# Patient Record
Sex: MALE | Race: WHITE | NOT HISPANIC OR LATINO | URBAN - METROPOLITAN AREA
[De-identification: names, ages, dates, MRNs, and addresses within clinical notes are randomized per-mention and may not be internally consistent; named-entity substitution may affect disease eponyms.]

---

## 2017-01-19 ENCOUNTER — EMERGENCY (EMERGENCY)
Facility: HOSPITAL | Age: 27
LOS: 1 days | Discharge: HOME | End: 2017-01-19
Attending: EMERGENCY MEDICINE | Admitting: EMERGENCY MEDICINE
Payer: SELF-PAY

## 2017-01-19 VITALS
TEMPERATURE: 98 F | RESPIRATION RATE: 18 BRPM | HEART RATE: 112 BPM | OXYGEN SATURATION: 98 % | SYSTOLIC BLOOD PRESSURE: 122 MMHG | DIASTOLIC BLOOD PRESSURE: 84 MMHG

## 2017-01-19 DIAGNOSIS — K04.7 PERIAPICAL ABSCESS WITHOUT SINUS: ICD-10-CM

## 2017-01-19 DIAGNOSIS — K02.9 DENTAL CARIES, UNSPECIFIED: ICD-10-CM

## 2017-01-19 DIAGNOSIS — K08.89 OTHER SPECIFIED DISORDERS OF TEETH AND SUPPORTING STRUCTURES: ICD-10-CM

## 2017-01-19 PROCEDURE — 99283 EMERGENCY DEPT VISIT LOW MDM: CPT

## 2017-01-19 RX ORDER — ACETAMINOPHEN 500 MG
975 TABLET ORAL ONCE
Qty: 0 | Refills: 0 | Status: COMPLETED | OUTPATIENT
Start: 2017-01-19 | End: 2017-01-19

## 2017-01-19 RX ORDER — IBUPROFEN 200 MG
600 TABLET ORAL ONCE
Qty: 0 | Refills: 0 | Status: COMPLETED | OUTPATIENT
Start: 2017-01-19 | End: 2017-01-19

## 2017-01-19 RX ORDER — PENICILLIN V POTASSIUM 250 MG
1 TABLET ORAL
Qty: 40 | Refills: 0 | OUTPATIENT
Start: 2017-01-19 | End: 2017-01-29

## 2017-01-19 RX ADMIN — Medication 600 MILLIGRAM(S): at 10:20

## 2017-01-19 RX ADMIN — Medication 975 MILLIGRAM(S): at 10:20

## 2017-01-19 NOTE — ED ADULT NURSE NOTE - OBJECTIVE STATEMENT
26y m pt c/o pain th left upper mouth; pt has no insurance; no dentist; no doctor; aox3; no resp distress; no chest pain; no fever/chills; no blood from gums; pt able to eat and swallow

## 2017-01-19 NOTE — ED PROVIDER NOTE - PHYSICAL EXAMINATION
post OP wnl, no trismus, no facial swelling, neck supple, +significant caries of tooth #19 and 18, no buccal swelling, no abscess at gumline

## 2017-01-19 NOTE — ED PROVIDER NOTE - PLAN OF CARE
You were seen in the ED for tooth pain and found to have a tooth infection.     Please take the antibiotic as prescribed, take the full course.     You may use Tylenol 650mg every 8 hours or Motrin 600mg every 8 hours as needed for pain.     Please call  to arrange a follow up with one of our dentists in 2-3 days. Return for worsening pain, difficulty swallowing or for any other concerns

## 2017-01-19 NOTE — ED PROVIDER NOTE - OBJECTIVE STATEMENT
26M presenting with left lower tooth pain x 1 day. No injury, no swelling of the face. Denies fevers or chills. Has not taken anything for the pain yet.

## 2017-01-19 NOTE — ED PROVIDER NOTE - MEDICAL DECISION MAKING DETAILS
Attending MD Gonzáles: 26M with left tooth pain, tooth #18 and 19 with substantial caries, no dental abscess visualized. Plan for PO penicillin VK, dental referral

## 2017-01-19 NOTE — ED PROVIDER NOTE - CARE PLAN
Principal Discharge DX:	Dental infection  Instructions for follow-up, activity and diet:	You were seen in the ED for tooth pain and found to have a tooth infection.     Please take the antibiotic as prescribed, take the full course.     You may use Tylenol 650mg every 8 hours or Motrin 600mg every 8 hours as needed for pain.     Please call  to arrange a follow up with one of our dentists in 2-3 days. Return for worsening pain, difficulty swallowing or for any other concerns

## 2017-04-15 NOTE — ED PROVIDER NOTE - ATTENDING CONTRIBUTION TO CARE
Attending MD Gonzáles:  I personally have seen and examined this patient.  Resident note reviewed and agree on plan of care and except where noted.  See HPI, PE, and MDM for details. Viral syndrome

## 2018-02-16 ENCOUNTER — APPOINTMENT (OUTPATIENT)
Dept: FAMILY MEDICINE | Facility: CLINIC | Age: 28
End: 2018-02-16
Payer: MEDICAID

## 2018-02-16 VITALS
WEIGHT: 220 LBS | OXYGEN SATURATION: 98 % | HEIGHT: 64.5 IN | SYSTOLIC BLOOD PRESSURE: 132 MMHG | HEART RATE: 106 BPM | BODY MASS INDEX: 37.1 KG/M2 | DIASTOLIC BLOOD PRESSURE: 88 MMHG

## 2018-02-16 VITALS — TEMPERATURE: 98.2 F | RESPIRATION RATE: 16 BRPM

## 2018-02-16 DIAGNOSIS — Z78.9 OTHER SPECIFIED HEALTH STATUS: ICD-10-CM

## 2018-02-16 DIAGNOSIS — G47.00 INSOMNIA, UNSPECIFIED: ICD-10-CM

## 2018-02-16 DIAGNOSIS — F17.200 NICOTINE DEPENDENCE, UNSPECIFIED, UNCOMPLICATED: ICD-10-CM

## 2018-02-16 PROCEDURE — 99205 OFFICE O/P NEW HI 60 MIN: CPT

## 2018-02-17 ENCOUNTER — EMERGENCY (EMERGENCY)
Facility: HOSPITAL | Age: 28
LOS: 1 days | Discharge: HOME | End: 2018-02-17
Admitting: EMERGENCY MEDICINE
Payer: MEDICAID

## 2018-02-17 PROCEDURE — 87086 URINE CULTURE/COLONY COUNT: CPT

## 2018-02-17 PROCEDURE — 80053 COMPREHEN METABOLIC PANEL: CPT

## 2018-02-17 PROCEDURE — 36415 COLL VENOUS BLD VENIPUNCTURE: CPT

## 2018-02-17 PROCEDURE — 99284 EMERGENCY DEPT VISIT MOD MDM: CPT

## 2018-02-17 PROCEDURE — 85027 COMPLETE CBC AUTOMATED: CPT

## 2018-02-17 PROCEDURE — 81003 URINALYSIS AUTO W/O SCOPE: CPT

## 2018-02-17 PROCEDURE — 99283 EMERGENCY DEPT VISIT LOW MDM: CPT | Mod: 25

## 2018-02-20 LAB
ALBUMIN SERPL ELPH-MCNC: 4.7 G/DL
ALP BLD-CCNC: 125 U/L
ALT SERPL-CCNC: 28 U/L
ANION GAP SERPL CALC-SCNC: 14 MMOL/L
AST SERPL-CCNC: 25 U/L
BASOPHILS # BLD AUTO: 0.02 K/UL
BASOPHILS NFR BLD AUTO: 0.2 %
BILIRUB SERPL-MCNC: 0.3 MG/DL
BUN SERPL-MCNC: 9 MG/DL
CALCIUM SERPL-MCNC: 10.7 MG/DL
CHLORIDE SERPL-SCNC: 101 MMOL/L
CHOLEST SERPL-MCNC: 209 MG/DL
CHOLEST/HDLC SERPL: 4.6 RATIO
CO2 SERPL-SCNC: 27 MMOL/L
CREAT SERPL-MCNC: 1.05 MG/DL
EOSINOPHIL # BLD AUTO: 0.04 K/UL
EOSINOPHIL NFR BLD AUTO: 0.5 %
GLUCOSE SERPL-MCNC: 91 MG/DL
HBA1C MFR BLD HPLC: 5.6 %
HCT VFR BLD CALC: 45.1 %
HDLC SERPL-MCNC: 45 MG/DL
HGB BLD-MCNC: 14.8 G/DL
IMM GRANULOCYTES NFR BLD AUTO: 0.2 %
LDLC SERPL CALC-MCNC: 135 MG/DL
LYMPHOCYTES # BLD AUTO: 1.28 K/UL
LYMPHOCYTES NFR BLD AUTO: 16 %
MAN DIFF?: NORMAL
MCHC RBC-ENTMCNC: 27.7 PG
MCHC RBC-ENTMCNC: 32.8 GM/DL
MCV RBC AUTO: 84.5 FL
MONOCYTES # BLD AUTO: 0.52 K/UL
MONOCYTES NFR BLD AUTO: 6.5 %
NEUTROPHILS # BLD AUTO: 6.14 K/UL
NEUTROPHILS NFR BLD AUTO: 76.6 %
PLATELET # BLD AUTO: 246 K/UL
POTASSIUM SERPL-SCNC: 4.7 MMOL/L
PROT SERPL-MCNC: 7.7 G/DL
RBC # BLD: 5.34 M/UL
RBC # FLD: 12.6 %
SODIUM SERPL-SCNC: 142 MMOL/L
T4 FREE SERPL-MCNC: 1.2 NG/DL
TRIGL SERPL-MCNC: 146 MG/DL
TSH SERPL-ACNC: 1.2 UIU/ML
WBC # FLD AUTO: 8.02 K/UL

## 2018-02-22 ENCOUNTER — TRANSCRIPTION ENCOUNTER (OUTPATIENT)
Age: 28
End: 2018-02-22

## 2018-02-22 ENCOUNTER — APPOINTMENT (OUTPATIENT)
Dept: FAMILY MEDICINE | Facility: CLINIC | Age: 28
End: 2018-02-22

## 2018-03-15 ENCOUNTER — APPOINTMENT (OUTPATIENT)
Dept: FAMILY MEDICINE | Facility: CLINIC | Age: 28
End: 2018-03-15
Payer: MEDICAID

## 2018-03-15 VITALS — RESPIRATION RATE: 14 BRPM

## 2018-03-15 VITALS
HEIGHT: 65 IN | DIASTOLIC BLOOD PRESSURE: 78 MMHG | OXYGEN SATURATION: 97 % | BODY MASS INDEX: 36.65 KG/M2 | SYSTOLIC BLOOD PRESSURE: 130 MMHG | WEIGHT: 220 LBS | HEART RATE: 74 BPM

## 2018-03-15 DIAGNOSIS — E66.9 OBESITY, UNSPECIFIED: ICD-10-CM

## 2018-03-15 DIAGNOSIS — F41.8 OTHER SPECIFIED ANXIETY DISORDERS: ICD-10-CM

## 2018-03-15 DIAGNOSIS — Z00.00 ENCOUNTER FOR GENERAL ADULT MEDICAL EXAMINATION W/OUT ABNORMAL FINDINGS: ICD-10-CM

## 2018-03-15 DIAGNOSIS — E83.52 HYPERCALCEMIA: ICD-10-CM

## 2018-03-15 PROCEDURE — 99395 PREV VISIT EST AGE 18-39: CPT | Mod: 25

## 2018-03-15 PROCEDURE — 36415 COLL VENOUS BLD VENIPUNCTURE: CPT

## 2018-03-15 RX ORDER — SERTRALINE HYDROCHLORIDE 50 MG/1
50 TABLET, FILM COATED ORAL
Qty: 30 | Refills: 3 | Status: ACTIVE | COMMUNITY
Start: 2018-02-16 | End: 1900-01-01

## 2018-03-15 RX ORDER — MIRTAZAPINE 15 MG/1
15 TABLET, FILM COATED ORAL
Qty: 30 | Refills: 3 | Status: ACTIVE | COMMUNITY
Start: 2018-02-16 | End: 1900-01-01

## 2018-03-16 LAB
ALBUMIN SERPL ELPH-MCNC: 4.4 G/DL
ALP BLD-CCNC: 124 U/L
ALT SERPL-CCNC: 27 U/L
ANION GAP SERPL CALC-SCNC: 13 MMOL/L
AST SERPL-CCNC: 27 U/L
BILIRUB SERPL-MCNC: <0.2 MG/DL
BUN SERPL-MCNC: 13 MG/DL
CALCIUM SERPL-MCNC: 10.2 MG/DL
CALCIUM SERPL-MCNC: 10.2 MG/DL
CHLORIDE SERPL-SCNC: 101 MMOL/L
CO2 SERPL-SCNC: 26 MMOL/L
CREAT SERPL-MCNC: 0.98 MG/DL
GLUCOSE SERPL-MCNC: 116 MG/DL
PARATHYROID HORMONE INTACT: 34 PG/ML
POTASSIUM SERPL-SCNC: 4.3 MMOL/L
PROT SERPL-MCNC: 7 G/DL
SODIUM SERPL-SCNC: 140 MMOL/L

## 2020-10-21 ENCOUNTER — APPOINTMENT (OUTPATIENT)
Dept: GENERAL RADIOLOGY | Age: 30
End: 2020-10-21
Payer: COMMERCIAL

## 2020-10-21 ENCOUNTER — HOSPITAL ENCOUNTER (EMERGENCY)
Age: 30
Discharge: HOME | End: 2020-10-21
Payer: COMMERCIAL

## 2020-10-21 VITALS
RESPIRATION RATE: 18 BRPM | SYSTOLIC BLOOD PRESSURE: 165 MMHG | WEIGHT: 240 LBS | TEMPERATURE: 99 F | HEIGHT: 66 IN | OXYGEN SATURATION: 96 % | HEART RATE: 101 BPM | DIASTOLIC BLOOD PRESSURE: 100 MMHG | BODY MASS INDEX: 38.57 KG/M2

## 2020-10-21 PROCEDURE — 99283 EMERGENCY DEPT VISIT LOW MDM: CPT

## 2020-10-21 PROCEDURE — 6370000000 HC RX 637 (ALT 250 FOR IP): Performed by: NURSE PRACTITIONER

## 2020-10-21 PROCEDURE — 73630 X-RAY EXAM OF FOOT: CPT

## 2020-10-21 RX ORDER — IBUPROFEN 800 MG/1
800 TABLET ORAL EVERY 8 HOURS PRN
Qty: 20 TABLET | Refills: 0 | Status: SHIPPED | OUTPATIENT
Start: 2020-10-21 | End: 2021-11-03 | Stop reason: ALTCHOICE

## 2020-10-21 RX ORDER — IBUPROFEN 800 MG/1
800 TABLET ORAL ONCE
Status: COMPLETED | OUTPATIENT
Start: 2020-10-21 | End: 2020-10-21

## 2020-10-21 RX ADMIN — IBUPROFEN 800 MG: 800 TABLET, FILM COATED ORAL at 17:28

## 2020-10-21 ASSESSMENT — PAIN SCALES - GENERAL
PAINLEVEL_OUTOF10: 7
PAINLEVEL_OUTOF10: 8

## 2020-10-21 ASSESSMENT — PAIN DESCRIPTION - ORIENTATION: ORIENTATION: LEFT

## 2020-10-21 ASSESSMENT — PAIN DESCRIPTION - LOCATION: LOCATION: FOOT

## 2020-10-21 NOTE — ED PROVIDER NOTES
[address] [place] [address]  ED  [address] [address]  Dept: [phone]  Dept Fax: [phone]  Loc: [facility]        This patient was not seen or evaluated by the attending physician. I evaluated this patient, the attending physician was available for consultation. CHIEF COMPLAINT    Chief Complaint   Patient presents with    Foot Pain     left foot pain with some swelling for a week. Pain doctor appointment on the 27th for his chronic knee and back pain       HPI    Dandy Castillo is a 35 y.o. male who presents with pain localized in the left foot. Onset was 1 week ago. The context was patient states that he felt like he was going to fall, and caught himself. Did not know if that was specifically the injury event to cause the left foot to have some pain. He has had pain in that left foot ever since. No pain at rest, states that it starts hurting after long periods of standing. He works at [employer] and stands for long periods of time at work. The pain severity is 7/10. The patient has no other associated injuries. The pain is aggravated by ambulation. Came to the ED for further evaluation and treatment. REVIEW OF SYSTEMS    General: No fever  Skin: No lacerations or puncture wounds  Musculoskeletal: see HPI    PAST MEDICAL & SURGICAL HISTORY    No past medical history on file. No past surgical history on file.     CURRENT MEDICATIONS  (may include discharge medications prescribed in the ED)  Current Outpatient Rx   Medication Sig Dispense Refill    ibuprofen (IBU) 800 MG tablet Take 1 tablet by mouth every 8 hours as needed for Pain 20 tablet 0       ALLERGIES    Allergies   Allergen Reactions    Trazodone And Nefazodone Nausea And Vomiting       SOCIAL & FAMILY HISTORY    Social History     Socioeconomic History    Marital status: Single     Spouse name: Not on file    Number of children: Not on file    Years of education: Not on file    Highest education level: Not on file   Occupational History    Not on file   Social Needs    Financial resource strain: Not on file    Food insecurity     Worry: Not on file     Inability: Not on file    Transportation needs     Medical: Not on file     Non-medical: Not on file   Tobacco Use    Smoking status: Current Every Day Smoker     Packs/day: 1.00    Smokeless tobacco: Never Used   Substance and Sexual Activity    Alcohol use: Yes     Comment: rarely    Drug use: Yes     Types: Marijuana     Comment: occasionally    Sexual activity: Not on file   Lifestyle    Physical activity     Days per week: Not on file     Minutes per session: Not on file    Stress: Not on file   Relationships    Social connections     Talks on phone: Not on file     Gets together: Not on file     Attends Yazdanism service: Not on file     Active member of club or organization: Not on file     Attends meetings of clubs or organizations: Not on file     Relationship status: Not on file    Intimate partner violence     Fear of current or ex partner: Not on file     Emotionally abused: Not on file     Physically abused: Not on file     Forced sexual activity: Not on file   Other Topics Concern    Not on file   Social History Narrative    Not on file     No family history on file.       PHYSICAL EXAM    VITAL SIGNS: BP (!) 165/100   Pulse 101   Temp 99 °F (37.2 °C) (Oral)   Resp 18   Ht 5' 6\" (1.676 m)   Wt 240 lb (108.9 kg)   SpO2 96%   BMI 38.74 kg/m²   Constitutional:  Well developed, appears comfortable  HENT:  Atraumatic  Respiratory:  No retractions  Vascular: left DP pulse 2+  Musculoskeletal: Examination of the affected ankle and foot reveals: no edema, no obvious deformity, no bony tenderness of the lateral malleolus, no bony tenderness of the medial malleolus, no navicular tenderness, no bony tenderness at the base of the fifth metatarsal; the ankle joint is stable, no intraosseous membrane tenderness, no proximal fibular tenderness  Integument:  No open wounds of the affected foot, no erythema of the foot  Neurologic:  Awake, alert, no slurred speech, sensation and motor intact in the affected extremity    RADIOLOGY   XR FOOT LEFT (MIN 3 VIEWS)   Final Result   No acute osseous injury is identified. ED COURSE & MEDICAL DECISION MAKING    See EMR for medications prescribed. Differential diagnosis: fracture, dislocation, grade 3 sprain, syndesmotic sprain, vascular injury, neurologic injury, tendon injury, other    No evidence of neurovascular injury on exam.  Plain films as above. Patient provided with Ace wrap, NSAIDs. We will give him couple days off of work to rice the area. I do not believe that crutches and/or a postop shoe or boot are warranted at this point in time. Patient is remained afebrile and hemodynamically stable throughout his entire ED course and will be discharged home in stable condition with a prescription for NSAIDs. He will be given a no PCP referral as none is listed. I estimate there is LOW risk for COMPARTMENT SYNDROME, DEEP VENOUS THROMBOSIS, SEPTIC ARTHRITIS, TENDON OR NEUROVASCULAR INJURY, thus I consider the discharge disposition reasonable. Feliberto Grace and I have discussed the diagnosis and risks, and we agree with discharging home to follow-up with their primary doctor or the referral orthopedist. We also discussed returning to the Emergency Department immediately if new or worsening symptoms occur. We have discussed the symptoms which are most concerning (e.g., changing or worsening pain, numbness, weakness) that necessitate immediate return. Blood pressure (!) 165/100, pulse 101, temperature 99 °F (37.2 °C), temperature source Oral, resp. rate 18, height 5' 6\" (1.676 m), weight 240 lb (108.9 kg), SpO2 96 %. The patient was instructed to follow up as an outpatient in 2 days.  The patient was instructed to return to the ED immediately for any new or worsening symptoms. The patient verbalized understanding. FINAL IMPRESSION    1.  Left foot pain        PLAN  Outpatient followup, medications, and discharge instructions (see EMR)    (Please note that this note was completed with a voice recognition program.  Every attempt was made to edit the dictations, but inevitably there remain words that are mis-transcribed.)              XOCHILT Fuchs - JESUS  10/21/20 2654

## 2021-06-12 ENCOUNTER — HOSPITAL ENCOUNTER (EMERGENCY)
Age: 31
Discharge: HOME | End: 2021-06-12
Payer: COMMERCIAL

## 2021-06-12 VITALS
SYSTOLIC BLOOD PRESSURE: 161 MMHG | OXYGEN SATURATION: 97 % | WEIGHT: 250 LBS | RESPIRATION RATE: 20 BRPM | HEART RATE: 107 BPM | DIASTOLIC BLOOD PRESSURE: 92 MMHG | HEIGHT: 66 IN | TEMPERATURE: 97.9 F | BODY MASS INDEX: 40.18 KG/M2

## 2021-06-12 DIAGNOSIS — K02.9 DENTAL CARIES: ICD-10-CM

## 2021-06-12 DIAGNOSIS — F41.1 ANXIETY STATE: ICD-10-CM

## 2021-06-12 DIAGNOSIS — H65.02 ACUTE SEROUS OTITIS MEDIA OF LEFT EAR, RECURRENCE NOT SPECIFIED: Primary | ICD-10-CM

## 2021-06-12 PROCEDURE — 99284 EMERGENCY DEPT VISIT MOD MDM: CPT

## 2021-06-12 RX ORDER — AMOXICILLIN 250 MG/1
250 CAPSULE ORAL 3 TIMES DAILY
COMMUNITY
End: 2021-11-03 | Stop reason: ALTCHOICE

## 2021-06-12 ASSESSMENT — PAIN DESCRIPTION - PAIN TYPE: TYPE: ACUTE PAIN

## 2021-06-12 ASSESSMENT — PAIN DESCRIPTION - ORIENTATION: ORIENTATION: LEFT

## 2021-06-12 ASSESSMENT — PAIN DESCRIPTION - DESCRIPTORS: DESCRIPTORS: ACHING

## 2021-06-12 ASSESSMENT — PAIN SCALES - GENERAL: PAINLEVEL_OUTOF10: 9

## 2021-06-12 ASSESSMENT — PAIN DESCRIPTION - LOCATION: LOCATION: EAR

## 2021-06-12 NOTE — LETTER
[facility]  [address] [facility] [address]  Phone: [phone]  Fax: [phone]               June 12, 2021    Patient: Saira Aragon   YOB: 1990   Date of Visit: 6/12/2021       To Whom It May Concern:    Saira Aragon was seen and treated in our emergency department on 6/12/2021. Please excuse from work from 6/12-6/13/21. Thank you.        Sincerely,       Zoë Arroyo RN         Signature:__________________________________

## 2021-06-13 NOTE — ED NOTES
Pt DC home in good condition with referral to ENT and instructions to complete ABX as prescribed. V/u of Dc instructions. Denies questions or concerns. Teaching done re: s/s to report.        Karen Mcwilliams RN  06/12/21 6826

## 2021-06-13 NOTE — ED NOTES
RN rounded on pt. Pt ambulated to bathroom without difficulty. Updated on plan of care. Became tearful while this RN was in room due to his ear pain. Provider notified. Pt has no further needs at this time. Pt resting in bed, call light within reach. Pt denies any questions.         Carola Pedroza RN  06/12/21 5667

## 2021-06-14 NOTE — ED PROVIDER NOTES
[address] [place] [address]  ED  EMERGENCY DEPARTMENT ENCOUNTER      This patient was not seen and evaluated by the attending physician. Pt Name: Osvaldo Nageotte  MRN: [id number]  Tianagfmaulik 1990  Date of evaluation: 6/12/2021  Provider: XOCHILT Gerard - CNP-C  PCP: Elizabeth Menendez DO      History provided by the patient. CHIEFCOMPLAINT:     Chief Complaint   Patient presents with    Tinnitus     seen at urgent care 2 days ago for left ear infection; taking amoxicillin; pain is getting better; ringing in left ear.  Otalgia       HISTORY OF PRESENT ILLNESS:      Osvaldo Nageotte is a 35 y.o. male who presents to [address] [place] [address]  ED with complaints of left ear pain. Patient states that he was diagnosed 2 days ago with a left ear infection, states that his been have persistent ringing in the ear, patient has been on amoxicillin for only 2 days. He states that it is getting better but is still painful. Patient is also very anxious, he states that he is worried he is can go deaf. He is here for further evaluation. LOCATION:left ear  QUALITY:ache  SEVERITY:9  DURATION:several days  MODIFYING FACTORS:none noted    Nursing Notes were reviewed     REVIEW OF SYSTEMS:     Review of Systems  All systems, a total of 10, are reviewed and negative except for those that were just noted in history present illness. PAST MEDICAL HISTORY:   History reviewed. No pertinent past medical history. SURGICAL HISTORY:    History reviewed. No pertinent surgical history.       CURRENT MEDICATIONS:       Discharge Medication List as of 6/12/2021 10:40 PM      CONTINUE these medications which have NOT CHANGED    Details   amoxicillin (AMOXIL) 250 MG capsule Take 250 mg by mouth 3 times dailyHistorical Med      ibuprofen (IBU) 800 MG tablet Take 1 tablet by mouth every 8 hours as needed for Pain, Disp-20 tablet,R-0Print               ALLERGIES:    Trazodone and nefazodone    FAMILY HISTORY: History reviewed. No pertinent family history. SOCIAL HISTORY:     Social History     Socioeconomic History    Marital status: Single     Spouse name: None    Number of children: None    Years of education: None    Highest education level: None   Occupational History    None   Tobacco Use    Smoking status: Current Every Day Smoker     Packs/day: 1.00    Smokeless tobacco: Never Used   Substance and Sexual Activity    Alcohol use: Yes     Comment: rarely    Drug use: Yes     Types: Marijuana     Comment: occasionally    Sexual activity: None   Other Topics Concern    None   Social History Narrative    None     Social Determinants of Health     Financial Resource Strain:     Difficulty of Paying Living Expenses:    Food Insecurity:     Worried About Running Out of Food in the Last Year:     Ran Out of Food in the Last Year:    Transportation Needs:     Lack of Transportation (Medical):  Lack of Transportation (Non-Medical):    Physical Activity:     Days of Exercise per Week:     Minutes of Exercise per Session:    Stress:     Feeling of Stress :    Social Connections:     Frequency of Communication with Friends and Family:     Frequency of Social Gatherings with Friends and Family:     Attends Mandaeism Services:     Active Member of Clubs or Organizations:     Attends Club or Organization Meetings:     Marital Status:    Intimate Partner Violence:     Fear of Current or Ex-Partner:     Emotionally Abused:     Physically Abused:     Sexually Abused:        SCREENINGS:    Jennifer Coma Scale  Eye Opening: Spontaneous  Best Verbal Response: Oriented  Best Motor Response: Obeys commands  Sylvia Coma Scale Score: 15        PHYSICAL EXAM:       ED Triage Vitals [06/12/21 2145]   BP Temp Temp Source Pulse Resp SpO2 Height Weight   (!) 161/92 97.9 °F (36.6 °C) Oral 107 20 97 % 5' 6\" (1.676 m) 250 lb (113.4 kg)       Physical Exam    CONSTITUTIONAL: Awake and alert. Cooperative. 97%   Weight: 250 lb (113.4 kg)   Height: 5' 6\" (1.676 m)       Patient wasgiven the following medications:  Medications - No data to display      Patient was evaluated independently by myself with the attending physician available for consultation. Patient presented to the emergency room today with complaints of left ear pain states he was diagnosed with an ear infection a couple days ago, states that he still having some discomfort is been on antibiotics for 2 days now. Physical exam actually was fairly unremarkable, the tympanic membrane appeared clear, I did caution the patient to continue his antibiotics. I gave him referral to follow-up with ENT, patient was very anxious, he was reassured that I saw no indications of an acute emergent medical condition. I did recommend that he follow-up with a dentist, patient has very poor dentition with most of his teeth decayed. Patient was discharged home in good condition. Patient laboratory studies, radiographic imaging, and assessment were all discussed with the patient and/orpatient family. There was shared decision-making between myself as well as the patient and/or their surrogate and we are all in agreement with discharge home. There was an opportunity for questions and all questions were answered tothe best of my ability and to the satisfaction of the patient and/or patient family. FINAL IMPRESSION:      1. Acute serous otitis media of left ear, recurrence not specified    2. Anxiety state    3.  Dental caries          DISPOSITION/PLAN:   DISPOSITION Decision To Discharge      PATIENT REFERRED TO:  Sammy Ruth MD  [address] [address]  [phone]    Call   For follow up      DISCHARGE MEDICATIONS:  Discharge Medication List as of 6/12/2021 10:40 PM                     (Please note thatportions of this note were completed with a voice recognition program.  Efforts were made to edit the dictations, but occasionally

## 2021-11-03 ENCOUNTER — OFFICE VISIT (OUTPATIENT)
Dept: PRIMARY CARE CLINIC | Age: 31
End: 2021-11-03
Payer: COMMERCIAL

## 2021-11-03 VITALS
BODY MASS INDEX: 33.88 KG/M2 | SYSTOLIC BLOOD PRESSURE: 139 MMHG | WEIGHT: 210.8 LBS | HEIGHT: 66 IN | HEART RATE: 146 BPM | DIASTOLIC BLOOD PRESSURE: 87 MMHG

## 2021-11-03 DIAGNOSIS — G89.29 CHRONIC BILATERAL LOW BACK PAIN WITHOUT SCIATICA: ICD-10-CM

## 2021-11-03 DIAGNOSIS — Z83.79 FAMILY HISTORY OF CELIAC DISEASE: ICD-10-CM

## 2021-11-03 DIAGNOSIS — F99 INSOMNIA DUE TO OTHER MENTAL DISORDER: ICD-10-CM

## 2021-11-03 DIAGNOSIS — K08.9 POOR DENTITION: ICD-10-CM

## 2021-11-03 DIAGNOSIS — I10 PRIMARY HYPERTENSION: ICD-10-CM

## 2021-11-03 DIAGNOSIS — F41.9 ANXIETY: ICD-10-CM

## 2021-11-03 DIAGNOSIS — F17.200 TOBACCO USE DISORDER: ICD-10-CM

## 2021-11-03 DIAGNOSIS — F51.05 INSOMNIA DUE TO OTHER MENTAL DISORDER: ICD-10-CM

## 2021-11-03 DIAGNOSIS — Z00.00 ANNUAL PHYSICAL EXAM: ICD-10-CM

## 2021-11-03 DIAGNOSIS — Z00.00 ANNUAL PHYSICAL EXAM: Primary | ICD-10-CM

## 2021-11-03 DIAGNOSIS — K04.7 DENTAL ABSCESS: ICD-10-CM

## 2021-11-03 DIAGNOSIS — M54.50 CHRONIC BILATERAL LOW BACK PAIN WITHOUT SCIATICA: ICD-10-CM

## 2021-11-03 DIAGNOSIS — E66.09 CLASS 1 OBESITY DUE TO EXCESS CALORIES WITH SERIOUS COMORBIDITY AND BODY MASS INDEX (BMI) OF 34.0 TO 34.9 IN ADULT: ICD-10-CM

## 2021-11-03 DIAGNOSIS — R19.7 DIARRHEA, UNSPECIFIED TYPE: ICD-10-CM

## 2021-11-03 LAB
BASOPHILS ABSOLUTE: 0 K/UL (ref 0–0.2)
BASOPHILS RELATIVE PERCENT: 0.3 %
EOSINOPHILS ABSOLUTE: 0.2 K/UL (ref 0–0.6)
EOSINOPHILS RELATIVE PERCENT: 2.5 %
HCT VFR BLD CALC: 44 % (ref 40.5–52.5)
HEMOGLOBIN: 14.9 G/DL (ref 13.5–17.5)
IGA: 148 MG/DL (ref 70–400)
LYMPHOCYTES ABSOLUTE: 1.5 K/UL (ref 1–5.1)
LYMPHOCYTES RELATIVE PERCENT: 15.8 %
MCH RBC QN AUTO: 28.2 PG (ref 26–34)
MCHC RBC AUTO-ENTMCNC: 33.8 G/DL (ref 31–36)
MCV RBC AUTO: 83.3 FL (ref 80–100)
MONOCYTES ABSOLUTE: 0.6 K/UL (ref 0–1.3)
MONOCYTES RELATIVE PERCENT: 6.3 %
NEUTROPHILS ABSOLUTE: 7 K/UL (ref 1.7–7.7)
NEUTROPHILS RELATIVE PERCENT: 75.1 %
PDW BLD-RTO: 12.8 % (ref 12.4–15.4)
PLATELET # BLD: 234 K/UL (ref 135–450)
PMV BLD AUTO: 8.6 FL (ref 5–10.5)
RBC # BLD: 5.28 M/UL (ref 4.2–5.9)
WBC # BLD: 9.3 K/UL (ref 4–11)

## 2021-11-03 PROCEDURE — 90732 PPSV23 VACC 2 YRS+ SUBQ/IM: CPT | Performed by: STUDENT IN AN ORGANIZED HEALTH CARE EDUCATION/TRAINING PROGRAM

## 2021-11-03 PROCEDURE — 90471 IMMUNIZATION ADMIN: CPT | Performed by: STUDENT IN AN ORGANIZED HEALTH CARE EDUCATION/TRAINING PROGRAM

## 2021-11-03 PROCEDURE — 99204 OFFICE O/P NEW MOD 45 MIN: CPT | Performed by: STUDENT IN AN ORGANIZED HEALTH CARE EDUCATION/TRAINING PROGRAM

## 2021-11-03 PROCEDURE — 99385 PREV VISIT NEW AGE 18-39: CPT | Performed by: STUDENT IN AN ORGANIZED HEALTH CARE EDUCATION/TRAINING PROGRAM

## 2021-11-03 RX ORDER — CETIRIZINE HYDROCHLORIDE 10 MG/1
TABLET ORAL
COMMUNITY
Start: 2021-10-16 | End: 2022-01-25 | Stop reason: ALTCHOICE

## 2021-11-03 RX ORDER — CLINDAMYCIN HYDROCHLORIDE 300 MG/1
CAPSULE ORAL
COMMUNITY
Start: 2021-09-28 | End: 2021-11-03 | Stop reason: ALTCHOICE

## 2021-11-03 RX ORDER — BLOOD PRESSURE TEST KIT-MEDIUM
KIT MISCELLANEOUS
COMMUNITY
Start: 2021-08-16 | End: 2021-11-03 | Stop reason: ALTCHOICE

## 2021-11-03 RX ORDER — AMOXICILLIN AND CLAVULANATE POTASSIUM 875; 125 MG/1; MG/1
1 TABLET, FILM COATED ORAL 2 TIMES DAILY
Qty: 14 TABLET | Refills: 0 | Status: SHIPPED | OUTPATIENT
Start: 2021-11-03 | End: 2021-11-10

## 2021-11-03 RX ORDER — CHLORHEXIDINE GLUCONATE 0.12 MG/ML
15 RINSE ORAL 2 TIMES DAILY
Qty: 420 ML | Refills: 0 | Status: SHIPPED | OUTPATIENT
Start: 2021-11-03 | End: 2021-11-17

## 2021-11-03 RX ORDER — LISINOPRIL 10 MG/1
10 TABLET ORAL DAILY
Qty: 90 TABLET | Refills: 3 | Status: SHIPPED | OUTPATIENT
Start: 2021-11-03 | End: 2022-01-25 | Stop reason: ALTCHOICE

## 2021-11-03 RX ORDER — LISINOPRIL 10 MG/1
TABLET ORAL
COMMUNITY
Start: 2021-09-09 | End: 2021-11-03 | Stop reason: SDUPTHER

## 2021-11-03 ASSESSMENT — PATIENT HEALTH QUESTIONNAIRE - PHQ9
SUM OF ALL RESPONSES TO PHQ9 QUESTIONS 1 & 2: 0
SUM OF ALL RESPONSES TO PHQ QUESTIONS 1-9: 0
1. LITTLE INTEREST OR PLEASURE IN DOING THINGS: 0
2. FEELING DOWN, DEPRESSED OR HOPELESS: 0
SUM OF ALL RESPONSES TO PHQ QUESTIONS 1-9: 0
SUM OF ALL RESPONSES TO PHQ QUESTIONS 1-9: 0

## 2021-11-03 NOTE — PROGRESS NOTES
[facility] Primary Care  Establish care visit   11/3/2021    Christopher Trejo (:  1990) is a 28 y.o. male, here to establish care. Chief Complaint   Patient presents with    Annual Exam    Establish Care        ASSESSMENT/ PLAN  1. Annual physical exam  Screening labs ordered. Patient declines STD testing. Patient declines Covid and flu vaccines. Pneumonia vaccine today. Counseled on 150 minutes of exercise weekly and better dietary choices. - CBC Auto Differential; Future  - Comprehensive Metabolic Panel; Future  - Hemoglobin A1C; Future  - Lipid Panel; Future  - Hepatitis C Antibody; Future  - HIV Screen; Future  - Pneumococcal polysaccharide vaccine 23-valent greater than or equal to 3yo subcutaneous/IM    2. Primary hypertension  Controlled, continue lisinopril  - lisinopril (PRINIVIL;ZESTRIL) 10 MG tablet; Take 1 tablet by mouth daily  Dispense: 90 tablet; Refill: 3    3. Dental abscess  History of. Poor dentition with multiple dental caries in addition to inflamed tongue lesion likely secondary to persistent biting. Start Augmentin and chlorhexidine, attend scheduled follow-up with dentist next week  - amoxicillin-clavulanate (AUGMENTIN) 875-125 MG per tablet; Take 1 tablet by mouth 2 times daily for 7 days  Dispense: 14 tablet; Refill: 0  - chlorhexidine (PERIDEX) 0.12 % solution; Take 15 mLs by mouth 2 times daily for 14 days  Dispense: 420 mL; Refill: 0    4. Insomnia due to other mental disorder  Control underlying anxiety and depression. Patient would like to hold off on pharmacotherapy at this time. Referral for psychotherapy with Dr. Ingrid Chambers    5. Anxiety  Per above    6. Chronic bilateral low back pain without sciatica  Referral to Dr. Akira Sanchez pain clinic per patient request. No indication for imaging at this time. Would start with NSAIDs, physical therapy and RICE regimen. - External Referral To Pain Clinic    7.  Family history of celiac disease  Celiac screening per patient request  - Celiac Screen with Reflex; Future    8. Diarrhea, unspecified type  Likely secondary to IBS, poor diet. No red flag symptoms. Check celiac screening. Follow-up if persistent or worsening  - Celiac Screen with Reflex; Future    9. Poor dentition  Per above  - chlorhexidine (PERIDEX) 0.12 % solution; Take 15 mLs by mouth 2 times daily for 14 days  Dispense: 420 mL; Refill: 0    10. Tobacco use disorder  Precontemplative about quitting. Return in about 1 week (around 11/10/2021) for follow up with Dr. Franklin Duran, mood recheck. HPI  Patient presents today to establish care. He has concerns about tooth pain and anxious mood. He presents today with his girlfriend who has similar concerns. He is currently working as a manager at [employer]. He endorses a long history of low back pain which is bilateral, nonradicular. No known injury. He states it is worse after standing at work all day. He does not exercise. No saddle anesthesia, numbness/weakness or tingling of his lower extremity. He does not take any medications for the pain. He states that he struggles with anxiety, depression and insomnia. He previously had a bad reaction when he tried trazodone. He is currently not taking any mood medications. He is interested in starting psychotherapy. No SI, HI or self-harm. He endorses a history of dental caries and abscess. He is currently consuming a liquid diet due to the pain. He is scheduled to follow-up with the dentist next week. No fever, chills. He endorses frequent nonbloody diarrhea. No abdominal pain, nausea, vomiting, joint swelling or rashes. There is a family history of celiac disease and he would like to be tested for it today. He smokes about a pack per day. He is precontemplative about quitting at this time. Barriers include anxious mood and living with girlfriend who smokes. He lives at home with his girlfriend and an elderly roommate.   He is originally from [facility], moved to [place] when he met his girlfriend on [employer]. There is sexually active. No significant alcohol, marijuana or other drug use. Patient takes lisinopril daily as prescribed for his hypertension. He denies lightheadedness, chest pain, shortness of breath, lower extremity swelling. ROS  Review of Systems   Constitutional: Negative for fatigue and fever. HENT: Positive for dental problem. Negative for congestion, drooling, facial swelling, sinus pressure, sinus pain and sore throat. Respiratory: Negative for cough, shortness of breath and wheezing. Cardiovascular: Negative for leg swelling. Gastrointestinal: Positive for diarrhea. Negative for abdominal pain, constipation, nausea and vomiting. Genitourinary: Negative for dysuria. Musculoskeletal: Positive for back pain. Negative for gait problem, joint swelling and neck stiffness. Neurological: Negative for headaches. Psychiatric/Behavioral: Positive for agitation and sleep disturbance. Negative for self-injury and suicidal ideas. The patient is nervous/anxious. HISTORIES  Current Outpatient Medications on File Prior to Visit   Medication Sig Dispense Refill    cetirizine (ZYRTEC) 10 MG tablet       CIPRODEX 0.3-0.1 % otic suspension        No current facility-administered medications on file prior to visit. Allergies   Allergen Reactions    Tramadol Swelling    Trazodone And Nefazodone Nausea And Vomiting       No past medical history on file. Patient Active Problem List   Diagnosis    Tobacco use disorder    Primary hypertension    Chronic bilateral low back pain without sciatica    Anxiety    Insomnia due to other mental disorder    Dental abscess    Poor dentition       No past surgical history on file.     Social History     Socioeconomic History    Marital status: Single     Spouse name: Not on file    Number of children: Not on file    Years of education: Not on file    Highest education level: Not on file   Occupational History    Occupation: Manager at [address] [place] [address] Way Smoking status: Current Every Day Smoker     Packs/day: 1.00     Years: 17.00     Pack years: 17.00    Smokeless tobacco: Never Used   Substance and Sexual Activity    Alcohol use: Not Currently    Drug use: Not Currently     Types: Marijuana Isiah Curiel)    Sexual activity: Yes     Partners: Female   Other Topics Concern    Not on file   Social History Narrative    Lives at home with girlfriend      Social Determinants of Health     Financial Resource Strain:     Difficulty of Paying Living Expenses:    Food Insecurity:     Worried About Running Out of Food in the Last Year:     [address] in the Last Year:    Transportation Needs:     Lack of Transportation (Medical):  Lack of Transportation (Non-Medical):    Physical Activity:     Days of Exercise per Week:     Minutes of Exercise per Session:    Stress:     Feeling of Stress :    Social Connections:     Frequency of Communication with Friends and Family:     Frequency of Social Gatherings with Friends and Family:     Attends [employer] Services:     Active Member of Clubs or Organizations:     Attends Club or Organization Meetings:     Marital Status:    Intimate Partner Violence:     Fear of Current or Ex-Partner:     Emotionally Abused:     Physically Abused:     Sexually Abused:         Family History   Problem Relation Age of Onset    Diabetes Mother     Cancer Maternal Grandmother         Pancreatic CA       PE  Vitals:    11/03/21 1408   BP: 139/87   Pulse: 146   Weight: 210 lb 12.8 oz (95.6 kg)   Height: 5' 6\" (1.676 m)     Estimated body mass index is 34.02 kg/m² as calculated from the following:    Height as of this encounter: 5' 6\" (1.676 m). Weight as of this encounter: 210 lb 12.8 oz (95.6 kg). Physical Exam  Vitals and nursing note reviewed. Constitutional:       Appearance: Normal appearance. He is obese.  He is not ill-appearing. HENT:      Head: Normocephalic and atraumatic. Right Ear: Tympanic membrane normal.      Left Ear: Tympanic membrane normal.      Nose: Nose normal.      Mouth/Throat:      Mouth: Mucous membranes are moist.      Pharynx: Oropharynx is clear. Comments: Poor dentition with multiple dental caries. Erythematous gingiva. Inflamed lesion on right posteriolateral aspect of tongue about 2 cm in diameter. Eyes:      Conjunctiva/sclera: Conjunctivae normal.      Pupils: Pupils are equal, round, and reactive to light. Cardiovascular:      Rate and Rhythm: Normal rate and regular rhythm. Pulses: Normal pulses. Heart sounds: Normal heart sounds. Pulmonary:      Effort: Pulmonary effort is normal.      Breath sounds: Normal breath sounds. Abdominal:      General: Abdomen is flat. Palpations: Abdomen is soft. Musculoskeletal:         General: Normal range of motion. Cervical back: Normal range of motion and neck supple. Comments: Increase muscle tissue texture L2-L4 bilaterally, normal gait. Strength and sensation intact bilateral lower extremity    Skin:     General: Skin is warm. Neurological:      Mental Status: He is alert.    Psychiatric:         Mood and Affect: Mood normal.      Comments: Flat affect, speech impediment         Immunization History   Administered Date(s) Administered    COVID-19, Pfizer, PF, 30mcg/0.3mL 08/20/2021, 09/15/2021, 09/15/2021    Pneumococcal Polysaccharide ([id number]) 11/03/2021    Tdap (Boostrix, Adacel) 04/01/2021       Health Maintenance   Topic Date Due    Varicella vaccine (1 of 2 - 2-dose childhood series) Never done    HIV screen  Never done    Flu vaccine (1) Never done    Potassium monitoring  11/03/2022    Creatinine monitoring  11/03/2022    DTaP/Tdap/Td vaccine (2 - Td or Tdap) 04/01/2031    Pneumococcal 0-64 years Vaccine (2 of 2 - PPSV23) 07/22/2056    COVID-19 Vaccine  Completed    Hepatitis C screen Completed    Hepatitis A vaccine  Aged Out    Hepatitis B vaccine  Aged Out    Hib vaccine  Aged C/ Travis Mathew 19 Meningococcal (ACWY) vaccine  Aged Out       PSH, PMH, SH and FH reviewed and noted. Recent and past labs, tests and consults also reviewed. Recent or new meds also reviewed. Bianka Lightning, DO    This dictation was generated by voice recognition computer software. Although all attempts are made to edit the dictation for accuracy, there may be errors in the transcription that are not intended.

## 2021-11-03 NOTE — PATIENT INSTRUCTIONS
Patient Education        Generalized Anxiety Disorder: Care Instructions  Overview     We all worry. It's a normal part of life. But when you have generalized anxiety disorder, you worry about lots of things. You have a hard time not worrying. This worry or anxiety interferes with your relationships, work or school, and other areas of your life. You may worry most days about things like money, health, work, or friends. That may make you feel tired, tense, or cranky. It can make it hard to think. It may get in the way of healthy sleep. Counseling and medicine can both work to treat anxiety. They are often used together with lifestyle changes, such as getting enough sleep. Treatment can include a type of counseling called cognitive-behavioral therapy, or CBT. It helps you notice and replace thoughts that make you worry. You also might have counseling along with those closest to you so that they can help. Follow-up care is a key part of your treatment and safety. Be sure to make and go to all appointments, and call your doctor if you are having problems. It's also a good idea to know your test results and keep a list of the medicines you take. How can you care for yourself at home? · Get at least 30 minutes of exercise on most days of the week. Walking is a good choice. You also may want to do other activities, such as running, swimming, cycling, or playing tennis or team sports. · Learn and do relaxation exercises, such as deep breathing. · Go to bed at the same time every night. Try for 8 to 10 hours of sleep a night. · Avoid alcohol, marijuana, and illegal drugs. · Find a counselor who uses cognitive-behavioral therapy (CBT). · Don't isolate yourself. Let those closest to you help you. Find someone you can trust and confide in. Talk to that person. · Be safe with medicines. Take your medicines exactly as prescribed. Call your doctor if you think you are having a problem with your medicine.   · Practice healthy thinking. How you think can affect how you feel and act. Ask yourself if your thoughts are helpful or unhelpful. If they are unhelpful, you can learn how to change them. · Recognize and accept your anxiety. When you feel anxious, say to yourself, \"This is not an emergency. I feel uncomfortable, but I am not in danger. I can keep going even if I feel anxious. \"  When should you call for help? Call 911  anytime you think you may need emergency care. For example, call if:    · You feel you can't stop from hurting yourself or someone else. Keep the numbers for these national suicide hotlines: 7-562-008-TALK (9-317.660.8585) and 0-964-DTHCEGR (3-760.859.1357). If you or someone you know talks about suicide or feeling hopeless, get help right away. Call your doctor or counselor now or seek immediate medical care if:    · You have new anxiety, or your anxiety gets worse.     · You have been feeling sad, depressed, or hopeless or have lost interest in things that you usually enjoy.     · You do not get better as expected. Where can you learn more? Go to https://[employer]. org and sign in to your [employer] account. Enter G123 in the "[employer] box to learn more about \"Generalized Anxiety Disorder: Care Instructions. \"     If you do not have an account, please click on the \"Sign Up Now\" link. Current as of: June 16, 2021               Content Version: 13.0  © 9493-1671 Healthwise, Incorporated. Care instructions adapted under license by [facility] [facility] ([facility]). If you have questions about a medical condition or this instruction, always ask your healthcare professional. [address] any warranty or liability for your use of this information.

## 2021-11-04 PROBLEM — K08.9 POOR DENTITION: Status: ACTIVE | Noted: 2021-11-04

## 2021-11-04 PROBLEM — E66.09 CLASS 1 OBESITY DUE TO EXCESS CALORIES WITH SERIOUS COMORBIDITY AND BODY MASS INDEX (BMI) OF 34.0 TO 34.9 IN ADULT: Status: ACTIVE | Noted: 2021-11-04

## 2021-11-04 LAB
A/G RATIO: 1.6 (ref 1.1–2.2)
ALBUMIN SERPL-MCNC: 4.6 G/DL (ref 3.4–5)
ALP BLD-CCNC: 109 U/L (ref 40–129)
ALT SERPL-CCNC: 21 U/L (ref 10–40)
ANION GAP SERPL CALCULATED.3IONS-SCNC: 14 MMOL/L (ref 3–16)
AST SERPL-CCNC: 23 U/L (ref 15–37)
BILIRUB SERPL-MCNC: 0.3 MG/DL (ref 0–1)
BUN BLDV-MCNC: 8 MG/DL (ref 7–20)
CALCIUM SERPL-MCNC: 10 MG/DL (ref 8.3–10.6)
CHLORIDE BLD-SCNC: 102 MMOL/L (ref 99–110)
CHOLESTEROL, TOTAL: 184 MG/DL (ref 0–199)
CO2: 24 MMOL/L (ref 21–32)
CREAT SERPL-MCNC: 0.8 MG/DL (ref 0.9–1.3)
ESTIMATED AVERAGE GLUCOSE: 119.8 MG/DL
GFR AFRICAN AMERICAN: >60
GFR NON-AFRICAN AMERICAN: >60
GLUCOSE BLD-MCNC: 86 MG/DL (ref 70–99)
HBA1C MFR BLD: 5.8 %
HDLC SERPL-MCNC: 33 MG/DL (ref 40–60)
HEPATITIS C ANTIBODY INTERPRETATION: NORMAL
HIV AG/AB: NORMAL
HIV ANTIGEN: NORMAL
HIV-1 ANTIBODY: NORMAL
HIV-2 AB: NORMAL
LDL CHOLESTEROL CALCULATED: 119 MG/DL
POTASSIUM SERPL-SCNC: 4.5 MMOL/L (ref 3.5–5.1)
SODIUM BLD-SCNC: 140 MMOL/L (ref 136–145)
TISSUE TRANSGLUTAMINASE IGA: <0.5 U/ML (ref 0–14)
TOTAL PROTEIN: 7.4 G/DL (ref 6.4–8.2)
TRIGL SERPL-MCNC: 159 MG/DL (ref 0–150)
VLDLC SERPL CALC-MCNC: 32 MG/DL

## 2021-11-04 ASSESSMENT — ENCOUNTER SYMPTOMS
DIARRHEA: 1
SINUS PRESSURE: 0
WHEEZING: 0
VOMITING: 0
CONSTIPATION: 0
FACIAL SWELLING: 0
SHORTNESS OF BREATH: 0
SORE THROAT: 0
BACK PAIN: 1
ABDOMINAL PAIN: 0
COUGH: 0
NAUSEA: 0
SINUS PAIN: 0

## 2021-11-08 ENCOUNTER — TELEPHONE (OUTPATIENT)
Dept: PRIMARY CARE CLINIC | Age: 31
End: 2021-11-08

## 2021-11-09 ENCOUNTER — TELEPHONE (OUTPATIENT)
Dept: PRIMARY CARE CLINIC | Age: 31
End: 2021-11-09

## 2021-11-09 NOTE — TELEPHONE ENCOUNTER
----- Message from Berny Nicole sent at 11/9/2021 10:29 AM EST -----  Subject: Message to Provider    QUESTIONS  Information for Provider? Patient is asking for a pain medication. waiting   on appointment with Pain Management. also needs a prescription for cough   medicine. has a chronic cough. [phone]  ---------------------------------------------------------------------------  --------------  Milady FORD  What is the best way for the office to contact you? OK to leave message on   voicemail  Preferred Call Back Phone Number? [phone]  ---------------------------------------------------------------------------  --------------  SCRIPT ANSWERS  Relationship to Patient? Other  Representative Name? Evonne Ledezma  Is the Representative on the appropriate HIPAA document in Epic?  Yes

## 2022-01-18 NOTE — TELEPHONE ENCOUNTER
Med Request    Pt is requesting albuterol for his nebulizer    [facility] [address], [address] [place] [address] [address] [facility]

## 2022-01-19 ENCOUNTER — TELEPHONE (OUTPATIENT)
Dept: PRIMARY CARE CLINIC | Age: 32
End: 2022-01-19

## 2022-01-19 DIAGNOSIS — J45.40 MODERATE PERSISTENT ASTHMA, UNSPECIFIED WHETHER COMPLICATED: Primary | ICD-10-CM

## 2022-01-19 RX ORDER — ALBUTEROL SULFATE 2.5 MG/3ML
2.5 SOLUTION RESPIRATORY (INHALATION) EVERY 6 HOURS PRN
Qty: 120 EACH | Refills: 3 | Status: SHIPPED | OUTPATIENT
Start: 2022-01-19 | End: 2022-02-22 | Stop reason: SDUPTHER

## 2022-01-19 NOTE — TELEPHONE ENCOUNTER
[address] [facility] would like to know if the easier form of albuterol nebulizer solution can be sent in. This one comes pre mixed and its easier for the pt. So they don't have to do any mixing or pouring.      [address], [address] [place] [address]

## 2022-01-19 NOTE — TELEPHONE ENCOUNTER
New prescription sent.   If this is not correct, we need to clarify with the pharmacy before I send in anything else

## 2022-01-19 NOTE — TELEPHONE ENCOUNTER
----- Message from Fausto Zaidi sent at 1/18/2022  4:15 PM EST -----  Subject: Message to Provider    QUESTIONS  Information for Provider? Patient was told by the surgeon Dr. Sasha Carr,   who just performed his tongue surgery to get albuterol solution and   inhaler prescribed from his PCP. [facility]. ---------------------------------------------------------------------------  --------------  Shun FORD  What is the best way for the office to contact you? OK to leave message on   voicemail  Preferred Call Back Phone Number? [phone]  ---------------------------------------------------------------------------  --------------  SCRIPT ANSWERS  Relationship to Patient?  Self

## 2022-01-19 NOTE — TELEPHONE ENCOUNTER
Medication:   Requested Prescriptions     Pending Prescriptions Disp Refills    albuterol (PROVENTIL) (5 MG/ML) 0.5% nebulizer solution 120 each 3     Sig: Take 1 mL by nebulization 4 times daily as needed for Wheezing        Last Filled:  Never filled    Patient Phone Number: [phone] (home)     Last appt: 11/3/2021   Next appt: 1/19/2022    Last OARRS: No flowsheet data found.

## 2022-01-20 ENCOUNTER — HOSPITAL ENCOUNTER (EMERGENCY)
Age: 32
Discharge: HOME | End: 2022-01-20
Attending: EMERGENCY MEDICINE
Payer: COMMERCIAL

## 2022-01-20 VITALS
RESPIRATION RATE: 20 BRPM | SYSTOLIC BLOOD PRESSURE: 142 MMHG | HEART RATE: 100 BPM | DIASTOLIC BLOOD PRESSURE: 91 MMHG | TEMPERATURE: 98.1 F | OXYGEN SATURATION: 95 %

## 2022-01-20 DIAGNOSIS — R13.10 DYSPHAGIA, UNSPECIFIED TYPE: ICD-10-CM

## 2022-01-20 DIAGNOSIS — R60.9 POSTOPERATIVE EDEMA: Primary | ICD-10-CM

## 2022-01-20 PROCEDURE — 99283 EMERGENCY DEPT VISIT LOW MDM: CPT

## 2022-01-21 ENCOUNTER — TELEPHONE (OUTPATIENT)
Dept: PRIMARY CARE CLINIC | Age: 32
End: 2022-01-21

## 2022-01-21 NOTE — TELEPHONE ENCOUNTER
Evelin Rivera with [facility] call in to let Dr. Rose Underwood know that she visit the Pt today. She states that he is having suicidal thoughts. He is taking Atarax and this is not working, also started him on Zyprexa and this is making him have more anxiety. Evelin Rivera would like to know if there is something else or what is the next plan moving forward.     [phone]

## 2022-01-21 NOTE — ED PROVIDER NOTES
[address] [facility]  ED  EMERGENCY DEPARTMENT ENCOUNTER      Pt Name: Shira Canada  MRN: [id number]  [place] 1990  Date of evaluation: 1/20/2022  Provider: MD Nestor [facility]       Chief Complaint   Patient presents with    Post-op Problem     had feeding tube removed today and now feels like he is choking when he swallows. Being treated for tongue cancer         HISTORY OF PRESENT ILLNESS   (Location/Symptom, Timing/Onset,Context/Setting, Quality, Duration, Modifying Factors, Severity)  Note limiting factors. Shira Canada is a 28 y.o. male who presents to the emergency department for recent surgery for tongue cancer, feeding tube removed today. Any time he takes sips of water he feels pain. He feels like the area where he had the surgery is swollen but he is able to swallow. Patient is scared. Patient's chart is reviewed he had tongue cancer and had oral surgery on the first.  He had a trach and he had a PEG tube placed. This was done on 3 January. The patient did see his surgeon today. Nursing notes were reviewed. REVIEW OF SYSTEMS    (2-9 systems for level 4, 10 or more for level 5)     Review of Systems      PAST MEDICAL HISTORY   History reviewed. No pertinent past medical history. SURGICALHISTORY     History reviewed. No pertinent surgical history. CURRENT MEDICATIONS       Current Discharge Medication List      CONTINUE these medications which have NOT CHANGED    Details   albuterol (PROVENTIL) (5 MG/ML) 0.5% nebulizer solution Take 1 mL by nebulization 4 times daily as needed for Wheezing  Qty: 120 each, Refills: 3      albuterol (PROVENTIL) (2.5 MG/3ML) 0.083% nebulizer solution Take 3 mLs by nebulization every 6 hours as needed for Wheezing  Qty: 120 each, Refills: 3    Associated Diagnoses:  Moderate persistent asthma, unspecified whether complicated      cetirizine (ZYRTEC) 10 MG tablet       CIPRODEX 0.3-0.1 % otic suspension       lisinopril (PRINIVIL;ZESTRIL) 10 MG tablet Take 1 tablet by mouth daily  Qty: 90 tablet, Refills: 3    Associated Diagnoses: Primary hypertension             ALLERGIES     Tramadol and Trazodone and nefazodone    FAMILY HISTORY       Family History   Problem Relation Age of Onset    Diabetes Mother     Cancer Maternal Grandmother         Pancreatic CA          SOCIAL HISTORY       Social History     Socioeconomic History    Marital status: Single     Spouse name: None    Number of children: None    Years of education: None    Highest education level: None   Occupational History    Occupation: Manager at [employer] Use    Smoking status: Current Every Day Smoker     Packs/day: 1.00     Years: 17.00     Pack years: 17.00    Smokeless tobacco: Never Used   Vaping Use    Vaping Use: Never used   Substance and Sexual Activity    Alcohol use: Not Currently    Drug use: Not Currently     Types: Marijuana [facility])    Sexual activity: Yes     Partners: Female   Other Topics Concern    None   Social History Narrative    Lives at home with girlfriend      Social Determinants of Health     Financial Resource Strain:     Difficulty of Paying Living Expenses: Not on file   Food Insecurity:     Worried About Running Out of Food in the Last Year: Not on file    Patsy of Food in the Last Year: Not on file   Transportation Needs:     Lack of Transportation (Medical): Not on file    Lack of Transportation (Non-Medical):  Not on file   Physical Activity:     Days of Exercise per Week: Not on file    Minutes of Exercise per Session: Not on file   Stress:     Feeling of Stress : Not on file   Social Connections:     Frequency of Communication with Friends and Family: Not on file    Frequency of Social Gatherings with Friends and Family: Not on file    Attends Temple Services: Not on file    Active Member of Clubs or Organizations: Not on file    Attends Club or Organization Meetings: Not on file    Marital Status: Not on file   Intimate Partner Violence:     Fear of Current or Ex-Partner: Not on file    Emotionally Abused: Not on file    Physically Abused: Not on file    Sexually Abused: Not on file   Housing Stability:     Unable to Pay for Housing in the Last Year: Not on file    Number of [place] in the Last Year: Not on file    Unstable Housing in the Last Year: Not on file       SCREENINGS             PHYSICAL EXAM    (up to 7 for level 4, 8 or more for level 5)     ED Triage Vitals   BP Temp Temp Source Pulse Resp SpO2 Height Weight   01/20/22 2023 01/20/22 2021 01/20/22 2021 01/20/22 2021 01/20/22 2021 01/20/22 2021 -- --   (!) 142/91 98.1 °F (36.7 °C) Oral 139 20 95 %         Physical Exam  Vitals and nursing note reviewed. Constitutional:       Appearance: Normal appearance. He is well-developed. He is not ill-appearing. HENT:      Head: Normocephalic and atraumatic. Right Ear: External ear normal.      Left Ear: External ear normal.      Nose: Nose normal.      Mouth/Throat:      Mouth: Mucous membranes are moist.   Eyes:      General: No scleral icterus. Right eye: No discharge. Left eye: No discharge. Conjunctiva/sclera: Conjunctivae normal.   Neck:      Comments: Surgical scars are healing well. The trach site has a small stoma which does not appear infected. There is some fullness in the neck likely from scar tissue. Cardiovascular:      Rate and Rhythm: Normal rate. Pulmonary:      Effort: Pulmonary effort is normal. No respiratory distress. Breath sounds: No stridor. No wheezing. Skin:     Coloration: Skin is not pale. Neurological:      Mental Status: He is alert.    Psychiatric:         Mood and Affect: Mood normal.         Behavior: Behavior normal.             DIAGNOSTIC RESULTS     EKG: All EKG's are interpreted by the Emergency Department Physician who either signs or Co-signs this chart in the absence of a cardiologist.    12 lead EKG shows RADIOLOGY:   Non-plain film images such as CT, Ultrasound and MRI are read by the radiologist. Plain radiographic images are visualized and preliminarily interpreted by the emergency physician with the below findings:        Interpretation per the Radiologist below, if available at the time of this note:    No orders to display         ED BEDSIDE ULTRASOUND:   Performed by ED Physician - none    LABS:  Labs Reviewed - No data to display    All other labs were within normal range or not returned as of this dictation. EMERGENCY DEPARTMENT COURSE and DIFFERENTIAL DIAGNOSIS/MDM:   Vitals:    Vitals:    01/20/22 2021 01/20/22 2023 01/20/22 2036   BP:  (!) 142/91    Pulse: 139  100   Resp: 20     Temp: 98.1 °F (36.7 °C)     TempSrc: Oral     SpO2: 95%         Adult male who comes in for dysphagia. He  is concerned about the swelling in his neck. Although he states the swelling has not worsened. The patient demonstrates multiple times that he is able to drink water. He has a bottle and a small glass of. He asked that I feel his neck while he is swallowing to see if there is any change. There is no change. He does have hoarseness from his surgery but otherwise the patient is in no acute distress and his lung sounds are clear with no stridor. The patient is reassured and I encouraged him to continue to follow-up with his surgeon. The patient is trying to get a hold of his girlfriend he wants me to talk to her to let her know that he is fine. The patient seems upset that his girlfriend is not answering the telephone. He says that his girlfriend does not believe that he is fine. This may be feeding into the patient's anxiety. CRITICAL CARE TIME   None       CONSULTS:  None    PROCEDURES:       Procedures    FINAL IMPRESSION      1. Postoperative edema    2.  Dysphagia, unspecified type          DISPOSITION/PLAN   DISPOSITION Decision To Discharge 01/20/2022 08:36:52 PM      PATIENT REFERREDTOFoye Dalier,   [address] [place] [address] [address]  [phone]    Schedule an appointment as soon as possible for a visit         Please follow up with your surgeon  Schedule an appointment as soon as possible for a visit         DISCHARGEMEDICATIONS:  Current Discharge Medication List             (Please note that portions of this note were completed with a voice recognition program.  Efforts were made to edit the dictations but occasionally words are mis-transcribed.)    Abdi Ceballos MD (electronically signed)  Attending Emergency Physician        Abdi Ceballos MD  01/20/22 2050

## 2022-01-25 ENCOUNTER — OFFICE VISIT (OUTPATIENT)
Dept: PRIMARY CARE CLINIC | Age: 32
End: 2022-01-25
Payer: COMMERCIAL

## 2022-01-25 VITALS
SYSTOLIC BLOOD PRESSURE: 113 MMHG | WEIGHT: 161.8 LBS | DIASTOLIC BLOOD PRESSURE: 82 MMHG | BODY MASS INDEX: 26 KG/M2 | TEMPERATURE: 96.4 F | HEIGHT: 66 IN | HEART RATE: 121 BPM

## 2022-01-25 DIAGNOSIS — C02.9 PRIMARY SQUAMOUS CELL CARCINOMA OF TONGUE (HCC): Primary | ICD-10-CM

## 2022-01-25 DIAGNOSIS — F17.200 TOBACCO USE DISORDER: ICD-10-CM

## 2022-01-25 DIAGNOSIS — I10 PRIMARY HYPERTENSION: ICD-10-CM

## 2022-01-25 DIAGNOSIS — R73.03 PREDIABETES: ICD-10-CM

## 2022-01-25 DIAGNOSIS — F33.9 RECURRENT MAJOR DEPRESSIVE DISORDER, REMISSION STATUS UNSPECIFIED (HCC): ICD-10-CM

## 2022-01-25 PROBLEM — K04.7 DENTAL ABSCESS: Status: RESOLVED | Noted: 2021-11-03 | Resolved: 2022-01-25

## 2022-01-25 PROCEDURE — G8417 CALC BMI ABV UP PARAM F/U: HCPCS | Performed by: STUDENT IN AN ORGANIZED HEALTH CARE EDUCATION/TRAINING PROGRAM

## 2022-01-25 PROCEDURE — 4004F PT TOBACCO SCREEN RCVD TLK: CPT | Performed by: STUDENT IN AN ORGANIZED HEALTH CARE EDUCATION/TRAINING PROGRAM

## 2022-01-25 PROCEDURE — 99214 OFFICE O/P EST MOD 30 MIN: CPT | Performed by: STUDENT IN AN ORGANIZED HEALTH CARE EDUCATION/TRAINING PROGRAM

## 2022-01-25 PROCEDURE — G8484 FLU IMMUNIZE NO ADMIN: HCPCS | Performed by: STUDENT IN AN ORGANIZED HEALTH CARE EDUCATION/TRAINING PROGRAM

## 2022-01-25 PROCEDURE — G8427 DOCREV CUR MEDS BY ELIG CLIN: HCPCS | Performed by: STUDENT IN AN ORGANIZED HEALTH CARE EDUCATION/TRAINING PROGRAM

## 2022-01-25 RX ORDER — AMOXICILLIN 500 MG/1
CAPSULE ORAL
Status: ON HOLD | COMMUNITY
Start: 2021-11-29 | End: 2022-06-23

## 2022-01-25 RX ORDER — AMOXICILLIN AND CLAVULANATE POTASSIUM 400; 57 MG/5ML; MG/5ML
POWDER, FOR SUSPENSION ORAL
COMMUNITY
Start: 2022-01-19 | End: 2022-01-25 | Stop reason: ALTCHOICE

## 2022-01-25 RX ORDER — AMOXICILLIN AND CLAVULANATE POTASSIUM 400; 57 MG/5ML; MG/5ML
875 POWDER, FOR SUSPENSION ORAL 2 TIMES DAILY
COMMUNITY
Start: 2022-01-19 | End: 2022-01-29

## 2022-01-25 RX ORDER — HYDROXYZINE HYDROCHLORIDE 10 MG/1
TABLET, FILM COATED ORAL
COMMUNITY
Start: 2022-01-15 | End: 2022-02-09 | Stop reason: SDUPTHER

## 2022-01-25 NOTE — PROGRESS NOTES
[facility]  2022    Erika Chun (:  1990) is a 28 y.o. male, here for evaluation of the following medical concerns:    Chief Complaint   Patient presents with    1 Month Follow-Up     for diabetes, also needs to see a physch         ASSESSMENT/ PLAN  1. Primary squamous cell carcinoma of tongue (HCC)  Status post surgical resection. Follow-up with  for aftercare, pain management, dietary recommendations. Continue smoking cessation    2. Tobacco use disorder  Congratulated patient on cessation    3. Primary hypertension  Controlled without medication. Likely was related to tobacco use disorder previously. Okay to discontinue lisinopril    4. Recurrent major depressive disorder, remission status unspecified ([facility]  Uncontrolled, patient declines pharmacotherapy today. Referral to Dr. Delos Goldberg for psychotherapy    5. Prediabetes  A1c 5.8  Patient has lost a significant amount of weight secondary to tongue surgery. We will hold off on Metformin at this time and recheck A1c in 1 month. Return in about 4 weeks (around 2022) for mood recheck, prediabetes. HPI  Patient presents today with his girlfriend for multiple concerns. He has missed previous appointments to follow-up on original blood work that was done in 2021. At that time it was found that he has prediabetes and hyperlipidemia. Since that time, he has been diagnosed with squamous cell cancer of his tongue secondary to tobacco use disorder. He underwent surgical resection through , which he states was a traumatic experience. He was admitted for a week, and required a tracheostomy and tube feeding. He is currently on a liquid diet, and is struggling with pain. Pain is being managed by his ENT with narcotic medication. He has quit smoking. He states that he struggled with depression for most of his life.   He was previously on medication, and is opposed to restarting pharmacotherapy at this time as he endorsed negative side effects or inefficacy. He is interested in psychotherapy for his mood. Girlfriend states that he discusses SI at home. Patient denies active SI, previous suicide attempt, HI or self-harm. Girlfriend also states that he has an \"anger problem\" but did not elaborate today. Since quitting smoking, he has stopped his lisinopril and his blood pressure has remained normal.    ROS  Review of Systems   Constitutional: Positive for appetite change. Negative for activity change, fatigue and unexpected weight change. HENT: Negative for tinnitus. Eyes: Negative for visual disturbance. Respiratory: Negative for chest tightness, shortness of breath and wheezing. Cardiovascular: Negative for chest pain, palpitations and leg swelling. Gastrointestinal: Negative for abdominal pain, constipation and nausea. Genitourinary: Negative for decreased urine volume. Neurological: Negative for syncope, light-headedness and headaches. Psychiatric/Behavioral: Positive for agitation, sleep disturbance and suicidal ideas. Negative for decreased concentration and self-injury. The patient is nervous/anxious. HISTORIES  Current Outpatient Medications on File Prior to Visit   Medication Sig Dispense Refill    amoxicillin-clavulanate (AUGMENTIN) 400-57 MG/5ML suspension Take 875 mg by mouth 2 times daily      albuterol (PROVENTIL) (5 MG/ML) 0.5% nebulizer solution Take 1 mL by nebulization 4 times daily as needed for Wheezing 120 each 3    albuterol (PROVENTIL) (2.5 MG/3ML) 0.083% nebulizer solution Take 3 mLs by nebulization every 6 hours as needed for Wheezing 120 each 3    hydrOXYzine (ATARAX) 10 MG tablet       amoxicillin (AMOXIL) 500 MG capsule        No current facility-administered medications on file prior to visit. No past medical history on file.   Patient Active Problem List   Diagnosis    HTN (hypertension)    Chronic bilateral low back pain without sciatica    Anxiety    Insomnia due to other mental disorder    Poor dentition    Class 1 obesity due to excess calories with serious comorbidity and body mass index (BMI) of 34.0 to 34.9 in adult    Primary squamous cell carcinoma of tongue (HCC)    Recurrent major depressive disorder (HCC)    Prediabetes       PE  Vitals:    01/25/22 1416   BP: 113/82   Site: Right Upper Arm   Position: Sitting   Cuff Size: Large Adult   Pulse: 121   Temp: 96.4 °F (35.8 °C)   TempSrc: Temporal   Weight: 161 lb 12.8 oz (73.4 kg)   Height: 5' 6\" (1.676 m)     Estimated body mass index is 26.12 kg/m² as calculated from the following:    Height as of this encounter: 5' 6\" (1.676 m). Weight as of this encounter: 161 lb 12.8 oz (73.4 kg). Physical Exam    DO Norberto Rodas dictation was generated by voice recognition computer software. Although all attempts are made to edit the dictation for accuracy, there may be errors in the transcription that are not intended.

## 2022-01-25 NOTE — PATIENT INSTRUCTIONS
Schedule follow up appointment with Dr. Adolfo Raines for therapy at the      Patient Education        Recovering From Depression: Care Instructions  Your Care Instructions     Taking good care of yourself is important as you recover from depression. In time, your symptoms will fade as your treatment takes hold. Do not give up. Instead, focus your energy on getting better. Your mood will improve. It just takes some time. Focus on things that can help you feel better, such as being with friends and family, eating well, and getting enough rest. But take things slowly. Do not do too much too soon. You will begin to feel better gradually. Follow-up care is a key part of your treatment and safety. Be sure to make and go to all appointments, and call your doctor if you are having problems. It's also a good idea to know your test results and keep a list of the medicines you take. How can you care for yourself at home? Be realistic  · If you have a large task to do, break it up into smaller steps you can handle, and just do what you can. · You may want to put off important decisions until your depression has lifted. If you have plans that will have a major impact on your life, such as marriage, divorce, or a job change, try to wait a bit. Talk it over with friends and loved ones who can help you look at the overall picture first.  · Reaching out to people for help is important. Do not isolate yourself. Let your family and friends help you. Find someone you can trust and confide in, and talk to that person. · Be patient, and be kind to yourself. Remember that depression is not your fault and is not something you can overcome with willpower alone. Treatment is important for depression, just like for any other illness. Feeling better takes time, and your mood will improve little by little. Stay active  · Stay busy and get outside. Take a walk, or try some other light exercise.   · Talk with your doctor about an exercise program. Exercise can help with mild depression. · Go to a movie or concert. Take part in a Worship activity or other social gathering. Go to a ball game. · Ask a friend to have dinner with you. Take care of yourself  · Eat a balanced diet with plenty of fresh fruits and vegetables, whole grains, and lean protein. If you have lost your appetite, eat small snacks rather than large meals. · Avoid using illegal drugs or marijuana and drinking alcohol. Do not take medicines that have not been prescribed for you. They may interfere with medicines you may be taking for depression, or they may make your depression worse. · Take your medicines exactly as they are prescribed. You may start to feel better within 1 to 3 weeks of taking antidepressant medicine. But it can take as many as 6 to 8 weeks to see more improvement. If you have questions or concerns about your medicines, or if you do not notice any improvement by 3 weeks, talk to your doctor. · Continue to take your medicine after your symptoms improve. Taking your medicine for at least 6 months after you feel better can help keep you from getting depressed again. If this isn't the first time you have been depressed, your doctor may recommend you to take medicine even longer. · If you have any side effects from your medicine, tell your doctor. Many side effects are mild and will go away on their own after you have been taking the medicine for a few weeks. Some may last longer. Talk to your doctor if side effects are bothering you too much. You might be able to try a different medicine. · Continue counseling. It may help prevent depression from returning, especially if you've had multiple episodes of depression. Talk with your counselor if you are having a hard time attending your sessions or you think the sessions aren't working. Don't just stop going. · Get enough sleep. Talk to your doctor if you are having problems sleeping.   · Avoid sleeping pills unless they are prescribed by the doctor treating your depression. Sleeping pills may make you groggy during the day, and they may interact with other medicine you are taking. · If you have any other illnesses, such as diabetes, heart disease, or high blood pressure, make sure to continue with your treatment. Tell your doctor about all of the medicines you take, including those with or without a prescription. · If you or someone you know talks about suicide, self-harm, or feeling hopeless, get help right away. Call the [address] [place] [address] at 6-374-420-VCLJ (7-928.533.4949) or text HOME to 548267 to access the Crisis Text Line. Consider saving these numbers in your phone. When should you call for help? Call 431 anytime you think you may need emergency care. For example, call if:    · You feel like hurting yourself or someone else.     · Someone you know has depression and is about to attempt or is attempting suicide. Call your doctor now or seek immediate medical care if:    · You hear voices.     · Someone you know has depression and:  ? Starts to give away his or her possessions. ? Uses illegal drugs or drinks alcohol heavily. ? Talks or writes about death, including writing suicide notes or talking about guns, knives, or pills. ? Starts to spend a lot of time alone. ? Acts very aggressively or suddenly appears calm. Watch closely for changes in your health, and be sure to contact your doctor if:    · You do not get better as expected. Where can you learn more? Go to https://Liveclubsmissael.WorldTV. org and sign in to your [employer] account. Enter G037 in the GoFormz box to learn more about \"Recovering From Depression: Care Instructions. \"     If you do not have an account, please click on the \"Sign Up Now\" link. Current as of: June 16, 2021               Content Version: 13.1  © 4813-7773 Healthwise, Incorporated.    Care instructions adapted under license by [facility] ([facility]). If you have questions about a medical condition or this instruction, always ask your healthcare professional. [place] [address] any warranty or liability for your use of this information.

## 2022-01-26 PROBLEM — F17.200 TOBACCO USE DISORDER: Status: RESOLVED | Noted: 2021-11-03 | Resolved: 2022-01-26

## 2022-01-26 ASSESSMENT — ENCOUNTER SYMPTOMS
WHEEZING: 0
CHEST TIGHTNESS: 0
SHORTNESS OF BREATH: 0
NAUSEA: 0
ABDOMINAL PAIN: 0
CONSTIPATION: 0

## 2022-02-06 ENCOUNTER — APPOINTMENT (OUTPATIENT)
Dept: GENERAL RADIOLOGY | Age: 32
End: 2022-02-06
Payer: COMMERCIAL

## 2022-02-06 ENCOUNTER — HOSPITAL ENCOUNTER (EMERGENCY)
Age: 32
Discharge: ANOTHER HOSPITAL | End: 2022-02-06
Attending: EMERGENCY MEDICINE
Payer: COMMERCIAL

## 2022-02-06 ENCOUNTER — APPOINTMENT (OUTPATIENT)
Dept: CT IMAGING | Age: 32
End: 2022-02-06
Payer: COMMERCIAL

## 2022-02-06 VITALS
SYSTOLIC BLOOD PRESSURE: 156 MMHG | OXYGEN SATURATION: 98 % | TEMPERATURE: 98 F | HEART RATE: 103 BPM | DIASTOLIC BLOOD PRESSURE: 85 MMHG | HEIGHT: 66 IN | WEIGHT: 150 LBS | RESPIRATION RATE: 19 BRPM | BODY MASS INDEX: 24.11 KG/M2

## 2022-02-06 DIAGNOSIS — C06.9 ORAL CANCER (HCC): ICD-10-CM

## 2022-02-06 DIAGNOSIS — R22.0 TONGUE SWELLING: Primary | ICD-10-CM

## 2022-02-06 LAB
A/G RATIO: 1.8 (ref 1.1–2.2)
ALBUMIN SERPL-MCNC: 4.6 G/DL (ref 3.4–5)
ALP BLD-CCNC: 108 U/L (ref 40–129)
ALT SERPL-CCNC: 17 U/L (ref 10–40)
ANION GAP SERPL CALCULATED.3IONS-SCNC: 15 MMOL/L (ref 3–16)
AST SERPL-CCNC: 17 U/L (ref 15–37)
BASOPHILS ABSOLUTE: 0 K/UL (ref 0–0.2)
BASOPHILS RELATIVE PERCENT: 0.4 %
BILIRUB SERPL-MCNC: 0.4 MG/DL (ref 0–1)
BUN BLDV-MCNC: 8 MG/DL (ref 7–20)
CALCIUM SERPL-MCNC: 9.9 MG/DL (ref 8.3–10.6)
CHLORIDE BLD-SCNC: 97 MMOL/L (ref 99–110)
CO2: 29 MMOL/L (ref 21–32)
CREAT SERPL-MCNC: <0.5 MG/DL (ref 0.9–1.3)
D DIMER: 370 NG/ML DDU (ref 0–229)
EKG ATRIAL RATE: 100 BPM
EKG DIAGNOSIS: NORMAL
EKG Q-T INTERVAL: 390 MS
EKG QRS DURATION: 82 MS
EKG QTC CALCULATION (BAZETT): 515 MS
EKG R AXIS: 32 DEGREES
EKG T AXIS: 23 DEGREES
EKG VENTRICULAR RATE: 105 BPM
EOSINOPHILS ABSOLUTE: 0 K/UL (ref 0–0.6)
EOSINOPHILS RELATIVE PERCENT: 0.4 %
GFR AFRICAN AMERICAN: >60
GFR NON-AFRICAN AMERICAN: >60
GLUCOSE BLD-MCNC: 115 MG/DL (ref 70–99)
HCT VFR BLD CALC: 40.2 % (ref 40.5–52.5)
HEMOGLOBIN: 13.1 G/DL (ref 13.5–17.5)
INR BLD: 1.19 (ref 0.88–1.12)
LYMPHOCYTES ABSOLUTE: 0.8 K/UL (ref 1–5.1)
LYMPHOCYTES RELATIVE PERCENT: 8.1 %
MCH RBC QN AUTO: 26.9 PG (ref 26–34)
MCHC RBC AUTO-ENTMCNC: 32.6 G/DL (ref 31–36)
MCV RBC AUTO: 82.5 FL (ref 80–100)
MONOCYTES ABSOLUTE: 0.8 K/UL (ref 0–1.3)
MONOCYTES RELATIVE PERCENT: 8.4 %
NEUTROPHILS ABSOLUTE: 8 K/UL (ref 1.7–7.7)
NEUTROPHILS RELATIVE PERCENT: 82.7 %
PDW BLD-RTO: 13.3 % (ref 12.4–15.4)
PLATELET # BLD: 248 K/UL (ref 135–450)
PMV BLD AUTO: 8 FL (ref 5–10.5)
POTASSIUM REFLEX MAGNESIUM: 3.9 MMOL/L (ref 3.5–5.1)
PRO-BNP: 40 PG/ML (ref 0–124)
PROTHROMBIN TIME: 13.5 SEC (ref 9.9–12.7)
RBC # BLD: 4.88 M/UL (ref 4.2–5.9)
SODIUM BLD-SCNC: 141 MMOL/L (ref 136–145)
TOTAL PROTEIN: 7.1 G/DL (ref 6.4–8.2)
TROPONIN: <0.01 NG/ML
WBC # BLD: 9.7 K/UL (ref 4–11)

## 2022-02-06 PROCEDURE — 2580000003 HC RX 258: Performed by: EMERGENCY MEDICINE

## 2022-02-06 PROCEDURE — 85610 PROTHROMBIN TIME: CPT

## 2022-02-06 PROCEDURE — 96365 THER/PROPH/DIAG IV INF INIT: CPT

## 2022-02-06 PROCEDURE — 85025 COMPLETE CBC W/AUTO DIFF WBC: CPT

## 2022-02-06 PROCEDURE — 80053 COMPREHEN METABOLIC PANEL: CPT

## 2022-02-06 PROCEDURE — 6360000004 HC RX CONTRAST MEDICATION: Performed by: EMERGENCY MEDICINE

## 2022-02-06 PROCEDURE — 6360000002 HC RX W HCPCS: Performed by: EMERGENCY MEDICINE

## 2022-02-06 PROCEDURE — 84484 ASSAY OF TROPONIN QUANT: CPT

## 2022-02-06 PROCEDURE — 83880 ASSAY OF NATRIURETIC PEPTIDE: CPT

## 2022-02-06 PROCEDURE — 99283 EMERGENCY DEPT VISIT LOW MDM: CPT

## 2022-02-06 PROCEDURE — 71045 X-RAY EXAM CHEST 1 VIEW: CPT

## 2022-02-06 PROCEDURE — 93005 ELECTROCARDIOGRAM TRACING: CPT | Performed by: EMERGENCY MEDICINE

## 2022-02-06 PROCEDURE — 93010 ELECTROCARDIOGRAM REPORT: CPT | Performed by: INTERNAL MEDICINE

## 2022-02-06 PROCEDURE — 96375 TX/PRO/DX INJ NEW DRUG ADDON: CPT

## 2022-02-06 PROCEDURE — 70491 CT SOFT TISSUE NECK W/DYE: CPT

## 2022-02-06 PROCEDURE — 85379 FIBRIN DEGRADATION QUANT: CPT

## 2022-02-06 RX ORDER — ONDANSETRON 2 MG/ML
4 INJECTION INTRAMUSCULAR; INTRAVENOUS ONCE
Status: COMPLETED | OUTPATIENT
Start: 2022-02-06 | End: 2022-02-06

## 2022-02-06 RX ORDER — MORPHINE SULFATE 2 MG/ML
2 INJECTION, SOLUTION INTRAMUSCULAR; INTRAVENOUS ONCE
Status: COMPLETED | OUTPATIENT
Start: 2022-02-06 | End: 2022-02-06

## 2022-02-06 RX ORDER — DEXAMETHASONE SODIUM PHOSPHATE 10 MG/ML
6 INJECTION INTRAMUSCULAR; INTRAVENOUS ONCE
Status: COMPLETED | OUTPATIENT
Start: 2022-02-06 | End: 2022-02-06

## 2022-02-06 RX ADMIN — SODIUM CHLORIDE 1500 MG: 900 INJECTION INTRAVENOUS at 14:25

## 2022-02-06 RX ADMIN — ONDANSETRON 4 MG: 2 INJECTION INTRAMUSCULAR; INTRAVENOUS at 12:47

## 2022-02-06 RX ADMIN — IOPAMIDOL 75 ML: 755 INJECTION, SOLUTION INTRAVENOUS at 13:23

## 2022-02-06 RX ADMIN — MORPHINE SULFATE 2 MG: 2 INJECTION, SOLUTION INTRAMUSCULAR; INTRAVENOUS at 12:46

## 2022-02-06 RX ADMIN — DEXAMETHASONE SODIUM PHOSPHATE 6 MG: 10 INJECTION INTRAMUSCULAR; INTRAVENOUS at 12:54

## 2022-02-06 ASSESSMENT — PAIN SCALES - GENERAL
PAINLEVEL_OUTOF10: 8
PAINLEVEL_OUTOF10: 8

## 2022-02-06 NOTE — ED NOTES
1226 - called [facility] and spoke with Abram Ennis to reach ENT at [facility] hospital  Re: airway, recent post op  0 - Dr Jeanie Torres called back via the  transfer center to speak with Dr Alondra Marr.  ED Attending Dr Grace Bridges also on the line to accept pt as ED to ED transfer. [facility] called [address], ETA 1430.       Rosey Mcguire  02/06/22 1306

## 2022-02-06 NOTE — ED PROVIDER NOTES
CHIEF COMPLAINT  Oral Pain (pt had tongue surg last month for cancer and state that he is having pain today . .and state that it feels swollen) and Panic Attack (pt state this is causing him to have chest pain )      HISTORY OF PRESENT ILLNESS  Enmanuel Thompson is a 28 y.o. male with a history of oropharyngeal cancer status post resection in January 2022 status post tracheostomy removal several weeks ago presenting to emergency department for evaluation of tongue swelling, neck swelling and neck pain. Patient reports increased tongue swelling over the past night. He states that he is having difficulty breathing and swallowing because of this. He states that he did not have the swelling before last night. Patient states that he is very anxious. He is having some chest discomfort along with this feeling of anxiety. His uncle is with him who helps provide additional history. He underwent operative intervention with  ENT, Dr. Ivy Daniel. Past Medical History:   Diagnosis Date    Cancer [facility])      History reviewed. No pertinent surgical history.   Family History   Problem Relation Age of Onset    Diabetes Mother     Cancer Maternal Grandmother         Pancreatic CA     Social History     Socioeconomic History    Marital status: Single     Spouse name: Not on file    Number of children: Not on file    Years of education: Not on file    Highest education level: Not on file   Occupational History    Occupation: Manager at [employer] Use    Smoking status: Current Every Day Smoker     Packs/day: 1.00     Years: 17.00     Pack years: 17.00    Smokeless tobacco: Never Used   Vaping Use    Vaping Use: Never used   Substance and Sexual Activity    Alcohol use: Not Currently    Drug use: Not Currently     Types: Marijuana Tevin Buddickson)    Sexual activity: Yes     Partners: Female   Other Topics Concern    Not on file   Social History Narrative    Lives at home with girlfriend      Social Determinants of Health Financial Resource Strain:     Difficulty of Paying Living Expenses: Not on file   Food Insecurity:     Worried About Running Out of Food in the Last Year: Not on file    Patsy of Food in the Last Year: Not on file   Transportation Needs:     Lack of Transportation (Medical): Not on file    Lack of Transportation (Non-Medical):  Not on file   Physical Activity:     Days of Exercise per Week: Not on file    Minutes of Exercise per Session: Not on file   Stress:     Feeling of Stress : Not on file   Social Connections:     Frequency of Communication with Friends and Family: Not on file    Frequency of Social Gatherings with Friends and Family: Not on file    Attends Roman [employer] Services: Not on file    Active Member of [address] [place] [address] [email or web address] or Organizations: Not on file    Attends Club or Organization Meetings: Not on file    Marital Status: Not on file   Intimate Partner Violence:     Fear of Current or Ex-Partner: Not on file    Emotionally Abused: Not on file    Physically Abused: Not on file    Sexually Abused: Not on file   Housing Stability:     Unable to Pay for Housing in the Last Year: Not on file    Number of [place] in the Last Year: Not on file    Unstable Housing in the Last Year: Not on file     Current Facility-Administered Medications   Medication Dose Route Frequency Provider Last Rate Last Admin    ampicillin-sulbactam (UNASYN) 1500 mg IVPB minibag  1,500 mg IntraVENous Once Kellee Shah MD         Current Outpatient Medications   Medication Sig Dispense Refill    amoxicillin (AMOXIL) 500 MG capsule       hydrOXYzine (ATARAX) 10 MG tablet       albuterol (PROVENTIL) (5 MG/ML) 0.5% nebulizer solution Take 1 mL by nebulization 4 times daily as needed for Wheezing 120 each 3    albuterol (PROVENTIL) (2.5 MG/3ML) 0.083% nebulizer solution Take 3 mLs by nebulization every 6 hours as needed for Wheezing 120 each 3     Allergies   Allergen Reactions    Tramadol Swelling and Other (See Comments)    Trazodone Other (See Comments) and Hives     Does not remember      Trazodone And Nefazodone Nausea And Vomiting       REVIEW OF SYSTEMS  Positive and pertinent negatives as per HPI. All other systems were reviewed and are negative. PHYSICAL EXAM  /82   Pulse 95   Resp 16   Ht 5' 6\" (1.676 m)   Wt 150 lb (68 kg)   SpO2 96%   BMI 24.21 kg/m²   GENERAL APPEARANCE: Awake and alert. Cooperative. Anxious  HEAD: Normocephalic. Atraumatic. EYES: PERRL. EOM's grossly intact. ENT: Mucous membranes are moist.  There is moderate to severe tongue swelling. Mallampati 3, no difficulty tolerating secretions, no trismus, no stridor  NECK: Supple, trachea midline. Prior surgical incisions. There is no woody induration of the neck, no overt cellulitic change. HEART: Tachycardic no harsh murmurs. Intact radial pulses 2+ bilaterally. LUNGS: Respirations unlabored without accessory muscle use. Speaking comfortably in full sentences. Prior tracheostomy scar  ABDOMEN: Soft. Non-distended. Non-tender. No guarding or rebound. EXTREMITIES: No peripheral edema. No acute deformities. SKIN: Warm and dry. No acute rashes. NEUROLOGICAL: Alert and oriented X 3. No focal deficits    LABS  I have reviewed all labs for this visit.    Results for orders placed or performed during the hospital encounter of 02/06/22   CBC Auto Differential   Result Value Ref Range    WBC 9.7 4.0 - 11.0 K/uL    RBC 4.88 4.20 - 5.90 M/uL    Hemoglobin 13.1 (L) 13.5 - 17.5 g/dL    Hematocrit 40.2 (L) 40.5 - 52.5 %    MCV 82.5 80.0 - 100.0 fL    MCH 26.9 26.0 - 34.0 pg    MCHC 32.6 31.0 - 36.0 g/dL    RDW 13.3 12.4 - 15.4 %    Platelets 768 672 - 483 K/uL    MPV 8.0 5.0 - 10.5 fL    Neutrophils % 82.7 %    Lymphocytes % 8.1 %    Monocytes % 8.4 %    Eosinophils % 0.4 %    Basophils % 0.4 %    Neutrophils Absolute 8.0 (H) 1.7 - 7.7 K/uL    Lymphocytes Absolute 0.8 (L) 1.0 - 5.1 K/uL    Monocytes Absolute 0.8 0.0 - 1.3 K/uL Eosinophils Absolute 0.0 0.0 - 0.6 K/uL    Basophils Absolute 0.0 0.0 - 0.2 K/uL   Comprehensive Metabolic Panel w/ Reflex to MG   Result Value Ref Range    Sodium 141 136 - 145 mmol/L    Potassium reflex Magnesium 3.9 3.5 - 5.1 mmol/L    Chloride 97 (L) 99 - 110 mmol/L    CO2 29 21 - 32 mmol/L    Anion Gap 15 3 - 16    Glucose 115 (H) 70 - 99 mg/dL    BUN 8 7 - 20 mg/dL    CREATININE <0.5 (L) 0.9 - 1.3 mg/dL    GFR Non-African American >60 >60    GFR African American >60 >60    Calcium 9.9 8.3 - 10.6 mg/dL    Total Protein 7.1 6.4 - 8.2 g/dL    Albumin 4.6 3.4 - 5.0 g/dL    Albumin/Globulin Ratio 1.8 1.1 - 2.2    Total Bilirubin 0.4 0.0 - 1.0 mg/dL    Alkaline Phosphatase 108 40 - 129 U/L    ALT 17 10 - 40 U/L    AST 17 15 - 37 U/L   Troponin   Result Value Ref Range    Troponin <0.01 <0.01 ng/mL   Brain Natriuretic Peptide   Result Value Ref Range    Pro-BNP 40 0 - 124 pg/mL   Protime-INR   Result Value Ref Range    Protime 13.5 (H) 9.9 - 12.7 sec    INR 1.19 (H) 0.88 - 1.12   D-Dimer, Quantitative   Result Value Ref Range    D-Dimer, Quant 370 (H) 0 - 229 ng/mL DDU   EKG 12 Lead   Result Value Ref Range    Ventricular Rate 105 BPM    Atrial Rate 100 BPM    QRS Duration 82 ms    Q-T Interval 390 ms    QTc Calculation (Bazett) 515 ms    R Axis 32 degrees    T Axis 23 degrees    Diagnosis       Atrial fibrillation with rapid ventricular responseNonspecific ST and T wave abnormalityAbnormal ECGNo previous ECGs available       EKG  The Ekg interpreted by myself in the emergency department in the absence of a cardiologist.  atrial fibrillation with a rate of 105  Axis is   Normal  QTc is  515  Intervals and Durations are unremarkable. No specific ST-T wave changes appreciated. No evidence of acute ischemia. Compared to prior EKG dated May 18, 2021, JACQUE vasquez is new      RADIOLOGY  X-RAYS:  I have reviewed radiologic plain film image(s).   ALL OTHER NON-PLAIN FILM IMAGES SUCH AS CT, ULTRASOUND AND MRI HAVE BEEN READ BY THE RADIOLOGIST. CT SOFT TISSUE NECK W CONTRAST   Preliminary Result   1. There is a low-attenuation area along the right posterolateral   nasopharyngeal soft tissues, immediately posterior to the right   parapharyngeal fat space measuring 1.2 x 1.0 cm (series 2, image 31). This   is concerning for an abscess, however, an underlying metastatic lesion cannot   entirely be excluded. 2. Additionally, there are low-attenuation areas noted along the posterior   margins of the sternocleidomastoid muscles in the lower neck measuring 1.1 x   0.9 cm on the right, and 0.8 x 0.6 cm on the left. These may represent small   seromas or hematomas following surgery, however, abscesses cannot be excluded. 3. Indeterminate left-sided thyroid nodule measuring 2.7 cm. Outpatient   thyroid ultrasound is recommended. 4. No cervical adenopathy to suggest metastatic disease. 5. Postsurgical changes are noted along the tongue as well as from bilateral   neck dissection. No evidence of local tumor recurrence. 6. Dental caries with periodontal disease. 7. Incidental right retro cerebellar arachnoid cyst.      RECOMMENDATIONS:   Unavailable         XR CHEST PORTABLE   Final Result   No acute process. Critical Care: Total critical care time is 30 minutes, which excludes separately billable procedures and updating family. Time spent is specifically for management of the presenting complaint and symptoms initially, direct bedside care, reevaluation, review of records, and consultation. There was a high probability of clinically significant life-threatening deterioration in the patient's condition, which required my urgent intervention. ED COURSE/MDM  Patient seen and evaluated. Old records reviewed. Labs and imaging reviewed and results discussed with patient.      80-year-old male with oral cancer surgery about a month ago presenting with increased tongue swelling, difficulty breathing, neck pain. Patient arrives with stable vital signs, saturating in the high 90s on room air, no overt respiratory distress, no difficulty with tolerating secretions no stridor. He does however have significant tongue swelling and he is reporting difficulty breathing, difficulty swallowing. He also does have recent tracheostomy removal.  The combination of the tongue swelling, recent tracheostomy removal, history of oral cancer makes for a very challenging airway. Because he is having complications related to his recent ENT surgery, I have discussed his care with  ENT, Dr. John Joe, and the [facility] emergency department attending, Dr. Brian Saeed. They have accepted the patient for ED evaluation. Patient was ordered laboratory evaluation, CT of the neck with contrast.  He was given morphine, Zofran for symptom control and Decadron to help with swelling of his tongue. CT of the neck reveals multiple areas concerning for abscess. He will be started on Unasyn. These images will be post to  for their evaluation. CLINICAL IMPRESSION  1. Tongue swelling    2. Oral cancer ([address]        Blood pressure 136/82, pulse 95, resp. rate 16, height 5' 6\" (1.676 m), weight 150 lb (68 kg), SpO2 96 %. DISPOSITION  Jackie Mratino was transferred to [facility] in guarded condition    This chart was generated in part by using Dragon Dictation system and may contain errors related to that system including errors in grammar, punctuation, and spelling, as well as words and phrases that may be inappropriate. If there are any questions or concerns please feel free to contact the dictating provider for clarification.      Heydi Griffin MD  02/06/22 8694

## 2022-02-06 NOTE — ED NOTES
Pt came in today with tongue swelling, neck pain and chest pain. Pt with hx of throat cancer and surgery at [facility] ENT. Pt is awake alert. Pt with no cardiac hx however came in in AFIB RVR per EKG new onset per pt. Pt state that increase tongue swelling and pain. Pt on monitor with anxiety. Pt no airway distress. Pt worried about having to stay all night at hosp with no family. Pt did c/o some chest discomfort with anxiety that eased with family at bedside.       Kailey Mendiola RN  02/06/22 1073

## 2022-02-06 NOTE — ED NOTES
Patient identified as a positive fall risk on the ED triage fall screening. Patient placed in fall precautions which includes:  yellow fall risk bracelet on wrist and yellow socks on feet. Patient instructed on importance of not getting out of bed or ambulating without assistance for safety. Pt verbalized understanding.      Jayleen Baires RN  02/06/22 8411

## 2022-02-06 NOTE — ED NOTES
Attempted x2 to call ER and give report [phone] and no answer each time     Eli Simon RN  02/06/22 1056

## 2022-02-06 NOTE — ED NOTES
Pt currently being transported to [facility] ER.      Halle Smalls RN  02/06/22 8387

## 2022-02-08 ENCOUNTER — TELEPHONE (OUTPATIENT)
Dept: PRIMARY CARE CLINIC | Age: 32
End: 2022-02-08

## 2022-02-08 NOTE — TELEPHONE ENCOUNTER
The anxiety medication isn't working   The surgeon said to call and see if dr. Rose Underwood can give him something until he can get in with Dr. Elie Boo.

## 2022-02-08 NOTE — TELEPHONE ENCOUNTER
I called the pt and scheduled him for tomorrow at 1:45. Also the pt girlfriend(who speaks for him) stated that she would like him to get started on Lorazepam she heard about it through a friend and thinks it would be good for the pt.

## 2022-02-09 ENCOUNTER — TELEPHONE (OUTPATIENT)
Dept: PRIMARY CARE CLINIC | Age: 32
End: 2022-02-09

## 2022-02-09 DIAGNOSIS — F41.9 ANXIETY: Primary | ICD-10-CM

## 2022-02-09 RX ORDER — HYDROXYZINE HYDROCHLORIDE 10 MG/1
25 TABLET, FILM COATED ORAL EVERY 8 HOURS PRN
Qty: 30 TABLET | Refills: 2 | Status: SHIPPED | OUTPATIENT
Start: 2022-02-09

## 2022-02-09 NOTE — TELEPHONE ENCOUNTER
Medication:   Hydrooxyzine,Atarax 10 mg      Last Filled:      Patient Phone Number: [phone] (home)     Last appt: 1/25/2022   Next appt: 2/10/2022    Last OARRS: No flowsheet data found. Medication:   Requested Prescriptions      No prescriptions requested or ordered in this encounter        Last Filled:      Patient Phone Number: [phone] (home)     Last appt: 1/25/2022   Next appt: 2/10/2022    Last OARRS: No flowsheet data found.

## 2022-02-10 ENCOUNTER — OFFICE VISIT (OUTPATIENT)
Dept: PRIMARY CARE CLINIC | Age: 32
End: 2022-02-10
Payer: COMMERCIAL

## 2022-02-10 VITALS — HEART RATE: 100 BPM | DIASTOLIC BLOOD PRESSURE: 82 MMHG | SYSTOLIC BLOOD PRESSURE: 143 MMHG | TEMPERATURE: 98 F

## 2022-02-10 DIAGNOSIS — I10 PRIMARY HYPERTENSION: ICD-10-CM

## 2022-02-10 DIAGNOSIS — R45.1 AGITATION: Primary | ICD-10-CM

## 2022-02-10 DIAGNOSIS — F41.9 ANXIETY: ICD-10-CM

## 2022-02-10 DIAGNOSIS — F99 INSOMNIA DUE TO OTHER MENTAL DISORDER: ICD-10-CM

## 2022-02-10 DIAGNOSIS — F51.05 INSOMNIA DUE TO OTHER MENTAL DISORDER: ICD-10-CM

## 2022-02-10 PROCEDURE — G8427 DOCREV CUR MEDS BY ELIG CLIN: HCPCS | Performed by: STUDENT IN AN ORGANIZED HEALTH CARE EDUCATION/TRAINING PROGRAM

## 2022-02-10 PROCEDURE — G8484 FLU IMMUNIZE NO ADMIN: HCPCS | Performed by: STUDENT IN AN ORGANIZED HEALTH CARE EDUCATION/TRAINING PROGRAM

## 2022-02-10 PROCEDURE — 99215 OFFICE O/P EST HI 40 MIN: CPT | Performed by: STUDENT IN AN ORGANIZED HEALTH CARE EDUCATION/TRAINING PROGRAM

## 2022-02-10 PROCEDURE — G8420 CALC BMI NORM PARAMETERS: HCPCS | Performed by: STUDENT IN AN ORGANIZED HEALTH CARE EDUCATION/TRAINING PROGRAM

## 2022-02-10 PROCEDURE — 4004F PT TOBACCO SCREEN RCVD TLK: CPT | Performed by: STUDENT IN AN ORGANIZED HEALTH CARE EDUCATION/TRAINING PROGRAM

## 2022-02-10 RX ORDER — CIPROFLOXACIN AND DEXAMETHASONE 3; 1 MG/ML; MG/ML
SUSPENSION/ DROPS AURICULAR (OTIC)
Status: ON HOLD | COMMUNITY
Start: 2021-10-18 | End: 2022-06-23

## 2022-02-10 RX ORDER — IBUPROFEN 800 MG/1
TABLET ORAL
Status: ON HOLD | COMMUNITY
Start: 2022-01-25 | End: 2022-06-23

## 2022-02-10 RX ORDER — LISINOPRIL 10 MG/1
10 TABLET ORAL DAILY
Qty: 90 TABLET | Refills: 1 | Status: SHIPPED | OUTPATIENT
Start: 2022-02-10 | End: 2022-02-22 | Stop reason: ALTCHOICE

## 2022-02-10 NOTE — PROGRESS NOTES
[facility]  2/10/2022    Phoebe Sierra (:  1990) is a 28 y.o. male, here for evaluation of the following medical concerns:    Chief Complaint   Patient presents with    Other     wants to get on medication, gf wants him on new meds,  his behavior has changed    Anxiety        ASSESSMENT/ PLAN  1. Agitation  2. Insomnia due to other mental disorder  3. Anxiety  Uncontrolled. Past psychiatric history unknown and it is unclear if current mood concerns are related to delirium related to steroid administration, primary anxiety with panic or undiagnosed psychotic disorder. Discussed options at length with girlfriend, recommended taking the patient to the ER or follow up with our psychiatrist in clinic tomorrow. She would like to follow up tomorrow and will report to the ER in the interim if symptoms worsen. Discussed plan with Dr. Joshua Guerra. 4. Primary hypertension  Uncontrolled, restart lisinopril.   - lisinopril (PRINIVIL;ZESTRIL) 10 MG tablet; Take 1 tablet by mouth daily  Dispense: 90 tablet; Refill: 1       Return in about 1 day (around 2022) for assessment by Dr. Joshua Guerra . HPI  Phoebe Sierra is a 27 yo male with PMHx anxiety, HTN, squamous cell CA s/p recent tongue resection who is here for neck and back pain, anxiety, insomnia. History provided by girlfriend primarily as patient was pacing around the room and has some difficulty talking since recent surgery. Per girlfriend, he has been feeling more anxious and \"feels like he is having a panic attack and can't breath. \" Girlfriend says that he is \"driving everyone crazy and no one can rest around him. \"  She requests that he is prescribed medication to help him rest and specifically asks for ativan. Per girlfriend, the panic attacks are \"all day everyday\" and she feels irritated by his behavior. She states that ever since the surgery, he [facility] been a different person. \" Of note, patient was given 2 doses of decadron 3 days ago in the ER for tongue swelling. No SI/HI/self harm. Per girlfriend, patient stopped taking his medications for hypertension before the surgery. When he saw that his blood pressure was normal before surgery, he thought his blood pressure was fine and never took the medication again. He also discontinued the hydroxyzine as he did not find it effective. ROS  Review of Systems   Constitutional: Negative for activity change, appetite change, fatigue and unexpected weight change. Gastrointestinal: Negative for abdominal pain and nausea. Neurological: Negative for headaches. Psychiatric/Behavioral: Positive for agitation, decreased concentration and sleep disturbance. Negative for self-injury and suicidal ideas. The patient is nervous/anxious and is hyperactive. HISTORIES  Current Outpatient Medications on File Prior to Visit   Medication Sig Dispense Refill    phenol 1.4 % LIQD mouth spray       ciprofloxacin-dexamethasone (CIPRODEX) 0.3-0.1 % otic suspension       hydrOXYzine (ATARAX) 10 MG tablet Take 2.5 tablets by mouth every 8 hours as needed for Anxiety 30 tablet 2    amoxicillin (AMOXIL) 500 MG capsule       albuterol (PROVENTIL) (5 MG/ML) 0.5% nebulizer solution Take 1 mL by nebulization 4 times daily as needed for Wheezing 120 each 3    albuterol (PROVENTIL) (2.5 MG/3ML) 0.083% nebulizer solution Take 3 mLs by nebulization every 6 hours as needed for Wheezing 120 each 3    ibuprofen (ADVIL;MOTRIN) 800 MG tablet        No current facility-administered medications on file prior to visit.       Past Medical History:   Diagnosis Date    Cancer [facility])      Patient Active Problem List   Diagnosis    HTN (hypertension)    Chronic bilateral low back pain without sciatica    Anxiety    Insomnia due to other mental disorder    Poor dentition    Class 1 obesity due to excess calories with serious comorbidity and body mass index (BMI) of 34.0 to 34.9 in adult    Primary squamous cell [facility] of [facility] [address]    Recurrent major depressive disorder ([facility]    Prediabetes    Agitation       PE  Vitals:    02/10/22 1358   BP: (!) 143/82   Site: Left Upper Arm   Position: Sitting   Cuff Size: Medium Adult   Pulse: 100   Temp: 98 °F (36.7 °C)   TempSrc: Temporal     Estimated body mass index is 24.21 kg/m² as calculated from the following:    Height as of 2/6/22: 5' 6\" (1.676 m). Weight as of 2/6/22: 150 lb (68 kg). Physical Exam  Vitals reviewed. Constitutional:       Appearance: He is not ill-appearing or toxic-appearing. Comments: Disheveled appearance    HENT:      Head: Normocephalic and atraumatic. Mouth/Throat:      Comments: Recent surgical scars healing well   Eyes:      Extraocular Movements: Extraocular movements intact. Pupils: Pupils are equal, round, and reactive to light. Cardiovascular:      Rate and Rhythm: Normal rate and regular rhythm. Pulses: Normal pulses. Heart sounds: Normal heart sounds. Pulmonary:      Effort: Pulmonary effort is normal.      Breath sounds: Normal breath sounds. Abdominal:      General: Abdomen is flat. Bowel sounds are normal.      Palpations: Abdomen is soft. Neurological:      Mental Status: He is alert. Psychiatric:         Attention and Perception: He is inattentive. Mood and Affect: Mood is anxious. Mood is not elated. Affect is tearful. Affect is not angry. Speech: Speech is not rapid and pressured or slurred. Behavior: Behavior is agitated. Behavior is not aggressive or combative. Ricky Maurice,     This dictation was generated by voice recognition computer software. Although all attempts are made to edit the dictation for accuracy, there may be errors in the transcription that are not intended.

## 2022-02-10 NOTE — PROGRESS NOTES
PSYCHIATRY INITIAL EVALUATION    Alma Braxton  1990 02/11/22  Face to Face time: 60 minutes  PCP: Rico Hazel DO    CC: Anxiety and Depression      ASSESSMENT:   Patient is a 64-year-old male with a past medical history of squamous cell carcinoma of the tongue with recent removal as well as hypertension who presents to the outpatient psychiatry clinic today for evaluation of agitation and anxiety. Patient's presentation today appears indicative of some underlying/cooccurring disorders. The first would be that of panic disorder. This is evidenced by the patient's statements of multiple times a day having panic attacks. Is unclear whether this is directly related to his recent traumas or whether it is more something that has been going on for a prolonged period of time. This will need to be evaluated going forward. Second diagnosis from today would be an adjustment reaction with anxiety and depression. Does appear that the patient had some underlying anxiety prior to the surgery, however since the surgery he is it exhibited a significant amount of depression/anxiety above any sort of baseline that he may have had. Additionally, though not formally diagnostic, there appears to be a dependency reaction that has occurred since the patient's surgery. This is characterized by the patient's constant need for his fiancée's assistance and comforting, much in the same role that his mother would provide for him. This is a rather common response after surgeries, with patients exhibiting regressive behaviors that will ultimately resolve over time. With the recent addition of a steroid to the patient's medication regimen, there is a possibility of a steroidinduced delirium that could have been accentuated by the patient's pain medication usage.   This might be the reason behind his agitation seen on prior visits    Diagnoses:  Panic disorder  Adjustment disorder with mixed anxious and depressive states    PLAN:   1. Discussed with patient potential management options further conditions including medication management as well as nonpharmacologic strategies. Patient on board with current plan of care. 2.  We will attempt to address patient's underlying anxiety as well as current pain state by initiating duloxetine 30 mg daily for depression, anxiety, and neuropathic pain. Patient was cautioned on potential adverse effects of this medication including nausea, diarrhea, increased heart rate, abnormal blood pressure, headaches, and increased suicidal ideations. 3.  We will attempt to address his panic disorder with the addition of a small amount of clonazepam 0.5 mg twice daily as needed anxiety. Patient's fiancée was asked to manage this medication and not to time it with his current pain medications. The interactions between the pain medications and the benzodiazepines were explained to the patient and his fiancée. They were advised not to have him drinking any alcohol with this medication as that can also decrease his respiratory drive. Patient was cautioned regarding potential adverse effects including sedation, rebound anxiety, increased risk of falls. We will anticipate that as his adjustment reaction resolves and his duloxetine reaches a steadier state that he will no longer need this medication after a period of time. Medication Monitoring:    - PDMP reviewed: Oxycodoneacetaminophen 02921 30-day supply of 4 times a day dosing provided by Kika De Leon DO on 2/3/2022      Follow-up: RTC in 4 weeks or sooner as needed    Safety: Pt was counseled on the potential for increased suicidal ideations and advised on potential options for dealing with these including hotlines, calling the office, or going to the nearest emergency room.     ____________________________________________________________________________    HPI:   Patient is a 81-year-old male with a past medical history of squamous cell carcinoma of the tongue with recent removal as well as hypertension who presents to the outpatient psychiatry clinic today for evaluation of agitation and anxiety. Patient was accompanied to the visit today by his fiancée. She supplied much of the information as the patient indicated that he was in significant pain and unable to do so. Patient's fiancé indicated that over the past 3 weeks he has been demonstrating high degrees of anxiety and more recent depression. Regarding the anxiety, she noted that this seems nearly constant. The patient spoke up and did say that he gets the sensation that he cannot breathe, then usually this is accompanied by an increase in a tremor and the patient freezing in place. He notes that these episodes can last anywhere from 10 to 30 minutes long. Currently they are occurring approximately 2 times per day. Regarding the depression, the patient's fiancé indicated that this has acutely worsened over the past week and in particular the last 3 days. She noted that his appetite is significantly decreased, mostly secondary to pain. He is currently eating some semisolids but generally is only taking in liquids. His sleep is disrupted by pain, energy is generally low, motivation is practically nonexistent. Patient did voice some passive SI that is occurring secondary to his current pain situation. He denied any active SI in today's evaluation. Patient screened negative for syeda on evaluation today. He did give a history of psychotic symptoms that were present when he previously withdrew off of alcohol. Significant to the patient's fiancée, she noted that especially over the last week the patient had been excessively needy and behaving unusually. She noted that he would frequently ask her for chin/neck rubs, would follow her around the house.   She denied that this has been present prior to the surgical procedure, noted that he was an  at [employer] and had been very independent. During the visit, the patient on several occasions did actually demonstrate these behaviors, including asking for a neck rub, asking for a chin scratch, and laying his head in his fiancée's lap. ROS:   Review of Systems   Constitutional: Positive for activity change, appetite change and fatigue. HENT: Positive for sore throat, trouble swallowing and voice change. Positive for neck pain   Eyes: Negative. Respiratory: Positive for shortness of breath. Cardiovascular: Negative. Gastrointestinal: Negative. Endocrine: Negative. Genitourinary: Negative. Musculoskeletal: Negative. Skin: Negative. Allergic/Immunologic: Negative. Neurological: Negative. Hematological: Negative. Psychiatric/Behavioral: Positive for agitation, dysphoric mood and sleep disturbance. The patient is nervous/anxious. Past Psychiatric History:     Hosp: 1 hospitalization in his teenage years  Diagnoses: Anxiety, depression  Currrent medications: Hydroxyzine  Med trials: Unclear, patient unable to provide history  Outpt: No significant history  NSSI: Eating behaviors in his teenage years   Suicide Attempts: 1 prior attempt in his teenage years when he was hospitalized. Patient would not elaborate further    Past Medical History:   Diagnosis Date    Cancer [facility])      No past surgical history on file.   Social History     Socioeconomic History    Marital status: Single     Spouse name: Not on file    Number of children: Not on file    Years of education: Not on file    Highest education level: Not on file   Occupational History    Occupation: Manager at [employer] Use    Smoking status: Current Every Day Smoker     Packs/day: 1.00     Years: 17.00     Pack years: 17.00    Smokeless tobacco: Never Used   Vaping Use    Vaping Use: Never used   Substance and Sexual Activity    Alcohol use: Not Currently    Drug use: Not Currently     Types: Marijuana [facility])    Sexual activity: Yes     Partners: Female   Other Topics Concern    Not on file   Social History Narrative    Lives at home with girlfriend and elderly patient     Social Determinants of Health     Financial Resource Strain:     Difficulty of Paying Living Expenses: Not on file   Food Insecurity:     Worried About Running Out of Food in the Last Year: Not on file    Patsy of Food in the Last Year: Not on file   Transportation Needs:     Lack of Transportation (Medical): Not on file    Lack of Transportation (Non-Medical):  Not on file   Physical Activity:     Days of Exercise per Week: Not on file    Minutes of Exercise per Session: Not on file   Stress:     Feeling of Stress : Not on file   Social Connections:     Frequency of Communication with Friends and Family: Not on file    Frequency of Social Gatherings with Friends and Family: Not on file    Attends [employer] Services: Not on file    Active Member of [address] [place] [address] [employer] or Organizations: Not on file    Attends Club or Organization Meetings: Not on file    Marital Status: Not on file   Intimate Partner Violence:     Fear of Current or Ex-Partner: Not on file    Emotionally Abused: Not on file    Physically Abused: Not on file    Sexually Abused: Not on file   Housing Stability:     Unable to Pay for Housing in the Last Year: Not on file    Number of [place] in the Last Year: Not on file    Unstable Housing in the Last Year: Not on file      Family History   Problem Relation Age of Onset    Diabetes Mother     Cancer Maternal Grandmother         Pancreatic [place]     Allergies   Allergen Reactions    Trazodone And Nefazodone Nausea And Vomiting, Hives and Other (See Comments)     Does not remember  Does not remember    Tramadol Swelling and Other (See Comments)    Trazodone Other (See Comments) and Hives     Does not remember       Current Outpatient Medications on File Prior to Visit   Medication Sig Dispense Refill    phenol 1.4 % LIQD mouth spray       ibuprofen (ADVIL;MOTRIN) 800 MG tablet       ciprofloxacin-dexamethasone (CIPRODEX) 0.3-0.1 % otic suspension       lisinopril (PRINIVIL;ZESTRIL) 10 MG tablet Take 1 tablet by mouth daily 90 tablet 1    hydrOXYzine (ATARAX) 10 MG tablet Take 2.5 tablets by mouth every 8 hours as needed for Anxiety 30 tablet 2    amoxicillin (AMOXIL) 500 MG capsule       albuterol (PROVENTIL) (5 MG/ML) 0.5% nebulizer solution Take 1 mL by nebulization 4 times daily as needed for Wheezing 120 each 3    albuterol (PROVENTIL) (2.5 MG/3ML) 0.083% nebulizer solution Take 3 mLs by nebulization every 6 hours as needed for Wheezing 120 each 3     No current facility-administered medications on file prior to visit. OBJECTIVE:  Vitals:    02/13/22 1356   BP: 131/86   Pulse: 83   Resp: 12   Weight: 149 lb 12.8 oz (67.9 kg)       MSE:   Appearance:    Appropriate clothing that did appear well worn from  Motor: Frequent movements and apparent discomfort. Several times he attempted to elicit affection from his fiancée by literally placing his head in her lap. Eye Contact: Fair at best  Speech:    Soft  Language:   Appropriate diction  Mood/Affect:   \"Depressed and anxious\"/mildly labile  Thought Process:    Generally linear, logical  Thought Content:    Anxious content predominating. Patient's thoughts rarely strayed away from discussing pain, passive SI/no HI  Hallucinations:   Denied, not seem to be responding internal stimuli  Associations:   Intact  Attention/Concentration:   Fair. On days of the week forward and backwards he made the same error by not actually stating the day he was supposed to start on. For example, when asked to go forward from Tuesday, he actually went from Wednesday to Monday. Orientation:    Alert and oriented x4  Memory:   Intact  Fund of Knowledge:    Possibly mildly decreased for age  Insight/Judgement:   Fair/fair    No flowsheet data found.   PHQ-9 Questionaire 11/3/2021   Audra

## 2022-02-11 ENCOUNTER — OFFICE VISIT (OUTPATIENT)
Dept: PSYCHIATRY | Age: 32
End: 2022-02-11
Payer: COMMERCIAL

## 2022-02-11 DIAGNOSIS — F43.23 ADJUSTMENT REACTION WITH ANXIETY AND DEPRESSION: ICD-10-CM

## 2022-02-11 DIAGNOSIS — F41.0 PANIC DISORDER: Primary | ICD-10-CM

## 2022-02-11 PROCEDURE — 4004F PT TOBACCO SCREEN RCVD TLK: CPT | Performed by: STUDENT IN AN ORGANIZED HEALTH CARE EDUCATION/TRAINING PROGRAM

## 2022-02-11 PROCEDURE — 99204 OFFICE O/P NEW MOD 45 MIN: CPT | Performed by: STUDENT IN AN ORGANIZED HEALTH CARE EDUCATION/TRAINING PROGRAM

## 2022-02-11 PROCEDURE — G8484 FLU IMMUNIZE NO ADMIN: HCPCS | Performed by: STUDENT IN AN ORGANIZED HEALTH CARE EDUCATION/TRAINING PROGRAM

## 2022-02-11 PROCEDURE — G8427 DOCREV CUR MEDS BY ELIG CLIN: HCPCS | Performed by: STUDENT IN AN ORGANIZED HEALTH CARE EDUCATION/TRAINING PROGRAM

## 2022-02-11 PROCEDURE — G8420 CALC BMI NORM PARAMETERS: HCPCS | Performed by: STUDENT IN AN ORGANIZED HEALTH CARE EDUCATION/TRAINING PROGRAM

## 2022-02-11 RX ORDER — CLONAZEPAM 0.5 MG/1
0.5 TABLET ORAL 2 TIMES DAILY PRN
Qty: 60 TABLET | Refills: 0 | Status: SHIPPED | OUTPATIENT
Start: 2022-02-11 | End: 2022-03-24 | Stop reason: SDUPTHER

## 2022-02-11 RX ORDER — DULOXETIN HYDROCHLORIDE 30 MG/1
30 CAPSULE, DELAYED RELEASE ORAL DAILY
Qty: 30 CAPSULE | Refills: 0 | Status: SHIPPED | OUTPATIENT
Start: 2022-02-11 | End: 2022-03-24 | Stop reason: SDUPTHER

## 2022-02-11 ASSESSMENT — ENCOUNTER SYMPTOMS
NAUSEA: 0
ABDOMINAL PAIN: 0

## 2022-02-13 VITALS
RESPIRATION RATE: 12 BRPM | SYSTOLIC BLOOD PRESSURE: 131 MMHG | DIASTOLIC BLOOD PRESSURE: 86 MMHG | WEIGHT: 149.8 LBS | HEART RATE: 83 BPM | BODY MASS INDEX: 24.18 KG/M2

## 2022-02-13 ASSESSMENT — ENCOUNTER SYMPTOMS
EYES NEGATIVE: 1
TROUBLE SWALLOWING: 1
GASTROINTESTINAL NEGATIVE: 1
SHORTNESS OF BREATH: 1
VOICE CHANGE: 1
SORE THROAT: 1
ALLERGIC/IMMUNOLOGIC NEGATIVE: 1

## 2022-02-22 ENCOUNTER — OFFICE VISIT (OUTPATIENT)
Dept: PRIMARY CARE CLINIC | Age: 32
End: 2022-02-22
Payer: COMMERCIAL

## 2022-02-22 VITALS
BODY MASS INDEX: 22.63 KG/M2 | WEIGHT: 140.8 LBS | SYSTOLIC BLOOD PRESSURE: 86 MMHG | HEIGHT: 66 IN | HEART RATE: 121 BPM | DIASTOLIC BLOOD PRESSURE: 61 MMHG | TEMPERATURE: 97 F

## 2022-02-22 DIAGNOSIS — R13.13 PHARYNGEAL DYSPHAGIA: Primary | ICD-10-CM

## 2022-02-22 DIAGNOSIS — F41.9 ANXIETY: ICD-10-CM

## 2022-02-22 DIAGNOSIS — C02.9 PRIMARY SQUAMOUS CELL CARCINOMA OF TONGUE (HCC): ICD-10-CM

## 2022-02-22 DIAGNOSIS — J45.40 MODERATE PERSISTENT ASTHMA, UNSPECIFIED WHETHER COMPLICATED: ICD-10-CM

## 2022-02-22 DIAGNOSIS — I10 PRIMARY HYPERTENSION: ICD-10-CM

## 2022-02-22 PROBLEM — R45.1 AGITATION: Status: RESOLVED | Noted: 2022-02-10 | Resolved: 2022-02-22

## 2022-02-22 PROCEDURE — G8484 FLU IMMUNIZE NO ADMIN: HCPCS | Performed by: STUDENT IN AN ORGANIZED HEALTH CARE EDUCATION/TRAINING PROGRAM

## 2022-02-22 PROCEDURE — G8427 DOCREV CUR MEDS BY ELIG CLIN: HCPCS | Performed by: STUDENT IN AN ORGANIZED HEALTH CARE EDUCATION/TRAINING PROGRAM

## 2022-02-22 PROCEDURE — G8420 CALC BMI NORM PARAMETERS: HCPCS | Performed by: STUDENT IN AN ORGANIZED HEALTH CARE EDUCATION/TRAINING PROGRAM

## 2022-02-22 PROCEDURE — 1036F TOBACCO NON-USER: CPT | Performed by: STUDENT IN AN ORGANIZED HEALTH CARE EDUCATION/TRAINING PROGRAM

## 2022-02-22 PROCEDURE — 99214 OFFICE O/P EST MOD 30 MIN: CPT | Performed by: STUDENT IN AN ORGANIZED HEALTH CARE EDUCATION/TRAINING PROGRAM

## 2022-02-22 RX ORDER — ALBUTEROL SULFATE 2.5 MG/3ML
2.5 SOLUTION RESPIRATORY (INHALATION) EVERY 6 HOURS PRN
Qty: 120 EACH | Refills: 3 | Status: SHIPPED | OUTPATIENT
Start: 2022-02-22

## 2022-02-22 RX ORDER — PSEUDOEPHED/ACETAMINOPH/DIPHEN 30MG-500MG
TABLET ORAL
Status: ON HOLD | COMMUNITY
Start: 2022-02-08 | End: 2022-06-23

## 2022-02-22 ASSESSMENT — ENCOUNTER SYMPTOMS
EYE PAIN: 0
NAUSEA: 0
SHORTNESS OF BREATH: 0
WHEEZING: 0
SINUS PRESSURE: 0
RHINORRHEA: 0
COUGH: 0
TROUBLE SWALLOWING: 1
VOMITING: 0
DIARRHEA: 0
SORE THROAT: 1

## 2022-02-22 NOTE — PATIENT INSTRUCTIONS
Patient Education        Learning About Speech and Swallowing Problems After Cancer Treatments  How can cancer treatments affect speech and swallowing? Cancer treatment to the head and neck can cause some uncomfortable side effects. Chemotherapy or radiation of the head or neck can affect how well you can swallow or speak. The effects are more common in the days and weeks after treatment. But sometimes these changes can be permanent. Surgery for head or neck cancer may also cause long-term changes in the way you eat, drink, and speak. Cancer treatments can affect your sense of taste, which can make eating difficult. You may get a dry mouth, a cough, or a sore and swollen throat. If you have chemotherapy or radiation, you may have other side effects that can affect how you speak and eat. These side effects can be mild or severe. They include:  · Swelling and redness in the lining of the mouth (stomatitis). · A yeast infection in the mouth (thrush or candida). · Tightness of the jaw. If the side effects are so severe that you can't eat, you can get nutrients through a feeding tube. And speech therapy or special exercises or equipment can help if you're having trouble with your tongue or vocal cords. Coping with side effects can be a challenge. But there are many things you can do at home to make your mouth and throat feel better. What can you do if you have trouble swallowing or eating? · Try to think of food as medicine. It's part of your treatment. You need plenty of calories and protein to get better. · Eat foods you like, but not your favorite foods. Your sense of taste may change. After you recover, you may not want to eat the same foods. · Try soft foods like cooked cereals, scrambled eggs, cottage cheese, or even baby food, which comes in many flavors. In general, soft, moist foods are easiest to swallow.   · Get extra protein by adding protein powder to milk shakes, breakfast drinks, or nutritional drinks. · Stay away from spicy or acidic foods. And don't eat anything too cold or too hot. · If your symptoms are severe, ask your doctor about getting a feeding tube to make sure you get the nutrition you need. What are some other tips to try? · Make a rinse to keep your mouth from getting dry. Add 1 teaspoon baking soda and ½ teaspoon salt to a quart of water. Use it to rinse your mouth 4 to 6 times each day. Spit out the rinse. Don't swallow it. · Do not use a mouthwash or any other over-the-counter rinse that contains alcohol. These can dry out your mouth or cause more pain. Ask your doctor about other oral gels, lubricants, and mouthwashes that you might use. · Drink plenty of fluids to prevent dehydration. Drinking through a straw may help with pain. · Practice good oral hygiene. Use a very soft toothbrush to brush your teeth. You could also use a soft cloth. When your mouth is dry, you are more likely to get tooth decay or have other dental problems. What happens if you have trouble speaking? If you need surgery on your tongue or other parts of your mouth, such as removing lymph nodes or tumors, you may have trouble speaking. For some people, this is temporary. But for others, it can be permanent. Surgery on your voice box or vocal cords can change the sound of your voice and make it hard to communicate. You may need to use an assistive device to speak after the surgery. Or you can learn a different way to communicate. If you need a breathing tube after surgery, you will also learn how to speak with the tube in. You may need to use a special device to make sounds. Your doctor or speech therapist may give you exercises to do at home. These exercises train your muscles to work together and help you swallow and speak more clearly. A speech therapist can also show you how to use equipment that may allow you to speak again. Follow-up care is a key part of your treatment and safety.  Be sure to make and go to all appointments, and call your doctor if you are having problems. It's also a good idea to know your test results and keep a list of the medicines you take. Where can you learn more? Go to [email or web address] org and sign in to your [employer] account. Enter B689 in the [employer] box to learn more about \"Learning About Speech and Swallowing Problems After Cancer Treatments. \"     If you do not have an account, please click on the \"Sign Up Now\" link. Current as of: September 8, 2021               Content Version: 13.1  © 6745-8180 Healthwise, Incorporated. Care instructions adapted under license by [facility] ([facility]). If you have questions about a medical condition or this instruction, always ask your healthcare professional. [address] any warranty or liability for your use of this information.

## 2022-02-22 NOTE — PROGRESS NOTES
[facility]  2022    Michelle Todd (:  1990) is a 28 y.o. male, here for evaluation of the following medical concerns:    Chief Complaint   Patient presents with    2 Week Follow-Up    Diabetes        ASSESSMENT/ PLAN  1. Pharyngeal dysphagia  Uncontrolled, likely multifactorial.  Girlfriend to call  and request alternative antibiotic as previously instructed, however exam benign and dysphagia has been an issue since surgery. Referral placed to occupational therapy to work with patient on swallowing mechanism and progress to solid foods. - [facility] - [facility]    2. Moderate persistent asthma, unspecified whether complicated  Controlled, continue albuterol as needed  - albuterol (PROVENTIL) (2.5 MG/3ML) 0.083% nebulizer solution; Take 3 mLs by nebulization every 6 hours as needed for Wheezing  Dispense: 120 each; Refill: 3    3. Primary hypertension  Controlled, hypotensive today. Due to weight loss, significant improvement in blood pressure. Discontinue lisinopril. 4. Primary squamous cell carcinoma of tongue (HCC)  Continue follow-up with     5. Anxiety  Uncontrolled although improving. Continue Cymbalta, hydroxyzine and Klonopin       Return in about 2 weeks (around 3/8/2022) for mood recheck, dysphagia. HPI  Patient presents today with his girlfriend for follow-up on multiple concerns. She provides most of the history. She states that he was recently diagnosed with tonsillitis, and put on amoxicillin. He has been taking his medication as prescribed for the past 7 days without improvement in symptoms. She wonders how much of his difficulty swallowing is related to healing from the surgery and mood symptoms versus tonsillitis. No fever, chills, nausea, vomiting. He has been drinking 2 ensures per day and occasionally sips on soup. He has lost 10 pounds since previous appointment due to lack of p.o. intake.     His mood has improved since initiating Klonopin, however girlfriend states that he is still incredibly anxious. She states Amador Nissen has been really messed up since the surgery. \"    ROS  Review of Systems   Constitutional: Negative for activity change, appetite change, fatigue and fever. HENT: Positive for sore throat and trouble swallowing. Negative for congestion, ear pain, rhinorrhea and sinus pressure. Eyes: Negative for pain. Respiratory: Negative for cough, shortness of breath and wheezing. Cardiovascular: Negative for chest pain. Gastrointestinal: Negative for diarrhea, nausea and vomiting. Genitourinary: Negative for decreased urine volume. Musculoskeletal: Negative for myalgias. Neurological: Negative for headaches. Psychiatric/Behavioral: The patient is nervous/anxious. HISTORIES  Current Outpatient Medications on File Prior to Visit   Medication Sig Dispense Refill    DULoxetine (CYMBALTA) 30 MG extended release capsule Take 1 capsule by mouth daily 30 capsule 0    clonazePAM (KLONOPIN) 0.5 MG tablet Take 1 tablet by mouth 2 times daily as needed for Anxiety for up to 30 days. 60 tablet 0    phenol 1.4 % LIQD mouth spray       ibuprofen (ADVIL;MOTRIN) 800 MG tablet       ciprofloxacin-dexamethasone (CIPRODEX) 0.3-0.1 % otic suspension       hydrOXYzine (ATARAX) 10 MG tablet Take 2.5 tablets by mouth every 8 hours as needed for Anxiety 30 tablet 2    amoxicillin (AMOXIL) 500 MG capsule       albuterol (PROVENTIL) (5 MG/ML) 0.5% nebulizer solution Take 1 mL by nebulization 4 times daily as needed for Wheezing 120 each 3    ACETAMINOPHEN EXTRA STRENGTH 500 MG tablet        No current facility-administered medications on file prior to visit.       Past Medical History:   Diagnosis Date    Cancer [facility])      Patient Active Problem List   Diagnosis    Chronic bilateral low back pain without sciatica    Anxiety    Insomnia due to other mental disorder    Poor dentition    Class 1 obesity due to excess calories with serious comorbidity and body mass index (BMI) of 34.0 to 34.9 in adult    Primary squamous cell carcinoma of tongue (HCC)    Recurrent major depressive disorder (HCC)    Prediabetes       PE  Vitals:    02/22/22 1400   BP: 86/61   Site: Left Upper Arm   Position: Sitting   Cuff Size: Large Adult   Pulse: 121   Temp: 97 °F (36.1 °C)   TempSrc: Temporal   Weight: 140 lb 12.8 oz (63.9 kg)   Height: 5' 6\" (1.676 m)     Estimated body mass index is 22.73 kg/m² as calculated from the following:    Height as of this encounter: 5' 6\" (1.676 m). Weight as of this encounter: 140 lb 12.8 oz (63.9 kg). Physical Exam  Vitals reviewed. Constitutional:       General: He is not in acute distress. Appearance: Normal appearance. He is not ill-appearing. HENT:      Head: Normocephalic and atraumatic. Right Ear: Tympanic membrane normal.      Left Ear: Tympanic membrane normal.      Nose: No congestion. Mouth/Throat:      Mouth: Mucous membranes are moist.      Pharynx: Oropharynx is clear. Comments: Status post surgical resection of tongue healing well. No tonsillar hypertrophy or exudate visualized. Eyes:      Conjunctiva/sclera: Conjunctivae normal.      Pupils: Pupils are equal, round, and reactive to light. Cardiovascular:      Rate and Rhythm: Normal rate and regular rhythm. Pulses: Normal pulses. Heart sounds: Normal heart sounds. Pulmonary:      Effort: Pulmonary effort is normal.      Breath sounds: Normal breath sounds. No wheezing or rhonchi. Abdominal:      General: Abdomen is flat. Bowel sounds are normal.   Musculoskeletal:      Cervical back: Normal range of motion. Right lower leg: No edema. Left lower leg: No edema. Lymphadenopathy:      Cervical: No cervical adenopathy. Skin:     General: Skin is warm. Capillary Refill: Capillary refill takes less than 2 seconds. Coloration: Skin is not jaundiced or pale.    Neurological: Mental Status: He is alert. Psychiatric:         Behavior: Behavior normal.      Comments: Anxious, tearful and hugging girlfriend         Mary Beth Luciano, DO    This dictation was generated by voice recognition computer software. Although all attempts are made to edit the dictation for accuracy, there may be errors in the transcription that are not intended.

## 2022-02-25 ENCOUNTER — TELEPHONE (OUTPATIENT)
Dept: PRIMARY CARE CLINIC | Age: 32
End: 2022-02-25

## 2022-02-25 DIAGNOSIS — R13.13 PHARYNGEAL DYSPHAGIA: Primary | ICD-10-CM

## 2022-02-25 NOTE — TELEPHONE ENCOUNTER
[facility] from [facility] out pt therapy called and needs a referral for speech therapy for r13.13  Fr diagnosis

## 2022-03-01 ENCOUNTER — HOSPITAL ENCOUNTER (OUTPATIENT)
Dept: SPEECH THERAPY | Age: 32
Setting detail: THERAPIES SERIES
Discharge: HOME | End: 2022-03-01
Payer: COMMERCIAL

## 2022-03-01 PROCEDURE — 92610 EVALUATE SWALLOWING FUNCTION: CPT

## 2022-03-01 PROCEDURE — 92526 ORAL FUNCTION THERAPY: CPT

## 2022-03-01 NOTE — PROGRESS NOTES
Maine Medina    Dear Dr. Jefferson Garcia,    We had the pleasure of evaluating the following patient for speech therapy services at [facility]. A summary of our findings can be found in the initial assessment below. This includes our plan of care. If you have any questions or concerns regarding these findings, please do not hesitate to contact me at the office phone number checked above. Thank you for the referral.       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 The pt reported being in 10/10 chronic pain since his surgery to remove cancer of his right tongue base. The pt said that nothing has been able to take his pain way. The pt's girlfriend said that the pt is scheduled to see pain management on 3/3/22 for the first time. The pt's girlfriend also reported that more cancer was reportedly found above his tonsils on the right side this week. ONSET: 1/3/22    Recent Chest CT (21): IMPRESSION   \"No significant abnormality on chest CT\"    Modified Barium Swallow Study (22):    \"Assessment: Patient presents with oropharyngeal dysphagia secondary to perioral weakness, reduced hyolaryngeal elevation, delayed swallow initiation, poor bolus control and reduced BOT retraction, complicated by comorbidities resulting in observed laryngeal penetration of thin liquids and puree. Pt independently completes a throat clear and reswallow to clear penetrated puree, and a secondary swallow to clear penetrated thin liquid. Based on today's evaluation, recommend dysphagia 1 diet with thin liquids with intermittent throat clear and re-swallow. Of note, MBS was significantly limited d/t poor patient participation 2/2 throat pain. Pt taking very small volume for PO trials, and majority of bolus remained on anterior floor of mouth. Patient is at an increased risk of aspiration. \"    Hearing: WFL  Vision: WFL    [x] Patient history, allergies, meds reviewed. Medical chart reviewed. See intake form. Review Of Systems (ROS):  [x]Performed Review of systems (Integumentary, CardioPulmonary, Neurological) by intake and observation. Intake form has been scanned into medical record. Patient has been instructed to contact their primary care physician regarding ROS issues if not already being addressed at this time. Functional Outcome:   National Outcomes Measurement System (NOMS):     NOMS - Swallowing  Note: In Levels 3-5, some patients may meet only one of the \"and/or\" criteria listed. If you have difficulty deciding on the most appropriate level for an individual, use dietary level as the most important criterion if the dietary level is the result of swallow function rather than dentition only. Dietary levels at [facility] 6 and 7 should be judged only on swallow function, and any influence of poor dentition should be disregarded. []  Level 1 Individual is not able to swallow anything safely by mouth.  All nutrition and hydration is received through non-oral means (e.g., nasogastric tube, PEG)    []  Level 2 Individual is not able to swallow safely by mouth for nutrition and hydration, but may take some consistency with consistent maximal cues in therapy only. Alternative method of feeding required    []  Level 3  Alternative method of feeding required as individual takes less than 50% of nutrition and hydration by mouth, and/or swallowing is safe with consistent use of moderate cues to use compensatory strategies and/or requires maximum diet restriction    [x]  Level 4 Swallowing is safe, but usually requires moderate cues to use compensatory strategies, and/or the individual has moderate diet restrictions and /or still requires tube feeding and/or oral supplements    []  Level 5 Swallowing is safe with minimal diet restriction and/or occasionally requires minimal cueing to use compensatory strategies. The individual may occasionally self-cue. All nutrition and hydration needs are met by mouth at mealtime    []  Level 6 Swallowing is safe, and the individual eats and drinks independently and may rarely require minimal cueing. The individual usually self-cues when difficulty occurs. May need to avoid specific food items (e.g., popcorn and nuts), or require additional time (due to dysphasia)    []  Level 7 The individual's ability to eat independently is not limited by swallow function. Swallowing would be safe and efficient for all consistencies. Compensatory strategies are effectively used when needed       Diet levels/restrictions are defined on next page.   Please use levels as a guide in scoring this FCM    Swallowing: Dietary Levels/Restrictions  · Maximum Restrictions: Diet is two or more levels below a regular diet status and liquid consistency  · Moderate Restrictions: Diet is two or more levels below a regular diet status in either solid or liquid consistency (but not both), OR diet is one level below in both solid and liquid consistency   · Minimum Restrictions: Diet is one level below a regular diet status in solid or liquid consistency    Solids  · Regular: No restrictions  · Reduced One Level: Meats are cooked until soft, with not tough or stringy foods. Might include meats like meat loaf, baked fish, and soft chicken. Vegetables are cooked soft  · Reduced Two Levels: Meats are chopped or ground. Vegetables are one consistency (e.g., souffle, baked potato) or are mashed with a fork  · Reduced Three Levels: Meats and vegetables are pureed    Liquids  · Regular: Thin liquids; No restrictions  · Reduced One Level: Nectar, syrup; mildly thick    · Reduced Two Levels: Honey; moderately thick  · Reduced Three Levels: Pudding; extra thick       PAIN: Yes- 10/10 pain reported  Location: Right side of neck and right side of oral cavity; The pt reports that his MD is aware of this pain and that he is scheduled to see pain management on 3/3/22      DYSPHAGIA ASSESSMENT:     Prior Dysphagia History: Yes; The pt reportedly has been having dysphagia since his 1/3/22 surgery to remove cancer of the right     Patient Positioning: Upright in chair    Dentition: Adequate    Current Diet Level:  Solids: IDDSI 4 Puree Solids  Liquids: IDDSI 0 Thin Liquids  Medication: Crushed in puree    Medical record review and intake / patient interview:   Predisposing dysphagia risk factors: Hx of dysphagia and Hx of aspiration  Clinical signs of possible chronic dysphagia: weight loss, reduced PO intake, hx of dysphagia, hx of malnutrition, hx of aspiration and hx of tracheostomy  Precipitating dysphagia risk factors: recent intubation    Oral motor exam:  - Noted site of surgical intervention on right side of tongue; Reduced overall lingual ROM; Reduced jaw opening (trismus); Noted reduced movement of the velum, mild increase in movement on the left vs right side of velum; Cued cough was productive; Pt refused to audible swallow due to severe pain when swallowing.      Laryngeal function exam:   Secretions: Appears to grossly manage  Cough: Adequate  Speech: Dysarthric and apparently hypernasal    PO trials:   Ice:   IDDSI 0 (thin):   - 5cc bolus (tsp): no anterior bolus loss , suspect reduced/impaired A-P bolus transit, suspect premature bolus loss into pharynx, suspect delayed swallow onset, no clinical s/s of aspiration and dry vocal quality  - Cup: no anterior bolus loss , suspect reduced/impaired A-P bolus transit, suspect premature bolus loss into pharynx, suspect delayed swallow onset and no clinical s/s of aspiration  IDDSI 4 (puree): no anterior bolus loss , suspect reduced/impaired A-P bolus transit, suspect premature bolus loss into pharynx, suspect delayed swallow onset, prolonged oral phase and no clinical s/s of aspiration    Case History:  The pt reportedly has had L radial forearm free flap, STSG, quad scope, bilateral neck dissection, R leora glossectomy, trach, EGD (1/3). 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 Assessment/Impressions:  Clinical signs of oropharyngeal dysphagia likely subacute related to hx of dysphagia and co-morbidities. Swallow prognosis is good. Instrumental swallow study is not indicated. Given severity of laryngeal dysfunction, stable respiratory status and lack of aspiration noted on instrumental assessment, pt is safe for oral diet at this time  The pt demonstrates moderate-severe oropharyngeal dysphagia. The pt's reported 10/10 pain in the right throat and oral cavity significantly limiting today's assessment. The pt reported that his pain was worse when swallowing. He also reports feeling like this area is more swollen when swallowing. The evaluation revealed partial reduction in A-P bolus propulsion with apparent reduced bolus control and suspected premature pharyngeal entry. The pt needed 3 swallows per bite and sip.  With a thicker puree (pudding) the pt complained of the sensation of pharyngeal residue needing multiple drinks of liquid to clear this sensation. The pt has reportedly lost ~100 lbs in the past few months. However, most recently he has gain 6 lbs per his report. The pt is mostly taking oral supplements and soups. Swallowing exercises were initiated (see below). However, he was not able to tolerate all of these due to his reported pain. Management of the pt's pain may improve his tolerance of these exercises. Again, he is scheduled to see pain management in 2 days. Dysphagia treatment is recommended 1-2 x week for 8 weeks as tolerated. See the rest of this report for more details. Instrumentation: Not clinically indicated at this time.  Will monitor for need during scheduled follow-up sessions    Diet recommendation:   Solids: IDDSI 4 Puree Solids (Preferrably smoother purees)   Liquids: IDDSI 0 Thin Liquids  Meds: Meds crushed in puree as able    Risk management: upright for all intake, stay upright for at least 30 mins after intake, small bites/sips, alternate bites/sips and general aspiration precautions    Dysphagia Therapeutic Intervention:  [x]  Bolus control Exercises  [x]  Oral Motor Exercises  []  Gusman Water Protocol  []  Thermal Stimulation  []  Oral Care    []  Vital Stim/NMES  [x]  Laryngeal Exercises  [x]  Patient/Family Education  [x]  Pharyngeal Exercises  [x]  Therapeutic PO trials with SLP  [x]  Diet tolerance monitoring  []  Other:       Compensatory Swallowing Strategies: Upright for all intake, Remain upright at least 30 mins after intake, Small bites, Small sips, Alternate solids and liquids and See above      Prognosis/Rehab Potential:      []Excellent   [x]Good    []Fair   []Poor    Tolerance of evaluation/treatment:    []Excellent   []Good    [x]Fair   []Poor     Frequency/Duration:  # Days per week: [x] 1-2 days # Weeks: [] 1 week [] 5 weeks      [] 2 days   [] 2 weeks [] 6 weeks     [] 3 days   [] 3 weeks [] 7 weeks     [] 4 days   [] 4 weeks [x] 8 weeks      Therapeutic Interventions:  []  Speech-Language Evaluation/Treatment  []  Cognitive-Linguistic Skills Development  []  Voice Evaluation and Treatment  []  Inez Stare Voice Treatment (LSVT LOUD)  [x]  Dysphagia Evaluation/Treatment  []  Modified Barium Swallow Study  []  Fiberoptic Endoscopic Evaluation of Swallowing (FEES)  []  Videostroboscopy (VLS)  []  Dysphagia Treatment via Neuromuscular Electrical Stimulation (NMES)  []  Deep Pharyngeal Neuromuscular Stimulation (DPNS)  []  Evaluation, Modifications, and Training in Voice Prosthetic  []  Evaluation for Speech-Generating Augmentative and Alternative Communication Device   []  Therapeutic Services for the use of Speech-Generating Device  [x]  Myofascial release (manual) massage for the voice &/or swallow  [x]  Use of surface EMG (sEMG) for the treatment of oropharyngeal dysphagia  []  Other:    HEP instruction: Written HEP instructions provided and reviewed:  Pt verbalized understanding and agreement. GOALS:  Patient stated goal: To swallow normal again  [] Progressing: [] Met: [] Not Met: [] Adjusted    Therapist goals for Patient:   Short Term Goals:   1. The patient will tolerate recommended diet (purees and thins) with no clinical s/s of aspiration or other form of significant dysphagia 10/10  [] Progressing: [] Met: [] Not Met: [] Adjusted    2. The patient will tolerate trials of soft solids with no clinical s/s of aspiration or other form of significant dysphagia 5/5  [] Progressing: [] Met: [] Not Met: [] Adjusted    3. The patient will independently recall/perform recommended compensatory strategies  [] Progressing: [] Met: [] Not Met: [] Adjusted    Long Term Goals:   1. The patient will tolerate least restrictive diet with no clinical s/s of aspiration or worsening respiratory/pulmonary status  [] Progressing: [] Met: [] Not Met: [] Adjusted       Oral Motor Exercises:  General lingual ROM and stretching exercises were trained      Swallowing Exercises:   The following exercises were trained:  · Gargling  · Saying \"kiCK\" with hard 2nd K  · Swallowing while pressing tongue to roof of mouth  · Falsetto EE  · Shikha  · Pressing tongue to roof of mouth and holding  · Mendelsohn Maneuver      C-SSRS Triggered by Intake questionnaire (Past 2 wk assessment):   [] No, Questionnaire did not trigger screening. [x] Yes, Patient intake triggered further evaluation      [x] C-SSRS Screening completed; Pt answered \"yes\" to questions 1 and 2; He answered \"No\" to questions 3, 4, 5 and 6. The pt is already seeing behavioral health. He was told to continue these services and to contact them if he experiences any negative changes before his next behavioral health appointment. [] PCP notified via Plan of Care  [] Emergency services notified      Total Treatment Time / Charges     Time in Time out Total Time / units   Speech Sound/Lang Comp Eval         Swallow Eval  1356  1420  24 min / 1 unit   Cognitive Tx      Speech Tx      Dysphagia Tx 1420 1440 20 min / 1 unit       Thank you for this referral. Please do not hesitate to call with any questions or concerns. Electronically signed by:  Jaciel Rodriguez, [address] [place], [address] [id number]  Speech-Language Pathologist  Phone #: [phone]  Fax #: [phone]      Note: If patient does not return for scheduled/ recommended follow up visits, this note will serve as a discharge from care along with most recent update on progress.

## 2022-03-02 NOTE — CARE COORDINATION
Speech Therapy    Patient: Rachelle Lambert                                    : 1990                                  MRN: [id number]  Referring Physician: Marilee Farmer DO                   This therapist was contacted by Speech Therapist Nicholas Shaw from [address] [place] [address]. The pt reportedly was already scheduled with Speech Therapy at [address] [place] [address] unknown to this therapist. Due to the pt already being seen at [address] [place] [address] for his radiation treatment, it was decided to D/C Speech Therapy at [address] in order to allow the pt to continue the therapy at [address] [place] [address]. The therapist attempted to contact the pt about this matter but the pt's phone did not allow the therapist to leave a message. Will also contact the referring MD re: this matter. D/C Speech Therapy services at [address]       Chadd Patel MA, [address] [place] [address]  [id number]  Speech-Language Pathologist  Phone #: [phone]  Fax #: [phone]

## 2022-03-04 NOTE — CARE COORDINATION
Speech Therapy     Patient: Maverick Austin                                    : 1990                                  MRN: [id number]  Referring JOURDAN Fernandez                The Speech Therapist contacted Dr. Leah Chaparro office and left a message for the doctor explaining the reason while the therapist has discharged this patient and am transferring his Speech Therapy care to [address] [place] [address].        Luke Coleman [place], [address] [id number]  Speech-Language Pathologist  Phone #: [phone]  Fax #: [phone]

## 2022-03-07 ENCOUNTER — TELEPHONE (OUTPATIENT)
Dept: PRIMARY CARE CLINIC | Age: 32
End: 2022-03-07

## 2022-03-07 NOTE — TELEPHONE ENCOUNTER
----- Message from Nory Cannonmary annduke sent at 3/4/2022  4:15 PM EST -----  Subject: Message to Provider    QUESTIONS  Information for Provider? Speech Therapist would like provider to contact   him bc he might be transferring him to another provider at [facility] pt is not   compliant with his radiation. [phone]  ---------------------------------------------------------------------------  --------------  Mago FORD  What is the best way for the office to contact you? OK to leave message on   voicemail  Preferred Call Back Phone Number? [phone]  ---------------------------------------------------------------------------  --------------  SCRIPT ANSWERS  Relationship to Patient? Third Party  Representative Name?  [facility] Speech Therapist

## 2022-03-18 ENCOUNTER — TELEPHONE (OUTPATIENT)
Dept: PRIMARY CARE CLINIC | Age: 32
End: 2022-03-18

## 2022-03-21 ENCOUNTER — TELEPHONE (OUTPATIENT)
Dept: PSYCHIATRY | Age: 32
End: 2022-03-21

## 2022-03-22 ENCOUNTER — PATIENT MESSAGE (OUTPATIENT)
Dept: PSYCHIATRY | Age: 32
End: 2022-03-22

## 2022-03-22 DIAGNOSIS — F41.0 PANIC DISORDER: ICD-10-CM

## 2022-03-24 RX ORDER — CLONAZEPAM 0.5 MG/1
0.5 TABLET ORAL 2 TIMES DAILY PRN
Qty: 60 TABLET | Refills: 0 | Status: SHIPPED | OUTPATIENT
Start: 2022-03-24 | End: 2022-03-25

## 2022-03-24 RX ORDER — DULOXETIN HYDROCHLORIDE 30 MG/1
30 CAPSULE, DELAYED RELEASE ORAL DAILY
Qty: 30 CAPSULE | Refills: 2 | Status: ON HOLD
Start: 2022-03-24 | End: 2022-06-30 | Stop reason: HOSPADM

## 2022-03-25 RX ORDER — CLONAZEPAM 0.5 MG/1
0.5 TABLET ORAL 2 TIMES DAILY PRN
Qty: 60 TABLET | Refills: 0 | Status: ON HOLD
Start: 2022-03-25 | End: 2022-06-30 | Stop reason: HOSPADM

## 2022-04-04 ENCOUNTER — PATIENT MESSAGE (OUTPATIENT)
Dept: PSYCHIATRY | Age: 32
End: 2022-04-04

## 2022-04-14 NOTE — TELEPHONE ENCOUNTER
From: Damien Fuentes  To: Dr. Dimitri Caldwell: 4/4/2022 6:12 AM EDT  Subject: Asap    Have I Benjy Sheets kicked out of u and Marilees office ? Me and my girlfriend both got letters saying we was being dismissed ! Witch is not fair because I told the office staff that my cancer care apts come first and foremost right now. And I asked that if I miss any apts for it to be excused for right now. Could u pls let me know if its still ok to make apts with u and mrs Sahra Salazar ? For me and Nuris Monroe my girlfriend ?
Hi Dr. Dellie Denver ,   Please see message below. The message looks like it was sent to Dr. Su Diaz as well. How would you like to proceed?
Patient is dismissed per our standard no show/late cancellation policy. Please cancel upcoming appointment on 4/11. He should establish care with a different provider.
Patient's appt on 4/11 has been cancelled.
Per Dr. Sharona Caldwell, advised patient to check with his oncologist for medication as his oncologist has prescribed meds for him in the past.  Also, advised patient to check with his oncologist to see if he/she can assist patient in locating a group to help him coupe.     Message sent to patient in [address]
Wheelchair/Stroller

## 2022-04-19 NOTE — TELEPHONE ENCOUNTER
albuterol (PROVENTIL) (5 MG/ML) 0.5% nebulizer solution     Pharmacy needs the one that is already mix not this one. No

## 2022-06-08 ENCOUNTER — HOSPITAL ENCOUNTER (INPATIENT)
Age: 32
LOS: 15 days | Discharge: ANOTHER HOSPITAL | DRG: 058 | End: 2022-06-23
Attending: STUDENT IN AN ORGANIZED HEALTH CARE EDUCATION/TRAINING PROGRAM | Admitting: STUDENT IN AN ORGANIZED HEALTH CARE EDUCATION/TRAINING PROGRAM
Payer: COMMERCIAL

## 2022-06-08 PROBLEM — G72.81 CRITICAL ILLNESS MYOPATHY: Status: ACTIVE | Noted: 2022-06-08

## 2022-06-08 LAB
ANION GAP SERPL CALCULATED.3IONS-SCNC: 8 MMOL/L (ref 3–16)
BUN BLDV-MCNC: 19 MG/DL (ref 7–20)
CALCIUM SERPL-MCNC: 8.6 MG/DL (ref 8.3–10.6)
CHLORIDE BLD-SCNC: 94 MMOL/L (ref 99–110)
CO2: 25 MMOL/L (ref 21–32)
CREAT SERPL-MCNC: 0.6 MG/DL (ref 0.9–1.3)
GFR AFRICAN AMERICAN: >60
GFR NON-AFRICAN AMERICAN: >60
GLUCOSE BLD-MCNC: 90 MG/DL (ref 70–99)
POTASSIUM SERPL-SCNC: 5.1 MMOL/L (ref 3.5–5.1)
SODIUM BLD-SCNC: 127 MMOL/L (ref 136–145)

## 2022-06-08 PROCEDURE — 80048 BASIC METABOLIC PNL TOTAL CA: CPT

## 2022-06-08 PROCEDURE — 6370000000 HC RX 637 (ALT 250 FOR IP): Performed by: STUDENT IN AN ORGANIZED HEALTH CARE EDUCATION/TRAINING PROGRAM

## 2022-06-08 PROCEDURE — 36415 COLL VENOUS BLD VENIPUNCTURE: CPT

## 2022-06-08 PROCEDURE — 6360000002 HC RX W HCPCS: Performed by: STUDENT IN AN ORGANIZED HEALTH CARE EDUCATION/TRAINING PROGRAM

## 2022-06-08 PROCEDURE — 2580000003 HC RX 258: Performed by: STUDENT IN AN ORGANIZED HEALTH CARE EDUCATION/TRAINING PROGRAM

## 2022-06-08 PROCEDURE — 6370000000 HC RX 637 (ALT 250 FOR IP)

## 2022-06-08 PROCEDURE — 1280000000 HC REHAB R&B

## 2022-06-08 RX ORDER — ALBUTEROL SULFATE 2.5 MG/3ML
2.5 SOLUTION RESPIRATORY (INHALATION) EVERY 6 HOURS PRN
Status: DISCONTINUED | OUTPATIENT
Start: 2022-06-08 | End: 2022-06-23 | Stop reason: HOSPADM

## 2022-06-08 RX ORDER — SENNA AND DOCUSATE SODIUM 50; 8.6 MG/1; MG/1
2 TABLET, FILM COATED ORAL NIGHTLY
Status: DISCONTINUED | OUTPATIENT
Start: 2022-06-08 | End: 2022-06-23 | Stop reason: HOSPADM

## 2022-06-08 RX ORDER — ACETAMINOPHEN 325 MG/1
650 TABLET ORAL EVERY 6 HOURS PRN
Status: DISCONTINUED | OUTPATIENT
Start: 2022-06-08 | End: 2022-06-23 | Stop reason: HOSPADM

## 2022-06-08 RX ORDER — GABAPENTIN 100 MG/1
100 CAPSULE ORAL 3 TIMES DAILY
Status: DISCONTINUED | OUTPATIENT
Start: 2022-06-08 | End: 2022-06-23 | Stop reason: HOSPADM

## 2022-06-08 RX ORDER — LANOLIN ALCOHOL/MO/W.PET/CERES
100 CREAM (GRAM) TOPICAL DAILY
Status: DISCONTINUED | OUTPATIENT
Start: 2022-06-09 | End: 2022-06-23 | Stop reason: HOSPADM

## 2022-06-08 RX ORDER — MECOBALAMIN 5000 MCG
5 TABLET,DISINTEGRATING ORAL NIGHTLY
Status: DISCONTINUED | OUTPATIENT
Start: 2022-06-08 | End: 2022-06-23 | Stop reason: HOSPADM

## 2022-06-08 RX ORDER — BISACODYL 10 MG
10 SUPPOSITORY, RECTAL RECTAL DAILY PRN
Status: DISCONTINUED | OUTPATIENT
Start: 2022-06-08 | End: 2022-06-23 | Stop reason: HOSPADM

## 2022-06-08 RX ORDER — OXYCODONE HYDROCHLORIDE 5 MG/1
5 TABLET ORAL EVERY 4 HOURS PRN
Status: DISCONTINUED | OUTPATIENT
Start: 2022-06-08 | End: 2022-06-14

## 2022-06-08 RX ORDER — FOLIC ACID 1 MG/1
1 TABLET ORAL DAILY
Status: DISCONTINUED | OUTPATIENT
Start: 2022-06-09 | End: 2022-06-23 | Stop reason: HOSPADM

## 2022-06-08 RX ORDER — CLONAZEPAM 0.5 MG/1
0.5 TABLET ORAL DAILY PRN
Status: DISCONTINUED | OUTPATIENT
Start: 2022-06-08 | End: 2022-06-23 | Stop reason: HOSPADM

## 2022-06-08 RX ORDER — DOXAZOSIN 2 MG/1
1 TABLET ORAL NIGHTLY
Status: DISCONTINUED | OUTPATIENT
Start: 2022-06-08 | End: 2022-06-15

## 2022-06-08 RX ORDER — OXYMETAZOLINE HYDROCHLORIDE 0.05 G/100ML
2 SPRAY NASAL 2 TIMES DAILY PRN
Status: ACTIVE | OUTPATIENT
Start: 2022-06-11 | End: 2022-06-13

## 2022-06-08 RX ADMIN — CLONAZEPAM 0.5 MG: 0.5 TABLET ORAL at 23:24

## 2022-06-08 RX ADMIN — Medication 5 MG: at 23:24

## 2022-06-08 RX ADMIN — DOXAZOSIN 1 MG: 2 TABLET ORAL at 23:24

## 2022-06-08 RX ADMIN — OXYCODONE HYDROCHLORIDE 5 MG: 5 TABLET ORAL at 23:24

## 2022-06-08 RX ADMIN — APIXABAN 5 MG: 5 TABLET, FILM COATED ORAL at 23:24

## 2022-06-08 RX ADMIN — CEFTAROLINE FOSAMIL 600 MG: 600 POWDER, FOR SOLUTION INTRAVENOUS at 23:05

## 2022-06-08 RX ADMIN — Medication: at 23:25

## 2022-06-08 RX ADMIN — GABAPENTIN 100 MG: 100 CAPSULE ORAL at 23:24

## 2022-06-08 ASSESSMENT — PAIN SCALES - GENERAL
PAINLEVEL_OUTOF10: 7
PAINLEVEL_OUTOF10: 7

## 2022-06-09 ENCOUNTER — APPOINTMENT (OUTPATIENT)
Dept: GENERAL RADIOLOGY | Age: 32
DRG: 058 | End: 2022-06-09
Attending: STUDENT IN AN ORGANIZED HEALTH CARE EDUCATION/TRAINING PROGRAM
Payer: COMMERCIAL

## 2022-06-09 ENCOUNTER — APPOINTMENT (OUTPATIENT)
Dept: INTERVENTIONAL RADIOLOGY/VASCULAR | Age: 32
DRG: 058 | End: 2022-06-09
Attending: STUDENT IN AN ORGANIZED HEALTH CARE EDUCATION/TRAINING PROGRAM
Payer: COMMERCIAL

## 2022-06-09 PROBLEM — E43 SEVERE MALNUTRITION (HCC): Chronic | Status: ACTIVE | Noted: 2022-06-09

## 2022-06-09 LAB
ANION GAP SERPL CALCULATED.3IONS-SCNC: 8 MMOL/L (ref 3–16)
BASOPHILS ABSOLUTE: 0 K/UL (ref 0–0.2)
BASOPHILS RELATIVE PERCENT: 1.3 %
BUN BLDV-MCNC: 19 MG/DL (ref 7–20)
CALCIUM SERPL-MCNC: 8.5 MG/DL (ref 8.3–10.6)
CHLORIDE BLD-SCNC: 97 MMOL/L (ref 99–110)
CHLORIDE URINE RANDOM: 69 MMOL/L
CO2: 25 MMOL/L (ref 21–32)
CREAT SERPL-MCNC: 0.6 MG/DL (ref 0.9–1.3)
EOSINOPHILS ABSOLUTE: 0.1 K/UL (ref 0–0.6)
EOSINOPHILS RELATIVE PERCENT: 2.6 %
GFR AFRICAN AMERICAN: >60
GFR NON-AFRICAN AMERICAN: >60
GLUCOSE BLD-MCNC: 110 MG/DL (ref 70–99)
HCT VFR BLD CALC: 24 % (ref 40.5–52.5)
HEMOGLOBIN: 7.9 G/DL (ref 13.5–17.5)
INR BLD: 1.6 (ref 0.87–1.14)
LYMPHOCYTES ABSOLUTE: 0.2 K/UL (ref 1–5.1)
LYMPHOCYTES RELATIVE PERCENT: 5.5 %
MCH RBC QN AUTO: 28.9 PG (ref 26–34)
MCHC RBC AUTO-ENTMCNC: 33 G/DL (ref 31–36)
MCV RBC AUTO: 87.5 FL (ref 80–100)
MONOCYTES ABSOLUTE: 0.5 K/UL (ref 0–1.3)
MONOCYTES RELATIVE PERCENT: 13.8 %
NEUTROPHILS ABSOLUTE: 2.6 K/UL (ref 1.7–7.7)
NEUTROPHILS RELATIVE PERCENT: 76.8 %
OSMOLALITY URINE: 466 MOSM/KG (ref 390–1070)
PDW BLD-RTO: 15.5 % (ref 12.4–15.4)
PLATELET # BLD: 157 K/UL (ref 135–450)
PMV BLD AUTO: 7.3 FL (ref 5–10.5)
POTASSIUM REFLEX MAGNESIUM: 5.2 MMOL/L (ref 3.5–5.1)
POTASSIUM, UR: 39.2 MMOL/L
PREALBUMIN: 14.5 MG/DL (ref 20–40)
PROTHROMBIN TIME: 18.9 SEC (ref 11.7–14.5)
RBC # BLD: 2.74 M/UL (ref 4.2–5.9)
SODIUM BLD-SCNC: 130 MMOL/L (ref 136–145)
SODIUM URINE: 82 MMOL/L
T4 FREE: 1.3 NG/DL (ref 0.9–1.8)
TSH REFLEX: 5.89 UIU/ML (ref 0.27–4.2)
URIC ACID, SERUM: 5.2 MG/DL (ref 3.5–7.2)
WBC # BLD: 3.3 K/UL (ref 4–11)

## 2022-06-09 PROCEDURE — 6360000002 HC RX W HCPCS: Performed by: STUDENT IN AN ORGANIZED HEALTH CARE EDUCATION/TRAINING PROGRAM

## 2022-06-09 PROCEDURE — 97535 SELF CARE MNGMENT TRAINING: CPT

## 2022-06-09 PROCEDURE — 2580000003 HC RX 258: Performed by: STUDENT IN AN ORGANIZED HEALTH CARE EDUCATION/TRAINING PROGRAM

## 2022-06-09 PROCEDURE — 92610 EVALUATE SWALLOWING FUNCTION: CPT

## 2022-06-09 PROCEDURE — 97116 GAIT TRAINING THERAPY: CPT

## 2022-06-09 PROCEDURE — C1769 GUIDE WIRE: HCPCS

## 2022-06-09 PROCEDURE — 82436 ASSAY OF URINE CHLORIDE: CPT

## 2022-06-09 PROCEDURE — 80048 BASIC METABOLIC PNL TOTAL CA: CPT

## 2022-06-09 PROCEDURE — 84133 ASSAY OF URINE POTASSIUM: CPT

## 2022-06-09 PROCEDURE — 83935 ASSAY OF URINE OSMOLALITY: CPT

## 2022-06-09 PROCEDURE — 97167 OT EVAL HIGH COMPLEX 60 MIN: CPT

## 2022-06-09 PROCEDURE — 71045 X-RAY EXAM CHEST 1 VIEW: CPT

## 2022-06-09 PROCEDURE — 85025 COMPLETE CBC W/AUTO DIFF WBC: CPT

## 2022-06-09 PROCEDURE — 36573 INSJ PICC RS&I 5 YR+: CPT

## 2022-06-09 PROCEDURE — 84134 ASSAY OF PREALBUMIN: CPT

## 2022-06-09 PROCEDURE — 02HV33Z INSERTION OF INFUSION DEVICE INTO SUPERIOR VENA CAVA, PERCUTANEOUS APPROACH: ICD-10-PCS | Performed by: STUDENT IN AN ORGANIZED HEALTH CARE EDUCATION/TRAINING PROGRAM

## 2022-06-09 PROCEDURE — 92523 SPEECH SOUND LANG COMPREHEN: CPT

## 2022-06-09 PROCEDURE — 85610 PROTHROMBIN TIME: CPT

## 2022-06-09 PROCEDURE — 6370000000 HC RX 637 (ALT 250 FOR IP): Performed by: STUDENT IN AN ORGANIZED HEALTH CARE EDUCATION/TRAINING PROGRAM

## 2022-06-09 PROCEDURE — 97163 PT EVAL HIGH COMPLEX 45 MIN: CPT

## 2022-06-09 PROCEDURE — 36415 COLL VENOUS BLD VENIPUNCTURE: CPT

## 2022-06-09 PROCEDURE — 97110 THERAPEUTIC EXERCISES: CPT

## 2022-06-09 PROCEDURE — 84550 ASSAY OF BLOOD/URIC ACID: CPT

## 2022-06-09 PROCEDURE — 84443 ASSAY THYROID STIM HORMONE: CPT

## 2022-06-09 PROCEDURE — 74018 RADEX ABDOMEN 1 VIEW: CPT

## 2022-06-09 PROCEDURE — 92526 ORAL FUNCTION THERAPY: CPT

## 2022-06-09 PROCEDURE — 84300 ASSAY OF URINE SODIUM: CPT

## 2022-06-09 PROCEDURE — 84439 ASSAY OF FREE THYROXINE: CPT

## 2022-06-09 PROCEDURE — 97530 THERAPEUTIC ACTIVITIES: CPT

## 2022-06-09 PROCEDURE — 1280000000 HC REHAB R&B

## 2022-06-09 RX ORDER — BISACODYL 10 MG
10 SUPPOSITORY, RECTAL RECTAL DAILY PRN
COMMUNITY
Start: 2022-06-08

## 2022-06-09 RX ORDER — AMOXICILLIN 250 MG
2 CAPSULE ORAL NIGHTLY
COMMUNITY
Start: 2022-06-08

## 2022-06-09 RX ORDER — SODIUM CHLORIDE 9 MG/ML
25 INJECTION, SOLUTION INTRAVENOUS PRN
Status: DISCONTINUED | OUTPATIENT
Start: 2022-06-09 | End: 2022-06-23 | Stop reason: HOSPADM

## 2022-06-09 RX ORDER — OXYCODONE HYDROCHLORIDE 5 MG/1
5 TABLET ORAL EVERY 6 HOURS PRN
Status: ON HOLD | COMMUNITY
Start: 2022-06-08 | End: 2022-06-30 | Stop reason: HOSPADM

## 2022-06-09 RX ORDER — ECHINACEA PURPUREA EXTRACT 125 MG
1 TABLET ORAL PRN
COMMUNITY
Start: 2022-06-08

## 2022-06-09 RX ORDER — TERAZOSIN 1 MG/1
1 CAPSULE ORAL NIGHTLY
Status: ON HOLD | COMMUNITY
Start: 2022-06-08 | End: 2022-06-30 | Stop reason: HOSPADM

## 2022-06-09 RX ORDER — LANOLIN ALCOHOL/MO/W.PET/CERES
100 CREAM (GRAM) TOPICAL DAILY
COMMUNITY
Start: 2022-06-09 | End: 2022-07-09

## 2022-06-09 RX ORDER — LANOLIN ALCOHOL/MO/W.PET/CERES
6 CREAM (GRAM) TOPICAL NIGHTLY
COMMUNITY

## 2022-06-09 RX ORDER — LIDOCAINE HYDROCHLORIDE 10 MG/ML
5 INJECTION, SOLUTION INFILTRATION; PERINEURAL ONCE
Status: DISCONTINUED | OUTPATIENT
Start: 2022-06-09 | End: 2022-06-23 | Stop reason: HOSPADM

## 2022-06-09 RX ORDER — SODIUM HYPOCHLORITE 1.25 MG/ML
SOLUTION TOPICAL DAILY
Status: DISCONTINUED | OUTPATIENT
Start: 2022-06-09 | End: 2022-06-21

## 2022-06-09 RX ORDER — LIDOCAINE HYDROCHLORIDE 20 MG/ML
SOLUTION OROPHARYNGEAL
COMMUNITY
Start: 2022-04-26

## 2022-06-09 RX ORDER — OXYMETAZOLINE HYDROCHLORIDE 0.05 G/100ML
2 SPRAY NASAL 2 TIMES DAILY PRN
Status: ON HOLD | COMMUNITY
Start: 2022-06-08 | End: 2022-06-30 | Stop reason: HOSPADM

## 2022-06-09 RX ORDER — SODIUM CHLORIDE 0.9 % (FLUSH) 0.9 %
5-40 SYRINGE (ML) INJECTION PRN
Status: DISCONTINUED | OUTPATIENT
Start: 2022-06-09 | End: 2022-06-23 | Stop reason: HOSPADM

## 2022-06-09 RX ORDER — GABAPENTIN 100 MG/1
100 CAPSULE ORAL 3 TIMES DAILY
Status: ON HOLD | COMMUNITY
Start: 2022-06-08 | End: 2022-06-30 | Stop reason: SDUPTHER

## 2022-06-09 RX ORDER — HYDROPHILIC CREAM
PASTE (GRAM) TOPICAL DAILY
Status: DISCONTINUED | OUTPATIENT
Start: 2022-06-09 | End: 2022-06-10 | Stop reason: SDUPTHER

## 2022-06-09 RX ORDER — SODIUM CHLORIDE 0.9 % (FLUSH) 0.9 %
5-40 SYRINGE (ML) INJECTION EVERY 12 HOURS SCHEDULED
Status: DISCONTINUED | OUTPATIENT
Start: 2022-06-09 | End: 2022-06-23 | Stop reason: HOSPADM

## 2022-06-09 RX ORDER — FOLIC ACID 1 MG/1
1 TABLET ORAL DAILY
COMMUNITY
Start: 2022-06-09

## 2022-06-09 RX ORDER — ACETAMINOPHEN 325 MG/1
650 TABLET ORAL EVERY 6 HOURS PRN
COMMUNITY

## 2022-06-09 RX ADMIN — OXYCODONE HYDROCHLORIDE 5 MG: 5 TABLET ORAL at 22:48

## 2022-06-09 RX ADMIN — CLONAZEPAM 0.5 MG: 0.5 TABLET ORAL at 22:49

## 2022-06-09 RX ADMIN — OXYCODONE HYDROCHLORIDE 5 MG: 5 TABLET ORAL at 16:56

## 2022-06-09 RX ADMIN — SODIUM CHLORIDE, PRESERVATIVE FREE 10 ML: 5 INJECTION INTRAVENOUS at 22:50

## 2022-06-09 RX ADMIN — DOCUSATE SODIUM AND SENNOSIDES 2 TABLET: 8.6; 5 TABLET, FILM COATED ORAL at 22:48

## 2022-06-09 RX ADMIN — OXYCODONE HYDROCHLORIDE 5 MG: 5 TABLET ORAL at 13:15

## 2022-06-09 RX ADMIN — NYSTATIN 500000 UNITS: 100000 SUSPENSION ORAL at 12:21

## 2022-06-09 RX ADMIN — NYSTATIN 500000 UNITS: 100000 SUSPENSION ORAL at 22:48

## 2022-06-09 RX ADMIN — GABAPENTIN 100 MG: 100 CAPSULE ORAL at 22:49

## 2022-06-09 RX ADMIN — DOXAZOSIN 1 MG: 2 TABLET ORAL at 22:49

## 2022-06-09 RX ADMIN — GABAPENTIN 100 MG: 100 CAPSULE ORAL at 13:14

## 2022-06-09 RX ADMIN — CEFTAROLINE FOSAMIL 600 MG: 600 POWDER, FOR SOLUTION INTRAVENOUS at 06:42

## 2022-06-09 RX ADMIN — CEFTAROLINE FOSAMIL 600 MG: 600 POWDER, FOR SOLUTION INTRAVENOUS at 17:05

## 2022-06-09 RX ADMIN — SODIUM CHLORIDE, PRESERVATIVE FREE 10 ML: 5 INJECTION INTRAVENOUS at 17:17

## 2022-06-09 RX ADMIN — NYSTATIN 500000 UNITS: 100000 SUSPENSION ORAL at 16:57

## 2022-06-09 RX ADMIN — APIXABAN 5 MG: 5 TABLET, FILM COATED ORAL at 22:49

## 2022-06-09 RX ADMIN — CEFTAROLINE FOSAMIL 600 MG: 600 POWDER, FOR SOLUTION INTRAVENOUS at 23:13

## 2022-06-09 RX ADMIN — Medication 5 MG: at 22:49

## 2022-06-09 ASSESSMENT — PAIN SCALES - GENERAL
PAINLEVEL_OUTOF10: 10
PAINLEVEL_OUTOF10: 7
PAINLEVEL_OUTOF10: 10
PAINLEVEL_OUTOF10: 8

## 2022-06-09 ASSESSMENT — PAIN DESCRIPTION - DESCRIPTORS: DESCRIPTORS: SHARP

## 2022-06-09 ASSESSMENT — PAIN - FUNCTIONAL ASSESSMENT: PAIN_FUNCTIONAL_ASSESSMENT: PREVENTS OR INTERFERES WITH MANY ACTIVE NOT PASSIVE ACTIVITIES

## 2022-06-09 ASSESSMENT — PAIN DESCRIPTION - LOCATION: LOCATION: COCCYX

## 2022-06-09 NOTE — PROGRESS NOTES
Physical Therapy  Facility/Department: [facility] AR  Rehabilitation Physical Therapy Initial Assessment    NAME: Polina Callahan  : 1990 (28 y.o.)  MRN: [id number]  CODE STATUS: Full Code    Date of Service: 22      Past Medical History:   Diagnosis Date    Cancer [facility])      No past surgical history on file. Chart Reviewed: Yes  Patient assessed for rehabilitation services?: Yes  Family / Caregiver Present: No  Referring Practitioner: Oneal Weber MD  Referral Date : 22  Diagnosis: CIM  General Comment  Comments: RN cleared pt to participate and provided pt with pain medication during session per pt request    Restrictions:  Restrictions/Precautions: NPO;Isolation; Fall Risk;General Precautions  Position Activity Restriction  Other position/activity restrictions: antonio, PEG, NPO, MRSA contact precautions     SUBJECTIVE  Subjective: pt reports 10/10 throat pain, RN notified and address pts needs     Prior Level of Function:  Social/Functional History  Lives With: Significant other (elderly roommate)  Type of Home: Apartment  Home Layout: Two level  Home Access: Level entry  Bathroom Shower/Tub: Tub/Shower unit,Shower chair with back  Bathroom Toilet: Standard  Bathroom Equipment: Grab bars around toilet  Home Equipment: Cane, quad  ADL Assistance: [address]: Independent  Homemaking Responsibilities: Yes  Ambulation Assistance: Independent  Transfer Assistance: Independent  Active : No  Patient's  Info: pt reported prior to January he was driving  Occupation: Full time employment  Type of Occupation:  at [employer], [employer]–1983 13: \"I don't know\" when asked by SLP  Additional Comments: Pt states that girlfriend is able to provide 24HR physical assistance if needed. pt states him and GF live with elderly roommate whom he also assist with household tasks and physically (does not state how).       OBJECTIVE  Vision  Vision: Within Functional Limits    Hearing  Hearing: Within functional limits         ROM       Strength  Strength RLE  Strength RLE: Exception  Comment: grossly 2+/5  Strength LLE  Strength LLE: Exception  Comment: grossly 2+/5    Quality of Movement       Sensation  Overall Sensation Status: Impaired (intact light touch L1-S2, but reports numbness in B feet)    Functional Mobility  Bed mobility  Supine to Sit: Contact guard assistance  Sit to Supine: Contact guard assistance  Transfers  Sit to Stand: Minimal Assistance  Stand to sit: Minimal Assistance  Bed to Chair: Moderate assistance  Stand Pivot Transfers: Moderate Assistance  Car Transfer: Minimal Assistance  Comment: stand pivot EOB to w/c with mod A. sit to stand w/c to HHA with min A x 2 reps. stand pivot w/c <> Car with min A. stand pviot w/c to EOB with min A. no AD used for transfers. Balance  Sitting - Static: Fair;+  Sitting - Dynamic: Fair  Standing - Static: Fair;-  Standing - Dynamic: Poor  Comments: mulitple LOB posteriorly with mod-max A to correct while in standing. pt sat EOB x 3-5 min with CGA-SBA to maintain self in midline.     Environmental Mobility  Ambulation  Surface: level tile  Device: No Device;Hand-Held Assist  Other Apparatus: Wheelchair follow  Assistance: 2 Person assistance;Dependent/Total  Quality of Gait: feet anterior to shoulders resulting in posterior COG, narrow JOE, decreased swing phase , UEs in high guard position  Distance: 30 ft  Comments: w/c follow for safety, B HHA  Wheelchair Activities  Wheelchair Parts Management: Yes  Left Brakes Level of Assistance: Stand by assistance  Right Brakes Level of Assistance: Stand by Assist  Propulsion: Yes  Propulsion 1  Propulsion: Manual  Level: Level Tile  Method: RUE;RLE  Level of Assistance: Contact guard assistance  Description/ Details: decreased UE movement resulting in decreased effeciency, fatigues quickly  Distance: 15 ft         ASSESSMENT  Vitals  Heart Rate: 91  Heart Rate Source: Monitor  BP: (!) 98/59  BP Location: Left upper arm  MAP (Calculated): 72  SpO2: 95 %  O2 Device: None (Room air)    Activity Tolerance  Activity Tolerance: Patient limited by pain; Patient limited by fatigue;Patient limited by endurance    Assessment  Assessment: pt is 28year old male admitted to ARU with dx of critical illness myopathy. 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 He was found to have persistent MRSA bacteremia with concern for infected port, which was removed. His hospital course was complicated by right hydropneumothorax with worsening hypoxia requiring multiple intubations and chest tube placements. He underwent right internal jugular and right atrial thrombectomy due to concern that this was causing ongoing bacteremia. ID was consulted and he is to remain on IV ceftaroline with plan for long term treatment until 7/2/22. pt reports being ind with no AD prior to admission for mobilitym works full time and assists in caring for elderly roommate. pt currently functioning significantly below baseline, requiring CGA for bed mobility, min-mod a for pivot transfers, TD for gait upto 30 ft and CGA for w/c mobility x 15 ft. staris deferred due to safety concerns. at this time, anticiapte pt will require 24/7 supv/assist upon d/c and HHPT. DME: TBD pending progress.   Treatment Diagnosis: impaired endurance/ mobilty  Therapy Prognosis: Fair  Decision Making: High Complexity  Discharge Recommendations: 24 hour supervision or assist;Home with assist PRN  PT Equipment Recommendations  Equipment Needed: Yes (TBD)    CLINICAL IMPRESSION   pt is 28year old male admitted to ARU with dx of critical illness myopathy. 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 He was found to have persistent MRSA bacteremia with concern for infected port, which was removed. His hospital course was complicated by right hydropneumothorax with worsening hypoxia requiring multiple intubations and chest tube placements. He underwent right internal jugular and right atrial thrombectomy due to concern that this was causing ongoing bacteremia. ID was consulted and he is to remain on IV ceftaroline with plan for long term treatment until 7/2/22. pt reports being ind with no AD prior to admission for mobilitym works full time and assists in caring for elderly roommate. pt currently functioning significantly below baseline, requiring CGA for bed mobility, min-mod a for pivot transfers, TD for gait upto 30 ft and CGA for w/c mobility x 15 ft. staris deferred due to safety concerns. at this time, anticiapte pt will require 24/7 supv/assist upon d/c and HHPT. DME: TBD pending progress. GOALS  Patient Goals   Patient goals : \"to walk and to eat\"  Short Term Goals  Time Frame for Short term goals: 10 days 6/17  Short term goal 1: pt will complete bed mobility with mod ind  Short term goal 2: pt will complete functional transfer with CGA and LRAD. Short term goal 3: pt will ambulate 50 ft with LRAD and mod A. Short term goal 4: pt will tolerate stair assessment when appropriate and goal will be made. Long Term Goals  Time Frame for Long term goals : 21 days 6/28  Long term goal 1: pt will complete functional transfer with SBA and LRAD  Long term goal 2: pt will ambulate 100 ft wiht LRAD and CGA.   Long term goal 3: pt will complete car transfer with SBA and LRAD. Long term goal 4: pt will tolerate standing for 2 min with 1-2 UE support and CGA -SBA for balance. PLAN OF CARE  Frequency: 1-2 treatment sessions per day, 5-7 days per week  Plan  Plan: 5-7 times per week  Current Treatment Recommendations: Strengthening; Wheelchair mobility training;Cognitive reorientation;Equipment evaluation, education, & procurement; Modalities; Positioning; Therapeutic activities; Wound care; Patient/Caregiver education & training; Safety education & training;Home exercise program;Return to work related activity;Pain management;Gait training;Balance training;Functional mobility training;Stair training;Neuromuscular re-education;Transfer training;ADL/Self-care training; Endurance training;Manual Therapy - Soft Tissue Mobilization  Safety Devices  Type of Devices: Gait belt;Patient at risk for falls;Nurse notified; All fall risk precautions in place; Bed alarm in place;Call light within reach; Left in bed  Restraints  Restraints Initially in Place: No    EDUCATION  Education  Education Given To: Patient  Education Provided: Role of Therapy;Plan of Care;Precautions; Safety; Energy Conservation;Transfer Training;DME/Home Modifications; Fall Prevention Strategies; Equipment; Mobility Training;Cognition;ADL Function  Education Provided Comments: role of PT, ARU requirements, safe transfers/gait, tube feed precautions  Education Method: Demonstration;Verbal  Barriers to Learning: None  Education Outcome: Verbalized understanding;Demonstrated understanding      Therapy Time   Individual Concurrent Group Co-treatment   Time In 1300         Time Out 1400         Minutes 60           Timed Code Treatment Minutes: One [facility]       Keshawn Pepe, PT, 06/09/22 at 2:58 PM

## 2022-06-09 NOTE — PROGRESS NOTES
PICC/CVC insertion Procedure Note    Luis Fernando Garcia   Admitted- 6/8/2022  7:52 PM  Admission diagnosis- Critical illness myopathy [G72.81]      Attending Physician- Micah Garcia MD  Ordering Physician- Micah Garcia MD  Indication for Insertion: Limited Access    Lab Results   Component Value Date    INR 1.60 (H) 06/09/2022    PROTIME 18.9 (H) 06/09/2022     Lab Results   Component Value Date    WBC 3.3 (L) 06/09/2022    HGB 7.9 (L) 06/09/2022    HCT 24.0 (L) 06/09/2022     06/09/2022     Lab Results   Component Value Date    CREATININE 0.6 (L) 06/09/2022    BUN 19 06/09/2022       Catheter Insertion Date- 6/9/2022   Catheter Brand- Navilyst BioSmule PICC  Lot Number- [id number]  Lumen-Dual    Insertion Site- Left Brachial  Vein Diameter- 0.32 cm  Turkish Size- 5  Catheter Length- 45 cm  Exposed Catheter Length- 2cm   PICC Tip Terminates in the Cavo Atrial Junction- Yes  Easy insertion- Yes  Able to Aspirate Blood- Yes  Easy Flush- Yes    PICC Placement Confirmation- Vein Position System ECG  PICC insertion successful- Yes  Ultrasound- yes    Okay To Use PICC- \"Yes    NURSES:  *please replace all existing IV tubing with new IV tubing prior to using the PICC for current IV infusions. *please remove any PIVs from left arm. *please refrain from obtaining BPs in the left arm. All of the above may be sources of infection or damage to the PICC line/site.     Electronically signed by Luisa Butler, RN, RN on 6/9/2022 at 3:01 PM

## 2022-06-09 NOTE — PLAN OF CARE
ARU PATIENT TREATMENT PLAN  [facility], [address]   [phone]    Joe Beatty    : 1990  Acct #: [de-identified]  MRN: [id number]   PHYSICIAN:  Claudia Coleman MD  Primary Problem    Patient Active Problem List   Diagnosis    Chronic bilateral low back pain without sciatica    Anxiety    Insomnia due to other mental disorder    Poor dentition    Class 1 obesity due to excess calories with serious comorbidity and body mass index (BMI) of 34.0 to 34.9 in adult    Primary squamous cell carcinoma of tongue (HCC)    Recurrent major depressive disorder (HCC)    Prediabetes    Critical illness myopathy    Severe malnutrition (HCC)       Rehabilitation Diagnosis:     Critical illness myopathy [G72.81]       ADMIT DATE:2022    Patient Goals: \"to walk and to eat\"    Admitting Impairments: Pt. Admitted d/t Critical illness myopathy resulting in Decreased functional mobility ; Decreased safe awareness;Decreased balance;Decreased endurance;Decreased ROM; Decreased ADL status; Decreased strength;Decreased fine motor control;Decreased high-level IADLs;Decreased posture;Decreased vision/visual deficit; Decreased coordination;Decreased cognition  Barriers: home set up, comorbidities, decrease endurance, tube feeds, IV abx  Participation: Good     CARE PLAN     NURSING:  Joe Beatty while on this unit will:     [x] Be continent of bowel and bladder     [] Have an adequate number of bowel movements  [x] Urinate with no urinary retention >300ml in bladder  [] Complete bladder protocol with antonio removal  [] Maintain O2 SATs at ___%  [x] Have pain managed while on ARU       [] Be pain free by discharge   [] Have no skin breakdown while on ARU  [x] Have improved skin integrity via wound measurements  [x] Have no signs/symptoms of infection at the wound site  [] Be free from injury during hospitalization   [] Complete education with patient/family with understanding demonstrated for:  [] Adjustment   [] Other:   Nursing interventions may include bowel/bladder training, education for medical assistive devices, medication education, O2 saturation management, energy conservation, stress management techniques, fall prevention, alarms protocol, seating and positioning, skin/wound care, pressure relief instruction,dressing changes,  infection protection, DVT prophylaxis, and/or assistance with in room safety with transfers to bed, toilet, wheelchair, shower as well as bathroom activities and hygiene. Patient/caregiver education for:   [] Disease/sustained injury/management      [x] Medication Use   [] Surgical intervention   [x] Safety   [x] Body mechanics and or joint protection   [] Health maintenance         PHYSICAL THERAPY:  Goals:                  Short Term Goals  Time Frame for Short term goals: 10 days 6/17  Short term goal 1: pt will complete bed mobility with mod ind  Short term goal 2: pt will complete functional transfer with CGA and LRAD. Short term goal 3: pt will ambulate 50 ft with LRAD and mod A. Short term goal 4: pt will tolerate stair assessment when appropriate and goal will be made. Long Term Goals  Time Frame for Long term goals : 21 days 6/28  Long term goal 1: pt will complete functional transfer with SBA and LRAD  Long term goal 2: pt will ambulate 100 ft wiht LRAD and CGA. Long term goal 3: pt will complete car transfer with SBA and LRAD. Long term goal 4: pt will tolerate standing for 2 min with 1-2 UE support and CGA -SBA for balance. These goals were reviewed with this patient at the time of assessment and Dilma Louie is in agreement.      Plan of Care: Pt to be seen 5 out of 7 days per week, 60  mins (exact) per day for 21 days (exact)                   Current Treatment Recommendations: Prasad Malone mobility training,Cognitive reorientation,Equipment evaluation, education, & function. Dysphagia Goals: The patient will tolerate instrumental swallowing procedure,The patient/caregiver will demonstrate understanding of compensatory strategies for improved swallowing safety. ,The patient will tolerate recommended diet without observed clinical signs of aspiration                            Short-term Goals  Timeframe for Short-term Goals: 18 days (06/26/22)  Goal 1: Pt will complete recall tasks with 80% acc given min cues for use of compensatory strategies. Goal 2: Pt will complete executive function tasks (meds, math, money, time, etc) with 80% acc given min cues. Goal 3: Pt will complete problem solving and thought organization tasks with 80% acc given min cues. Goal 4: Pt will complete verbal and visual reasoning tasks with 80% acc given min cues. Goal 5: Pt will repeat words/phrases/sentences using speech intelligibility strategies with 80% acc given min cues. Timeframe for Short-term Goals: 18 days (06/26/22)  These goals were reviewed with this patient at the time of assessment and Keyshawn Hines is in agreement    Plan of Care: Pt to be seen 5 out of 7 days per week, 60 mins (exact) per day for 21 days (exact)             Dysphagia: NPO, safe swallow strategies, therapeutic interventions with SLP, pharyngeal/laryngeal strengthening exercises            Speech/Language/Cognition: Compensatory strategy training and carryover, recall/STM, problem solving, reasoning, exec function, thought organization, attention. CASE MANAGEMENT:  Goals:   Assist patient/family with discharge planning, patient/family counseling,   and coordination with insurance during ARU stay.     QIM / IRF KAILYN SCORES:  ITEM CURRENT SCORE GOAL   Eating CARE Score: 88 Discharge Goal: Independent   Oral Hygiene CARE Score: 88 Discharge Goal: Independent   Toileting Hygiene CARE Score: 1 Discharge Goal: Supervision or touching assistance   Shower/Bathe Self CARE Score: 2 Discharge Goal: Supervision or touching assistance   Upper Body Dressing CARE Score: 2 Discharge Goal: Independent   Lower Body Dressing CARE Score: 1 Discharge Goal: Supervision or touching assistance   On/Off Footwear CARE Score: 1 Discharge Goal: Supervision or touching assistance   Roll Left & Right CARE Score: 4 Discharge Goal: Independent   Sit to Lying  CARE Score: 4 Discharge Goal: Independent   Lying to Sitting EOB CARE Score: 4 Discharge Goal: Independent   Sit to Stand CARE Score: 3 Discharge Goal: Independent   Chair/Bed to Chair Transfer CARE Score: 3 Discharge Goal: Independent   Toilet Transfer CARE Score: 88 Discharge Goal: Supervision or touching assistance   Car Transfer CARE Score: 3 Discharge Goal: Supervision or touching assistance   Walk 10 Feet CARE Score: 1 Discharge Goal: Supervision or touching assistance   Walk 50 Feet, 2 Turns CARE Score: 88 Discharge Goal: Supervision or touching assistance   Walk 150 Feet CARE Score: 88 Discharge Goal: Dependent   Walk 10 Feet, Uneven Surface CARE Score: 88 Discharge Goal: Supervision or touching assistance   1 Step (Curb) CARE Score: 88 Discharge Goal: Supervision or touching assistance   4 Steps CARE Score: 88 Discharge Goal: Supervision or touching assistance   12 Steps CARE Score: 88 Discharge Goal: Partial/moderate assistance    Object CARE Score: 88 Discharge Goal: Independent   Wheel 50 feet, 2 turns CARE Score: 2 Discharge Goal: Independent   Wheel 150 Feet CARE Score: 2 Discharge Goal: Independent          Juan Pace will be seen a minimum of 3 hours of therapy per day, a minimum of 5 out of 7 days per week. [] In this rare instance due to the nature of this patient's medical involvement, this patient will be seen 15 hours per week (900 minutes within a 7 day period).     Treatments may include therapeutic exercises, gait training, neuromuscular re-ed, transfer training, community reintegration, bed mobility, w/c mobility and training, self care, home mgmt, cognitive training, energy conservation,dysphagia tx, speech/language/communication therapy, group therapy, and patient/family education. In addition, dietician/nutritionist may monitor calorie count as well as intake and collaboratively work with SLP on dietary upgrades. Neuropsychology/Psychology may evaluate and provide necessary support. Medical issues being managed closely and that require 24 hour availability of a physician:   [x] Swallowing Precautions  [] Bowel/Bladder Fx  [] Weight bearing precautions   [x] Wound Care    [x] Pain Mgmt   [x] Infection Protection   [x] DVT Prophylaxis   [x] Fall Precautions  [x] Fluid/Electrolyte/Nutrition Balance   [] Voice Protection   [x] Respiratory  [] Other:    Medical Prognosis: [] Good  [x] Fair    [] Guarded   Total expected IRF days 21  Anticipated discharge destination:    [] Home Independently   [] Home Modified Independent  [x] Home with supervision    []SNF     [] Other                                           Physician anticipated functional outcomes:  Home w/ supervision and home health services. 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 He was found to have persistent MRSA bacteremia with concern for infected port, which was removed. His hospital course was complicated by right hydropneumothorax with worsening hypoxia requiring multiple intubations and chest tube placements. He underwent right internal jugular and right atrial thrombectomy due to concern that this was causing ongoing bacteremia.  ID was consulted and he is to remain on IV ceftaroline with plan for long term treatment until 7/2/22. His course was complicated by severe penile edema and antonio was placed. Urology followed during acute stay. Patient failed several voiding trials. Plan to keep antonio and follow up with outpatient    This plan has been reviewed with Molly Plunkett on 6/9  in a language the patient understands.   Molly Plunkett has had the opportunity to include input with the therapy team.      I have reviewed this initial plan of care and agree with its contents:    Title   Name    Date    Time    Physician: Dr. Laron Xavier,     Case Mgmt: Bridgette Nunez    OT: Philipp Quintero OTR/L    PT: Giovani Reynoso PT, DPT     RN: Marika Thompson RN    ST: Zoe Bobby MA [facility] SLP     : Catalino Sandoval OTR/L

## 2022-06-09 NOTE — H&P
history. He has never used smokeless tobacco. He reports previous alcohol use. He reports previous drug use. Drug: Marijuana Lex Divine). family history includes Cancer in his maternal grandmother; Diabetes in his mother.     Current Facility-Administered Medications   Medication Dose Route Frequency Provider Last Rate Last Admin    acetaminophen (TYLENOL) tablet 650 mg  650 mg Per G Tube Q6H PRN Jing Parra MD        apixaban Cydney Bougie) tablet 5 mg  5 mg Per G Tube BID Jign Parra MD   5 mg at 06/08/22 2324    bisacodyl (DULCOLAX) suppository 10 mg  10 mg Rectal Daily PRN Jing Parra MD        ceftaroline fosamil (TEFLARO) 600 mg in sodium chloride 0.9 % 50 mL IVPB  600 mg IntraVENous Nile Rodrigues MD   Stopped at 07/35/69 7281    folic acid (FOLVITE) tablet 1 mg  1 mg Per G Tube Daily Jing Parra MD        gabapentin (NEURONTIN) capsule 100 mg  100 mg Per G Tube TID Jing Parra MD   100 mg at 06/08/22 2324    melatonin disintegrating tablet 5 mg  5 mg Per G Tube Nightly Jing Parra MD   5 mg at 06/08/22 2324    nystatin (MYCOSTATIN) [id number] UNIT/ML suspension 500,000 Units  5 mL Oral 4x Daily PRN Jing Parra MD        [START ON 6/11/2022] oxymetazoline (AFRIN) 0.05 % nasal spray 2 spray  2 spray Each Nostril BID PRN Jing Parra MD        sennosides-docusate sodium (SENOKOT-S) 8.6-50 MG tablet 2 tablet  2 tablet Per G Tube Nightly Jing Parra MD        sodium chloride (OCEAN, BABY AYR) 0.65 % nasal spray 1 spray  1 spray Each Nostril Q2H PRN Jing Parra MD        doxazosin (CARDURA) tablet 1 mg  1 mg Oral Nightly Jing Parra MD   1 mg at 06/08/22 2324    thiamine tablet 100 mg  100 mg Per G Tube Daily Jing Parra MD        clonazePAM Bettyjo Distel) tablet 0.5 mg  0.5 mg Per G Tube Daily PRN Jing Parra MD   0.5 mg at 06/08/22 2324    oxyCODONE (ROXICODONE) immediate release tablet 5 mg  5 mg Per G Tube Q4H PRN Yamile Limon MD   5 mg at 06/08/22 2324    albuterol (PROVENTIL) nebulizer solution 2.5 mg  2.5 mg Nebulization Q6H PRN Yamile Limon MD           Allergies   Allergen Reactions    Trazodone And Nefazodone Nausea And Vomiting, Hives and Other (See Comments)     Does not remember  Does not remember    Tramadol Swelling and Other (See Comments)    Trazodone Other (See Comments) and Hives     Does not remember         Current Facility-Administered Medications   Medication Dose Route Frequency Provider Last Rate Last Admin    acetaminophen (TYLENOL) tablet 650 mg  650 mg Per G Tube Q6H PRN Yamile Limon MD        apixaban [facility]) tablet 5 mg  5 mg Per G Tube BID Yamile Limon MD   5 mg at 06/08/22 2324    bisacodyl (DULCOLAX) suppository 10 mg  10 mg Rectal Daily PRN Yamile Limon MD        ceftaroline fosamil (TEFLARO) 600 mg in sodium chloride 0.9 % 50 mL IVPB  600 mg IntraVENous Tressa Singh MD   Stopped at 65/52/38 8414    folic acid (FOLVITE) tablet 1 mg  1 mg Per G Tube Daily Yamile Limon MD        gabapentin (NEURONTIN) capsule 100 mg  100 mg Per G Tube TID Yamile Limon MD   100 mg at 06/08/22 2324    melatonin disintegrating tablet 5 mg  5 mg Per G Tube Nightly Yamile Limon MD   5 mg at 06/08/22 2324    nystatin (MYCOSTATIN) [id number] UNIT/ML suspension 500,000 Units  5 mL Oral 4x Daily PRN Yamile Limon MD        [START ON 6/11/2022] oxymetazoline (AFRIN) 0.05 % nasal spray 2 spray  2 spray Each Nostril BID PRN Yamile Limon MD        sennosides-docusate sodium (SENOKOT-S) 8.6-50 MG tablet 2 tablet  2 tablet Per G Tube Nightly Yamile Limon MD        sodium chloride (OCEAN, BABY AYR) 0.65 % nasal spray 1 spray  1 spray Each Nostril Q2H PRN Yamile Limon MD        doxazosin (CARDURA) tablet 1 mg  1 mg Oral Nightly Yamile Limon MD   1 mg at 06/08/22 2324    thiamine tablet 100 mg  100 mg Per G Tube Daily Bea Lopez MD        clonazePAM [facility].) tablet 0.5 mg  0.5 mg Per G Tube Daily PRN Bea Lopez MD   0.5 mg at 06/08/22 2324    oxyCODONE (ROXICODONE) immediate release tablet 5 mg  5 mg Per G Tube Q4H PRN Bea Lopez MD   5 mg at 06/08/22 2324    albuterol (PROVENTIL) nebulizer solution 2.5 mg  2.5 mg Nebulization Q6H PRN Bea Lopez MD             REVIEW OF SYSTEMS:   CONSTITUTIONAL: negative for fevers, chills, diaphoresis, activity change, appetite change, fatigue, night sweats and unexpected weight change. EYES: negative for blurred vision, eye discharge, visual disturbance and icterus. HEENT: negative for hearing loss, tinnitus, ear drainage, sinus pressure, nasal congestion, epistaxis and snoring. RESPIRATORY: Negative for hemoptysis, cough, sputum production. CARDIOVASCULAR: negative for chest pain, palpitations, exertional chest pressure/discomfort, edema, syncope. GASTROINTESTINAL: negative for nausea, vomiting, diarrhea, constipation, blood in stool and abdominal pain. GENITOURINARY: has antonio  HEMATOLOGIC/LYMPHATIC: negative for easy bruising, bleeding and lymphadenopathy. ALLERGIC/IMMUNOLOGIC: negative for recurrent infections, angioedema, anaphylaxis and drug reactions. ENDOCRINE: negative for weight changes and diabetic symptoms including polyuria, polydipsia and polyphagia. MUSCULOSKELETAL: positive for muscle weakness; negative for pain, joint swelling, decreased range of motion   NEUROLOGICAL: negative for headaches, slurred speech, unilateral weakness. PSYCHIATRIC/BEHAVIORAL: negative for hallucinations, behavioral problems, confusion and agitation. All pertinent positives are noted in the HPI.     Physical Examination:  Vitals:   Patient Vitals for the past 24 hrs:   BP Temp Temp src Pulse Resp SpO2 Height Weight   06/09/22 0730 99/61 (!) 96.6 °F (35.9 °C) Oral 88 16 93 % -- -- 06/08/22 1945 113/70 97.8 °F (36.6 °C) Oral 75 18 95 % 5' 6\" (1.676 m) 110 lb 14.3 oz (50.3 kg)     Psych: Stable mood, normal judgement, normal affect   Const: No distress, cachetic, seated up in bed  Eyes: Conjunctiva noninjected, no icterus noted; pupils equal, round, and reactive to light. HENT: Atraumatic, normocephalic; Oral mucosa moist, no film on tongue. Deformity of tongue and unable to protrude from mouth  Neck: Trachea midline, neck supple. No thyromegaly noted. CV: Regular rate and rhythm, no murmur rub or gallop noted  Resp: Lungs clear to auscultation bilaterally, no rales wheezes or ronchi, no retractions. Respirations unlabored. GI: Soft, nontender, nondistended. Normal bowel sounds. No palpable masses. G-tube in place, site appears clean without surrounding erythema or drainage  Neuro: Alert, oriented, appropriate. No gross cranial nerve deficits appreciated, difficulty assessing tongue due to deformity. Sensation intact to light touch. Dysarthria, diffusely weak on manual muscle testing, able to lift both legs off of bed, ankles in plantar flexion but able to stretch to neutral  Skin: Normal temperature and turgor  MSK: No joint abnormalities noted. Ext: No significant edema appreciated. No varicosities.     Lab Results   Component Value Date    WBC 3.3 (L) 06/09/2022    HGB 7.9 (L) 06/09/2022    HCT 24.0 (L) 06/09/2022    MCV 87.5 06/09/2022     06/09/2022     Lab Results   Component Value Date    INR 1.19 (H) 02/06/2022    PROTIME 13.5 (H) 02/06/2022     Lab Results   Component Value Date    CREATININE 0.6 (L) 06/09/2022    BUN 19 06/09/2022     (L) 06/09/2022    K 5.2 (H) 06/09/2022    CL 97 (L) 06/09/2022    CO2 25 06/09/2022     Lab Results   Component Value Date    ALT 17 02/06/2022    AST 17 02/06/2022    ALKPHOS 108 02/06/2022    BILITOT 0.4 02/06/2022     IMAGING    CT Abdomen and Pelvis With IV contrast 5/9/22  Lower chest: Reported separately  Liver: Normal morphology and attenuation without focal lesions. Biliary tree:No biliary ductal dilation. Normal gallbladder. Spleen: Normal  Pancreas: Normal  Adrenal glands: Normal  Kidneys/Ureters/Bladder: Normal size without hydronephrosis. No stones. No mass lesions. Normal appearance of the urinary bladder with a Villatoro catheter in place. Gastrointestinal tract: There is a gastrostomy tube, with the inflated balloon within the body of the stomach. The GI tract is normal in caliber and wall thickness. Normal appendix. There has been progression of the contrast column into the colon to the level of the rectum. Lymphatics: No lymphadenopathy. Vasculature: Normal  Peritoneum/Retroperitoneum: No free fluid, focal fluid collections or free air. Genital Organs: Normal  Abdominal wall/Soft tissues: Diffuse anasarca. Osseous structures: No acute osseous abnormalities or suspicious osseous lesions. IMPRESSION:  No demonstrable acute abnormalities in the abdomen and pelvis, except for the diffuse anasarca. CT Chest WO contrast 5/17/22  FINDINGS:  MEDICAL DEVICES: 2 right-sided chest tubes. .  AIRWAYS & LUNGS: Dependent secretions at the jackson. Lower lobe airways bilaterally demonstrate multifocal occlusion, left more than right. Few other areas of mucoid impaction in the airways. Remainder of the central airways are patent. Several solid and cavitary nodules peripheral and lower lung zone predominance without significant change. Complete bilateral lower lobe consolidation and patchy consolidation in the right middle lobe and lingula. PLEURA: Small mildly increased right and smaller left pleural effusions. The right-sided pleural effusion extends into the right major fissure. Interval near resolution of right pneumothorax with tiny right apical anterior pneumothorax remaining. LOWER NECK: Heterogeneous thyroid gland containing low-density lesion in the left lobe, likely multinodular goiter.   HEART: The heart is normal in size. Punctate density in the expected location of RCA in the right atrioventricular may represent coronary calcification. VASCULAR STRUCTURES: Aorta and main pulmonary artery are normal in caliber. MEDIASTINUM AND MELISSA: Mediastinal adenopathy is likely reactive. CHEST WALL AND AXILLA: Unremarkable. UPPER ABDOMEN: Unremarkable. OSSEOUS STRUCTURES: No suspicious osteolytic or osteoblastic lesions. IMPRESSION:  1. Findings consistent with septic emboli without significant change. 2. Consolidation bilateral lower lobes, right middle lobe and lingula that likely represents multifocal pneumonia. 3. Small bilateral pleural effusions with ascending the right and mild improvement on the left. Tiny residual anterior inferior right thorax. 4. Punctate density in the location of right pulmonary artery may represent pulmonary calcification, significant in a young patient. CT Chest With IV contrast 5/13/22  FINDINGS:  MEDICAL DEVICES: ET tube tube overlies the midthoracic trachea. AIRWAYS & LUNGS: Retained secretions are noted at the jackson. . Significant interval increase in widespread cavitary nodules with additional scattered foci outside of consolidation the largest within the right upper lobe measuring 2 cm (series 302 image 102). There is near complete collapse of the right lower lobe with significant atelectasis involving the left lower lobe. PLEURA: Moderate left and small right pleural effusions. A moderate right pneumothorax is similar to prior. LOWER NECK: Reported separately. HEART: The heart is normal in size. VASCULAR STRUCTURES: Aorta and main pulmonary artery are normal in caliber. Filling defect within the right jugular vein is better appreciated on the prior study though grossly stable. MEDIASTINUM AND MELISSA: No pathologically enlarged lymph nodes. CHEST WALL AND AXILLA: There is diffuse anasarca.  Soft tissue defect and associated subcutaneous emphysema is present within the right chest wall  UPPER ABDOMEN: Unremarkable. OSSEOUS STRUCTURES: No suspicious osteolytic or osteoblastic lesions. IMPRESSION:  1. Significant interval increase in pulmonary nodules many of which are cavitary favoring infectious sequela. .  2. Moderate left, small right pleural effusions. 3. Stable moderate right pneumothorax. 4. Near-complete collapse of the right lower lobe, moderate left lower lobe atelectasis. CT Head WO contrast 5/13/22  FINDINGS:  The brain parenchyma demonstrates normal attenuation. No hemorrhages, mass effect or focal lesions are identified. The ventricles are normal in size and configuration. No acute calvarial abnormalities are seen. A left maxillary sinus mucous retention cyst is present. Mild mucosal thickening is noted in the remainder of the paranasal sinuses. IMPRESSION:  1. No acute intracranial hemorrhage or mass effect. 2. Evaluation for metastatic disease is limited on noncontrast CT however no enhancing lesions were identified on the recent contrast-enhanced CT dated 5/9/2022. X-ray Chest PA or AP 5/2/22  FINDINGS:  Medical Devices: Interval removal of the right apical chest tube, additional single bilateral chest tubes unchanged in position. A vascular catheter overlies over the medial right arm, unchanged. Partially included catheter projecting over the upper abdomen and few clips. Heart and Mediastinum: Unchanged. Lungs and Pleura: Bilateral mid to lower lung zone predominant pleuroparenchymal disease right greater than left not significantly changed in interim. Right apical pleural-based opacity persists. No visible pneumothorax. Bones and soft tissues: Unchanged. Results for orders placed during the hospital encounter of 05/02/22  X-ray Portable Chest      ECHOCARDIOGRAM LIMITED  - Left ventricle: The cavity size is normal. Wall thickness is normal. Systolic function was normal.  The estimated ejection fraction was in the range of 55% to 60%.  Wall motion was normal; there were  no regional wall motion abnormalities. The study is not technically sufficient to allow evaluation  of LV diastolic function.  - Right ventricle: Systolic function was normal by visual assessment. TAPSE: 3.4cm.  - Pulmonary arteries: Systolic pressure was moderately increased, = 51mm Hg assuming that the right  atrial pressure was 15 mmHg. The above laboratory data have been reviewed. The above imaging data have been reviewed. The above medical testing have been reviewed. Body mass index is 17.9 kg/m². Barriers to Discharge: home set up, comorbidities, decrease endurance, tube feeds, IV abx    POST ADMISSION PHYSICIAN EVALUATION  The patient has agreed to being admitted to our comprehensive inpatient rehabilitation facility consisting of at least 180 minutes of therapy a day, 5 out of 7 days a week. The patient/family has a good understanding of our discharge process. The patient has potential to make improvement and is in need of at least two of the following multidisciplinary therapies including but not limited to physical, occupational, respiratory, and speech, nutritional services, wound care, and prosthetics and orthotics. Given the patients complex condition and risk of further medical complications, rehabilitation services cannot be safely provided at a lower level of care such as a skilled nursing facility. I have compared the patients medical and functional status at the time of the preadmission screening and the same on this date, and there are no significant changes. By signing this document, I acknowledge that I have personally performed a full physical examination on this patient within 24 hours of admission to this inpatient rehabilitation facility and have determined the patient to be able to tolerate the above course of treatment at an intensive level for a reasonable period of time.  I will be completing a detailed individualized Plan of Care for this patient by day four of the patients stay based upon the Preadmission Screen, this Post-Admission Evaluation, and the therapy evaluations. Rehabilitation Diagnosis:  Neurologic, 3.8, Neuromuscular Disorders, e.g. 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 He was admitted to [facility] on 6/8/22 due to functional deficits below his baseline. Critical Illness Myopathy  - due to below  - PT, OT, ST    Squamous Cell Carcinoma of the tongue  - s/p right hemiglossectomy, bilateral neck dissection, and RFFF reconstruction on 1/3/22 followed by chemoradiation, and weekly cisplatin completed 4/25/22 and s/p G-tube  - NPO except ice chips  - Tube feeds per orders  - dietary consulted  - GI consulted due to G not having supplies for patient's tube. Question of exchange  - will schedule nystatin swish and spit for comfort  - PT, OT, ST    MRSA bacteremia  - cefaroline for 24 days from admit per  ID  - consult to ID as this medication needs to be cleared by them per pharmacy  - unable to see midline on XR, plan to remove and place PICC    Acute hypoxic respiratory failure  - with right lower hydropneumothorax s/p chest tube, MRSA pneumonia, pleural effusions s/p chest tube and requiring several reintubations during acute stay  - adequately oxygenating on room air  - wean oxygen for SpO2>90%  - IS     Urinary Retention  - has severe penile edema during acute stay.  Was followed by Urology and failed multiple voiding trials  - continue antonio catheter, most recently placed 6/6  - follow up with Urology outpatient     PE  Right Atrial Thrombus  - s/p thrombectomy 5/20 with IR, thrombus positive for MRSA  - Elquis 5 mg BID     Anasarca  - requiring diuretics during acute stay, since weaned off  - monitor    Hyponatremia  - consult to Nephrology, appreciate assistance    Anemia  - monitor and transfuse for Hb<7    Severe Protein-calorie malnutrition  - BMI 17.9  - on continuous tube feeds  - Dietician consulted    Pain  - gabapentin 100 mg TID  - PRN tylenol, PRN oxycodone     Anxiety  - clonazepam 0.5 mg daily PRN    Multiple Wounds POA  - including stage four pressure ulcer on coccyx  - wound care per orders  - consult to wound care    Bowels: Schedule stool softener. Follow bowel movements. Enema or suppository if needed. Bladder: continue antonio    Sleep: patient allergic to trazodone    Follow up appointments: PCP, ID, Cardiology, [address] [place] [address] Dr. Buzz Nguyen MD 6/9/2022, 8:45 AM

## 2022-06-09 NOTE — CONSULTS
[facility]  Nephrology Consult Note           Reason for Consult: Hyponatremia  Requesting Physician:  Dr. Nilton Obando    Chief Complaint:  No chief complaint on file. History of Present Illness on 6/9/2022:    28 y.o. yo male with PMH of squamous cell carcinoma of the tongue (s/p right hemiglossectomy, b/l ND, and RFFF reconstruction on 1/3/22, radiation, and weekly cisplatin completed 4/25/22) who is admitted to ARU from  for rehabilitation. Nephrology has been consulted for hyponatremia and hyperkalemia  Patient is being fed through the feeding tube. His sodium on 6/8 at [facility] was 133 and has been around 130-135 in June. Earlier in admission, he was hypernatremic in the 149 range as well    He was admitted at [facility] from 5/2/22 and had MRSA bacteremia from infected port s/p removal. Per Dr Nilton Obando, while at [facility],  \"pt had right hydropneumothorax with worsening hypoxia requiring multiple intubations and chest tube placements. He underwent right internal jugular and right atrial thrombectomy due to concern that this was causing ongoing bacteremia. ID was consulted and he is to remain on IV ceftaroline with plan for long term treatment until 7/2/22. His course was complicated by severe penile edema and antonio was placed. Urology followed during acute stay. Patient failed several voiding trials    Past Medical History:        Diagnosis Date    Cancer [facility])        Past Surgical History:    No past surgical history on file. Home Medications:    No current facility-administered medications on file prior to encounter.      Current Outpatient Medications on File Prior to Encounter   Medication Sig Dispense Refill    sodium chloride 0.9 % SOLN 50 mL with ceftaroline fosamil 600 MG SOLR 600 mg Infuse 600 mg intravenously every 8 hours      apixaban (ELIQUIS) 5 MG TABS tablet Take 5 mg by mouth 2 times daily      bisacodyl (DULCOLAX) 10 MG suppository Place 10 mg rectally daily as needed      folic acid (FOLVITE) 1 MG tablet 1 mg by Enteral route daily      gabapentin (NEURONTIN) 100 MG capsule Take 100 mg by mouth 3 times daily.  nystatin (MYCOSTATIN) 859137 UNIT/ML suspension Take 500,000 Units by mouth every 4 hours as needed      oxyCODONE (ROXICODONE) 5 MG immediate release tablet 5 mg by Enteral route every 6 hours as needed.  Oxymetazoline HCl (NASAL SPRAY) 0.05 % SOLN 2 sprays by Nasal route 2 times daily as needed      senna-docusate (PERICOLACE) 8.6-50 MG per tablet 2 tablets by Enteral route nightly      sodium chloride (OCEAN) 0.65 % nasal spray 1 spray by Nasal route as needed      terazosin (HYTRIN) 1 MG capsule Take 1 mg by mouth nightly      thiamine 100 mg tablet 100 mg by Enteral route daily      acetaminophen (TYLENOL) 325 mg tablet Take 650 mg by mouth every 6 hours as needed for Pain      melatonin 3 mg TABS tablet Take 6 mg by mouth nightly      lidocaine viscous hcl (XYLOCAINE) 2 % SOLN solution       clonazePAM (KLONOPIN) 0.5 MG tablet Take 1 tablet by mouth 2 times daily as needed for Anxiety for up to 30 days.  60 tablet 0    DULoxetine (CYMBALTA) 30 MG extended release capsule Take 1 capsule by mouth daily 30 capsule 2    ACETAMINOPHEN EXTRA STRENGTH 500 MG tablet       albuterol (PROVENTIL) (2.5 MG/3ML) 0.083% nebulizer solution Take 3 mLs by nebulization every 6 hours as needed for Wheezing 120 each 3    phenol 1.4 % LIQD mouth spray       ibuprofen (ADVIL;MOTRIN) 800 MG tablet       ciprofloxacin-dexamethasone (CIPRODEX) 0.3-0.1 % otic suspension       hydrOXYzine (ATARAX) 10 MG tablet Take 2.5 tablets by mouth every 8 hours as needed for Anxiety 30 tablet 2    amoxicillin (AMOXIL) 500 MG capsule       albuterol (PROVENTIL) (5 MG/ML) 0.5% nebulizer solution Take 1 mL by nebulization 4 times daily as needed for Wheezing 120 each 3       Allergies:  Trazodone and nefazodone, Tramadol, and Trazodone    Social History:    Social History     Socioeconomic History  Marital status: Single     Spouse name: Not on file    Number of children: 0    Years of education: 15    Highest education level: High school graduate   Occupational History    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[place] [address] Way Smoking status: Former Smoker     Packs/day: 1.00     Years: 17.00     Pack years: 17.00    Smokeless tobacco: Never Used   Vaping Use    Vaping Use: Never used   Substance and Sexual Activity    Alcohol use: Not Currently     Comment: Previously could drink up to 20-12 ounce cans of Budweiser daily    Drug use: Not Currently     Types: Marijuana Fabiola November)     Comment: Previous marijuana use    Sexual activity: Yes     Partners: Female   Other Topics Concern    Not on file   Social History Narrative    Lives at home with his harlane and elderly parent. The patient's recent surgery has put a strain to the relationship between him and his fiancée. No guns in the home, no  service, no legal issues at this time. Social Determinants of Health     Financial Resource Strain:     Difficulty of Paying Living Expenses: Not on file   Food Insecurity:     Worried About Running Out of Food in the Last Year: Not on file    Patsy of Food in the Last Year: Not on file   Transportation Needs:     Lack of Transportation (Medical): Not on file    Lack of Transportation (Non-Medical):  Not on file   Physical Activity:     Days of Exercise per Week: Not on file    Minutes of Exercise per Session: Not on file   Stress:     Feeling of Stress : Not on file   Social Connections:     Frequency of Communication with Friends and Family: Not on file    Frequency of Social Gatherings with Friends and Family: Not on file    Attends [employer]ish Services: Not on file    Active Member of Clubs or Organizations: Not on file    Attends Club or Organization Meetings: Not on file    Marital Status: Not on file   Intimate Partner Violence:     Fear of Current or Ex-Partner: Not on file  Emotionally Abused: Not on file    Physically Abused: Not on file    Sexually Abused: Not on file   Housing Stability:     Unable to Pay for Housing in the Last Year: Not on file    Number of Places Lived in the Last Year: Not on file    Unstable Housing in the Last Year: Not on file       Family History:   Family History   Problem Relation Age of Onset    Diabetes Mother     Cancer Maternal Grandmother         Pancreatic CA       Review of Systems:   Unable to obtain    Physical exam:   Constitutional:  VITALS:  /62   Pulse 90   Temp (!) 96.6 °F (35.9 °C) (Oral)   Resp 18   Ht 5' 6\" (1.676 m)   Wt 110 lb 14.3 oz (50.3 kg)   SpO2 91%   BMI 17.90 kg/m²   Gen: alert, awake  Neck: No JVD  Skin: Unremarkable  Cardiovascular:  S1, S2 without m/r/g   Respiratory: CTA B without w/r/r; respiratory effort normal  Abdomen:  soft, nt, nd,   Extremities: no lower extremity edema  Neuro/Psy: AAoriented times 3 ; moves all 4 ext    Data/  Recent Labs     06/09/22  0632   WBC 3.3*   HGB 7.9*   HCT 24.0*   MCV 87.5        Recent Labs     06/08/22 2011 06/09/22  0632   * 130*   K 5.1 5.2*   CL 94* 97*   CO2 25 25   GLUCOSE 90 110*   BUN 19 19   CREATININE 0.6* 0.6*   LABGLOM >60 >60   GFRAA >60 >60     TSH 5.89  Uric acid 5.2  Urine na 82 osm 466    Assessment  -Hyponatremia   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 ( pt had multiple chest tubes for right hydro/pneumothorax)  -Hyperkalemia    -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 (s/p right hemiglossectomy, b/l ND, and RFFF reconstruction on 1/3/22, radiation, and weekly cisplatin completed 4/25/22)    -MRSA bacteremia , teflaro until 7/2/22    -chronic antonio d/t penile edema and failed voiding trials    -mildly elevated TSH, per rehab physician    -anemia per rehab physician    Plan  -cont free water  30 ml q4h  -cont proteinex supplements daily with 30 ml free water before and after administration;  -strict intake and out put  -follow cortisol level d/t low BP, hyponatremia n hyperkalemia  -lokelma prn, monitor for now  -serial labs      Thank you for the consultation. Please do not hesitate to call with questions. Rosie Kirkpatrick MD  Office: [phone]  Fax:    [phone]  [facility]. [facility]            TIA

## 2022-06-09 NOTE — PROGRESS NOTES
Speech Language Pathology    Speech Language Pathology  Clinical Bedside Swallow Assessment  Facility/Department: [facility]        Recommendations:  Diet recommendation: NPO; NPO and Ice chips with SLP/RN only; Meds via alt means of nutrition  Instrumentation: not clinically warranted at this time   Risk management: general aspiration precautions, NPO, oral care at least 3x/daily       Elizacassidy Fine  : 1990 (28 y.o.)   MRN: [id number]  ROOM: [address] [place] [address]  ADMISSION DATE: 2022  PATIENT DIAGNOSIS(ES): Critical illness myopathy [G72.81]  No chief complaint on file. Patient Active Problem List    Diagnosis Date Noted    Critical illness myopathy 2022    [facility]    Prediabetes 2022    Primary squamous cell carcinoma of tongue ([facility] 2021    Poor dentition 2021    Class 1 obesity due to excess calories with serious comorbidity and body mass index (BMI) of 34.0 to 34.9 in adult 2021    Chronic bilateral low back pain without sciatica 2021    Anxiety 2021    Insomnia due to other mental disorder 2021     Past Medical History:   Diagnosis Date    Cancer [facility])      No past surgical history on file.   Allergies   Allergen Reactions    Trazodone And Nefazodone Nausea And Vomiting, Hives and Other (See Comments)     Does not remember  Does not remember    Tramadol Swelling and Other (See Comments)    Trazodone Other (See Comments) and Hives     Does not remember         DATE ONSET: 22 admitted to [facility], however pt had right hemiglossectomy and bilateral selective neck dissection on 22    Date of Evaluation: 2022   Evaluating Therapist: DARWIN Rivers    Chart Reviewed: : [x] Yes [] No    Current Diet: Diet NPO  ADULT TUBE FEEDING; PEG; Standard with Fiber; Continuous; 40; Yes; 5; Q 4 hours; 56; 30; Q 4 hours    Recent Chest Radiography: [x] Chest XR   [] CT of Chest  Date:06/02/22  Impressions  Impression  Performed by QUYEN  IMPRESSION:   No significant interval change. Approved by Jose Alberto Drummond MD on 6/2/2022 7:46 AM EDT     I have personally reviewed the images and I agree with this report. Pain: throat discomfort RN notified     Reason for Referral  Jose Martinez was referred for a bedside swallow evaluation to assess the efficiency of their swallow function, identify signs and symptoms of aspiration and make recommendations regarding safe dietary consistencies, effective compensatory strategies, and safe eating environment. Assessment:    Medical record review/interview:   Results of MBSS on 06/01/22:    Assessment: Patient presents with oropharyngeal dysphagia secondary to perioral weakness, reduced hyolaryngeal elevation, nearly absent hyoid excursion, absent epiglottic inversion, reduced BOT retraction and reduced laryngeal sensation, complicated by comorbidities resulting in observed deep laryngeal penetration of thin liquids and nectar thick liquid to the TVFs which was not always able to be expelled out of laryngeal vestibule. There was moderate vallecular residue with thin liquid and severe vallecular residue with nectar thick liquid and puree. This severe residue was eventually penetrated into laryngeal vestibule with nectar thick liquid and eventual aspiration of puree. Aspirated material was expelled from vestibule with an independent cough (with visual of monitor and visualization of aspirated material. Penetration and aspiration episodes were silent and expelled from pt visualizing on monitor. Honey thick liquid was not trialed due to risk of aspiration and increased pharyngeal residue with thicker viscosities. Based on today's MBS, recommend NPO with TFs except he can have ice chips and sips of H2O via tsp for pleasure, intermittent cough/re-swallow. Patient is at an increased risk of aspiration, including silent.           Predisposing dysphagia risk factors: Hx of recurrent intubation, Chronic Respiratory Failure, Hx of PEG, Hx of dysphagia, Hx of aspiration and Current smoker; history of cancer   Clinical signs of possible chronic dysphagia: current use of PEG/TF, weight loss, reduced PO intake, hx of dysphagia and hx of aspiration  Precipitating dysphagia risk factors: recent intubation, right hemiglossectomy, chemo/radiation     Patient Complaints:  Odynophagia: [x] Yes [] No  Globus Sensation: [] Yes [x] No  SOB with PO intake: [] Yes [x] No  Increased WOB with PO intake: [] Yes [x] No  Reflux Sx's: [] Yes [x] No  Weight loss: [x] Yes [] No  Coughing/Choking with PO intake: [x] Yes [] No  Reduced Appetite: [x] Yes [] No    Additional Reported Symptoms/Complaints/Hospital Course: Pt reported he has been NPO since January 2022 s/p right hemiglossectomy and bilateral selective neck dissection. Pt's goals: \"to walk and eat again\"    Vitals/labs:   Temp: N/A  SpO2: 91% RA  RR: 18  BP: 101/62  HR: 90      CBC:   Recent Labs     06/09/22  0632   WBC 3.3*   HGB 7.9*         BMP:  Recent Labs     06/09/22  0632   *   K 5.2*   CL 97*   CO2 25   BUN 19   CREATININE 0.6*   GLUCOSE 110*          Cranial nerve exam:   CN V (trigeminal): ophthalmic, maxillary, and mandibular facial sensation- Impaired  CN VII (facial): Impaired R and Reduced  CN IX/X (glossopharyngeal/vagus): MPT: Impaired R and Reduced; pitch range: Reduced; vocal quality: Reduced; cough: Productive  CN XII (hypoglossal):  Impaired R and Reduced    Laryngeal function exam:   Secretions: None   Vocal quality: See CN exam above  MPT: See CN exam above  S/Z ratio: DNT  Pitch range: See CN exam above  Cough: See CN exam above    Oral Care Status:    [] Oral Care [facility]  [] Poor oral care status  [] Edentulous  [] Upper Dentures  [] Lower Dentures  [x] Missing/Broken Teeth  [] Evidence of dental cavities/carries      Impressions:  Pt alert and oriented, cooperative and agreeable to participate. Pt currently NPO with g tube for primary means of nutrition/meds, and has been NPO since January 2022. Pt with right hemiglossectomy, deviation of tongue to right and overall reduced oral motor coordination, strength, and ROM. Pt had a MBSS completed on 06/01 recommending NPO with tube feeds, ice chips and thin liquids via tsp only with SLP. Pt observed with multiple ice chips presenting with multiple swallows per ice chip, but no overt s/s of aspiration. Pt observed with half teaspoon trials of thin liquids presenting with rapid premature spillage to pharynx, reduced laryngeal elevation upon manual palpation of anterior neck, no overt s/s of aspiration. Pt then observed with one whole teaspoon of TL presenting with rapid premature spillage to pharynx and immediate s/s of aspiration characterized by wet vocal quality, and coughing. No solid trials were administered this date due to recent results of MBSS.      Recommendations:  Diet recommendation: NPO; NPO and Ice chips with SLP/RN only; Meds via alt means of nutrition  Instrumentation: not clinically warranted at this time   Risk management: oral care 2-3x/day to reduce adverse affects in the event of aspiration    Prognosis: Fair    Recommended Intervention:   [x] Dysphagia tx  [] Videostroboscopy                      [x] NPO   [x] MBS       [] Speech/Cog Eval    [x] Therapeutic PO Trials     [x] Ice Chips   [] Other:  [] FEES                                                 Dysphagia Therapeutic Intervention:   []  Bolus control Exercises  [x]  Oral Motor Exercises  []  [employer] Protocol  []  Thermal Stimulation  [x]  Oral Care    []  Vital Stim/NMES  [x]  Laryngeal Exercises  [x]  Patient/Family Education  [x]  Pharyngeal Exercises  [x]  Therapeutic PO trials with SLP  [x]  Diet tolerance monitoring  []  Other:     Referrals:  [] ENT    [] PT  [] Pulmonology [] GI  [] Neurology  [] RD  [] OT   []     Goals:  Short Term Goals: Timeframe for Short Term Goals: (18 days (06/26/22)  Goal 1: The patient will tolerate recommended diet with no clinical s/s of aspiration 5/5  Goal 2: The patient will tolerate instrumental assessment when able   Goal 3: The patient will recall/perform recommended compensatory strategies given min cues  Goal 4: The patient will complete laryngopharyngeal strengthening exercises with 90% acc given min cues    Long Term Goals:   Timeframe for Long Term Goals: (21 days 06/29/22)  Goal 1: The patient will tolerate least restrictive diet with no clinical s/s of aspiration or worsening respiratory/pulmonary status    Treatment:  Skilled instruction completed with patient re: evidenced based practice regarding recommendations and POC, importance of oral care to reduce adverse affects in the event of aspiration, and instruction of recommended compensatory strategies developed based upon clinical exam. Pt able to recall/demonstrate compensatory strategies with (min) cues. Pt Education: SLP educated the patient re: Role of SLP, rationale for completion of assessment, results of assessment, recommendations and POC  Pt Education Response: verbalized understanding and would benefit from ongoing education    Duration/Frequency of Tx: 60 mins, 5x/wk during LOS    Individuals Consulted:   [x]  Patient     []  NP         []  RN   []  RD                   []  MD      []  Family Member                        []  PA    []  Other:      Safety Devices / Report:  [x]  All fall risk precautions in place [x]  Safety handoff completed with RN  [x]  Bed alarm in place  [x]  Left in bed     []  Chair alarm in place  []  Left in chair   [x]  Call light in reach   []  Other: Total Treatment Time / Charges       Time in Time out Total Time / units   Swallow Eval/Tx Time  0830 0900 30 min/ 2 units      Signature:    Brandon PRETTY CCC-SLP  Speech-Language Pathologist  [id number]

## 2022-06-09 NOTE — PROGRESS NOTES
NURSING ASSESSMENT: [address]    Patient:Maverick EDMONDSON [facility]     Rehab Dx/Hx: Critical illness myopathy [G72.81]   :1990  BXV:[id number]  Date of Admit: 2022  Room #: 8294/9186-33    Subjective:   Patient admitted to room 156. Alert and oriented x4. Oriented to room and call light system. Oriented to rehab routine and therapy schedules. Informed about care conferences and ordering of meals with PCA. Drug / Medication Review:   Medications were reviewed by RN at time of admission  [x]  No potential or actual clinically significant medication issues were noted. []  Potential or actual clinically significant medication issues were found and MD was notified. 4 Eyes Skin Assessment   The patient is being assessed for: Admission     I agree that 2 RN's have performed a thorough Head to Toe Skin Assessment on the patient. ALL assessment sites listed below have been assessed. Areas assessed by both nurses:   [x]   Head, Face, and Ears   [x]   Shoulders, Back, and Chest, Abdomen  [x]   Arms, Elbows, and Hands   [x]   Coccyx, Sacrum, and Ischium  [x]   Legs, Feet, and Heel     Does the Patient have Skin Breakdown? Yes       Hiram Prevention initiated:  Yes  Wound Care Orders initiated:  Yes      [address]  nurse consulted for Pressure Injury (Stage 3,4, Unstageable, DTI, NWPT, Complex wounds)and New or Established Ostomies:  Yes    Primary Nurse eSignature: AMY Lee RN  Co-signer eSignature; Yany Macdonald RN

## 2022-06-09 NOTE — CARE COORDINATION
Case Management ARU Admission Assessment     Objective:  will complete initial assessment and review the role of Case Management while on the ARU. Patient will be educated on team conferences as well as discuss family training and how it is encouraged on the unit. Order for \"discharge planning\" has been addressed. Family Present: No  Primary : Sarah Hernandez (Domestic Partner)   [phone]    Admit date: 06/08/2022        Insurance: [facility] PLAN    Admitting diagnosis: Critical illness myopathy    Current home situation: Independent prior level of function. Lives with significant other in an two level apartment. Pt has an elderly roommate that lives in the apartment. Durable Medical Equipment at home:  Walker__Cane_Quad_RTS__ BSC__Shower Chair_x_  02__ HHN__ CPAP__  BiPap__  Hospital Bed__ W/C___ Other__________    Meds to Beds program: Yes    Services prior to admission: none    Preference for [place] [id number] or Outpatient therapy: no preference    Patient's goal(s): Return home    Working or [facility] prior to admission:  _x_Yes __No                        Accessibility to community resources/transportation: significant other       Has patient experienced a recent loss or significant life event that would impact their care or ability to participate? _x_No  __Yes, please explain:    Has patient ever been treated for emotional disorder(s)? _x_No  __Yes, how does this affect current situation? Is patient and family coping appropriately with stressful events and this hospitalization?   _x_Yes  __No, please explain:    Support system at home and in the community: Brother    Who will be the one to contact for family training: Sarah Hernandez    24 hour care on discharge: TBD    PCP: Trevon Zavaleta DO    Pharmacy: [facility] meds to bed    Discharge plan/Summary:   spoke with patient at bedside to complete initial assessment and review the role of Case Management while on the ARU. Patient educated on team conferences as well as discuss family training and how it is encouraged on the unit. Independent prior level of function. Lives with significant other in an two level apartment. Pt has an elderly roommate that lives in the apartment as well.   Case Management (CM) will continue to follow for discharge planning recommendations from the treatment team.

## 2022-06-09 NOTE — CONSULTS
Consult called to Infectious Disease on 6/9/2022 @ [address] [facility]

## 2022-06-09 NOTE — PROGRESS NOTES
Speech Language Pathology  Facility/Department: [facility] ARU  Initial Speech/Language/Cognitive Assessment    NAME: Yaneli Patel  : 1990   MRN: [id number]  ADMISSION DATE: 2022  ADMITTING DIAGNOSIS: has Chronic bilateral low back pain without sciatica; Anxiety; Insomnia due to other mental disorder; Poor dentition; Class 1 obesity due to excess calories with serious comorbidity and body mass index (BMI) of 34.0 to 34.9 in adult; Primary squamous cell carcinoma of tongue ([facility] [address] Recurrent major depressive disorder ([facility] [address] Prediabetes; and Critical illness myopathy on their problem list.  DATE ONSET: 22    Date of Eval: 2022   Evaluating Therapist: DARWIN Pires    RECENT RESULTS  CT OF HEAD/MRI:N/A    Primary Complaint:  Pt reported no changes with current cognitive status. Pt's goals: \"to walk and eat again\"     Pain:  Pain Assessment  Pain Assessment: 0-10  Pain Level: 7    Assessment:  Cognitive Diagnosis: Mild-mod cognitive linguistic deficit  Speech Diagnosis: Moderate dysarthria   Per H&P: Ever Alvarado is a 28year old male with a past medical history significant for squamous cell carcinoma of the tongue (s/p right hemiglossectomy, b/l ND, and RFFF reconstruction on 1/3/22, radiation, and weekly cisplatin completed 22) who presented to [facility] on 22 with progressive hypoxia and was intubated\"    Pt alert and oriented, cooperative and agreeable to participate in evaluation. 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 Pt reported his girlfriend manages cooking, cleaning, laundry, grocery shopping. Pt not actively driving but was able to prior to 2022. The pt was administered the [address] ([address]) Examination this date to assess cognition.   The pt scored a 17/30 with a score of 27 or greater considered WNL per the SLUMS. See pt's scores on each subtest below:    [address] Status (SLUMS) Examination   Section / subtest   Score Comments   Orientation   3/3    Short-term recall   3/5    Calculations   0/3    Naming   2/3    Attention: number repetition   1/2    Visuospatial tasks: Clock drawing and shape identification   4/6    Auditory comprehension 4/8     Total score: 17/30        The pt demonstrated difficulty with divergent naming/thought organization, short term recall, executive functioning repetition of numbers in backwards order, auditory comprehension of paragaph. The pt's strengths included orientation, immediate recall, shape identification. Recommendations:  5x/wk during LOS    Plan:   Goals:  Short-term Goals  Timeframe for Short-term Goals: 18 days (06/26/22)  Goal 1: Pt will complete recall tasks with 80% acc given min cues for use of compensatory strategies. Goal 2: Pt will complete executive function tasks (meds, math, money, time, etc) with 80% acc given min cues. Goal 3: Pt will complete problem solving and thought organization tasks with 80% acc given min cues. Goal 4: Pt will complete verbal and visual reasoning tasks with 80% acc given min cues. Goal 5: Pt will repeat words/phrases/sentences using speech intelligibility strategies with 80% acc given min cues. Long-term Goals  Timeframe for Long-term Goals: 21 days (06/29/22)  Goal 1: Pt will improve overall cognitive linguistic goals to increase safety and indep to retun to PLOF. Goal 2: Pt will utilize speech intelligibility strategies for improved communication with both familiar and unfamiliar listeners.    Patient/family involved in developing goals and treatment plan: yes, pt     Subjective:  Social/Functional History  Lives With: Significant other  Type of Home: Apartment  Meal Prep Responsibility: Secondary  Laundry Responsibility: Secondary  Cleaning Responsibility: Secondary  Bill Paying/Finance Responsibility: Primary  Shopping Responsibility: Secondary  Health Care Management: Primary  Ambulation Assistance: Independent  Transfer Assistance: Independent  Active : No  Patient's  Info: pt reported prior to January he was driving  Occupation: Full time employment  Type of Occupation:  at [employer]: \"I don't know\" when asked by SLP  Vision  Vision: Within Functional Limits  Hearing  Hearing: Within functional limits    Objective:  Oral/Motor  Oral Hygiene: Moist;Clean    Auditory Comprehension  Comprehension: Within Functional Limits    Expression  Primary Mode of Expression: Verbal    Verbal Expression  Verbal Expression: Within functional limits    Written Expression  Dominant Hand: Right  Written Expression: Within Functional Limits    Pragmatics/Social Functioning  Pragmatics: Within functional limits    Cognition:   Orientation  Overall Orientation Status: Within Normal Limits  Attention  Attention: Within Functional Limits  Memory  Memory: Exceptions to [facility]  Short-term Memory: Mild  Problem Solving  Problem Solving: Exceptions to [facility]  Executive Function Skills: Moderate  Managing Finances:  (TBA)  Managing Medications:  (TBA)  Numeric Reasoning  Numeric Reasoning: Exceptions to [facility]   Calculations: Moderate  Money Management: Moderate  Time: Mild  Abstract Reasoning  Abstract Reasoning: Exceptions to [facility]  Divergent Thinking: Mild  Safety/Judgment  Safety/Judgment: Within Functional Limits      Prognosis:  Speech Therapy Prognosis  Prognosis: Good  Individuals consulted  Consulted and agree with results and recommendations: Patient;RN  RN Name: Mikel    Education:  Education provided to pt regarding role of SLP, results of assessment, and recommendations    Safety Devices in place: Yes  Type of devices: All fall risk precautions in place; Left in bed;Bed alarm in place;Call light within reach;Nurse notified    Therapy Time:   Individual Concurrent Group Co-treatment   Time In 0830         Time Out 0900         Minutes [address] [place] [address] A CCC-SLP  Speech-Language Pathologist  [id number]

## 2022-06-09 NOTE — PROGRESS NOTES
Occupational Therapy  Facility/Department: [facility]  Rehabilitation Occupational Therapy Evaluation       Date: 22  Patient Name: Mary Felix       Room: 7122/2234-54  MRN: [id number]  Account: [de-identified]   : 1990  (28 y.o.) Gender: male     Referring Practitioner: Mal Avendano MD  Diagnosis: critical illness myopathy  Additional Pertinent Hx: tongue Cancer, neck dissection (2022), hospital admission in early May with multiple intubations and chest tube placements    Restrictions  Restrictions/Precautions: NPO;Isolation; Fall Risk;General Precautions  Other position/activity restrictions: antonio, PEG, NPO, MRSA contact precautions    Subjective  Subjective: Pt in bed, pleasant, getting midline removed by RN, agreeable to OT evaluation. BP 99/59, HR 84, O2 sats 93% on RA  Comments: Pain increasing to 7/10 in buttocks with sitting up for prolonged periods of time. Objective  Vision - Basic Assessment  Prior Vision: No visual deficits  Visual History: No significant visual history  Patient Visual Report: No visual complaint reported.       Perception  Overall Perceptual Status: Impaired  Unilateral Attention: Cues to maintain midline in standing  Initiation: Cues to initiate tasks  Motor Planning: Cues to use objects appropriately  Perseveration: Not present  Sensation  Overall Sensation Status: WFL   ROM  LUE AROM (degrees)  LUE AROM : WFL  Left Hand AROM (degrees)  Left Hand AROM: WFL  RUE AROM (degrees)  RUE AROM : WFL  Right Hand AROM (degrees)  Right Hand AROM: WFL  LUE Strength  Gross LUE Strength: Exceptions to [facility]  L Shoulder Flex: 3+/5  L Elbow Flex: 4-/5  L Elbow Ext: 4-/5  L Wrist Flex: 4-/5  L Wrist Ext: 4-/5  L Hand General: 3+/5  RUE Strength  Gross RUE Strength: Exceptions to [facility]  R Shoulder Flex: 4-/5  R Elbow Flex: 4-/5  R Elbow Ext: 4-/5  R Wrist Flex: 4-/5  R Wrist Ext: 4-/5  R Hand General: 4-/5  Fine Motor Skills  Coordination  Movements Are Fluid And Coordinated: No  Coordination and Movement Description: Gross motor impairments;Right UE;Fine motor impairments; Left UE   Comment: nose to finger off by 1 inch on RUE and mild incoordination/tremors with movement in BUEs but functional tasks   Hand Assessment  Hand Dominance  Hand Dominance: Right  Functional Mobility  Balance  Sitting Balance: Stand by assistance  Standing Balance: Maximum assistance  Standing Balance  Time: 30 seconds, 10-15 seconds x4  Activity: stand step transfers, LB bathing/dressing  Transfers  Stand Step Transfers: Moderate assistance  Stand Pivot Transfers: Moderate assistance  Sit to stand: Minimal assistance  Stand to sit: Minimal assistance  Toilet Transfers  Toilet - Technique: Stand pivot  Equipment Used: Standard toilet  Toilet Transfer: Maximum assistance  Shower Transfers  Shower Transfers: Not tested  Shower Transfers Comments: Sponge bathing only  Social/Functional History  Lives With: Significant other  Type of Home: Apartment  Home Layout: Two level (full flight between levels, ~12 steps, B handrail)  Home Access: Level entry  Bathroom Shower/Tub: Tub/Shower unit; Shower chair with back  Bathroom Toilet: Standard  Bathroom Equipment: Grab bars around toilet  Home Equipment: Cane, quad (has not been using AD recently)  Has the patient had two or more falls in the past year or any fall with injury in the past year?: No  ADL Assistance: Independent  Homemaking Assistance: Independent  Homemaking Responsibilities: Yes  Meal Prep Responsibility: Secondary  Laundry Responsibility: Secondary  Cleaning Responsibility: Secondary  Bill Paying/Finance Responsibility: Secondary  Shopping Responsibility: Secondary  Ambulation Assistance: Independent  Transfer Assistance: Independent  Active : No  Patient's  Info: Pt's girlfriend drives him  Occupation: Full time employment  Type of Occupation:  at [employer]  Additional Comments: Pt states that girlfriend is able to provide 24HR physical assistance if needed. ADL  Feeding: NPO  Grooming: Stand by assistance  Grooming Skilled Clinical Factors: to brush teeth seated at sink, unable to tolerate standing for task  UE Bathing: Minimal assistance  UE Bathing Skilled Clinical Factors: assist to bathe abdomen  LE Bathing: Dependent/Total  LE Bathing Skilled Clinical Factors: total assist for all aspects of task  UE Dressing: Maximum assistance  UE Dressing Skilled Clinical Factors: assist to thread LUE, pull down in back and front  LE Dressing: Dependent/Total  LE Dressing Skilled Clinical Factors: assist to thread BLEs into brief/pants and pull up in stance, assist to don B socks, poor activity tolerance and effort  Toileting: Dependent/Total  Toileting Skilled Clinical Factors: antonio    Goals  Patient Goals   Patient goals : \"to walk and eat again\"  Short Term Goals  Time Frame for Short term goals: 1 week- 6/15/22  Short Term Goal 1: Pt will complete functional transfers with min A. Short Term Goal 2: Pt will perform toileting with max A. Short Term Goal 3: Pt will complete LB dressing with mod A. Short Term Goal 4: Pt will perform UB dressing with min A. Long Term Goals  Time Frame for Long term goals : 3 weeks- 6/29/22  Long Term Goal 1: Pt will perform functional transfers with CGA. Long Term Goal 2: Pt will complete toileting with CGA. Long Term Goal 3: Pt will perform LB dressing with CGA. Long Term Goal 4: Pt will perform UB dressing with setup. Long Term Goal 5: Pt will complete full body bathing with CGA. Assessment  Performance deficits / Impairments: Decreased functional mobility ; Decreased safe awareness;Decreased balance;Decreased endurance;Decreased ROM; Decreased ADL status; Decreased strength;Decreased fine motor control;Decreased high-level IADLs;Decreased posture;Decreased vision/visual deficit; Decreased coordination;Decreased cognition  Assessment: Pt presents with the above deficits requiring skilled OT services to return home safely. Lashell Silva is a 28year old male with a past medical history significant for HTN and stage IV squamous cell carcinoma of the tongue (s/p right hemiglossectomy, bilateral neck dissection, and RFFF reconstruction on 1/3/22 followed by chemoradiation, and weekly cisplatin completed 4/25/22 and s/p G-tube) who presented to [facility] on 5/2/22 for acute hypoxic respiratory failure and septic shock. He was found to have persistent MRSA bacteremia with concern for infected port, which was removed. His hospital course was complicated by right hydropneumothorax with worsening hypoxia requiring multiple intubations and chest tube placements. He underwent right internal jugular and right atrial thrombectomy due to concern that this was causing ongoing bacteremia. ID was consulted and he is to remain on IV ceftaroline with plan for long term treatment until 7/2/22. His course was complicated by severe penile edema and antonio was placed. Urology followed during acute stay. Patient failed several voiding trials. Plan to keep antonio and follow up with outpatient. Pt completed stand step and stand pivot transfers with min/mod A but required max A to stand from toilet and w/c when fatigued. Pt c/o dizziness and pain in buttocks multiple times throughout session. Pt performed bathing with limited effort to bathe UEs and chest only. Pt completed UB dressing with max A and LB dressing with total assist. Pt able to brush teeth and comb hair while seated in w/c with SBA. Pt required supine rest break due to pain in buttocks and completed ther ex in w/c after 5 minute supine rest break but only able to tolerate ~10 minutes in w/c before requesting to return to bed due to pain and dizziness. Pt finished BUE ther ex in bed.   Prognosis: Fair  Decision Making: High Complexity  Discharge Recommendations: 24 hour supervision or assist;Home with Home health OT;S Level 4  Plan  Times per Week: 5/7 days/week  Plan Weeks: 3 weeks  Current Treatment Recommendations: Strengthening;ROM;Balance training;Functional mobility training; Endurance training; Safety education & training;Equipment evaluation, education, & procurement;Return to work related activity; Neuromuscular re-education;Patient/Caregiver education & training;Self-Care / ADL; Home management training;Coordination training       Therapy Time   Individual Concurrent Group Co-treatment   Time In 1000         Time Out 1130         Minutes 90         Timed Code Treatment Minutes: 75 Minutes (15 minute evaluation)       Mary Jo Segovia, OT

## 2022-06-09 NOTE — CONSULTS
Comprehensive Nutrition Assessment    Type and Reason for Visit:  Initial,Consult,Positive Nutrition Screen (home tube feed)    Nutrition Recommendations/Plan:   Continue Jevity 1.5  (standard with fiber formula) at 45 mL/hr and as tolerated, increase by 10 mL/hr q 4 hours until goal of 65 mL/hr. Recommend 30 mL H20 q 4 hours. Monitor Na labs and need for further adjustments  Recommend 1 Bottle Proteinex P2Go daily via syringe. Flush with 30 mL H20 before and after. Proteinex P2Go should not be mixed directly with the tube feeding formula. Monitor for tolerance (bowel habits, N/V, cramping, abdominal exam findings: distended, firm, tense, guarded, discomfort). Monitor hyperkalemia and need for modification to TF formula  Monitor nutrition adequacy, pertinent labs, bowel habits, wt changes, and clinical progress     Malnutrition Assessment:  Malnutrition Status:  Severe malnutrition (06/09/22 1408)    Context:  Chronic Illness     Findings of the 6 clinical characteristics of malnutrition:  Weight Loss:  Greater than 20% over 1 year     Body Fat Loss:  Severe body fat loss Orbital,Buccal region   Muscle Mass Loss:  Severe muscle mass loss Temples (temporalis),Clavicles (pectoralis & deltoids),Hand (interosseous)    Nutrition Assessment:    New ARU pt admitted with critical illness myopathy. Hx of tongue cancer, s/p hemiglossectomy and neck dissection and PEG placement in January 2022. Consulted for tube feeding ordering and management. Pt severely malnourished AEB weight loss, severe muscle and fat loss. Tube feeds running at 45 mL/hr this AM following KUB with plans to advance per orders. Pt reports has been tolerating current TFs. No c/o N/V, cramping or abdominal pain. C/o feeling hungry, will monitor ability to start bolus feeds if pt continues to tolerate current TFs this ARU stay. Nutrition Related Findings:    PEG. Active BS. BM on 6/8. Na 130. K 5.2. Wound Type:  Wound Consult Pending       Current Nutrition Intake & Therapies:    Average Meal Intake: NPO  Average Supplements Intake: NPO  Diet NPO  ADULT TUBE FEEDING; PEG; Standard with Fiber; Continuous; 40; Yes; 5; Q 4 hours; 56; 30; Q 4 hours  Current Tube Feeding (TF) Orders:  · Feeding Route: PEG  · Formula: Standard with Fiber  · Schedule: Continuous  · Additives/Modulars: Protein  · Goal TF & Flush Orders Provides: Jevity 1.5 at goal rate of 65 mL/hr x20 hours to provide 1300 mL TV, 1950 kcal, 83 g protein, and 988 mL free water. +1 bottle of proteinex daily for additional 26 g protein and 104 kcal. +30 mL free water flush q 4 hrs. Anthropometric Measures:  Height: 5' 6\" (167.6 cm)  Ideal Body Weight (IBW): 142 lbs (65 kg)       Current Body Weight: 110 lb (49.9 kg), 77.5 % IBW. Weight Source: Bed Scale  Current BMI (kg/m2): 17.8  Usual Body Weight: 250 lb (113.4 kg) (June 2021)  % Weight Change (Calculated): -56  Weight Adjustment For: No Adjustment                 BMI Categories: Underweight (BMI less than 18.5)    Estimated Daily Nutrient Needs:  Energy Requirements Based On: Kcal/kg (35-40)  Weight Used for Energy Requirements: Current  Energy (kcal/day): 2204-2471 kcal  Weight Used for Protein Requirements: Current (1.5-2.0 g/kg)  Protein (g/day):  g  Method Used for Fluid Requirements: 1 ml/kcal  Fluid (ml/day): 2259-7630 mL    Nutrition Diagnosis:   · Severe malnutrition related to inadequate protein-energy intake,catabolic illness as evidenced by weight loss greater than or equal to 20% in 1 year,wounds,severe loss of subcutaneous fat,severe muscle loss,nutrition support - enteral nutrition      Nutrition Interventions:   Food and/or Nutrient Delivery: Modify Tube Feeding  Nutrition Education/Counseling: No recommendation at this time  Coordination of Nutrition Care: Continue to monitor while inpatient,Interdisciplinary Rounds  Plan of Care discussed with: Patient and RN    Goals:     Goals:  Tolerate nutrition support at goal rate,prior to discharge       Nutrition Monitoring and Evaluation:   Behavioral-Environmental Outcomes: None Identified  Food/Nutrient Intake Outcomes: Enteral Nutrition Intake/Tolerance  Physical Signs/Symptoms Outcomes: Biochemical Data,GI Status,Nutrition Focused Physical Findings,Skin,Weight    Discharge Planning:     Too soon to determine     Padmini Santa [address], [address], LD  Contact: [phone]

## 2022-06-10 LAB
ALBUMIN SERPL-MCNC: 2.3 G/DL (ref 3.4–5)
ANION GAP SERPL CALCULATED.3IONS-SCNC: 7 MMOL/L (ref 3–16)
BUN BLDV-MCNC: 19 MG/DL (ref 7–20)
CALCIUM SERPL-MCNC: 8.6 MG/DL (ref 8.3–10.6)
CHLORIDE BLD-SCNC: 96 MMOL/L (ref 99–110)
CO2: 26 MMOL/L (ref 21–32)
CORTISOL - AM: 16.7 UG/DL (ref 4.3–22.4)
CREAT SERPL-MCNC: 0.6 MG/DL (ref 0.9–1.3)
GFR AFRICAN AMERICAN: >60
GFR NON-AFRICAN AMERICAN: >60
GLUCOSE BLD-MCNC: 117 MG/DL (ref 70–99)
GLUCOSE BLD-MCNC: 124 MG/DL (ref 70–99)
GLUCOSE BLD-MCNC: 125 MG/DL (ref 70–99)
GLUCOSE BLD-MCNC: 174 MG/DL (ref 70–99)
GLUCOSE BLD-MCNC: 96 MG/DL (ref 70–99)
HCT VFR BLD CALC: 33.2 % (ref 40.5–52.5)
HEMOGLOBIN: 10.8 G/DL (ref 13.5–17.5)
MAGNESIUM: 1.7 MG/DL (ref 1.8–2.4)
MCH RBC QN AUTO: 28.7 PG (ref 26–34)
MCHC RBC AUTO-ENTMCNC: 32.6 G/DL (ref 31–36)
MCV RBC AUTO: 87.9 FL (ref 80–100)
PDW BLD-RTO: 15.6 % (ref 12.4–15.4)
PERFORMED ON: ABNORMAL
PERFORMED ON: NORMAL
PHOSPHORUS: 4.5 MG/DL (ref 2.5–4.9)
PLATELET # BLD: 118 K/UL (ref 135–450)
PMV BLD AUTO: 7.1 FL (ref 5–10.5)
POTASSIUM REFLEX MAGNESIUM: 5.3 MMOL/L (ref 3.5–5.1)
POTASSIUM SERPL-SCNC: 5.3 MMOL/L (ref 3.5–5.1)
RBC # BLD: 3.77 M/UL (ref 4.2–5.9)
SODIUM BLD-SCNC: 129 MMOL/L (ref 136–145)
WBC # BLD: 2.9 K/UL (ref 4–11)

## 2022-06-10 PROCEDURE — 85027 COMPLETE CBC AUTOMATED: CPT

## 2022-06-10 PROCEDURE — 6370000000 HC RX 637 (ALT 250 FOR IP): Performed by: STUDENT IN AN ORGANIZED HEALTH CARE EDUCATION/TRAINING PROGRAM

## 2022-06-10 PROCEDURE — 92526 ORAL FUNCTION THERAPY: CPT

## 2022-06-10 PROCEDURE — 97530 THERAPEUTIC ACTIVITIES: CPT

## 2022-06-10 PROCEDURE — 6360000002 HC RX W HCPCS: Performed by: INTERNAL MEDICINE

## 2022-06-10 PROCEDURE — 83735 ASSAY OF MAGNESIUM: CPT

## 2022-06-10 PROCEDURE — 80069 RENAL FUNCTION PANEL: CPT

## 2022-06-10 PROCEDURE — 97116 GAIT TRAINING THERAPY: CPT

## 2022-06-10 PROCEDURE — 97535 SELF CARE MNGMENT TRAINING: CPT

## 2022-06-10 PROCEDURE — 97110 THERAPEUTIC EXERCISES: CPT

## 2022-06-10 PROCEDURE — 97130 THER IVNTJ EA ADDL 15 MIN: CPT

## 2022-06-10 PROCEDURE — 82533 TOTAL CORTISOL: CPT

## 2022-06-10 PROCEDURE — 2500000003 HC RX 250 WO HCPCS: Performed by: STUDENT IN AN ORGANIZED HEALTH CARE EDUCATION/TRAINING PROGRAM

## 2022-06-10 PROCEDURE — 6360000002 HC RX W HCPCS: Performed by: STUDENT IN AN ORGANIZED HEALTH CARE EDUCATION/TRAINING PROGRAM

## 2022-06-10 PROCEDURE — 36415 COLL VENOUS BLD VENIPUNCTURE: CPT

## 2022-06-10 PROCEDURE — 97129 THER IVNTJ 1ST 15 MIN: CPT

## 2022-06-10 PROCEDURE — 6370000000 HC RX 637 (ALT 250 FOR IP): Performed by: PHYSICAL MEDICINE & REHABILITATION

## 2022-06-10 PROCEDURE — 2580000003 HC RX 258: Performed by: STUDENT IN AN ORGANIZED HEALTH CARE EDUCATION/TRAINING PROGRAM

## 2022-06-10 PROCEDURE — 92507 TX SP LANG VOICE COMM INDIV: CPT

## 2022-06-10 PROCEDURE — 1280000000 HC REHAB R&B

## 2022-06-10 RX ORDER — LANOLIN ALCOHOL/MO/W.PET/CERES
400 CREAM (GRAM) TOPICAL 2 TIMES DAILY
Status: DISCONTINUED | OUTPATIENT
Start: 2022-06-10 | End: 2022-06-23 | Stop reason: HOSPADM

## 2022-06-10 RX ORDER — FENTANYL CITRATE 50 UG/ML
25 INJECTION, SOLUTION INTRAMUSCULAR; INTRAVENOUS ONCE
Status: COMPLETED | OUTPATIENT
Start: 2022-06-10 | End: 2022-06-10

## 2022-06-10 RX ORDER — HYDROPHILIC CREAM
PASTE (GRAM) TOPICAL 2 TIMES DAILY
Status: DISCONTINUED | OUTPATIENT
Start: 2022-06-10 | End: 2022-06-23 | Stop reason: HOSPADM

## 2022-06-10 RX ORDER — FENTANYL CITRATE 50 UG/ML
25 INJECTION, SOLUTION INTRAMUSCULAR; INTRAVENOUS ONCE
Status: DISCONTINUED | OUTPATIENT
Start: 2022-06-10 | End: 2022-06-10 | Stop reason: CLARIF

## 2022-06-10 RX ADMIN — OXYCODONE HYDROCHLORIDE 5 MG: 5 TABLET ORAL at 08:58

## 2022-06-10 RX ADMIN — NYSTATIN 500000 UNITS: 100000 SUSPENSION ORAL at 13:26

## 2022-06-10 RX ADMIN — APIXABAN 5 MG: 5 TABLET, FILM COATED ORAL at 08:55

## 2022-06-10 RX ADMIN — GABAPENTIN 100 MG: 100 CAPSULE ORAL at 20:56

## 2022-06-10 RX ADMIN — Medication 100 MG: at 08:55

## 2022-06-10 RX ADMIN — APIXABAN 5 MG: 5 TABLET, FILM COATED ORAL at 20:56

## 2022-06-10 RX ADMIN — DAKIN'S SOLUTION 0.125% (QUARTER STRENGTH): 0.12 SOLUTION at 08:55

## 2022-06-10 RX ADMIN — NYSTATIN 500000 UNITS: 100000 SUSPENSION ORAL at 08:55

## 2022-06-10 RX ADMIN — DOXAZOSIN 1 MG: 2 TABLET ORAL at 20:56

## 2022-06-10 RX ADMIN — SODIUM CHLORIDE, PRESERVATIVE FREE 5 ML: 5 INJECTION INTRAVENOUS at 23:59

## 2022-06-10 RX ADMIN — Medication 400 MG: at 20:56

## 2022-06-10 RX ADMIN — CEFTAROLINE FOSAMIL 600 MG: 600 POWDER, FOR SOLUTION INTRAVENOUS at 15:37

## 2022-06-10 RX ADMIN — NYSTATIN 500000 UNITS: 100000 SUSPENSION ORAL at 20:56

## 2022-06-10 RX ADMIN — CEFTAROLINE FOSAMIL 600 MG: 600 POWDER, FOR SOLUTION INTRAVENOUS at 09:13

## 2022-06-10 RX ADMIN — OXYCODONE HYDROCHLORIDE 5 MG: 5 TABLET ORAL at 20:56

## 2022-06-10 RX ADMIN — CLONAZEPAM 0.5 MG: 0.5 TABLET ORAL at 20:56

## 2022-06-10 RX ADMIN — FENTANYL CITRATE 25 MCG: 50 INJECTION, SOLUTION INTRAMUSCULAR; INTRAVENOUS at 15:31

## 2022-06-10 RX ADMIN — SODIUM CHLORIDE, PRESERVATIVE FREE 10 ML: 5 INJECTION INTRAVENOUS at 11:25

## 2022-06-10 RX ADMIN — FOLIC ACID 1 MG: 1 TABLET ORAL at 08:55

## 2022-06-10 RX ADMIN — Medication 5 MG: at 20:56

## 2022-06-10 RX ADMIN — Medication: at 08:55

## 2022-06-10 RX ADMIN — DOCUSATE SODIUM AND SENNOSIDES 2 TABLET: 8.6; 5 TABLET, FILM COATED ORAL at 20:56

## 2022-06-10 ASSESSMENT — PAIN SCALES - GENERAL
PAINLEVEL_OUTOF10: 10
PAINLEVEL_OUTOF10: 10
PAINLEVEL_OUTOF10: 7

## 2022-06-10 ASSESSMENT — PAIN DESCRIPTION - LOCATION: LOCATION: COCCYX

## 2022-06-10 NOTE — PROGRESS NOTES
Occupational Therapy  Facility/Department: [facility]  Rehabilitation Occupational Therapy Daily Treatment Note    Date: 6/10/22  Patient Name: Carie Crain       Room: 1344/2908-41  MRN: [id number]  Account: [de-identified]   : 1990  (28 y.o.) Gender: male                    Past Medical History:  has a past medical history of Cancer ([address] Past Surgical History:   has no past surgical history on file. Restrictions  Restrictions/Precautions: NPO;Isolation; Fall Risk;General Precautions  Other position/activity restrictions: antonio, PEG, NPO, MRSA contact precautions    Subjective  Subjective: Pt in bed, c/o pain in buttocks and neck (7/10), /64, HR 97, O2 sats 93% on RA. Restrictions/Precautions: NPO;Isolation; Fall Risk;General Precautions             Objective     Cognition  Overall Cognitive Status: Exceptions  Arousal/Alertness: Appropriate responses to stimuli  Following Commands: Follows multistep commands with repitition; Follows multistep commands with increased time  Attention Span: Attends with cues to redirect  Safety Judgement: Decreased awareness of need for assistance;Decreased awareness of need for safety  Problem Solving: Assistance required to identify errors made;Assistance required to implement solutions;Assistance required to generate solutions  Insights: Decreased awareness of deficits  Initiation: Requires cues for some  Sequencing: Requires cues for some  Orientation  Overall Orientation Status: Within Functional Limits   Perception  Overall Perceptual Status: Impaired  Unilateral Attention: Cues to maintain midline in standing  Initiation: Cues to initiate tasks  Motor Planning: Cues to use objects appropriately  Perseveration: Perseverates during conversation           Functional Mobility  Activity: Retrieve items  Assistance Level: Dependent  Skilled Clinical Factors: min A of 2 with B HHA  Bed Mobility  Overall Assistance Level: Minimal Assistance  Sit to Supine  Assistance Level: Minimal assistance  Supine to Sit  Assistance Level: Minimal assistance  Transfers  Surface: From bed; Wheelchair  Additional Factors: Verbal cues; Hand placement cues  Sit to Stand  Assistance Level: Moderate assistance  Stand to Sit  Assistance Level: Moderate assistance  Bed To/From Chair  Technique: Stand step  Assistance Level: Dependent  Skilled Clinical Factors: min A of 2         Assessment  Assessment  Assessment: Pt agreeable to OT/PT session. Pt completed mobility with B HHA and min A for 2 minutes and limited by fatigue and dizziness. Pt's BP stable when sitting down but c/o dizziness with all stands. Pt picking at chemoradiation spot on neck on arrival with blood on fingers. Pt educated on need to not pick and bandage replaced on neck. Pt performed ascending of 4 stairs with min A/CGA of 2 and limited by fatigue, pain, and dizziness. Second Session: Pt incontinent of bowel on arrival. Pt ambulated to toilet with min A and completed partial bowel hygiene on toilet. Pt required max A to complete hygiene. Upon standing from toilet pt accidentally pulled PEG tube out and was not aware until OT commented on it. RN and OT assisted in bandaging tube sit and pt ambulated back to bed with min A and returned to supine with CGA. Continue POC. Activity Tolerance: Patient limited by pain; Patient limited by fatigue;Patient limited by endurance  Discharge Recommendations: 24 hour supervision or assist;Home with Home health OT;S Level 4  OT Equipment Recommendations  Other: TBD  Safety Devices  Safety Devices in place: Yes  Type of devices: Gait belt;Bed alarm in place;Call light within reach; Left in bed;Nurse notified    Patient Education  Education  Education Given To: Patient  Education Provided: Role of Therapy;Plan of Care;Precautions; Safety; Energy Conservation; Fall Prevention Strategies;DME/Home Modifications;Transfer Training;Mobility Training;Equipment;ADL Function;Cognition  Education Provided Comments: role of OT, safety with transfers, posture, midline balance, safety with PEG tube,  Education Method: Verbal;Demonstration  Barriers to Learning: Cognition  Education Outcome: Verbalized understanding;Demonstrated understanding    Plan  Plan  Times per Week: 5/7 days/week  Plan Weeks: 3 weeks  Current Treatment Recommendations: Strengthening;ROM;Balance training;Functional mobility training; Endurance training; Safety education & training;Equipment evaluation, education, & procurement;Return to work related activity; Neuromuscular re-education;Patient/Caregiver education & training;Self-Care / ADL; Home management training;Coordination training    Goals  Short Term Goals  Time Frame for Short term goals: 1 week- 6/15/22  Short Term Goal 1: Pt will complete functional transfers with min A. Short Term Goal 2: Pt will perform toileting with max A. Short Term Goal 3: Pt will complete LB dressing with mod A. Short Term Goal 4: Pt will perform UB dressing with min A. Long Term Goals  Time Frame for Long term goals : 3 weeks- 6/29/22  Long Term Goal 1: Pt will perform functional transfers with CGA. Long Term Goal 2: Pt will complete toileting with CGA. Long Term Goal 3: Pt will perform LB dressing with CGA. Long Term Goal 4: Pt will perform UB dressing with setup. Long Term Goal 5: Pt will complete full body bathing with CGA.       Therapy Time   Individual Concurrent Group Co-treatment   Time In 1030     0900   Time Out 1100     0930   Minutes 30     30   Timed Code Treatment Minutes: [address]

## 2022-06-10 NOTE — PROGRESS NOTES
Patient incontinent and soiled his dressing to his coccyx. Patient/wound cleaned up and new dressing placed on patient.  Tonie Velasquez RN

## 2022-06-10 NOTE — PROGRESS NOTES
\"pt had right hydropneumothorax with worsening hypoxia requiring multiple intubations and chest tube placements. He underwent right internal jugular and right atrial thrombectomy due to concern that this was causing ongoing bacteremia. ID was consulted and he is to remain on IV ceftaroline with plan for long term treatment until 7/2/22. His course was complicated by severe penile edema and antonio was placed. Urology followed during acute stay.  Patient failed several voiding trials    Physical exam:   Constitutional:  VITALS:  /64   Pulse 97   Temp 98 °F (36.7 °C) (Axillary)   Resp 16   Ht 5' 6\" (1.676 m)   Wt 110 lb 14.3 oz (50.3 kg)   SpO2 97%   BMI 17.90 kg/m²   Gen: alert, awake  Neck: No JVD  Skin: Unremarkable  Cardiovascular:  S1, S2 without m/r/g   Respiratory: CTA B without w/r/r; respiratory effort normal  Abdomen:  soft, nt, nd,   Extremities: no lower extremity edema  Neuro/Psy: AAoriented times 3 ; moves all 4 ext    Data/  Recent Labs     06/09/22  0632 06/10/22  0915   WBC 3.3* 2.9*   HGB 7.9* 10.8*   HCT 24.0* 33.2*   MCV 87.5 87.9    118*     Recent Labs     06/08/22 2011 06/09/22  0632 06/10/22  0719   * 130* 129*   K 5.1 5.2* 5.3*  5.3*   CL 94* 97* 96*   CO2 25 25 26   GLUCOSE 90 110* 125*   PHOS  --   --  4.5   MG  --   --  1.70*   BUN 19 19 19   CREATININE 0.6* 0.6* 0.6*   LABGLOM >60 >60 >60   GFRAA >60 >60 >60     TSH 5.89  Uric acid 5.2  Urine na 82 osm 466    Assessment    -Hyponatremia   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 ( pt had multiple chest tubes for right hydro/pneumothorax)  -Hyperkalemia    -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 (s/p right hemiglossectomy, b/l ND, and RFFF reconstruction on 1/3/22, radiation, and weekly cisplatin completed 4/25/22)    -MRSA bacteremia , teflaro until 7/2/22    -chronic antonio d/t penile edema and failed voiding trials    -mildly elevated TSH, per rehab physician    -anemia per rehab physician    Plan  -cont free water 30 ml q4h after PEGT replaced   -cont proteinex supplements daily with 30 ml free water before and after administration; after PEGT placment  -strict intake and out put  -follow cortisol level d/t low BP, hyponatremia n hyperkalemia  -lokelma 10 g on 6/10 ( was not given)  -Replace magnesium as ordered 400 mg of magnesium oxide twice a day  -serial labs      Thank you for the consultation. Please do not hesitate to call with questions. Fatmata Mccormick MD  Office: [phone]  Fax:    [phone] [address] [facility]

## 2022-06-10 NOTE — PROGRESS NOTES
Physical Therapy  Facility/Department: [facility]  Rehabilitation Physical Therapy Treatment Note    NAME: Carie Crain  : 1990 ([address] y.o.)  MRN: [id number]  CODE STATUS: Full Code    Date of Service: 6/10/22       Restrictions:  Restrictions/Precautions: NPO;Isolation; Fall Risk;General Precautions  Position Activity Restriction  Other position/activity restrictions: antonio, PEG, NPO, MRSA contact precautions     SUBJECTIVE  Subjective  Subjective: pt found supine in bed, reporting pain in buttocks throughout session. RN provided pt with am medications including for pain  Pain: 8-9/10 pain          OBJECTIVE       Functional Mobility    supine to sit with CGA with increased time and HOB elevated/ bed rail (per precautions). Sit to stand EOB to no Ad with min A, pt donning pants min A for static standing balance, minor posterior LOB but pt using bed to stabilize self. Gait x 40 ft with B HHA, requiring min A of 2 for gait. Pt feet continue to be anterior to shoulders during gait resulting in constant posterior lean, demos narrow JOE, lack of heel strike and decreased swing phase (shuffled gait pattern). Pt limited by c/o fatigue and utilizes seated rest.     Sit to stand w/c to B HHA with min A. Pt ascended/descended 4 6\" steps with BHR, CGA-min A of 1+ min-mod A of 1 with step to pattern with cues for safety/ foot placement with poor carryover. Pt unable to complete further stairs due to fatigue and c/o dizziness. BP seated - 115/64, pulse= 97 bpm , Spo2= 93% on room air     Second Session   pt seen for individual session with focus on standing and LE strengthening. Standing limited by c/o dizziness, Bp remains stable. See note. RN notified. Pt also limited by decreased activity tolerance.      Sit to stand w/c to HHA x 2-3 reps with min A   Pt standing with 1-2 UE support on table while completing sorting task to facilitate forward weight shift/ neutral posture with emphasis on JOE and feet position in relation to shoulders. Pt tolerated 1 min 12 sec , pt c/o dizziness. BP seated = 115/64   Bp standing = 119/67, pulse= 101 bpm.     Pt requested to trial standing once more, tolerated 1 min 30 sec prior to c/o dizziness. BP = 115/67, pulse= 111 bpm. RN notified of pt's vitals and further standing deferred due to symptoms. W/c mobility x 50 ft with CGA-min A with BUEs to propel with cues for more effective propulsion and assist to navigate obstalces. Stand pivot w/c to EOB with min A  Sit to supine with SBA, pt able to scoot self to [facility]    Pt completed 15 reps of BLE supine ankle pumps, heel slides, hip abduction    Pt in bed with alarm donned and call light/needs within reach. ASSESSMENT/PROGRESS TOWARDS GOALS  Vital Signs  Heart Rate: 97  Heart Rate Source: Monitor  BP: 115/64  BP Location: Right upper arm  MAP (Calculated): 81  SpO2: 97 %  O2 Device: None (Room air)    Assessment  Assessment: pt seen as co treat with OT for increased safety progressing functional mobility as well as to maximize functional gains while decreasing effects of fatigue. pt grossly min A for transfers, min A of 2 for gait with B HHA and TD for stair navigation (min-mod A of 1+ cGA-min A fo 1) wikiran BHR. pt continues to present with posterior COG due to feet being too far anterior to shoulders during transfers/gait/ stair training. continue to progress mobiity as pt tolerates. rec family training prior to d/c and HHPT. DME: TBD pending progress. Activity Tolerance: Patient limited by pain; Patient limited by fatigue;Patient limited by endurance  Discharge Recommendations: 24 hour supervision or assist;Home with assist PRN  PT Equipment Recommendations  Equipment Needed: Yes    Goals  Patient Goals   Patient goals : \"to walk and to eat\"  Short Term Goals  Time Frame for Short term goals: 10 days 6/17  Short term goal 1: pt will complete bed mobility with mod ind  Short term goal 2: pt will complete functional transfer with CGA and LRAD. Short term goal 3: pt will ambulate 50 ft with LRAD and mod A. Short term goal 4: pt will tolerate stair assessment when appropriate and goal will be made. Long Term Goals  Time Frame for Long term goals : 21 days 6/28  Long term goal 1: pt will complete functional transfer with SBA and LRAD  Long term goal 2: pt will ambulate 100 ft wiht LRAD and CGA. Long term goal 3: pt will complete car transfer with SBA and LRAD. Long term goal 4: pt will tolerate standing for 2 min with 1-2 UE support and CGA -SBA for balance. PLAN OF CARE/SAFETY  Plan  Plan: 5-7 times per week  Current Treatment Recommendations: Strengthening; Wheelchair mobility training;Cognitive reorientation;Equipment evaluation, education, & procurement; Modalities; Positioning; Therapeutic activities; Wound care; Patient/Caregiver education & training; Safety education & training;Home exercise program;Return to work related activity;Pain management;Gait training;Balance training;Functional mobility training;Stair training;Neuromuscular re-education;Transfer training;ADL/Self-care training; Endurance training;Manual Therapy - Soft Tissue Mobilization  Safety Devices  Type of Devices: Gait belt;Patient at risk for falls;Nurse notified; All fall risk precautions in place; Bed alarm in place;Call light within reach; Left in bed; All evelyn prominences offloaded  Restraints  Restraints Initially in Place: No    EDUCATION  Education  Education Given To: Patient  Education Provided: Role of Therapy;Plan of Care;Precautions; Safety; Energy Conservation;Transfer Training;DME/Home Modifications; Fall Prevention Strategies; Equipment; Mobility Training;Cognition;ADL Function  Education Outcome: Verbalized understanding;Demonstrated understanding        Therapy Time   Individual Concurrent Group Co-treatment   Time In 0930     0900   Time Out 1000     0930   Minutes 30     30     Timed Code Treatment Minutes: 61 Minutes       Oscar Kahn PT, 06/10/22 at 10:32 AM

## 2022-06-10 NOTE — PROGRESS NOTES
Dandy Castillo  6/10/2022  [id number]    Chief Complaint: Critical illness myopathy    Subjective: Patient seen this AM.  Patient has complaints of sore throat this morning, but this is improving with treatment. Patient seen by GI yesterday, and PEG tube flow is now working well with new connections. Repeat labs this morning reveal slight decrease of mag and slight increase in potassium. Will place on mag supplements, and encourage fluids. Patient seen by nephrology. Patient continues on his long-term IV antibiotics. Patient breathing easy. ROS: No N/V, chest pain, SOB, chills or fever    Objective:  Patient Vitals for the past 24 hrs:   BP Temp Temp src Pulse Resp SpO2 Height   06/10/22 0745 104/64 98 °F (36.7 °C) Axillary 87 16 95 % --   06/09/22 2000 105/65 98 °F (36.7 °C) Oral 72 16 95 % --   06/09/22 1700 103/64 98 °F (36.7 °C) Oral 79 16 -- --   06/09/22 1656 -- -- -- -- 16 -- --   06/09/22 1500 103/65 97.4 °F (36.3 °C) Axillary 85 16 96 % --   06/09/22 1445 (!) 98/59 -- -- 91 -- 95 % --   06/09/22 1352 -- -- -- -- -- -- 5' 6\" (1.676 m)   06/09/22 1315 -- -- -- -- 16 -- --   06/09/22 0918 101/62 -- -- 90 18 91 % --     Gen: No distress, pleasant. HEENT: Normocephalic, atraumatic. Bilateral radical neck dissection present,Deformity of tongue and unable to protrude from mouth  CV: Regular rate and rhythm. Resp: No respiratory distress. Abd: Soft, nontender , G-tube in place, site appears clean without surrounding erythema or drainage  Ext: No edema, able to lift both legs off of bed, ankles in plantar flexion but able to stretch to neutral  Neuro: Alert, oriented, appropriately interactive.      Wt Readings from Last 3 Encounters:   06/08/22 110 lb 14.3 oz (50.3 kg)   02/22/22 140 lb 12.8 oz (63.9 kg)   02/13/22 149 lb 12.8 oz (67.9 kg)       Laboratory data:   Lab Results   Component Value Date    WBC 3.3 (L) 06/09/2022    HGB 7.9 (L) 06/09/2022    HCT 24.0 (L) 06/09/2022    MCV 87.5 06/09/2022  06/09/2022       Lab Results   Component Value Date     06/10/2022    K 5.3 06/10/2022    K 5.3 06/10/2022    CL 96 06/10/2022    CO2 26 06/10/2022    BUN 19 06/10/2022    CREATININE 0.6 06/10/2022    GLUCOSE 125 06/10/2022    CALCIUM 8.6 06/10/2022        Therapy progress:  PT  Position Activity Restriction  Other position/activity restrictions: antonio, PEG, NPO, MRSA contact precautions  Objective     Sit to Stand: Minimal Assistance  Stand to sit: Minimal Assistance  Bed to Chair: Moderate assistance  Device: No Device,Hand-Held Assist  Other Apparatus: Wheelchair follow  Assistance: 2 Person assistance,Dependent/Total  Distance: 30 ft  OT  PT Equipment Recommendations  Equipment Needed: Yes (TBD)  Toilet - Technique: Stand pivot  Equipment Used: Standard toilet  Assessment        SLP    Recommendations:  Diet recommendation: NPO; NPO and Ice chips with SLP/RN only; Meds via alt means of nutrition  Instrumentation: not clinically warranted at this time   Risk management: oral care 2-3x/day to reduce adverse affects in the event of aspiration            Body mass index is 17.9 kg/m².       Rehabilitation Diagnosis:  Neurologic, 3.8, Neuromuscular Disorders, e.g. Critical Illness Myopathy, Other Myopathy     Assessment and Plan:  Estephanie Braun is a 28year old male with a past medical history significant for HTN and stage IV squamous cell carcinoma of the tongue (s/p right hemiglossectomy, bilateral neck dissection, and RFFF reconstruction on 1/3/22 followed by chemoradiation, and weekly cisplatin completed 4/25/22 and s/p G-tube) who presented to [facility] on 5/[facility]/22 for acute hypoxic respiratory failure and septic shock, found to have persistent MRSA bacteremia.  He was admitted to [facility] on 6/8/22 due to functional deficits below his baseline.      Critical Illness Myopathy  - due to below  - PT, OT, ST     Squamous Cell Carcinoma of the tongue  - s/p right hemiglossectomy, bilateral neck dissection, and RFFF reconstruction on 1/3/22 followed by chemoradiation, and weekly cisplatin completed 4/25/22 and s/p G-tube  - NPO except ice chips  - Tube feeds per orders  - dietary consulted  - GI consulted due to G not having supplies for patient's tube. Question of exchange  - will schedule nystatin swish and spit for comfort  - PT, OT, ST     MRSA bacteremia  - cefaroline for 24 days from admit per [facility] ID  - consult to ID as this medication needs to be cleared by them per pharmacy  - unable to see midline on XR, plan to remove and place PICC     Acute hypoxic respiratory failure  - with right lower hydropneumothorax s/p chest tube, MRSA pneumonia, pleural effusions s/p chest tube and requiring several reintubations during acute stay  - adequately oxygenating on room air  - wean oxygen for SpO2>90%  - IS      Urinary Retention  - has severe penile edema during acute stay. Was followed by Urology and failed multiple voiding trials  - continue antonio catheter, most recently placed 6/6  - follow up with Urology outpatient      PE  Right Atrial Thrombus  - s/p thrombectomy 5/20 with IR, thrombus positive for MRSA  - Elquis 5 mg BID      Anasarca  - requiring diuretics during acute stay, since weaned off  - monitor     Hyponatremia  - consult to Nephrology, appreciate assistance     Anemia  - monitor and transfuse for Hb<7     Severe Protein-calorie malnutrition  - BMI 17.9  - on continuous tube feeds  - Dietician consulted     Pain  - gabapentin 100 mg TID  - PRN tylenol, PRN oxycodone      Anxiety  - clonazepam 0.5 mg daily PRN     Multiple Wounds POA  - including stage four pressure ulcer on coccyx  - wound care per orders  - consult to wound care     Bowels: Schedule stool softener. Follow bowel movements. Enema or suppository if needed.      Bladder: continue antonio     Sleep: patient allergic to trazodone     Follow up appointments: PCP, ID, Cardiology, Oncology    Tesha Proctor.  MD [place] 6/10/2022, 9:05 AM

## 2022-06-10 NOTE — PROGRESS NOTES
Renetta VIVAR returned called, said to place antonio in stoma until he can see patient.  Kiesha Chavez, RN

## 2022-06-10 NOTE — PROGRESS NOTES
PROGRESS NOTE  S:31 yrs Patient  admitted on 6/8/2022 with Critical illness myopathy [G72.81] . Patient pulled PEG tube. Villatoro used to conserve stoma. Using a 20 Fr low profile 4 cm PEG tube, the tube were exchanged. Current Hospital Schedued Meds   sodium zirconium cyclosilicate  10 g Oral Once    magnesium oxide  400 mg Per G Tube BID    zinc oxide   Topical BID    medihoney   Topical q8h    lidocaine 1 % injection  5 mL IntraDERmal Once    sodium chloride flush  5-40 mL IntraVENous 2 times per day    nystatin  5 mL Oral 4x Daily    sodium hypochlorite   Irrigation Daily    apixaban  5 mg Per G Tube BID    ceftaroline fosamil (TEFLARO) 600 MG IVPB  600 mg IntraVENous X0K    folic acid  1 mg Per G Tube Daily    gabapentin  100 mg Per G Tube TID    melatonin  5 mg Per G Tube Nightly    sennosides-docusate sodium  2 tablet Per G Tube Nightly    doxazosin  1 mg Oral Nightly    thiamine  100 mg Per G Tube Daily     Current Hospital IV Meds   sodium chloride       Current Hospital PRN Meds  Lip Balm, sodium chloride flush, sodium chloride, acetaminophen, bisacodyl, [START ON 6/11/2022] oxymetazoline, sodium chloride, clonazePAM, oxyCODONE, albuterol    Exam:   Vitals:    06/10/22 1028   BP: 115/64   Pulse: 97   Resp:    Temp:    SpO2: 97%     I/O last 3 completed shifts:   In: 630 [P.O.:120; NG/GT:510]  Out: 2100 [Urine:2100]   General appearance: alert, appears stated age and cooperative  HEENT: PERRLA  Neck: no adenopathy, no carotid bruit, no JVD, supple, symmetrical, trachea midline and thyroid not enlarged, symmetric, no tenderness/mass/nodules  Lungs: clear to auscultation bilaterally  Heart: regular rate and rhythm, S1, S2 normal, no murmur, click, rub or gallop  Abdomen: soft, non-tender; bowel sounds normal; no masses,  no organomegaly  Extremities: extremities normal, atraumatic, no cyanosis or edema     Labs:  CBC:   Recent Labs     06/09/22  5542 06/10/22  0915   WBC 3.3* 2.9*   HGB 7.9* 10.8*   HCT 24.0* 33.2*   MCV 87.5 87.9    118*     BMP:   Recent Labs     06/08/22 2011 06/09/22  0632 06/10/22  0719   * 130* 129*   K 5.1 5.2* 5.3*  5.3*   CL 94* 97* 96*   CO2 25 25 26   PHOS  --   --  4.5   BUN 19 19 19   CREATININE 0.6* 0.6* 0.6*     LIVER PROFILE: No results for input(s): AST, ALT, LIPASE, PROT, BILIDIR, BILITOT, ALKPHOS in the last 72 hours. Invalid input(s): AMYLASE,  ALB  PT/INR:   Recent Labs     06/09/22  1214   INR 1.60*       IMAGING:  XR CHEST PORTABLE    Result Date: 6/9/2022  EXAMINATION: ONE SUPINE XRAY VIEW(S) OF THE ABDOMEN; ONE XRAY VIEW OF THE CHEST 6/9/2022 7:59 am; 6/9/2022 8:13 am COMPARISON: None. HISTORY: ORDERING SYSTEM PROVIDED HISTORY: Confirmation of course of NG/OG/NE tube and location of tip of tube TECHNOLOGIST PROVIDED HISTORY: Reason for exam:->Confirmation of course of NG/OG/NE tube and location of tip of tube Portable? ->Yes; ORDERING SYSTEM PROVIDED HISTORY: verify midline placement TECHNOLOGIST PROVIDED HISTORY: Reason for exam:->verify midline placement Reason for Exam: check midline FINDINGS: Chest: Heart size is normal.  Mediastinal contours are normal Scattered parenchymal opacities are seen throughout the lungs, right greater than left Clips are seen in the right supraclavicular region. G-tube projects in the region of the stomach A midline is not clearly included on the field of view Abdomen: G-tube projects in region of the stomach No significant small bowel distention noted. Mild-to-moderate stool load seen in the colon. Nonspecific bowel gas pattern is seen Villatoro catheter is seen     Chest: Bilateral airspace disease, right greater than left, either atelectasis or pneumonia. A midline is not included on the field of view. Abdomen: Nonobstructive bowel gas pattern. G-tube projects in the region of the stomach.      IR PICC WO SQ PORT/PUMP > 5 YEARS    Result Date: 6/9/2022  Radiology exam is complete. No Radiologist dictation. Please follow up with ordering provider. XR ABDOMEN FOR NG/OG/NE TUBE PLACEMENT    Result Date: 6/9/2022  EXAMINATION: ONE SUPINE XRAY VIEW(S) OF THE ABDOMEN; ONE XRAY VIEW OF THE CHEST 6/9/2022 7:59 am; 6/9/2022 8:13 am COMPARISON: None. HISTORY: ORDERING SYSTEM PROVIDED HISTORY: Confirmation of course of NG/OG/NE tube and location of tip of tube TECHNOLOGIST PROVIDED HISTORY: Reason for exam:->Confirmation of course of NG/OG/NE tube and location of tip of tube Portable? ->Yes; ORDERING SYSTEM PROVIDED HISTORY: verify midline placement TECHNOLOGIST PROVIDED HISTORY: Reason for exam:->verify midline placement Reason for Exam: check midline FINDINGS: Chest: Heart size is normal.  Mediastinal contours are normal Scattered parenchymal opacities are seen throughout the lungs, right greater than left Clips are seen in the right supraclavicular region. G-tube projects in the region of the stomach A midline is not clearly included on the field of view Abdomen: G-tube projects in region of the stomach No significant small bowel distention noted. Mild-to-moderate stool load seen in the colon. Nonspecific bowel gas pattern is seen Villatoro catheter is seen     Chest: Bilateral airspace disease, right greater than left, either atelectasis or pneumonia. A midline is not included on the field of view. Abdomen: Nonobstructive bowel gas pattern. G-tube projects in the region of the stomach. Hospital Problems           Last Modified POA    * (Principal) Critical illness myopathy 6/8/2022 Yes    Severe malnutrition (HCC) (Chronic) 6/9/2022 Yes         Impression:  27 yo male with squamous cell cancer of the tongue and peg tube malfunction. Recommendation:  1. Successful placement of 20 Fr low profile PEG tube. Was 4 cm size which is a bit long. See how does with feeds over the weekend. Can consider exchanging to a lower length if problematic.        Lexi Dillon, DO  7:18 PM 6/10/2022            [address] [place] [address]    [address]      [address] [facility]     Phone: [phone]     Fax: [phone]

## 2022-06-10 NOTE — CONSULTS
[facility] Nurse  Consult Note       NAME:  Eric Dougherty  MEDICAL RECORD NUMBER:  [id number]  AGE: 28 y.o. GENDER: male  : 1990  TODAY'S DATE:  6/10/2022    Subjective; Patient has trouble speaking related to radiation treatment. Writes wishes out on his telephone. Reason for WOCN Evaluation and Assessment: Deep wound to sacrum, open area throat from radiation treatment, area right neck, right chest open area red. Left radial scrap area. Left lateral leg open wound. Dr. Juani Baca gastroenterologist evaluated patient's peg tube site. Will return to replace appliance. Eric Dougherty is a 28 y.o. 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 He was found to have persistent MRSA bacteremia with concern for infected port  Current Wound Care Treatment: foam, and abd pads guaze to sacrum    Patient Goal of Care:  [x] Wound Healing  [] Odor Control  [] Palliative Care  [x] Pain Control   [] Other:         PAST MEDICAL HISTORY        Diagnosis Date    Cancer [facility])        PAST SURGICAL HISTORY    No past surgical history on file.     FAMILY HISTORY    Family History   Problem Relation Age of Onset    Diabetes Mother     Cancer Maternal Grandmother         Pancreatic CA       SOCIAL HISTORY    Social History     Tobacco Use    Smoking status: Former Smoker     Packs/day: 1.00     Years: 17.00 Pack years: 17.00    Smokeless tobacco: Never Used   Vaping Use    Vaping Use: Never used   Substance Use Topics    Alcohol use: Not Currently     Comment: Previously could drink up to 20-12 ounce cans of Budweiser daily    Drug use: Not Currently     Types: Marijuana Eris Gutiérrez     Comment: Previous marijuana use       ALLERGIES    Allergies   Allergen Reactions    Trazodone And Nefazodone Nausea And Vomiting, Hives and Other (See Comments)     Does not remember  Does not remember    Tramadol Swelling and Other (See Comments)    Trazodone Other (See Comments) and Hives     Does not remember         MEDICATIONS    No current facility-administered medications on file prior to encounter. Current Outpatient Medications on File Prior to Encounter   Medication Sig Dispense Refill    sodium chloride 0.9 % SOLN 50 mL with ceftaroline fosamil 600 MG SOLR 600 mg Infuse 600 mg intravenously every 8 hours      apixaban (ELIQUIS) 5 MG TABS tablet Take 5 mg by mouth 2 times daily      bisacodyl (DULCOLAX) 10 MG suppository Place 10 mg rectally daily as needed      folic acid (FOLVITE) 1 MG tablet 1 mg by Enteral route daily      gabapentin (NEURONTIN) 100 MG capsule Take 100 mg by mouth 3 times daily.  nystatin (MYCOSTATIN) 533189 UNIT/ML suspension Take 500,000 Units by mouth every 4 hours as needed      oxyCODONE (ROXICODONE) 5 MG immediate release tablet 5 mg by Enteral route every 6 hours as needed.       Oxymetazoline HCl (NASAL SPRAY) 0.05 % SOLN 2 sprays by Nasal route 2 times daily as needed      senna-docusate (PERICOLACE) 8.6-50 MG per tablet 2 tablets by Enteral route nightly      sodium chloride (OCEAN) 0.65 % nasal spray 1 spray by Nasal route as needed      terazosin (HYTRIN) 1 MG capsule Take 1 mg by mouth nightly      thiamine 100 mg tablet 100 mg by Enteral route daily      acetaminophen (TYLENOL) 325 mg tablet Take 650 mg by mouth every 6 hours as needed for Pain      melatonin 3 mg TABS tablet Take 6 mg by mouth nightly      lidocaine viscous hcl (XYLOCAINE) 2 % SOLN solution       clonazePAM (KLONOPIN) 0.5 MG tablet Take 1 tablet by mouth 2 times daily as needed for Anxiety for up to 30 days. 60 tablet 0    DULoxetine (CYMBALTA) 30 MG extended release capsule Take 1 capsule by mouth daily 30 capsule 2    ACETAMINOPHEN EXTRA STRENGTH 500 MG tablet       albuterol (PROVENTIL) (2.5 MG/3ML) 0.083% nebulizer solution Take 3 mLs by nebulization every 6 hours as needed for Wheezing 120 each 3    phenol 1.4 % LIQD mouth spray       ibuprofen (ADVIL;MOTRIN) 800 MG tablet       ciprofloxacin-dexamethasone (CIPRODEX) 0.3-0.1 % otic suspension       hydrOXYzine (ATARAX) 10 MG tablet Take 2.5 tablets by mouth every 8 hours as needed for Anxiety 30 tablet 2    amoxicillin (AMOXIL) 500 MG capsule       albuterol (PROVENTIL) (5 MG/ML) 0.5% nebulizer solution Take 1 mL by nebulization 4 times daily as needed for Wheezing 120 each 3       Objective; antonio, lying in bed. /64   Pulse 97   Temp 98 °F (36.7 °C) (Axillary)   Resp 16   Ht 5' 6\" (1.676 m)   Wt 110 lb 14.3 oz (50.3 kg)   SpO2 97%   BMI 17.90 kg/m²     LABS:  WBC:    Lab Results   Component Value Date    WBC 2.9 06/10/2022     H/H:    Lab Results   Component Value Date    HGB 10.8 06/10/2022    HCT 33.2 06/10/2022     PTT:  No results found for: APTT, PTT[APTT}  PT/INR:    Lab Results   Component Value Date    PROTIME 18.9 06/09/2022    INR 1.60 06/09/2022     HgBA1c:    Lab Results   Component Value Date    LABA1C 5.8 11/03/2021       Assessment; Multiple areas, neck red from radiation tx, rt neck yellow scab wound, rt chest open wound red, minimal drainage, left radial red area. Sacrum large open wound red, slough, tendon, bone.     Hiram Risk Score: Hiram Scale Score: 14    Patient Active Problem List   Diagnosis    Chronic bilateral low back pain without sciatica    Anxiety    Insomnia due to other mental disorder    Poor dentition    Class 1 obesity due to excess calories with serious comorbidity and body mass index (BMI) of 34.0 to 34.9 in adult    Primary squamous cell carcinoma of tongue (HCC)    Recurrent major depressive disorder (HCC)    Prediabetes    Critical illness myopathy    Severe malnutrition (HCC)       Measurements:  Negative Pressure Wound Therapy Sacrum Mid (Active)   $ Standard NPWT >50 sq cm PER TX $ Yes 06/10/22 1758   Wound Type Pressure ulcer: Stage IV 06/10/22 1758   Dressing Type Other (Comment) 06/10/22 1758   Number of pieces used 5 06/10/22 1758   Number of pieces removed 0 06/10/22 1758   Target Pressure (mmHg) 125 06/10/22 1758   Irrigation Solution Sodium chloride 0.9% 06/10/22 1758   Instillation Volume  24 mL 06/10/22 1758   Soak Time  10 minutes 06/10/22 1758   Vac Frequency 3 hours 06/10/22 1758   Canister changed? Yes 06/10/22 1758   Dressing Status New dressing applied 06/10/22 1758   Dressing Changed Changed/New 06/10/22 1758   Drainage Amount Moderate 06/10/22 1758   Drainage Description Serosanguinous 06/10/22 1758   Dressing Change Due 06/13/22 06/10/22 1758   Wound Assessment Red;Slough; Tan 06/10/22 1758   Shape oval 06/10/22 1758   Odor Mild 06/10/22 1758   Number of days: 0       Wound 06/08/22 Throat Right (Active)   Wound Image   06/10/22 1758   Wound Etiology Other 06/10/22 1758   Dressing Status New dressing applied 06/10/22 1758   Wound Cleansed Cleansed with saline 06/10/22 1758   Dressing/Treatment Petroleum gauze; Honey gel/honey paste 06/10/22 1758   Dressing Change Due 06/13/22 06/10/22 1758   Wound Length (cm) 3.5 cm 06/10/22 1758   Wound Width (cm) 5 cm 06/10/22 1758   Wound Depth (cm) 0.1 cm 06/10/22 1758   Wound Surface Area (cm^2) 17.5 cm^2 06/10/22 1758   Wound Volume (cm^3) 1.75 cm^3 06/10/22 1758   Wound Assessment Pink/red 06/10/22 1758   Drainage Amount Scant 06/10/22 1758   Drainage Description Serosanguinous 06/10/22 1758   Odor None 06/10/22 1758 Kristel-wound Assessment Blanchable erythema 06/10/22 1758   Margins Attached edges 06/10/22 1758   Wound Thickness Description not for Pressure Injury Partial thickness 06/10/22 1758   Number of days: 1    throat         Wound 06/08/22 Chest Right;Upper (Active)   Wound Image   06/10/22 1758   Wound Etiology Other 06/10/22 1758   Dressing Status New dressing applied 06/10/22 1758   Wound Cleansed Cleansed with saline 06/10/22 1758   Dressing/Treatment Honey gel/honey paste; Foam 06/10/22 1758   Dressing Change Due 06/13/22 06/10/22 1758   Wound Length (cm) 1 cm 06/10/22 1758   Wound Width (cm) 1.5 cm 06/10/22 1758   Wound Depth (cm) 0.1 cm 06/10/22 1758   Wound Surface Area (cm^2) 1.5 cm^2 06/10/22 1758   Wound Volume (cm^3) 0.15 cm^3 06/10/22 1758   Wound Assessment Pink/red 06/10/22 1758   Drainage Amount Scant 06/10/22 1758   Drainage Description Serosanguinous 06/10/22 1758   Odor None 06/10/22 1758   Kristel-wound Assessment Blanchable erythema 06/10/22 1758   Margins Attached edges 06/10/22 1758   Wound Thickness Description not for Pressure Injury Partial thickness 06/10/22 1758   Number of days: 1    chest         Wound 06/08/22 Radial Distal;Left (Active)   Wound Image   06/10/22 1758   Wound Etiology Traumatic 06/10/22 1758   Dressing Status New dressing applied 06/10/22 1758   Wound Cleansed Cleansed with saline 06/10/22 1758   Dressing/Treatment Alginate with Ag;Dry dressing;Roll gauze 06/10/22 1758   Dressing Change Due 06/12/22 06/10/22 1758   Wound Length (cm) 6 cm 06/10/22 1758   Wound Width (cm) 1 cm 06/10/22 1758   Wound Depth (cm) 0.1 cm 06/10/22 1758   Wound Surface Area (cm^2) 6 cm^2 06/10/22 1758   Wound Volume (cm^3) 0.6 cm^3 06/10/22 1758   Wound Assessment Pink/red;Granulation tissue 06/10/22 1758   Drainage Amount Small 06/10/22 1758   Drainage Description Serosanguinous 06/10/22 1758   Odor None 06/10/22 1758   Kristel-wound Assessment Blanchable erythema 06/10/22 1758   Margins Attached edges 06/10/22 1758   Wound Thickness Description not for Pressure Injury Partial thickness 06/10/22 1758   Number of days: 1    left radial         Wound 06/08/22 Knee Left;Posterior (Active)   Wound Image   06/10/22 1758   Wound Etiology Pressure Stage 3 06/10/22 1758   Dressing Status New dressing applied 06/10/22 1758   Wound Cleansed Cleansed with saline 06/10/22 1758   Dressing/Treatment Alginate with Ag; Foam 06/10/22 1758   Offloading for Diabetic Foot Ulcers Offloading ordered 06/10/22 1758   Dressing Change Due 06/12/22 06/10/22 1758   Wound Length (cm) 2.5 cm 06/10/22 1758   Wound Width (cm) 3.5 cm 06/10/22 1758   Wound Depth (cm) 0.2 cm 06/10/22 1758   Wound Surface Area (cm^2) 8.75 cm^2 06/10/22 1758   Wound Volume (cm^3) 1.75 cm^3 06/10/22 1758   Wound Assessment Pink/red;Slough 06/10/22 1758   Drainage Amount Small 06/10/22 1758   Drainage Description Sanguinous 06/10/22 1758   Odor None 06/10/22 1758   Kristel-wound Assessment Blanchable erythema 06/10/22 1758   Margins Attached edges 06/10/22 1758   Wound Thickness Description not for Pressure Injury Full thickness 06/10/22 1758   Number of days: 1    left knee         Wound 06/08/22 Sacrum (Active)   Wound Image   06/10/22 1758   Wound Etiology Pressure Stage 4 06/10/22 1758   Dressing Status New dressing applied 06/10/22 1758   Wound Cleansed Cleansed with saline 06/10/22 1758   Dressing/Treatment Negative pressure wound therapy 06/10/22 1758   Offloading for Diabetic Foot Ulcers Offloading ordered 06/10/22 1758   Dressing Change Due 06/13/22 06/10/22 1758   Wound Length (cm) 10.5 cm 06/10/22 1758   Wound Width (cm) 8.2 cm 06/10/22 1758   Wound Depth (cm) 1.7 cm 06/10/22 1758   Wound Surface Area (cm^2) 86.1 cm^2 06/10/22 1758   Wound Volume (cm^3) 146.37 cm^3 06/10/22 1758   Undermining Starts ___ O'Clock 1000 06/10/22 1758   Undermining Ends___ O'Clock 0400 06/10/22 1758   Undermining Maxium Distance (cm) 1.5 06/10/22 1758   Wound Assessment Pink/red;Slough; Other (Comment) 06/10/22 1758   Drainage Amount Moderate 06/10/22 1758   Drainage Description Serosanguinous 06/10/22 1758   Odor Mild 06/10/22 1758   Kristel-wound Assessment Blanchable erythema 06/10/22 1758   Margins Attached edges 06/10/22 1758   Wound Thickness Description not for Pressure Injury Full thickness 06/10/22 1758   Number of days: 1   Sacrum       Response to treatment:  With complaints of pain. Pain Assessment:  Severity:  8 / 10  Quality of pain: aching, burning  Wound Pain Timing/Severity: intermittent  Premedicated: No    Plan   Plan of Care: Wound 06/08/22 Throat Right-Dressing/Treatment: Petroleum gauze,Honey gel/honey paste  Wound 06/08/22 Chest Right;Upper-Dressing/Treatment: Honey gel/honey paste,Foam  Wound 06/08/22 Radial Distal;Left-Dressing/Treatment: Alginate with Ag,Dry dressing,Roll gauze  Wound 06/08/22 Knee Left;Posterior-Dressing/Treatment: Alginate with Ag,Foam  Wound 06/08/22 Sacrum-Dressing/Treatment: Negative pressure wound therapy      Recommendations;  Chin/ right Neck/ right Chest/L wrist wounds: Cleanse with saline, pat dry. Apply medihoney gel to wound bed of chin, neck & chest. Cover with bordered foam dressing. Change every 3 days and PRN for saturation. Left wrist Medihoney gel dry dressing guaze wrap. Cleanse sacral wound with normal saline, pat dry, apply barrier wipe then hydrocolloid to wound edges. Apply VAC drape to wound edges. Insert gray foam with into tunnel areas. Pervis Balding foam with holes to body of wound with solid gray over top, with tracking  Going over right hip to attach vacuum suction to. Cover with VAC drape to seal.    5 pieces of gray foam used in sacrum wound. Cleanse choice  machine;set at 24 flush normal saline, soak for 10minutes every 3 hours. Treatment is 125 mmHg continuous suction.     Change cannister once a week or when full   If suction fails or patient is discharged:  -remove vac dressing  -cleanse wound with normal saline  -pack wound with saline moistened guaze and change every 12 hours. Call wound care for deterioration [phone] or call [phone] and leave message. Specialty Bed Required : Yes Power Pro elite_   xLow Air Loss   [x] Pressure Redistribution  [x] Fluid Immersion  [] Bariatric  [] Total Pressure Relief  [] Other:     Current Diet: Diet NPO  ADULT TUBE FEEDING; PEG; Standard with Fiber; Continuous; 45; Yes; 10; Q 4 hours; 65; 30; Q 4 hours; Protein; Syringe push of 1 bottle of proteinex daily with 30 mL free water flush before and after administration  Dietician consult:  Yes    Discharge Plan:  Placement for patient upon discharge: intermediate care facility    Patient appropriate for Outpatient [address]: Yes    Referrals:  [x]  / discharge planner following  [] [facility]  [] Supplies  [] Other    Patient/Caregiver Teaching:Educated patient on turning every two hours to prevent pressure to buttocks and relieve discomfort to buttocks related to Vera Flow.   Level of patient/caregiver understanding able to:   [] Indicates understanding       [x] Needs reinforcement  [] Unsuccessful      [] Verbal Understanding  [] Demonstrated understanding       [] No evidence of learning  [] Refused teaching         [] N/A       Electronically signed by Viky Woods, RN, BSN on 6/10/2022 at 6:19 PM

## 2022-06-10 NOTE — PROGRESS NOTES
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 Pt reported his girlfriend manages cooking, cleaning, laundry, grocery shopping. Pt not actively driving but was able to prior to 2022.    ST Dx: [] Aphasia  [x] Dysarthria  [] Apraxia   [x] Oropharyngeal dysphagia [x] Cognitive Impairment  [] Other:   Date of Admit: 2022  Room #: 4972/1423-84    Current functional status (updated daily):         Pt being seen for : [x] Speech/Language Treatment  [x] Dysphagia Treatment [x] Cognitive Treatment  [] Other:  Communication: []WFL  [] Aphasia  [x] Dysarthria  [] Apraxia  [] Pragmatic Impairment [] Non-verbal  [] Hearing Loss  [] Other:   Cognition: [] WFL  [] Mild  [x] Moderate  [] Severe [] Unable to Assess  [] Other:  Memory: [] WFL  [] Mild  [x] Moderate  [] Severe [] Unable to Assess  [] Other:  Behavior: [x] Alert  [x] Cooperative  [x]  Pleasant  [] Confused  [] Agitated  [] Uncooperative  [] Distractible [] Motivated  [] Self-Limiting [] Anxious  [] Other:  Endurance:  [x] Adequate for participation in SLP sessions  [] Reduced overall  [] Lethargic  [] Other:  Safety: [] No concerns at this time  [x] Reduced insight into deficits  [x]  Reduced safety awareness [] Not following call light procedures  [] Unable to Assess  [] Other:    Current Diet Order:Diet NPO  ADULT TUBE FEEDING; PEG; Standard with Fiber; Continuous; 45; Yes; 10; Q 4 hours; 65; 30; Q 4 hours; Protein; Syringe push of 1 bottle of proteinex daily with 30 mL free water flush before and after administration  Swallowing Precautions: oral care 2-3x/day to reduce adverse affects in the event of aspiration        Date: 6/10/2022      Tx session 1  3075 - 1045 Tx session 2  All tx needs met in session 1   Total Timed Code Min 30 0   Total Treatment Minutes 60 0   Individual Treatment Minutes 60 0   Group Treatment Minutes 0 0   Co-Treat Minutes 0 0   Variance/Reason:  n/a    Pain Buttocks pain     Pain Intervention [] RN notified  [x] Repositioned  [] Intervention offered and patient declined  [] N/A  [] Other: [] RN notified  [] Repositioned  [] Intervention offered and patient declined  [] N/A  [] Other:   Subjective     Pt alert and cooperative, agreeable to tx. Pt upright in bed for session. Objective:  Goals  Timeframe for Short-term Goals: 18 days (06/26/22)     Dysphagia Goals:  Goal 1: Pt will complete pharyngeal/laryngeal strengthening exercises 10/10 given min cues for overall improved swallowing function    Pt completed the following exercises given min cues:  -effortful swallow: 5x  -darwin maneuver: 5x  -jaw jut attempted; very difficulty for pt to complete     Although pt did appear to initiate swallow, suspect severely reduced hyolaryngeal movement. Goal 2: The patient will tolerate instrumental swallowing procedure Goal not directly targeted. Will continue to monitor progress and will recommend when clinically indicated. Goal 3: The patient/caregiver will demonstrate understanding of compensatory strategies for improved swallowing safety   SLP edu pt re: importance of oral care, not talking with ice in mouth, 1x piece of ice at a time. SLP also provided extensive edu re: ice chip trials with SLP or RN only; pt did appear somewhat frustrated and will need ongoing edu. Extensive edu re: risk of aspiration, risk of PNA.        Goal 4: The patient will tolerate recommended diet without observed clinical signs of aspiration   Ice chips PO trials:  -pt grossly tolerated ice chips at beginning of session with no overt s/s of aspiration   -towards end of session, pt with increased coughing following ice chips  -suspect significantly reduced laryngeal elevation and delayed trigger of pharyngeal swallow     Thin liquid via 1/2 tsp x5  -suspect significantly reduced laryngeal elevation and delayed trigger of pharyngeal swallow   -suspect rapid premature spillage to pharynx   -2x immediate post-swallow cough     RN provided pt with swish and spit mouth wash (medication)  -pt appeared to swallow some (spit out a good amount though)   -immediate post-swallow coughing      Cognitive-linguistic Goals  Goal 1: Pt will complete recall tasks with 80% acc given min cues for use of compensatory strategies   Mental Manipulation: Word Progression  -pt given 3 words; asked to rank in the order in which they occur  -e.g. Sep, May, Nov = May, Sep, Nov  -pt used recall abilities to complete task with 90% acc given min cues, improved to  97% acc given mod cues       Goal 2: Pt will complete executive function tasks (meds, math, money, time, etc) with 80% acc given min cues. Money General Questions  -e.g. do 8 quarters equal $2.00? -pt completed with 81% acc indep, improved to 86% acc given mod cues       Goal 3: Pt will complete problem solving and thought organization tasks with 80% acc given min cues. Mental Manipulation: Word Progression  -pt given 3 words; asked to rank in the order in which they occur  -e.g. Sep, May, Nov = May, Sep, Nov  -pt used thought org to complete task with  83% acc given min cues, improved to 95% acc given mod cues       Goal 4: Pt will complete verbal and visual reasoning tasks with 80% acc given min cues. Goal not directly targeted. Goal 5: Pt will repeat words/phrases/sentences using speech intelligibility strategies with 80% acc given min cues. SLP edu pt re: SLOB speech strategies - slow, loud, over-exaggerate, breath support. Pt required cues to use strategies when SLP was unable to understand him. Pt judged to be ~70% intelligible to SLP.       Other areas targeted: n/a    Education:   SLP edu pt re: role of SLP, rationale of NPO recs, rationale of ice chips with PRN and SLP only, risk of aspiration, safe swallow strategies, SLOB strategies, recall strategies. Pt will benefit from ongoing edu. Safety Devices: [x] Call light within reach  [] Chair alarm activated  [x] Bed alarm activated  [] Other: [] Call light within reach  [] Chair alarm activated  [] Bed alarm activated  [] Other:    Assessment: Pt alert and cooperative, agreeable to tx. Pt grossly tolerating ice chip trials, although did appear to have increased coughing towards end of session. SLP edu pt re: ice chips with RN or SLP only - pt will require ongoing edu because he stated \"I can swallow ice. \" SLP provided extensive edu re: risk of aspiration, and pt would state \"no, that won't happen. \" SLP introduced pharyngeal/laryngeal exercises - pt with reduced coordination to complete exercises. SLP edu pt re: SLOB speech strategies. Pt ~70% intelligible to SLP, and did require cues to repeat himself using the SLOB speech strategies. Pt did well with cognitive tasks this date given min-mod cues. Pt did require encouragement to continue to participate in cognitive tasks, stating his Georgina Peabulmaro is tired. \" Continue goals above. Plan: Continue as per plan of care. Additional Information:     Barriers toward progress: Cognitive deficit, Impulsivity, Limited safety awareness, Limited insight into deficits, Unrealistic expectations and Medication managment  Discharge recommendations:  [] Home independently  [] Home with assistance [x]  24 hour supervision  [] ECF [] Other:  Continued Tx Upon Discharge: ? [x] Yes [] No [] TBD based on progress while on ARU [] Vital Stim indicated [] Other:   Estimated discharge date: TBD    Interventions used this date:  [x] Speech/Language Treatment  [] Instruction in HEP [] Group [x] Dysphagia Treatment [x] Cognitive Treatment   [] Other:       Total Time Breakdown / Charges    Time in Time out Total Time / units   Cognitive Tx 1315 1345 30 min / 2 units   Speech Tx 1245 1255 10 min / 1 unit   Dysphagia Tx 1255 1315 20 min / 1 unit       Electronically Signed by     Aleja Londono MA CCC-SLP #[id number]  Speech Language Pathologist

## 2022-06-10 NOTE — CONSULTS
Gastroenterology Consult Note    Patient:   Bertha Ramirez   :    1990   Facility:     [address]    Referring/PCP: Gael Adamson DO  Date:     2022  Consultant:   Lexi Dillon DO    Subjective: This 28 y.o. 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 Had PEG tube placed at outside hospital staff did not find the peripherals work compatible with his current tube access. Staff was able to find another top to place on the PEG tube which is compatible with her devices. No further complaints. Past Medical History:   Diagnosis Date    Cancer [facility])      No past surgical history on file.     Social:   Social History     Tobacco Use    Smoking status: Former Smoker     Packs/day: 1.00     Years: 17.00     Pack years: 17.00    Smokeless tobacco: Never Used   Substance Use Topics    Alcohol use: Not Currently     Comment: Previously could drink up to 20-12 ounce cans of Budweiser daily     Family:   Family History   Problem Relation Age of Onset    Diabetes Mother     Cancer Maternal Grandmother         Pancreatic CA       Scheduled Medications:    lidocaine 1 % injection  5 mL IntraDERmal Once    sodium chloride flush  5-40 mL IntraVENous 2 times per day    nystatin  5 mL Oral 4x Daily    sodium hypochlorite   Irrigation Daily    zinc oxide   Topical Daily    apixaban  5 mg Per G Tube BID    ceftaroline fosamil (TEFLARO) 600 MG IVPB  600 mg IntraVENous B1Z    folic acid  1 mg Per G Tube Daily    gabapentin  100 mg Per G Tube TID    melatonin  5 mg Per G Tube Nightly    sennosides-docusate sodium  2 tablet Per G Tube Nightly    doxazosin  1 mg Oral Nightly    thiamine  100 mg Per G Tube Daily     Infusions:    sodium chloride       PRN Medications: sodium chloride flush, sodium chloride, acetaminophen, bisacodyl, [START ON 6/11/2022] oxymetazoline, sodium chloride, clonazePAM, oxyCODONE, albuterol  Allergies: Allergies   Allergen Reactions    Trazodone And Nefazodone Nausea And Vomiting, Hives and Other (See Comments)     Does not remember  Does not remember    Tramadol Swelling and Other (See Comments)    Trazodone Other (See Comments) and Hives     Does not remember         ROS:   Review of Systems    Objective:     Physical Exam:   Vitals:    06/09/22 2000   BP: 105/65   Pulse: 72   Resp: 16   Temp: 98 °F (36.7 °C)   SpO2: 95%     I/O last 3 completed shifts: In: 510 [NG/GT:510]  Out: 1450 [Urine:1450]   General appearance: alert, appears stated age and cooperative  HEENT: PERRLA  Neck: no adenopathy, no carotid bruit, no JVD, supple, symmetrical, trachea midline and thyroid not enlarged, symmetric, no tenderness/mass/nodules  Lungs: clear to auscultation bilaterally  Heart: regular rate and rhythm, S1, S2 normal, no murmur, click, rub or gallop  Abdomen: soft, non-tender; bowel sounds normal; no masses,  no organomegaly  Extremities: extremities normal, atraumatic, no cyanosis or edema     Lab and Imaging Review   Labs:  CBC:   Recent Labs     06/09/22  0632   WBC 3.3*   HGB 7.9*   HCT 24.0*   MCV 87.5        BMP:   Recent Labs     06/08/22 2011 06/09/22  0632   * 130*   K 5.1 5.2*   CL 94* 97*   CO2 25 25   BUN 19 19   CREATININE 0.6* 0.6*     LIVER PROFILE: No results for input(s): AST, ALT, LIPASE, PROT, BILIDIR, BILITOT, ALKPHOS in the last 72 hours. Invalid input(s): AMYLASE,  ALB  PT/INR:   Recent Labs     06/09/22  1214   INR 1.60*       IMAGING:  XR CHEST PORTABLE    Result Date: 6/9/2022  EXAMINATION: ONE SUPINE XRAY VIEW(S) OF THE ABDOMEN; ONE XRAY VIEW OF THE CHEST 6/9/2022 7:59 am; 6/9/2022 8:13 am COMPARISON: None.  HISTORY: ORDERING SYSTEM PROVIDED HISTORY: Confirmation of course of NG/OG/NE tube and location of tip of tube TECHNOLOGIST PROVIDED HISTORY: Reason for exam:->Confirmation of course of NG/OG/NE tube and location of tip of tube Portable? ->Yes; ORDERING SYSTEM PROVIDED HISTORY: verify midline placement TECHNOLOGIST PROVIDED HISTORY: Reason for exam:->verify midline placement Reason for Exam: check midline FINDINGS: Chest: Heart size is normal.  Mediastinal contours are normal Scattered parenchymal opacities are seen throughout the lungs, right greater than left Clips are seen in the right supraclavicular region. G-tube projects in the region of the stomach A midline is not clearly included on the field of view Abdomen: G-tube projects in region of the stomach No significant small bowel distention noted. Mild-to-moderate stool load seen in the colon. Nonspecific bowel gas pattern is seen Villatoro catheter is seen     Chest: Bilateral airspace disease, right greater than left, either atelectasis or pneumonia. A midline is not included on the field of view. Abdomen: Nonobstructive bowel gas pattern. G-tube projects in the region of the stomach. IR PICC WO SQ PORT/PUMP > 5 YEARS    Result Date: 6/9/2022  Radiology exam is complete. No Radiologist dictation. Please follow up with ordering provider. XR ABDOMEN FOR NG/OG/NE TUBE PLACEMENT    Result Date: 6/9/2022  EXAMINATION: ONE SUPINE XRAY VIEW(S) OF THE ABDOMEN; ONE XRAY VIEW OF THE CHEST 6/9/2022 7:59 am; 6/9/2022 8:13 am COMPARISON: None. HISTORY: ORDERING SYSTEM PROVIDED HISTORY: Confirmation of course of NG/OG/NE tube and location of tip of tube TECHNOLOGIST PROVIDED HISTORY: Reason for exam:->Confirmation of course of NG/OG/NE tube and location of tip of tube Portable? ->Yes; ORDERING SYSTEM PROVIDED HISTORY: verify midline placement TECHNOLOGIST PROVIDED HISTORY: Reason for exam:->verify midline placement Reason for Exam: check midline FINDINGS: Chest: Heart size is normal.  Mediastinal contours are normal Scattered parenchymal opacities are seen throughout the lungs, right greater than left Clips are seen in the right supraclavicular region. G-tube projects in the region of the stomach A midline is not clearly included on the field of view Abdomen: G-tube projects in region of the stomach No significant small bowel distention noted. Mild-to-moderate stool load seen in the colon. Nonspecific bowel gas pattern is seen Villatoro catheter is seen     Chest: Bilateral airspace disease, right greater than left, either atelectasis or pneumonia. A midline is not included on the field of view. Abdomen: Nonobstructive bowel gas pattern. G-tube projects in the region of the stomach. Hospital Problems           Last Modified POA    * (Principal) Critical illness myopathy 6/8/2022 Yes    Severe malnutrition (HCC) (Chronic) 6/9/2022 Yes        Assessment:   27 yo male with squamous cell cancer of the tongue and peg tube malfunction    Plan:   1. No further problems after exchange of feeding adapter. Please call with questions or concerns.     Naa Aguirre DO  8:21 PM 6/9/2022            [facility]    [address]      [address] [place] [address]     Phone: [phone]     Fax: [phone]

## 2022-06-11 LAB
ALBUMIN SERPL-MCNC: 2.4 G/DL (ref 3.4–5)
ANION GAP SERPL CALCULATED.3IONS-SCNC: 9 MMOL/L (ref 3–16)
BUN BLDV-MCNC: 18 MG/DL (ref 7–20)
CALCIUM SERPL-MCNC: 8.9 MG/DL (ref 8.3–10.6)
CHLORIDE BLD-SCNC: 97 MMOL/L (ref 99–110)
CO2: 25 MMOL/L (ref 21–32)
CREAT SERPL-MCNC: 0.7 MG/DL (ref 0.9–1.3)
GFR AFRICAN AMERICAN: >60
GFR NON-AFRICAN AMERICAN: >60
GLUCOSE BLD-MCNC: 114 MG/DL (ref 70–99)
GLUCOSE BLD-MCNC: 123 MG/DL (ref 70–99)
GLUCOSE BLD-MCNC: 142 MG/DL (ref 70–99)
GLUCOSE BLD-MCNC: 142 MG/DL (ref 70–99)
GLUCOSE BLD-MCNC: 154 MG/DL (ref 70–99)
PERFORMED ON: ABNORMAL
PHOSPHORUS: 4.8 MG/DL (ref 2.5–4.9)
POTASSIUM REFLEX MAGNESIUM: 4.9 MMOL/L (ref 3.5–5.1)
POTASSIUM SERPL-SCNC: 4.9 MMOL/L (ref 3.5–5.1)
SODIUM BLD-SCNC: 131 MMOL/L (ref 136–145)

## 2022-06-11 PROCEDURE — 97130 THER IVNTJ EA ADDL 15 MIN: CPT

## 2022-06-11 PROCEDURE — 2580000003 HC RX 258: Performed by: STUDENT IN AN ORGANIZED HEALTH CARE EDUCATION/TRAINING PROGRAM

## 2022-06-11 PROCEDURE — 92507 TX SP LANG VOICE COMM INDIV: CPT

## 2022-06-11 PROCEDURE — 6360000002 HC RX W HCPCS: Performed by: STUDENT IN AN ORGANIZED HEALTH CARE EDUCATION/TRAINING PROGRAM

## 2022-06-11 PROCEDURE — 97530 THERAPEUTIC ACTIVITIES: CPT

## 2022-06-11 PROCEDURE — 1280000000 HC REHAB R&B

## 2022-06-11 PROCEDURE — 97116 GAIT TRAINING THERAPY: CPT

## 2022-06-11 PROCEDURE — 92526 ORAL FUNCTION THERAPY: CPT

## 2022-06-11 PROCEDURE — 2500000003 HC RX 250 WO HCPCS: Performed by: STUDENT IN AN ORGANIZED HEALTH CARE EDUCATION/TRAINING PROGRAM

## 2022-06-11 PROCEDURE — 97110 THERAPEUTIC EXERCISES: CPT

## 2022-06-11 PROCEDURE — 97129 THER IVNTJ 1ST 15 MIN: CPT

## 2022-06-11 PROCEDURE — 80069 RENAL FUNCTION PANEL: CPT

## 2022-06-11 PROCEDURE — 6370000000 HC RX 637 (ALT 250 FOR IP): Performed by: STUDENT IN AN ORGANIZED HEALTH CARE EDUCATION/TRAINING PROGRAM

## 2022-06-11 PROCEDURE — 97535 SELF CARE MNGMENT TRAINING: CPT

## 2022-06-11 PROCEDURE — 6370000000 HC RX 637 (ALT 250 FOR IP): Performed by: PHYSICAL MEDICINE & REHABILITATION

## 2022-06-11 RX ADMIN — DOXAZOSIN 1 MG: 2 TABLET ORAL at 21:17

## 2022-06-11 RX ADMIN — CEFTAROLINE FOSAMIL 600 MG: 600 POWDER, FOR SOLUTION INTRAVENOUS at 17:12

## 2022-06-11 RX ADMIN — CEFTAROLINE FOSAMIL 600 MG: 600 POWDER, FOR SOLUTION INTRAVENOUS at 10:15

## 2022-06-11 RX ADMIN — CEFTAROLINE FOSAMIL 600 MG: 600 POWDER, FOR SOLUTION INTRAVENOUS at 00:00

## 2022-06-11 RX ADMIN — OXYCODONE HYDROCHLORIDE 5 MG: 5 TABLET ORAL at 13:52

## 2022-06-11 RX ADMIN — Medication: at 10:16

## 2022-06-11 RX ADMIN — NYSTATIN 500000 UNITS: 100000 SUSPENSION ORAL at 09:22

## 2022-06-11 RX ADMIN — GABAPENTIN 100 MG: 100 CAPSULE ORAL at 21:17

## 2022-06-11 RX ADMIN — NYSTATIN 500000 UNITS: 100000 SUSPENSION ORAL at 13:52

## 2022-06-11 RX ADMIN — Medication: at 21:16

## 2022-06-11 RX ADMIN — APIXABAN 5 MG: 5 TABLET, FILM COATED ORAL at 21:18

## 2022-06-11 RX ADMIN — Medication: at 09:23

## 2022-06-11 RX ADMIN — GABAPENTIN 100 MG: 100 CAPSULE ORAL at 09:08

## 2022-06-11 RX ADMIN — OXYCODONE HYDROCHLORIDE 5 MG: 5 TABLET ORAL at 09:08

## 2022-06-11 RX ADMIN — Medication 400 MG: at 21:18

## 2022-06-11 RX ADMIN — GABAPENTIN 100 MG: 100 CAPSULE ORAL at 13:52

## 2022-06-11 RX ADMIN — Medication 400 MG: at 09:08

## 2022-06-11 RX ADMIN — DAKIN'S SOLUTION 0.125% (QUARTER STRENGTH): 0.12 SOLUTION at 09:24

## 2022-06-11 RX ADMIN — DOCUSATE SODIUM AND SENNOSIDES 2 TABLET: 8.6; 5 TABLET, FILM COATED ORAL at 21:23

## 2022-06-11 RX ADMIN — NYSTATIN 500000 UNITS: 100000 SUSPENSION ORAL at 17:09

## 2022-06-11 RX ADMIN — Medication 100 MG: at 09:08

## 2022-06-11 RX ADMIN — NYSTATIN 500000 UNITS: 100000 SUSPENSION ORAL at 21:18

## 2022-06-11 RX ADMIN — OXYCODONE HYDROCHLORIDE 5 MG: 5 TABLET ORAL at 21:18

## 2022-06-11 RX ADMIN — SODIUM CHLORIDE, PRESERVATIVE FREE 10 ML: 5 INJECTION INTRAVENOUS at 09:22

## 2022-06-11 RX ADMIN — APIXABAN 5 MG: 5 TABLET, FILM COATED ORAL at 09:08

## 2022-06-11 RX ADMIN — FOLIC ACID 1 MG: 1 TABLET ORAL at 09:08

## 2022-06-11 RX ADMIN — Medication 5 MG: at 21:17

## 2022-06-11 ASSESSMENT — PAIN DESCRIPTION - PAIN TYPE: TYPE: ACUTE PAIN

## 2022-06-11 ASSESSMENT — PAIN SCALES - GENERAL
PAINLEVEL_OUTOF10: 9
PAINLEVEL_OUTOF10: 9
PAINLEVEL_OUTOF10: 8
PAINLEVEL_OUTOF10: 9

## 2022-06-11 ASSESSMENT — PAIN DESCRIPTION - LOCATION
LOCATION: BUTTOCKS
LOCATION: COCCYX
LOCATION: BUTTOCKS;BACK

## 2022-06-11 ASSESSMENT — PAIN DESCRIPTION - ONSET: ONSET: ON-GOING

## 2022-06-11 ASSESSMENT — PAIN - FUNCTIONAL ASSESSMENT: PAIN_FUNCTIONAL_ASSESSMENT: ACTIVITIES ARE NOT PREVENTED

## 2022-06-11 ASSESSMENT — PAIN DESCRIPTION - DESCRIPTORS: DESCRIPTORS: PENETRATING

## 2022-06-11 ASSESSMENT — PAIN DESCRIPTION - ORIENTATION: ORIENTATION: MID

## 2022-06-11 ASSESSMENT — PAIN DESCRIPTION - FREQUENCY: FREQUENCY: CONTINUOUS

## 2022-06-11 NOTE — PLAN OF CARE
Patient remains free from falls and injuries.   Patient compliant with calling for staff help with transfers and ambulating

## 2022-06-11 NOTE — PROGRESS NOTES
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 Pt reported his girlfriend manages cooking, cleaning, laundry, grocery shopping. Pt not actively driving but was able to prior to 2022.    ST Dx: [] Aphasia  [x] Dysarthria  [] Apraxia   [x] Oropharyngeal dysphagia [x] Cognitive Impairment  [] Other:   Date of Admit: 2022  Room #: 1453/4977-94    Current functional status (updated daily):         Pt being seen for : [x] Speech/Language Treatment  [x] Dysphagia Treatment [x] Cognitive Treatment  [] Other:  Communication: []WFL  [] Aphasia  [x] Dysarthria  [] Apraxia  [] Pragmatic Impairment [] Non-verbal  [] Hearing Loss  [] Other:   Cognition: [] WFL  [] Mild  [x] Moderate  [] Severe [] Unable to Assess  [] Other:  Memory: [] WFL  [] Mild  [x] Moderate  [] Severe [] Unable to Assess  [] Other:  Behavior: [x] Alert  [x] Cooperative  [x]  Pleasant  [] Confused  [] Agitated  [] Uncooperative  [] Distractible [] Motivated  [] Self-Limiting [] Anxious  [] Other:  Endurance:  [x] Adequate for participation in SLP sessions  [] Reduced overall  [] Lethargic  [] Other:  Safety: [] No concerns at this time  [x] Reduced insight into deficits  [x]  Reduced safety awareness [] Not following call light procedures  [] Unable to Assess  [] Other:    Current Diet Order:Diet NPO  ADULT TUBE FEEDING; PEG; Standard with Fiber; Continuous; 45; Yes; 10; Q 4 hours; 65; 30; Q 4 hours; Protein; Syringe push of 1 bottle of proteinex daily with 30 mL free water flush before and after administration  Swallowing Precautions: oral care 2-3x/day to reduce adverse affects in the event of aspiration        Date: 6/11/2022      Tx session 1  7648-0180 Tx session 2  All tx needs met in session 1   Total Timed Code Min 30 0   Total Treatment Minutes 60 0   Individual Treatment Minutes 60 0   Group Treatment Minutes 0 0   Co-Treat Minutes 0 0   Variance/Reason:  n/a    Pain Buttocks pain     Pain Intervention [x] RN notified  [x] Repositioned  [] Intervention offered and patient declined  [] N/A  [] Other: [] RN notified  [] Repositioned  [] Intervention offered and patient declined  [] N/A  [] Other:   Subjective     Pt alert and cooperative, agreeable to tx. Pt upright in bed for session. Objective:  Goals  Timeframe for Short-term Goals: 18 days (06/26/22)     Dysphagia Goals:  Goal 1: Pt will complete pharyngeal/laryngeal strengthening exercises 10/10 given min cues for overall improved swallowing function    Pt completed the following exercises given min cues:  -effortful swallow: 5x  -shaker maneuver: 30x  -jaw jut attempted; very difficulty for pt to complete     Although pt did appear to initiate swallow, suspect mild reduced hyolaryngeal movement. Goal 2: The patient will tolerate instrumental swallowing procedure Goal not directly targeted. Will continue to monitor progress and will recommend when clinically indicated. Goal 3: The patient/caregiver will demonstrate understanding of compensatory strategies for improved swallowing safety   SLP edu pt re: importance of oral care, not talking with ice in mouth, 1x piece of ice at a time. SLP also provided extensive edu re: ice chip trials with SLP or RN only; pt did appear somewhat frustrated and will need ongoing edu. Extensive edu re: risk of aspiration, risk of PNA.        Goal 4: The patient will tolerate recommended diet without observed clinical signs of aspiration   Ice chips PO trials:  -pt grossly tolerated ice chips at beginning of session with no overt s/s of aspiration   -suspect significantly reduced laryngeal elevation and delayed trigger of pharyngeal swallow     Cognitive-linguistic Goals  Goal 1: Pt will complete recall tasks with 80% acc given min cues for use of compensatory strategies   Not addressed in this session. Goal 2: Pt will complete executive function tasks (meds, math, money, time, etc) with 80% acc given min cues. Money General Questions  -e.g. do 8 quarters equal $2.00? -pt completed with 91% acc indep, improved to 100% acc given mod cues       Goal 3: Pt will complete problem solving and thought organization tasks with 80% acc given min cues. Categorization: Pt named three items in given category during session with 85% acc. (I)'ly ans 100% with mils semantic cues. Goal 4: Pt will complete verbal and visual reasoning tasks with 80% acc given min cues. Categorization: Pt named three items in given category during session with 85% acc. (I)'ly ans 100% with mils semantic cues. Goal 5: Pt will repeat words/phrases/sentences using speech intelligibility strategies with 80% acc given min cues. SLP edu pt re: SLOB speech strategies - slow, loud, over-exaggerate, breath support. Pt required cues to use strategies when SLP was unable to understand him. Pt judged to be ~75% intelligible to SLP. Other areas targeted: n/a    Education:   SLP edu pt re: role of SLP, rationale of NPO recs, rationale of ice chips with PRN and SLP only, risk of aspiration, safe swallow strategies, SLOB strategies, recall strategies. Pt will benefit from ongoing edu. Safety Devices: [x] Call light within reach  [] Chair alarm activated  [x] Bed alarm activated  [] Other: [] Call light within reach  [] Chair alarm activated  [] Bed alarm activated  [] Other:    Assessment: Pt alert and cooperative, agreeable to tx. Pt grossly tolerating ice chip trials, with no overt s/s of aspiration. SLP edu pt re: ice chips with RN or SLP only - pt will require ongoing edu because he stated \"I can swallow ice. \" SLP introduced pharyngeal/laryngeal exercises - pt with reduced coordination to complete exercises. SLP edu pt re: SLOB speech strategies. Pt ~75% intelligible to SLP, and did require cues to repeat himself using the SLOB speech strategies. Pt did well with cognitive tasks this date given min-mod cues. Pt required frequent breaks, claiming his mouth was dry. Continue goals above. Plan: Continue as per plan of care. Additional Information:     Barriers toward progress: Cognitive deficit, Impulsivity, Limited safety awareness, Limited insight into deficits, Unrealistic expectations and Medication managment  Discharge recommendations:  [] Home independently  [] Home with assistance [x]  24 hour supervision  [] ECF [] Other:  Continued Tx Upon Discharge: ? [x] Yes [] No [] TBD based on progress while on ARU [] Vital Stim indicated [] Other:   Estimated discharge date: TBD    Interventions used this date:  [x] Speech/Language Treatment  [] Instruction in HEP [] Group [x] Dysphagia Treatment [x] Cognitive Treatment   [] Other:       Total Time Breakdown / Charges    Time in Time out Total Time / units   Cognitive Tx 0730 0800 30 min/ 2 units   Speech Tx 0800 0815 15 min/ 1 unit   Dysphagia Tx 0815 0830 15 min/ 1 unit       Electronically Signed by     Jose Bustamante MA, [address]  Speech Language Pathologist

## 2022-06-11 NOTE — PROGRESS NOTES
PROGRESS NOTE  S:31 yrs Patient  admitted on 6/8/2022 with Critical illness myopathy [G72.81] . Today he complains of leakage around G tube    Exam:   Vitals:    06/11/22 0900   BP: 106/66   Pulse: 88   Resp: 18   Temp: 97.5 °F (36.4 °C)   SpO2: 98%      General appearance: alert, appears older than stated age and cachectic  HEENT: Sclera clear, anicteric  Neck: supple  Lungs: clear to auscultation bilaterally  Heart: regular rate and rhythm  Abdomen: soft, G tube site with moderate discharge  Extremities: edema none     Medications: Reviewed    Labs:  CBC:   Recent Labs     06/09/22  0632 06/10/22  0915   WBC 3.3* 2.9*   HGB 7.9* 10.8*   HCT 24.0* 33.2*   MCV 87.5 87.9    118*     BMP:   Recent Labs     06/09/22  0632 06/09/22  0632 06/10/22  0719 06/11/22  0615   *  --  129* 131*   K 5.2*   < > 5.3*  5.3* 4.9  4.9   CL 97*  --  96* 97*   CO2 25  --  26 25   PHOS  --   --  4.5 4.8   BUN 19  --  19 18   CREATININE 0.6*  --  0.6* 0.7*    < > = values in this interval not displayed. LIVER PROFILE: No results for input(s): AST, ALT, LIPASE, PROT, BILIDIR, BILITOT, ALKPHOS in the last 72 hours. 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 His G tube appears to be functioning well but with leakage    Recommendation:  1. Continue supportive care  2. Continue TFs per dietitian recs  3. Keep HOB elevated during PEG use  4. Flush PEG with 60mL free water every 6h and after use  5. Will follow up on Monday to replace with shorter low profile G tube  6.  Call with questions      Clint Ganser, MD, MD  11:12 AM 6/11/2022

## 2022-06-11 NOTE — PROGRESS NOTES
Jose Martinez  6/11/2022  [id number]    Chief Complaint: Critical illness myopathy    Subjective: Patient seen this AM.  Patient has complaints of increased pain in his sacral area after attachment of the wound VAC to this area. He was told the wound VAC will help to heal up his sacral pressure sore more quickly. Patient discussed with PT this morning, and patient is starting to get improvement in his transfers and endurance. Repeat labs this morning look better, and potassium is now normal range, and sodium up to 131. Clarence Pemberton Patient being followed by nephrology. Patient continues on his long-term IV antibiotics. Patient breathing easy. ROS: No N/V, chest pain, SOB, chills or fever    Objective:  Patient Vitals for the past 24 hrs:   BP Temp Temp src Pulse Resp SpO2   06/11/22 0900 106/66 97.5 °F (36.4 °C) Oral 88 18 98 %   06/10/22 2054 115/61 98 °F (36.7 °C) Axillary 87 18 96 %   06/10/22 1028 115/64 -- -- 97 -- 97 %     Gen: No distress, pleasant. HEENT: Normocephalic, atraumatic. Bilateral radical neck dissection present,Deformity of tongue and unable to protrude from mouth  CV: Regular rate and rhythm. Resp: No respiratory distress. Abd: Soft, nontender , G-tube in place, site appears clean without surrounding erythema or drainage  Ext: No edema, able to lift both legs off of bed, ankles in plantar flexion but able to stretch to neutral  Neuro: Alert, oriented, appropriately interactive.      Wt Readings from Last 3 Encounters:   06/08/22 110 lb 14.3 oz (50.3 kg)   02/22/22 140 lb 12.8 oz (63.9 kg)   02/13/22 149 lb 12.8 oz (67.9 kg)       Laboratory data:   Lab Results   Component Value Date    WBC 2.9 (L) 06/10/2022    HGB 10.8 (L) 06/10/2022    HCT 33.2 (L) 06/10/2022    MCV 87.9 06/10/2022     (L) 06/10/2022       Lab Results   Component Value Date     06/11/2022    K 4.9 06/11/2022    K 4.9 06/11/2022    CL 97 06/11/2022    CO2 25 06/11/2022    BUN 18 06/11/2022    CREATININE 0.7 06/11/2022    GLUCOSE 142 06/11/2022    CALCIUM 8.9 06/11/2022        Therapy progress:  PT  Position Activity Restriction  Other position/activity restrictions: antonio, PEG, NPO, MRSA contact precautions  Objective     Sit to Stand: Minimal Assistance  Stand to sit: Minimal Assistance  Bed to Chair: Moderate assistance  Device: No Device,Hand-Held Assist  Other Apparatus: Wheelchair follow  Assistance: 2 Person assistance,Dependent/Total  Distance: 30 ft  OT  PT Equipment Recommendations  Equipment Needed: Yes  Toilet - Technique: Stand pivot  Equipment Used: Standard toilet  Assessment        SLP    Recommendations:  Diet recommendation: NPO; NPO and Ice chips with SLP/RN only; Meds via alt means of nutrition  Instrumentation: not clinically warranted at this time   Risk management: oral care 2-3x/day to reduce adverse affects in the event of aspiration            Body mass index is 17.9 kg/m².       Rehabilitation Diagnosis:  Neurologic, 3.8, Neuromuscular Disorders, e.g. Critical Illness Myopathy, Other Myopathy     Assessment and Plan:  Maddy Butler is a 28year old male with a past medical history significant for HTN and stage IV squamous cell carcinoma of the tongue (s/p right hemiglossectomy, bilateral neck dissection, and RFFF reconstruction on 1/3/22 followed by chemoradiation, and weekly cisplatin completed 4/25/22 and s/p G-tube) who presented to [facility] on 5/2/22 for acute hypoxic respiratory failure and septic shock, found to have persistent MRSA bacteremia.  He was admitted to [facility] on 6/8/22 due to functional deficits below his baseline.      Critical Illness Myopathy  - due to below  - PT, OT, ST     Squamous Cell Carcinoma of the tongue  - s/p right hemiglossectomy, bilateral neck dissection, and RFFF reconstruction on 1/3/22 followed by chemoradiation, and weekly cisplatin completed 4/25/22 and s/p G-tube  - NPO except ice chips  - Tube feeds per orders  - dietary consulted  - GI consulted due to G not having supplies for patient's tube. Question of exchange  - will schedule nystatin swish and spit for comfort  - PT, OT, ST     MRSA bacteremia  - cefaroline for 24 days from admit per  ID  - consult to ID as this medication needs to be cleared by them per pharmacy  - unable to see midline on XR, plan to remove and place PICC     Acute hypoxic respiratory failure  - with right lower hydropneumothorax s/p chest tube, MRSA pneumonia, pleural effusions s/p chest tube and requiring several reintubations during acute stay  - adequately oxygenating on room air  - wean oxygen for SpO2>90%  - IS      Urinary Retention  - has severe penile edema during acute stay. Was followed by Urology and failed multiple voiding trials  - continue antonio catheter, most recently placed 6/6  - follow up with Urology outpatient      PE  Right Atrial Thrombus  - s/p thrombectomy 5/20 with IR, thrombus positive for MRSA  - Elquis 5 mg BID      Anasarca  - requiring diuretics during acute stay, since weaned off  - monitor     Hyponatremia  - consult to Nephrology, appreciate assistance     Anemia  - monitor and transfuse for Hb<7     Severe Protein-calorie malnutrition  - BMI 17.9  - on continuous tube feeds  - Dietician consulted     Pain  - gabapentin 100 mg TID  - PRN tylenol, PRN oxycodone      Anxiety  - clonazepam 0.5 mg daily PRN     Multiple Wounds POA  - including stage four pressure ulcer on coccyx  - wound care per orders  - consult to wound care     Bowels: Schedule stool softener. Follow bowel movements. Enema or suppository if needed.      Bladder: continue antonio     Sleep: patient allergic to trazodone     Follow up appointments: PCP, ID, Cardiology, Oncology    Floridalma Poon.  MD [place] 6/11/2022, 10:01 AM

## 2022-06-11 NOTE — PROGRESS NOTES
Occupational Therapy  Facility/Department: [facility]  Rehabilitation Occupational Therapy Daily Treatment Note    Date: 22  Patient Name: Carie Crain       Room: 5643/0798-99  MRN: [id number]  Account: [de-identified]   : 1990  (28 y.o.) Gender: male                    Past Medical History:  has a past medical history of Cancer ([address] Past Surgical History:   has no past surgical history on file. Restrictions  Restrictions/Precautions: NPO;Isolation; Fall Risk;General Precautions  Other position/activity restrictions: antonio, PEG, NPO, MRSA contact precautions, ice chips with therapy/RN only    Subjective  Subjective: Pt in bed, c/o pain in buttocks worse with wound vac (10/10) /66, HR 86, O2 sats 97% on RA. Restrictions/Precautions: NPO;Isolation; Fall Risk;General Precautions             Objective     Cognition  Overall Cognitive Status: Exceptions  Arousal/Alertness: Appropriate responses to stimuli  Following Commands: Follows multistep commands with repitition; Follows multistep commands with increased time  Attention Span: Attends with cues to redirect  Memory: Decreased short term memory  Safety Judgement: Decreased awareness of need for assistance;Decreased awareness of need for safety  Problem Solving: Assistance required to identify errors made;Assistance required to implement solutions;Assistance required to generate solutions  Insights: Decreased awareness of deficits  Initiation: Requires cues for all  Sequencing: Requires cues for some  Orientation  Overall Orientation Status: Within Functional Limits   Perception  Overall Perceptual Status: Impaired  Unilateral Attention: Cues to maintain midline in standing  Initiation: Cues to initiate tasks  Motor Planning: Cues to use objects appropriately  Perseveration: Perseverates during conversation     ADL  Upper Extremity Dressing  Assistance Level: Supervision  Skilled Clinical Factors: to doff/don shirt  Lower Extremity Dressing  Assistance Level: Maximum assistance  Skilled Clinical Factors: assist to thread BLEs into pants, able to pull over hips with min A for balance  Putting On/Taking Off Footwear  Assistance Level: Dependent  Skilled Clinical Factors: to don shoes/socks          Functional Mobility  Activity: Retrieve items  Assistance Level: Dependent  Skilled Clinical Factors: mod of 2 to correct B knee buckling x2, min A+ CGA for majority of walking  Bed Mobility  Overall Assistance Level: Stand By Assist  Sit to Supine  Assistance Level: Stand by assist  Supine to Sit  Assistance Level: Stand by assist  Transfers  Surface: From bed; Wheelchair  Additional Factors: Verbal cues; Hand placement cues  Sit to Stand  Assistance Level: Minimal assistance  Stand to Sit  Assistance Level: Minimal assistance  Bed To/From Chair  Technique: Stand step  Assistance Level: Dependent  Skilled Clinical Factors: CGA+ Andrzej for majority, mod A of 2 when B knees buckling  Stand Pivot  Assistance Level: Minimal assistance         Assessment  Assessment  Assessment: Pt agreeable to OT session. Pt continues to endorse severe buttock pain, worse since application of wound vac. Pt completed supine>sit with SBA and sat at EOB with SPV. Pt completed donning shirt with supervision but required max A for LB dressing and total assist for footwear. Pt completed stand pivot transfer to w/c with min A. Pt required co-treat for mobility in gym due to poor strength and balance and inconsistent safety/insight with B knees buckling. PT provided assistance to manage wound vac/antonio while providing balance at hips while OT engaged pt in UE task and provided steadying assist at hips. Pt required mod A of 2 on 2 episodes of B knee buckling. Pt stood for 1:20, 2:45, and 2:10 for bean bag task with 50% accuracy for tossing bean bags but good ability to name items of certain colors. BP ranging from 132/86 to 123/82 after mobility and 115/69 in stance.  Continue POC.  Activity Tolerance: Patient limited by pain; Patient limited by fatigue;Patient limited by endurance  Discharge Recommendations: 24 hour supervision or assist;Home with Home health OT;S Level 4  Safety Devices  Safety Devices in place: Yes  Type of devices: Gait belt;Bed alarm in place;Call light within reach; Left in bed;Nurse notified    Patient Education  Education  Education Given To: Patient  Education Provided: Role of Therapy;Plan of Care;Precautions; Safety; Energy Conservation; Fall Prevention Strategies;DME/Home Modifications;Transfer Training;Mobility Training;Equipment;ADL Function;Cognition  Education Provided Comments: role of OT, safety with transfers, posture, midline balance, safety with PEG tube, ADL retraining  Education Method: Verbal;Demonstration  Barriers to Learning: Cognition  Education Outcome: Verbalized understanding;Demonstrated understanding;Continued education needed    Plan  Plan  Times per Week: 5/7 days/week  Plan Weeks: 3 weeks  Current Treatment Recommendations: Strengthening;ROM;Balance training;Functional mobility training; Endurance training; Safety education & training;Equipment evaluation, education, & procurement;Return to work related activity; Neuromuscular re-education;Patient/Caregiver education & training;Self-Care / ADL; Home management training;Coordination training    Goals  Short Term Goals  Time Frame for Short term goals: 1 week- 6/15/22  Short Term Goal 1: Pt will complete functional transfers with min A. Short Term Goal 2: Pt will perform toileting with max A. Short Term Goal 3: Pt will complete LB dressing with mod A. Short Term Goal 4: Pt will perform UB dressing with min A. GOAL MET 6/11/22 Pt completed UB dressing with SPV. Long Term Goals  Time Frame for Long term goals : 3 weeks- 6/29/22  Long Term Goal 1: Pt will perform functional transfers with CGA. Long Term Goal 2: Pt will complete toileting with CGA.   Long Term Goal 3: Pt will perform LB dressing with CGA. Long Term Goal 4: Pt will perform UB dressing with setup. Long Term Goal 5: Pt will complete full body bathing with CGA.       Therapy Time   Individual Concurrent Group Co-treatment   Time In 0900     0930   Time Out 0930     1000   Minutes 30     30   Timed Code Treatment Minutes: [address] [facility] Heart

## 2022-06-11 NOTE — PROGRESS NOTES
[facility] [employer] [facility]  Nephrology follow-up note           Reason for Consult: Hyponatremia  Requesting Physician:  Dr. Yung Muir history  Sodium stabilized in low 130 range. Remains on TF's with free water flushes. ROS:   +weak. No pain or shortness of breath. Scheduled Meds:   sodium zirconium cyclosilicate  10 g Oral Once    magnesium oxide  400 mg Per G Tube BID    zinc oxide   Topical BID    medihoney   Topical q8h    lidocaine 1 % injection  5 mL IntraDERmal Once    sodium chloride flush  5-40 mL IntraVENous 2 times per day    nystatin  5 mL Oral 4x Daily    sodium hypochlorite   Irrigation Daily    apixaban  5 mg Per G Tube BID    ceftaroline fosamil (TEFLARO) 600 MG IVPB  600 mg IntraVENous M2Q    folic acid  1 mg Per G Tube Daily    gabapentin  100 mg Per G Tube TID    melatonin  5 mg Per G Tube Nightly    sennosides-docusate sodium  2 tablet Per G Tube Nightly    doxazosin  1 mg Oral Nightly    thiamine  100 mg Per G Tube Daily     Continuous Infusions:   sodium chloride       PRN Meds:. Lip Balm, sodium chloride flush, sodium chloride, acetaminophen, bisacodyl, oxymetazoline, sodium chloride, clonazePAM, oxyCODONE, albuterol    History of Present Illness on 6/9/2022:    28 y.o. yo male with PMH of squamous cell carcinoma of the tongue (s/p right hemiglossectomy, b/l ND, and RFFF reconstruction on 1/3/22, radiation, and weekly cisplatin completed 4/25/22) who is admitted to ARU from  for rehabilitation. Nephrology has been consulted for hyponatremia and hyperkalemia  Patient is being fed through the feeding tube. His sodium on 6/8 at [facility] was 133 and has been around 130-135 in June.   Earlier in admission, he was hypernatremic in the 149 range as well    He was admitted at [facility] from 5/2/22 and had MRSA bacteremia from infected port s/p removal. Per Dr Kathie Velasquez, while at [facility],  \"pt had right hydropneumothorax with worsening hypoxia requiring multiple intubations and chest tube placements. He underwent right internal jugular and right atrial thrombectomy due to concern that this was causing ongoing bacteremia. ID was consulted and he is to remain on IV ceftaroline with plan for long term treatment until 7/2/22. His course was complicated by severe penile edema and antonio was placed. Urology followed during acute stay. Patient failed several voiding trials    Physical exam:   Constitutional:  VITALS:  /66   Pulse 88   Temp 97.5 °F (36.4 °C) (Oral)   Resp 18   Ht 5' 6\" (1.676 m)   Wt 110 lb 14.3 oz (50.3 kg)   SpO2 98%   BMI 17.90 kg/m²   Gen: alert, awake  Neck: No JVD  Skin: Unremarkable  Cardiovascular:  S1, S2 without m/r/g   Respiratory: CTA B without w/r/r; respiratory effort normal  Abdomen:  soft, nt, nd,   Extremities: no lower extremity edema  Neuro/Psy: AAoriented times 3 ; moves all 4 ext    Data/  Recent Labs     06/09/22  0632 06/10/22  0915   WBC 3.3* 2.9*   HGB 7.9* 10.8*   HCT 24.0* 33.2*   MCV 87.5 87.9    118*     Recent Labs     06/09/22  0632 06/09/22  0632 06/10/22  0719 06/11/22  0615   *  --  129* 131*   K 5.2*   < > 5.3*  5.3* 4.9  4.9   CL 97*  --  96* 97*   CO2 25  --  26 25   GLUCOSE 110*  --  125* 142*   PHOS  --   --  4.5 4.8   MG  --   --  1.70*  --    BUN 19  --  19 18   CREATININE 0.6*  --  0.6* 0.7*   LABGLOM >60  --  >60 >60   GFRAA >60  --  >60 >60    < > = values in this interval not displayed.      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 ( pt had multiple chest tubes for right hydro/pneumothorax)  -Hyperkalemia    -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 (s/p right hemiglossectomy, b/l ND, and RFFF reconstruction on 1/3/22, radiation, and weekly cisplatin completed 4/25/22)    -MRSA bacteremia , teflaro until 7/2/22    -Chronic antonio d/t penile edema and failed voiding trials    -Elevated TSH, mild, per rehab physician    -Anemia per rehab physician    -Hyperkalemia - better with prn lokelma    -Hypomagnesemia - on oral Mg replacement      Plan  -Free water 30 ml q4h with TF's   -Continue proteinex supplements daily with 30 ml free water before and after administration; after PEGT placment  -Trend labs, bp's, & urine output

## 2022-06-11 NOTE — PROGRESS NOTES
Physical Therapy  Facility/Department: [address] [place] [address]  Rehabilitation Physical Therapy Treatment Note    NAME: Kitty Samayoa  : 1990 (28 y.o.)  MRN: [id number]  CODE STATUS: Full Code    Date of Service: 22       Restrictions:  Restrictions/Precautions: NPO;Isolation; Fall Risk;General Precautions  Position Activity Restriction  Other position/activity restrictions: antonio, PEG, NPO, MRSA contact precautions, ice chips with therapy/RN only     SUBJECTIVE  Subjective  Subjective: pt found in w/c with OT present  Pain: reports 9/10 pain buttocks       OBJECTIVE  Cognition  Overall Cognitive Status: Exceptions  Arousal/Alertness: Appropriate responses to stimuli  Following Commands: Follows multistep commands with repitition; Follows multistep commands with increased time  Attention Span: Attends with cues to redirect  Memory: Decreased short term memory  Safety Judgement: Decreased awareness of need for assistance;Decreased awareness of need for safety  Problem Solving: Assistance required to identify errors made;Assistance required to implement solutions;Assistance required to generate solutions  Insights: Decreased awareness of deficits  Initiation: Requires cues for all  Sequencing: Requires cues for some  Orientation  Overall Orientation Status: Within Functional Limits    Functional Mobility        Sit to stand w/c to HHA with CGA  Pt completed 4 square activity with no AD, CGA of 2-min A of 1-2 due to posterior lean. approx 10 ft between each target , pt completed forward gait/ side stepping L+R and backwards walking. 1st rep lasting 1 min 40 sec, pt experienced 1 episode of BLE buckling resulting in LOB with mod A of 2 to correct. 2nd rep fo 4 square activity, lasted 2 min 45 sec with CGA -min a of 2 with no AD. Pt experienced 2 bouts of LE buckling requiring mod A of 2 to maintain balance.  Pt attempted to retrieve beanbag from ground during activity, able to initiate squat but unable to retrieve item due to pain in buttocks. 3rd rep of 4 square activity, pt completed 2 reps of activity in 2 min 12 sec with CGA-min A of 2 and experienced no episodes of LE buckling. Continues to demo narrow JOE, decreased swing phase and posterior lean. BP remains stable throughout. BP seated= 132/86, 99 bpm HR and Spo2= 94%. BP standing = 123/82, 103 bpm HR. Gait x 15 ft to bed with CGA-min a of 2 and no AD, demonstrating narrow JOE, decreased swing phase. 1 LOBwhen turning with mod  Aof 2 to correct. Sit to supine with supv    Pt in bed with alarm donned and call light/needs within reach. Second Session:   Pt found in bed, requesting to walk / go to gym. Continues to report 8-9/10 pain in buttocks. Supine to sit with supv and increased time. Therapist assisted pt to don shoes at EOB. Sit to stand EOB to no AD with CGA   Pt ambulated 25 ft with no AD, initially CGA regressing to max A due to LE buckling and therapist assisted pt to sit in chair. Pt demo's narrow JOE, feet anterior to shoulders, shuffled gait. Pt completed 2x10 BLE seated ankle pumps, LAQ. Stand pivot w/c to EOB with bed rail and min a due to minor LE buckling  Sit to supine with supv    Pt supine in bed with bony prominences off loaded and call light/needs within reach, RN notified. Bed alarm donned. ASSESSMENT/PROGRESS TOWARDS GOALS  Vital Signs  Heart Rate: 88  Heart Rate Source: Monitor  BP: 106/66  BP Location: Right upper arm  MAP (Calculated): 79.33  SpO2: 98 %  O2 Device: None (Room air)    Assessment  Assessment: co treat with OT for increased safety progressing ind and tolerance to functional mobility. pt is grossly CGA-min a for trasnfers and initially CGA-min A of 2 for gait due to posterior lean. pt exhibited 3-4 significant episodes of BLE buckling requiring mod A of 2 to maintain balance. tolerated up to 2 min 12 sec of standing maximally prior to fatigue. continue to progress mobility as able.  rec home with 24/7 and HHPT, DME: [facility] razo progress  Activity Tolerance: Patient limited by pain; Patient limited by fatigue;Patient limited by endurance  Discharge Recommendations: 24 hour supervision or assist;Home with Home health PT  PT Equipment Recommendations  Equipment Needed: Yes    Goals  Patient Goals   Patient goals : \"to walk and to eat\"  Short Term Goals  Time Frame for Short term goals: 10 days 6/17  Short term goal 1: pt will complete bed mobility with mod ind  Short term goal 2: pt will complete functional transfer with CGA and LRAD. Short term goal 3: pt will ambulate 50 ft with LRAD and mod A. Short term goal 4: pt will tolerate stair assessment when appropriate and goal will be made. Long Term Goals  Time Frame for Long term goals : 21 days 6/28  Long term goal 1: pt will complete functional transfer with SBA and LRAD  Long term goal 2: pt will ambulate 100 ft wiht LRAD and CGA. Long term goal 3: pt will complete car transfer with SBA and LRAD. Long term goal 4: pt will tolerate standing for 2 min with 1-2 UE support and CGA -SBA for balance. PLAN OF CARE/SAFETY  Plan  Plan: 5-7 times per week  Current Treatment Recommendations: Strengthening; Wheelchair mobility training;Cognitive reorientation;Equipment evaluation, education, & procurement; Modalities; Positioning; Therapeutic activities; Wound care; Patient/Caregiver education & training; Safety education & training;Home exercise program;Return to work related activity;Pain management;Gait training;Balance training;Functional mobility training;Stair training;Neuromuscular re-education;Transfer training;ADL/Self-care training; Endurance training;Manual Therapy - Soft Tissue Mobilization  Safety Devices  Type of Devices: Gait belt;Patient at risk for falls;Nurse notified; All fall risk precautions in place; Bed alarm in place;Call light within reach; Left in bed; All evelyn prominences offloaded    EDUCATION  Education  Education Given To: Patient  Education Provided: Role of Therapy;Plan of Care;Precautions; Safety; Energy Conservation;Transfer Training;DME/Home Modifications; Fall Prevention Strategies; Equipment; Mobility Training;Cognition;ADL Function  Education Provided Comments: role of PT, ARU requirements, safe transfers/gait, tube feed precautions  Education Method: Demonstration;Verbal  Barriers to Learning: None  Education Outcome: Verbalized understanding;Demonstrated understanding        Therapy Time   Individual Concurrent Group Co-treatment   Time In 1155     0930   Time Out 1225     1000   Minutes 30     30     Timed Code Treatment Minutes: 61 Minutes       Ismael Yu PT, 06/11/22 at 12:14 PM

## 2022-06-12 LAB
ANION GAP SERPL CALCULATED.3IONS-SCNC: 7 MMOL/L (ref 3–16)
BUN BLDV-MCNC: 18 MG/DL (ref 7–20)
CALCIUM SERPL-MCNC: 8.6 MG/DL (ref 8.3–10.6)
CHLORIDE BLD-SCNC: 95 MMOL/L (ref 99–110)
CO2: 29 MMOL/L (ref 21–32)
CREAT SERPL-MCNC: 0.6 MG/DL (ref 0.9–1.3)
GFR AFRICAN AMERICAN: >60
GFR NON-AFRICAN AMERICAN: >60
GLUCOSE BLD-MCNC: 105 MG/DL (ref 70–99)
GLUCOSE BLD-MCNC: 109 MG/DL (ref 70–99)
GLUCOSE BLD-MCNC: 113 MG/DL (ref 70–99)
GLUCOSE BLD-MCNC: 120 MG/DL (ref 70–99)
GLUCOSE BLD-MCNC: 90 MG/DL (ref 70–99)
PERFORMED ON: ABNORMAL
PERFORMED ON: NORMAL
POTASSIUM REFLEX MAGNESIUM: 4.8 MMOL/L (ref 3.5–5.1)
SODIUM BLD-SCNC: 131 MMOL/L (ref 136–145)

## 2022-06-12 PROCEDURE — 1280000000 HC REHAB R&B

## 2022-06-12 PROCEDURE — 2580000003 HC RX 258: Performed by: STUDENT IN AN ORGANIZED HEALTH CARE EDUCATION/TRAINING PROGRAM

## 2022-06-12 PROCEDURE — 6370000000 HC RX 637 (ALT 250 FOR IP): Performed by: PHYSICAL MEDICINE & REHABILITATION

## 2022-06-12 PROCEDURE — 6370000000 HC RX 637 (ALT 250 FOR IP): Performed by: STUDENT IN AN ORGANIZED HEALTH CARE EDUCATION/TRAINING PROGRAM

## 2022-06-12 PROCEDURE — 6360000002 HC RX W HCPCS: Performed by: STUDENT IN AN ORGANIZED HEALTH CARE EDUCATION/TRAINING PROGRAM

## 2022-06-12 PROCEDURE — 80048 BASIC METABOLIC PNL TOTAL CA: CPT

## 2022-06-12 RX ADMIN — CEFTAROLINE FOSAMIL 600 MG: 600 POWDER, FOR SOLUTION INTRAVENOUS at 00:18

## 2022-06-12 RX ADMIN — Medication: at 09:35

## 2022-06-12 RX ADMIN — CEFTAROLINE FOSAMIL 600 MG: 600 POWDER, FOR SOLUTION INTRAVENOUS at 17:07

## 2022-06-12 RX ADMIN — APIXABAN 5 MG: 5 TABLET, FILM COATED ORAL at 20:52

## 2022-06-12 RX ADMIN — Medication: at 20:41

## 2022-06-12 RX ADMIN — SODIUM CHLORIDE, PRESERVATIVE FREE 10 ML: 5 INJECTION INTRAVENOUS at 09:36

## 2022-06-12 RX ADMIN — OXYCODONE HYDROCHLORIDE 5 MG: 5 TABLET ORAL at 02:07

## 2022-06-12 RX ADMIN — Medication 5 MG: at 20:52

## 2022-06-12 RX ADMIN — NYSTATIN 500000 UNITS: 100000 SUSPENSION ORAL at 21:01

## 2022-06-12 RX ADMIN — FOLIC ACID 1 MG: 1 TABLET ORAL at 09:34

## 2022-06-12 RX ADMIN — DOXAZOSIN 1 MG: 2 TABLET ORAL at 20:52

## 2022-06-12 RX ADMIN — GABAPENTIN 100 MG: 100 CAPSULE ORAL at 09:34

## 2022-06-12 RX ADMIN — CLONAZEPAM 0.5 MG: 0.5 TABLET ORAL at 04:58

## 2022-06-12 RX ADMIN — APIXABAN 5 MG: 5 TABLET, FILM COATED ORAL at 09:34

## 2022-06-12 RX ADMIN — Medication: at 00:21

## 2022-06-12 RX ADMIN — Medication 400 MG: at 20:52

## 2022-06-12 RX ADMIN — GABAPENTIN 100 MG: 100 CAPSULE ORAL at 20:52

## 2022-06-12 RX ADMIN — SODIUM CHLORIDE, PRESERVATIVE FREE 10 ML: 5 INJECTION INTRAVENOUS at 00:22

## 2022-06-12 RX ADMIN — CEFTAROLINE FOSAMIL 600 MG: 600 POWDER, FOR SOLUTION INTRAVENOUS at 11:19

## 2022-06-12 RX ADMIN — Medication: at 21:31

## 2022-06-12 RX ADMIN — DAKIN'S SOLUTION 0.125% (QUARTER STRENGTH): 0.12 SOLUTION at 09:36

## 2022-06-12 RX ADMIN — OXYCODONE HYDROCHLORIDE 5 MG: 5 TABLET ORAL at 14:52

## 2022-06-12 RX ADMIN — Medication 100 MG: at 09:34

## 2022-06-12 RX ADMIN — Medication: at 03:10

## 2022-06-12 RX ADMIN — NYSTATIN 500000 UNITS: 100000 SUSPENSION ORAL at 18:42

## 2022-06-12 RX ADMIN — Medication 400 MG: at 09:34

## 2022-06-12 RX ADMIN — SODIUM CHLORIDE, PRESERVATIVE FREE 10 ML: 5 INJECTION INTRAVENOUS at 23:40

## 2022-06-12 RX ADMIN — OXYCODONE HYDROCHLORIDE 5 MG: 5 TABLET ORAL at 20:53

## 2022-06-12 RX ADMIN — GABAPENTIN 100 MG: 100 CAPSULE ORAL at 14:51

## 2022-06-12 RX ADMIN — NYSTATIN 500000 UNITS: 100000 SUSPENSION ORAL at 14:52

## 2022-06-12 RX ADMIN — NYSTATIN 500000 UNITS: 100000 SUSPENSION ORAL at 09:34

## 2022-06-12 ASSESSMENT — PAIN SCALES - GENERAL
PAINLEVEL_OUTOF10: 8
PAINLEVEL_OUTOF10: 8
PAINLEVEL_OUTOF10: 7
PAINLEVEL_OUTOF10: 8

## 2022-06-12 ASSESSMENT — PAIN - FUNCTIONAL ASSESSMENT: PAIN_FUNCTIONAL_ASSESSMENT: ACTIVITIES ARE NOT PREVENTED

## 2022-06-12 ASSESSMENT — PAIN DESCRIPTION - DESCRIPTORS
DESCRIPTORS: NAGGING
DESCRIPTORS: NAGGING

## 2022-06-12 ASSESSMENT — PAIN DESCRIPTION - LOCATION
LOCATION: BACK
LOCATION: BACK

## 2022-06-12 ASSESSMENT — PAIN DESCRIPTION - ONSET: ONSET: ON-GOING

## 2022-06-12 ASSESSMENT — PAIN DESCRIPTION - ORIENTATION
ORIENTATION: MID
ORIENTATION: MID

## 2022-06-12 ASSESSMENT — PAIN DESCRIPTION - FREQUENCY: FREQUENCY: CONTINUOUS

## 2022-06-12 ASSESSMENT — PAIN DESCRIPTION - PAIN TYPE: TYPE: ACUTE PAIN

## 2022-06-13 LAB
ANION GAP SERPL CALCULATED.3IONS-SCNC: 7 MMOL/L (ref 3–16)
BASOPHILS ABSOLUTE: 0 K/UL (ref 0–0.2)
BASOPHILS RELATIVE PERCENT: 0.9 %
BUN BLDV-MCNC: 17 MG/DL (ref 7–20)
CALCIUM SERPL-MCNC: 9.7 MG/DL (ref 8.3–10.6)
CHLORIDE BLD-SCNC: 96 MMOL/L (ref 99–110)
CO2: 30 MMOL/L (ref 21–32)
CREAT SERPL-MCNC: 0.6 MG/DL (ref 0.9–1.3)
EOSINOPHILS ABSOLUTE: 0.1 K/UL (ref 0–0.6)
EOSINOPHILS RELATIVE PERCENT: 2 %
GFR AFRICAN AMERICAN: >60
GFR NON-AFRICAN AMERICAN: >60
GLUCOSE BLD-MCNC: 102 MG/DL (ref 70–99)
GLUCOSE BLD-MCNC: 107 MG/DL (ref 70–99)
GLUCOSE BLD-MCNC: 140 MG/DL (ref 70–99)
GLUCOSE BLD-MCNC: 97 MG/DL (ref 70–99)
HCT VFR BLD CALC: 22.2 % (ref 40.5–52.5)
HEMOGLOBIN: 7.7 G/DL (ref 13.5–17.5)
LYMPHOCYTES ABSOLUTE: 0.2 K/UL (ref 1–5.1)
LYMPHOCYTES RELATIVE PERCENT: 4.5 %
MAGNESIUM: 2 MG/DL (ref 1.8–2.4)
MCH RBC QN AUTO: 29.1 PG (ref 26–34)
MCHC RBC AUTO-ENTMCNC: 34.7 G/DL (ref 31–36)
MCV RBC AUTO: 84 FL (ref 80–100)
MONOCYTES ABSOLUTE: 0.4 K/UL (ref 0–1.3)
MONOCYTES RELATIVE PERCENT: 10.5 %
NEUTROPHILS ABSOLUTE: 3.2 K/UL (ref 1.7–7.7)
NEUTROPHILS RELATIVE PERCENT: 82.1 %
PDW BLD-RTO: 15.3 % (ref 12.4–15.4)
PERFORMED ON: ABNORMAL
PERFORMED ON: ABNORMAL
PERFORMED ON: NORMAL
PLATELET # BLD: 131 K/UL (ref 135–450)
PMV BLD AUTO: 7 FL (ref 5–10.5)
POTASSIUM REFLEX MAGNESIUM: 4.7 MMOL/L (ref 3.5–5.1)
RBC # BLD: 2.64 M/UL (ref 4.2–5.9)
SODIUM BLD-SCNC: 133 MMOL/L (ref 136–145)
WBC # BLD: 3.9 K/UL (ref 4–11)

## 2022-06-13 PROCEDURE — 97129 THER IVNTJ 1ST 15 MIN: CPT

## 2022-06-13 PROCEDURE — 97530 THERAPEUTIC ACTIVITIES: CPT

## 2022-06-13 PROCEDURE — 1280000000 HC REHAB R&B

## 2022-06-13 PROCEDURE — 92526 ORAL FUNCTION THERAPY: CPT

## 2022-06-13 PROCEDURE — 83735 ASSAY OF MAGNESIUM: CPT

## 2022-06-13 PROCEDURE — 80048 BASIC METABOLIC PNL TOTAL CA: CPT

## 2022-06-13 PROCEDURE — 6370000000 HC RX 637 (ALT 250 FOR IP): Performed by: STUDENT IN AN ORGANIZED HEALTH CARE EDUCATION/TRAINING PROGRAM

## 2022-06-13 PROCEDURE — 97130 THER IVNTJ EA ADDL 15 MIN: CPT

## 2022-06-13 PROCEDURE — 85025 COMPLETE CBC W/AUTO DIFF WBC: CPT

## 2022-06-13 PROCEDURE — 6370000000 HC RX 637 (ALT 250 FOR IP): Performed by: PHYSICAL MEDICINE & REHABILITATION

## 2022-06-13 PROCEDURE — 6360000002 HC RX W HCPCS: Performed by: STUDENT IN AN ORGANIZED HEALTH CARE EDUCATION/TRAINING PROGRAM

## 2022-06-13 PROCEDURE — 97535 SELF CARE MNGMENT TRAINING: CPT

## 2022-06-13 PROCEDURE — 2580000003 HC RX 258: Performed by: STUDENT IN AN ORGANIZED HEALTH CARE EDUCATION/TRAINING PROGRAM

## 2022-06-13 PROCEDURE — 97110 THERAPEUTIC EXERCISES: CPT

## 2022-06-13 PROCEDURE — 97116 GAIT TRAINING THERAPY: CPT

## 2022-06-13 RX ADMIN — GABAPENTIN 100 MG: 100 CAPSULE ORAL at 22:43

## 2022-06-13 RX ADMIN — NYSTATIN 500000 UNITS: 100000 SUSPENSION ORAL at 07:39

## 2022-06-13 RX ADMIN — DOCUSATE SODIUM AND SENNOSIDES 2 TABLET: 8.6; 5 TABLET, FILM COATED ORAL at 22:43

## 2022-06-13 RX ADMIN — SODIUM CHLORIDE, PRESERVATIVE FREE 10 ML: 5 INJECTION INTRAVENOUS at 07:41

## 2022-06-13 RX ADMIN — NYSTATIN 500000 UNITS: 100000 SUSPENSION ORAL at 16:21

## 2022-06-13 RX ADMIN — OXYCODONE HYDROCHLORIDE 5 MG: 5 TABLET ORAL at 01:22

## 2022-06-13 RX ADMIN — OXYCODONE HYDROCHLORIDE 5 MG: 5 TABLET ORAL at 10:23

## 2022-06-13 RX ADMIN — CEFTAROLINE FOSAMIL 600 MG: 600 POWDER, FOR SOLUTION INTRAVENOUS at 00:56

## 2022-06-13 RX ADMIN — CLONAZEPAM 0.5 MG: 0.5 TABLET ORAL at 01:22

## 2022-06-13 RX ADMIN — APIXABAN 5 MG: 5 TABLET, FILM COATED ORAL at 22:43

## 2022-06-13 RX ADMIN — Medication 100 MG: at 07:39

## 2022-06-13 RX ADMIN — Medication: at 22:45

## 2022-06-13 RX ADMIN — DOXAZOSIN 1 MG: 2 TABLET ORAL at 22:43

## 2022-06-13 RX ADMIN — Medication: at 17:13

## 2022-06-13 RX ADMIN — FOLIC ACID 1 MG: 1 TABLET ORAL at 07:39

## 2022-06-13 RX ADMIN — Medication 5 MG: at 22:43

## 2022-06-13 RX ADMIN — CEFTAROLINE FOSAMIL 600 MG: 600 POWDER, FOR SOLUTION INTRAVENOUS at 09:12

## 2022-06-13 RX ADMIN — DAKIN'S SOLUTION 0.125% (QUARTER STRENGTH): 0.12 SOLUTION at 07:41

## 2022-06-13 RX ADMIN — NYSTATIN 500000 UNITS: 100000 SUSPENSION ORAL at 22:45

## 2022-06-13 RX ADMIN — Medication 400 MG: at 22:43

## 2022-06-13 RX ADMIN — APIXABAN 5 MG: 5 TABLET, FILM COATED ORAL at 07:39

## 2022-06-13 RX ADMIN — OXYCODONE HYDROCHLORIDE 5 MG: 5 TABLET ORAL at 22:43

## 2022-06-13 RX ADMIN — Medication: at 07:41

## 2022-06-13 RX ADMIN — NYSTATIN 500000 UNITS: 100000 SUSPENSION ORAL at 12:56

## 2022-06-13 RX ADMIN — Medication 400 MG: at 07:39

## 2022-06-13 RX ADMIN — GABAPENTIN 100 MG: 100 CAPSULE ORAL at 12:56

## 2022-06-13 RX ADMIN — CEFTAROLINE FOSAMIL 600 MG: 600 POWDER, FOR SOLUTION INTRAVENOUS at 16:21

## 2022-06-13 RX ADMIN — GABAPENTIN 100 MG: 100 CAPSULE ORAL at 07:39

## 2022-06-13 ASSESSMENT — PAIN DESCRIPTION - LOCATION
LOCATION: BUTTOCKS
LOCATION: LEG
LOCATION: LEG

## 2022-06-13 ASSESSMENT — PAIN SCALES - GENERAL
PAINLEVEL_OUTOF10: 8
PAINLEVEL_OUTOF10: 7
PAINLEVEL_OUTOF10: 6

## 2022-06-13 ASSESSMENT — PAIN DESCRIPTION - ORIENTATION
ORIENTATION: RIGHT
ORIENTATION: RIGHT

## 2022-06-13 ASSESSMENT — PAIN DESCRIPTION - DESCRIPTORS: DESCRIPTORS: SHARP

## 2022-06-13 NOTE — PROGRESS NOTES
event of aspiration        Date: 6/13/2022      Tx session 1  1300 - 1400 Tx session 2  All tx needs met in session 1   Total Timed Code Min 30 0   Total Treatment Minutes 60 0   Individual Treatment Minutes 60 0   Group Treatment Minutes 0 0   Co-Treat Minutes 0 0   Variance/Reason:  n/a    Pain Buttocks pain     Pain Intervention [] RN notified  [x] Repositioned  [] Intervention offered and patient declined  [] N/A  [] Other: [] RN notified  [] Repositioned  [] Intervention offered and patient declined  [] N/A  [] Other:   Subjective     Pt alert and cooperative, agreeable to tx. Pt upright in bed for session. Objective:  Goals  Timeframe for Short-term Goals: 18 days (06/26/22)     Dysphagia Goals:  Goal 1: Pt will complete pharyngeal/laryngeal strengthening exercises 10/10 given min cues for overall improved swallowing function    Pt completed the following exercises given mod cues:  -effortful swallow: 4x  -shaker maneuver: hold for 1 min 3x, then 30x reps   -jaw jut attempted; very difficult for pt to complete     Although pt did appear to initiate swallow, suspect mild reduced hyolaryngeal movement. Goal 2: The patient will tolerate instrumental swallowing procedure Goal not directly targeted. Will continue to monitor progress and will recommend when clinically indicated. Goal 3: The patient/caregiver will demonstrate understanding of compensatory strategies for improved swallowing safety   SLP edu pt re: importance of oral care, not talking with ice in mouth, 1x piece of ice at a time. Extensive edu re: being fully upright for all PO trials with SLP/RN.  When pt sat himself up indep, still ~45 degree angle rather than 90 degrees when he said he was ready       Goal 4: The patient will tolerate recommended diet without observed clinical signs of aspiration   Ice chips PO trials x11:  -pt grossly tolerated ice chips  -2x wet vocal quality   -suspect significantly reduced laryngeal elevation and delayed trigger of pharyngeal swallow     Thin liquid 1/2 tsp x5  -significant reduced laryngeal elevation and delayed trigger of pharyngeal swallow  -pt swallowing 2-3x per sip  -no overt s/s of aspiration       Cognitive-linguistic Goals  Goal 1: Pt will complete recall tasks with 80% acc given min cues for use of compensatory strategies   Goal not directly targeted. Goal 2: Pt will complete executive function tasks (meds, math, money, time, etc) with 80% acc given min cues. Sample Prescription Label  -pt given a label; asked to use it to answer questions   -pt answered with 44% acc indep; improved to 70% acc given mod cues   -pt requires cues for dosage, determining how many pills taken in a day      Goal 3: Pt will complete problem solving and thought organization tasks with 80% acc given min cues. Category Members / Thought Organization  -pt given a category and a set of letters; asked to name an item from the category that begins with the letters  -e.g. colors that begin with B, T, O, and S  -pt completed with 69% acc given min cues, improved to 75% acc given mod cues        Goal 4: Pt will complete verbal and visual reasoning tasks with 80% acc given min cues. Goal not directly targeted. Goal 5: Pt will repeat words/phrases/sentences using speech intelligibility strategies with 80% acc given min cues. SLP briefly reviewed SLOB speech strategies; pt verbalized understanding. Other areas targeted: N/a    Education:   SLP edu pt re: diet recs, safe swallow strategies, SLOB speech strategies, thought org strategies     Safety Devices: [x] Call light within reach  [] Chair alarm activated  [x] Bed alarm activated  [] Other: [] Call light within reach  [] Chair alarm activated  [] Bed alarm activated  [] Other:    Assessment: Pt alert and cooperative, agreeable to tx.  Ice chips and 1/2 tsp thin liquid via spoon PO trials given - no coughing or throat clearing, but pt is at a high risk of silent aspiration due to significantly reduced laryngeal elevation and suspected delayed trigger of pharyngeal swallow. Pt with wet vocal quality x2. Pt also swallowing 2-3x per sip of thin via 1/2 tsp. Edu re: sitting fully upright for all PO trials, risk of aspiration. Pt completed pharyngeal/laryngeal exercises given mod cues; some still difficulty for pt to complete due to reduced coordination. Briefly reviewed SLOB speech strategies. Pt benefited from min-mod cues to improve thought org. Pt with difficulty answering questions using a prescription label indep, but did benefit from mod cues to increase acc. Continue goals above. Plan: Continue as per plan of care. Additional Information:     Barriers toward progress: Cognitive deficit, Impulsivity, Limited safety awareness, Limited insight into deficits, Unrealistic expectations and Medication managment  Discharge recommendations:  [] Home independently  [] Home with assistance [x]  24 hour supervision  [] ECF [] Other:  Continued Tx Upon Discharge: ? [x] Yes [] No [] TBD based on progress while on ARU [] Vital Stim indicated [] Other:   Estimated discharge date: TBD    Interventions used this date:  [x] Speech/Language Treatment  [] Instruction in HEP [] Group [x] Dysphagia Treatment [x] Cognitive Treatment   [] Other:       Total Time Breakdown / Charges    Time in Time out Total Time / units   Cognitive Tx 1330 1400 30 min / 2 units   Speech Tx      Dysphagia Tx 1300 1330 30 min/ 1 unit       Electronically Signed by     Shabbir Riggs MA CCC-SLP #[id number]  Speech Language Pathologist

## 2022-06-13 NOTE — PROGRESS NOTES
Occupational Therapy  Facility/Department: [facility]  Daily Treatment Note  NAME: Helga Perdue  : 1990  MRN: [id number]    Date of Service: 2022    Discharge Recommendations:  24 hour supervision or assist,Home with Home health OT,S Level 4  OT Equipment Recommendations  Other: TBD      Patient Diagnosis(es): There were no encounter diagnoses. Assessment    Assessment: First session: Pt in bed on arrival with c/o 10/10 pain d/t wound vac on buttocks-- RN is aware and had given medication prior to arrival. Pt completed sit>supine with SBA and req Mod A for LE dressing and SBA for UB dressing. Pt req Mod A for grooming and demo poor activity tolerance with c/o dizziness. Cotx session: Pt seen in cotx this day d/t need for A x2 with functional ambulation and safe progression towards goals. Pt completed ambulation in hallway to retrieve cones and req x1 seated rest break and was returned to bed d/t PEG tube leaking-- RN aware and dressing changed. Pt completed x2 4\" steps with Mod A x2. Cont per POC  Activity Tolerance: Patient limited by pain; Patient limited by fatigue;Patient limited by endurance  Discharge Recommendations: 24 hour supervision or assist;Home with Home health OT;S Level 4  Other: TBD      Plan   Plan  Times per Week: 5/7 days/week  Plan Weeks: 3 weeks  Current Treatment Recommendations: Strengthening;ROM;Balance training;Functional mobility training; Endurance training; Safety education & training;Equipment evaluation, education, & procurement;Return to work related activity; Neuromuscular re-education;Patient/Caregiver education & training;Self-Care / ADL; Home management training;Coordination training     Restrictions  Restrictions/Precautions  Restrictions/Precautions: NPO;Isolation; Fall Risk;General Precautions  Position Activity Restriction  Other position/activity restrictions: antonio, PEG, NPO, MRSA contact precautions, ice chips with therapy/RN only    Subjective Subjective  Subjective: Pt in bed, c/o pain in buttocks worse with wound vac (10/10) BP 97/59, HR 77, O2 sats 96% on RA. Pt states no therapy on sunday \"messed him up\"  Orientation  Overall Orientation Status: Within Functional Limits  Pain: reports 9/10 pain buttocks  Cognition  Overall Cognitive Status: Exceptions  Arousal/Alertness: Appropriate responses to stimuli  Following Commands: Follows multistep commands with repitition; Follows multistep commands with increased time  Attention Span: Attends with cues to redirect  Memory: Decreased short term memory  Safety Judgement: Decreased awareness of need for assistance;Decreased awareness of need for safety  Problem Solving: Assistance required to identify errors made;Assistance required to implement solutions;Assistance required to generate solutions  Insights: Decreased awareness of deficits  Initiation: Requires cues for all  Sequencing: Requires cues for some        Objective    Vitals   See subjective section for vitals  Safety Devices  Type of Devices: Gait belt;Patient at risk for falls;Nurse notified; All fall risk precautions in place; Bed alarm in place;Call light within reach; Left in bed; All evelyn prominences offloaded          Goals  Short Term Goals  Time Frame for Short term goals: 1 week- 6/15/22  Short Term Goal 1: Pt will complete functional transfers with min A. Short Term Goal 2: Pt will perform toileting with max A. Short Term Goal 3: Pt will complete LB dressing with mod A. Short Term Goal 4: Pt will perform UB dressing with min A. GOAL MET 6/11/22 Pt completed UB dressing with SPV. Long Term Goals  Time Frame for Long term goals : 3 weeks- 6/29/22  Long Term Goal 1: Pt will perform functional transfers with CGA. Long Term Goal 2: Pt will complete toileting with CGA. Long Term Goal 3: Pt will perform LB dressing with CGA. Long Term Goal 4: Pt will perform UB dressing with setup.   Long Term Goal 5: Pt will complete full body bathing with CGA.  Patient Goals   Patient goals : \"to walk and eat again\"       Therapy Time   Individual Concurrent Group Co-treatment   Time In 0800     0830   Time Out 0830     0900   Minutes 30     30   Timed Code Treatment Minutes: [address], OT

## 2022-06-13 NOTE — PROGRESS NOTES
Mary Felix  6/13/2022  [id number]    Chief Complaint: Critical illness myopathy    Subjective: Patient seen this AM.  Patient doing better this morning, and did well over the weekend. Repeat labs this morning show improvement in his sodium level magnesium and potassium level. Patient being followed by nephrology. Patient continues with Villatoro catheter in place. Patient continues on his long-term IV antibiotics. Patient breathing easy. Patient wants to know when he will be able to start eating. Patient continues on G-tube feeding. ROS: No N/V, chest pain, SOB, chills or fever    Objective:  Patient Vitals for the past 24 hrs:   BP Temp Temp src Pulse Resp SpO2   06/13/22 0730 100/62 98.3 °F (36.8 °C) Oral 88 16 96 %   06/13/22 0122 -- -- -- -- 16 --   06/12/22 2052 109/65 97.8 °F (36.6 °C) Oral 82 16 95 %     Gen: No distress, pleasant. HEENT: Normocephalic, atraumatic. Bilateral radical neck dissection present,Deformity of tongue and unable to protrude from mouth  CV: Regular rate and rhythm. Resp: No respiratory distress. Abd: Soft, nontender , G-tube in place, site appears clean without surrounding erythema or drainage  Ext: No edema, able to lift both legs off of bed, ankles in plantar flexion but able to stretch to neutral  Neuro: Alert, oriented, appropriately interactive.      Wt Readings from Last 3 Encounters:   06/12/22 109 lb 2 oz (49.5 kg)   02/22/22 140 lb 12.8 oz (63.9 kg)   02/13/22 149 lb 12.8 oz (67.9 kg)       Laboratory data:   Lab Results   Component Value Date    WBC 3.9 (L) 06/13/2022    HGB 7.7 (L) 06/13/2022    HCT 22.2 (L) 06/13/2022    MCV 84.0 06/13/2022     (L) 06/13/2022       Lab Results   Component Value Date     06/13/2022    K 4.7 06/13/2022    CL 96 06/13/2022    CO2 30 06/13/2022    BUN 17 06/13/2022    CREATININE 0.6 06/13/2022    GLUCOSE 102 06/13/2022    CALCIUM 9.7 06/13/2022        Therapy progress:  PT  Position Activity Restriction  Other position/activity restrictions: antonio, PEG, NPO, MRSA contact precautions, ice chips with therapy/RN only  Objective     Sit to Stand: Minimal Assistance  Stand to sit: Minimal Assistance  Bed to Chair: Moderate assistance  Device: No Device,Hand-Held Assist  Other Apparatus: Wheelchair follow  Assistance: 2 Person assistance,Dependent/Total  Distance: 30 ft  OT  PT Equipment Recommendations  Equipment Needed: Yes  Toilet - Technique: Stand pivot  Equipment Used: Standard toilet  Assessment        SLP    Recommendations:  Diet recommendation: NPO; NPO and Ice chips with SLP/RN only; Meds via alt means of nutrition  Instrumentation: not clinically warranted at this time   Risk management: oral care 2-3x/day to reduce adverse affects in the event of aspiration            Body mass index is 17.61 kg/m².       Rehabilitation Diagnosis:  Neurologic, 3.8, Neuromuscular Disorders, e.g. 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 He was admitted to [facility] on 6/8/22 due to functional deficits below his baseline.      Critical Illness Myopathy  - due to below  - PT, OT, ST     Squamous Cell Carcinoma of the tongue  - s/p right hemiglossectomy, bilateral neck dissection, and RFFF reconstruction on 1/3/22 followed by chemoradiation, and weekly cisplatin completed 4/25/22 and s/p G-tube  - NPO except ice chips  - Tube feeds per orders  - dietary consulted  - GI consulted due to G not having supplies for patient's tube.  Question of exchange  - will schedule nystatin swish and spit for comfort  - PT, OT, ST     MRSA bacteremia  - cefaroline for 24 days from admit per  ID  - consult to ID as this medication needs to be cleared by them per pharmacy  - unable to see midline on XR, plan to remove and place PICC     Acute hypoxic respiratory failure  - with right lower hydropneumothorax s/p chest tube, MRSA pneumonia, pleural effusions s/p chest tube and requiring several reintubations during acute stay  - adequately oxygenating on room air  - wean oxygen for SpO2>90%  - IS      Urinary Retention  - has severe penile edema during acute stay. Was followed by Urology and failed multiple voiding trials  - continue antonio catheter, most recently placed 6/6  - follow up with Urology outpatient      PE  Right Atrial Thrombus  - s/p thrombectomy 5/20 with IR, thrombus positive for MRSA  - Elquis 5 mg BID      Anasarca  - requiring diuretics during acute stay, since weaned off  - monitor     Hyponatremia  - consult to Nephrology, appreciate assistance     Anemia  - monitor and transfuse for Hb<7     Severe Protein-calorie malnutrition  - BMI 17.9  - on continuous tube feeds  - Dietician consulted     Pain  - gabapentin 100 mg TID  - PRN tylenol, PRN oxycodone      Anxiety  - clonazepam 0.5 mg daily PRN     Multiple Wounds POA  - including stage four pressure ulcer on coccyx  - wound care per orders  - consult to wound care     Bowels: Schedule stool softener. Follow bowel movements. Enema or suppository if needed.      Bladder: continue antonio     Sleep: patient allergic to trazodone     Follow up appointments: PCP, ID, Cardiology, Oncology    Cat Wallace MD 6/13/2022, 9:42 AM

## 2022-06-13 NOTE — PROGRESS NOTES
Changed/New 06/13/22 1132   Drainage Amount Moderate 06/13/22 1132   Drainage Description Serosanguinous 06/13/22 1132   Dressing Change Due 06/15/22 06/13/22 1132   Output (ml) 450 ml 06/12/22 0915   Wound Assessment Red;Slough; Tan 06/13/22 1132   Shape oval 06/13/22 1132   Odor Mild 06/13/22 1132   Number of days: 2       Wound 06/08/22 Throat Right (Active)   Wound Image   06/10/22 1758   Wound Etiology Other 06/13/22 1132   Dressing Status New dressing applied 06/13/22 1132   Wound Cleansed Cleansed with saline 06/13/22 1132   Dressing/Treatment Honey gel/honey paste 06/13/22 1132   Dressing Change Due 06/13/22 06/12/22 2052   Wound Length (cm) 3.5 cm 06/10/22 1758   Wound Width (cm) 5 cm 06/10/22 1758   Wound Depth (cm) 0.1 cm 06/10/22 1758   Wound Surface Area (cm^2) 17.5 cm^2 06/10/22 1758   Wound Volume (cm^3) 1.75 cm^3 06/10/22 1758   Wound Assessment Pink/red 06/13/22 1132   Drainage Amount None 06/13/22 1132   Drainage Description Serosanguinous 06/12/22 2052   Odor None 06/13/22 1132   Kristel-wound Assessment Blanchable erythema 06/13/22 1132   Margins Attached edges 06/13/22 1132   Wound Thickness Description not for Pressure Injury Partial thickness 06/13/22 1132   Number of days: 4       Wound 06/08/22 Chest Right;Upper (Active)   Wound Image   06/10/22 1758   Wound Etiology Pressure Stage 2 06/13/22 1132   Dressing Status Clean 06/13/22 1132   Wound Cleansed Cleansed with saline 06/13/22 1132   Dressing/Treatment Honey gel/honey paste; Foam 06/13/22 1132   Dressing Change Due 06/15/22 06/13/22 1132   Wound Length (cm) 1 cm 06/10/22 1758   Wound Width (cm) 1.5 cm 06/10/22 1758   Wound Depth (cm) 0.1 cm 06/10/22 1758   Wound Surface Area (cm^2) 1.5 cm^2 06/10/22 1758   Wound Volume (cm^3) 0.15 cm^3 06/10/22 1758   Wound Assessment Pink/red 06/13/22 1132   Drainage Amount None 06/13/22 1132   Drainage Description Serosanguinous 06/12/22 2052   Odor None 06/13/22 1132   Kristel-wound Assessment Blanchable erythema 06/13/22 1132   Margins Attached edges 06/13/22 1132   Wound Thickness Description not for Pressure Injury Partial thickness 06/13/22 1132   Number of days: 4       Wound 06/08/22 Radial Distal;Left (Active)   Wound Image   06/10/22 1758   Wound Etiology Traumatic 06/13/22 1132   Dressing Status Clean;Dry; Intact 06/13/22 1132   Wound Cleansed Cleansed with saline 06/13/22 1132   Dressing/Treatment Honey gel/honey paste 06/13/22 1132   Dressing Change Due 06/15/22 06/13/22 1132   Wound Length (cm) 6 cm 06/10/22 1758   Wound Width (cm) 1 cm 06/10/22 1758   Wound Depth (cm) 0.1 cm 06/10/22 1758   Wound Surface Area (cm^2) 6 cm^2 06/10/22 1758   Wound Volume (cm^3) 0.6 cm^3 06/10/22 1758   Wound Assessment Pink/red;Granulation tissue 06/13/22 1132   Drainage Amount None 06/13/22 1132   Drainage Description Serosanguinous 06/13/22 1132   Odor None 06/13/22 1132   Kristel-wound Assessment Blanchable erythema 06/13/22 1132   Margins Attached edges 06/13/22 1132   Wound Thickness Description not for Pressure Injury Partial thickness 06/13/22 1132   Number of days: 4       Wound 06/08/22 Knee Left;Posterior (Active)   Wound Image   06/10/22 1758   Wound Etiology Pressure Stage 3 06/13/22 1132   Dressing Status Clean;Dry; Intact 06/13/22 1132   Wound Cleansed Cleansed with saline 06/13/22 1132   Dressing/Treatment Honey gel/honey paste 06/13/22 1132   Offloading for Diabetic Foot Ulcers Offloading ordered 06/13/22 1132   Dressing Change Due 06/15/22 06/13/22 1132   Wound Length (cm) 2.5 cm 06/10/22 1758   Wound Width (cm) 3.5 cm 06/10/22 1758   Wound Depth (cm) 0.2 cm 06/10/22 1758   Wound Surface Area (cm^2) 8.75 cm^2 06/10/22 1758   Wound Volume (cm^3) 1.75 cm^3 06/10/22 1758   Wound Assessment [place]/red;Slough 06/13/22 1132   Drainage Amount None 06/13/22 1132   Drainage Description Sanguinous 06/13/22 1132   Odor None 06/13/22 1132   Kristel-wound Assessment Blanchable erythema 06/13/22 1132   Margins Attached edges 06/13/22 1132   Wound Thickness Description not for Pressure Injury Full thickness 06/13/22 1132   Number of days: 4       Wound 06/08/22 Sacrum (Active)   Wound Image   06/13/22 1132   Wound Etiology Pressure Stage 4 06/13/22 1132   Dressing Status Intact 06/13/22 1132   Wound Cleansed Cleansed with saline 06/13/22 1132   Dressing/Treatment Negative pressure wound therapy 06/13/22 1132   Offloading for Diabetic Foot Ulcers Offloading ordered 06/13/22 1132   Dressing Change Due 06/15/22 06/13/22 1132   Wound Length (cm) 9.5 cm 06/13/22 1132   Wound Width (cm) 6 cm 06/13/22 1132   Wound Depth (cm) 1.5 cm 06/13/22 1132   Wound Surface Area (cm^2) 57 cm^2 06/13/22 1132   Change in Wound Size % (l*w) 33.8 06/13/22 1132   Wound Volume (cm^3) 85.5 cm^3 06/13/22 1132   Wound Healing % 42 06/13/22 1132   Undermining Starts ___ O'Clock 1200 06/13/22 1132   Undermining Ends___ O'Clock 400 06/13/22 1132   Undermining Maxium Distance (cm) 2 06/13/22 1132   Wound Assessment Pink/red;Slough; Other (Comment) 06/13/22 1132   Drainage Amount Moderate 06/13/22 1132   Drainage Description Serosanguinous 06/13/22 1132   Odor Mild 06/13/22 1132   Kristel-wound Assessment Blanchable erythema 06/13/22 1132   Margins Attached edges 06/13/22 1132   Wound Thickness Description not for Pressure Injury Full thickness 06/13/22 1132   Number of days: 4      sacrum      5 pieces out and 5 pieces applied to wound bed. With one tracking up over right hip and 1 piece for suction device. Response to treatment:  With complaints of pain.      Pain Assessment:  Severity:  5 / 10  Quality of pain: aching  Wound Pain Timing/Severity: intermittent  Premedicated: Yes  Plan:   Plan of Care: Wound 06/08/22 Throat Right-Dressing/Treatment: Honey gel/honey paste  Wound 06/08/22 Chest Right;Upper-Dressing/Treatment: Honey gel/honey paste,Foam  Wound 06/08/22 Radial Distal;Left-Dressing/Treatment: Honey gel/honey paste  Wound 06/08/22 Knee Left;Posterior-Dressing/Treatment: Honey gel/honey paste  Wound 06/08/22 Sacrum-Dressing/Treatment: Negative pressure wound therapy       Recommendations;  Chin/ right Neck/ right Chest/L wrist wounds: Cleanse with saline, pat dry. Apply medihoney gel to wound bed of chin, neck & chest. Cover with bordered foam dressing. Change every 3 days and PRN for saturation. Left wrist Medihoney gel dry dressing guaze wrap.     Cleanse sacral wound with normal saline, pat dry, apply barrier wipe then hydrocolloid to wound edges. Hydrocolloid strips to edges of posterior wound. Apply VAC drape to wound edges. Insert gray foam with into tunnel areas. Rozelle Ramp foam with holes to body of wound with solid gray over top, with tracking  Going over right hip to attach vacuum suction to. Cover with VAC drape to seal.     5 pieces of gray foam used in sacrum wound. Cleanse choice  machine;set at 24 flush normal saline, soak for 10minutes every 3 hours. Treatment is 125 mmHg continuous suction.     Change cannister once a week or when full   If suction fails or patient is discharged:  -remove vac dressing  -cleanse wound with normal saline  -pack wound with saline moistened guaze and change every 12 hours. Call wound care for deterioration [phone] or call [phone] and leave message. Dr. Edith Riggs notified to clarify order times on Medihoney.     Specialty Bed Required : Yes power pro elite  [x] Low Air Loss   [x] Pressure Redistribution  [] Fluid Immersion  [] Bariatric  [] Total Pressure Relief  [] Other:     Current Diet: Diet NPO  ADULT TUBE FEEDING; PEG; Standard with Fiber; Continuous; 45; Yes; 10; Q 4 hours; 65; 30; Q 4 hours; Protein; Syringe push of 1 bottle of proteinex daily with 30 mL free water flush before and after administration  Dietician consult:  Yes    Discharge Plan:  Placement for patient upon discharge: home with support   Patient appropriate for Outpatient [address]: Yes    Referrals:  [x]  following  [] [address] [facility]  [] Supplies  [] Other    Patient/Caregiver Teaching:  Level of patient/caregiver understanding able to: Instructed on repositioning to prevent pressure areas on bony prominences.   [] Indicates understanding       [x] Needs reinforcement  [] Unsuccessful      [] Verbal Understanding  [] Demonstrated understanding       [] No evidence of learning  [] Refused teaching         [] N/A       Electronically signed by Willard Gaucher, RN, on 6/13/2022 at 11:44 AM

## 2022-06-13 NOTE — PROGRESS NOTES
Physical Therapy  Facility/Department: [facility]  Rehabilitation Physical Therapy Treatment Note    NAME: Yuni Martinez  : 1990 (28 y.o.)  MRN: [id number]  CODE STATUS: Full Code    Date of Service: 22       Restrictions:  Restrictions/Precautions: NPO;Isolation; Fall Risk;General Precautions  Position Activity Restriction  Other position/activity restrictions: antonio, PEG, NPO, MRSA contact precautions, ice chips with therapy/RN only     SUBJECTIVE  Subjective  Subjective: pt found in w/c with OT present  Pain: reports 9/10 pain buttocks          OBJECTIVE  Cognition  Overall Cognitive Status: Exceptions  Arousal/Alertness: Appropriate responses to stimuli  Following Commands: Follows multistep commands with repitition; Follows multistep commands with increased time  Attention Span: Attends with cues to redirect  Memory: Decreased short term memory  Safety Judgement: Decreased awareness of need for assistance;Decreased awareness of need for safety  Problem Solving: Assistance required to identify errors made;Assistance required to implement solutions;Assistance required to generate solutions  Insights: Decreased awareness of deficits  Initiation: Requires cues for all  Sequencing: Requires cues for some  Orientation  Overall Orientation Status: Within Functional Limits    Functional Mobility     Sit to stand w/c to no AD with CGA-min a  Gait x 30 ft with HHA and initially min A of 2 regressing to mod A of 2 due to increased fatigue. while ambulating, pt retrieved 2 cones from unit callaway. Pt demo's narrow JOE, shuffled gait with decreased swing phase and episodes of LE buckling. Pt requires cues to scan for environment. Pt ended bout of gait due to c/o dizziness, BP = 108/72. Following seated rest, pt agreeable to trial gait once more. Pt ambulated an additional 22 ft with B HHA, min A-mod A of 2 with above stated gait deficits.  Pt able to retrieve cone from ground with 1 UE support on rail and mod A for balance. Bout of gait limited as pt shirt soiled from feeding tube. Therapist assisted pt back to room via w/c, stand pivot w/c to EOB with max a due to BLE buckling. Pt transitioned sit <>  supine with supv-mod ind to relieve pressure on buttocks. Pt changed soiled shirt    Sit to stand EOB to B HHA with min A   Pt completed RLE+ LLE 4\" step up with BUE support and min A of 2    Pt returned to supine with supv-mod ind. Pt in bed with alarm donned and call light/needs within reach, HOB elevated per precautions. Second Session:  Pt found supine in bed, pleasant and motivated to participate. Pt c/o 10/10 pain in buttocks however RN stating he has no available pain medication available at this time. Supine to sit with mod ind  Sit to stand EOB to no AD with CGA    Pt ambulated 90 ft+ 146 ft with 1-2 HHA and close w/c follow with assist ranging from CGA of 2 to min A of 2. Gait deviations include narrow JOE, decreased heel strike, L + posterior lean, shuffled gait, minor episodes of LE buckling with no overt lOB. Sit to supine with mod ind  Pt completed 10 reps of BLE supine ankle pumps, hook-lying marches, hook-lying clamshells. Pt supine in bed with alarm donned and call light/needs within reach. ASSESSMENT/PROGRESS TOWARDS GOALS  Vital Signs  Heart Rate: 88  Heart Rate Source: Monitor  BP: 100/62  BP Location: Right upper arm  MAP (Calculated): 74.67  SpO2: 96 %  O2 Device: None (Room air)    Assessment  Assessment: co treat with OT for increased safety progressing functional mobility. pt reporting significant pain in buttocks limiting activity tolerance/ participation. pt initially declines multiple activities but agreeable with encouragement. grossly cGA_min a for transfer and gait assistance varies from min A of 2 to mod A of 2 pending fatigue.  at this time, continue to rec home with 24/7 and HHPT. DME: potential need for manual w/c for safety with household mobility  Activity Tolerance: Patient limited by pain; Patient limited by fatigue;Patient limited by endurance  Discharge Recommendations: 24 hour supervision or assist;Home with Home health PT  PT Equipment Recommendations  Equipment Needed: Yes  Mobility Devices: Wheelchair  Wheelchair: Standard    Goals  Patient Goals   Patient goals : \"to walk and to eat\"  Short Term Goals  Time Frame for Short term goals: 10 days 6/17  Short term goal 1: pt will complete bed mobility with mod ind  Short term goal 2: pt will complete functional transfer with CGA and LRAD. Short term goal 3: pt will ambulate 50 ft with LRAD and mod A. Short term goal 4: pt will tolerate stair assessment when appropriate and goal will be made. Long Term Goals  Time Frame for Long term goals : 21 days 6/28  Long term goal 1: pt will complete functional transfer with SBA and LRAD  Long term goal 2: pt will ambulate 100 ft wiht LRAD and CGA. Long term goal 3: pt will complete car transfer with SBA and LRAD. Long term goal 4: pt will tolerate standing for 2 min with 1-2 UE support and CGA -SBA for balance. PLAN OF CARE/SAFETY  Plan  Plan: 5-7 times per week  Current Treatment Recommendations: Strengthening; Wheelchair mobility training;Cognitive reorientation;Equipment evaluation, education, & procurement; Modalities; Positioning; Therapeutic activities; Wound care; Patient/Caregiver education & training; Safety education & training;Home exercise program;Return to work related activity;Pain management;Gait training;Balance training;Functional mobility training;Stair training;Neuromuscular re-education;Transfer training;ADL/Self-care training; Endurance training;Manual Therapy - Soft Tissue Mobilization  Safety Devices  Type of Devices: Gait belt;Patient at risk for falls;Nurse notified; All fall risk precautions in place; Bed alarm in place;Call light within reach; Left in bed; All evelyn prominences offloaded    EDUCATION  Education  Education Given To: Patient  Education Provided: Role of Therapy;Plan of Care;Precautions; Safety; Energy Conservation;Transfer Training;DME/Home Modifications; Fall Prevention Strategies; Equipment; Mobility Training;Cognition;ADL Function  Education Provided Comments: role of PT, ARU requirements, safe transfers/gait, tube feed precautions  Education Method: Demonstration;Verbal        Therapy Time   Individual Concurrent Group Co-treatment   Time In 1430     0830   Time Out 1500     0900   Minutes 30     30     Timed Code Treatment Minutes: 61 Minutes       Cody Gold PT, 06/13/22 at 12:57 PM

## 2022-06-13 NOTE — PROGRESS NOTES
[facility]  Nephrology follow-up note           Reason for Consult: Hyponatremia  Requesting Physician:  Dr. Zan Kaye history  Sodium stabilized in low 130 range. Remains on TF's with free water flushes. No new complaints     ROS:   +weak. No pain or shortness of breath. Scheduled Meds:   sodium zirconium cyclosilicate  10 g Oral Once    magnesium oxide  400 mg Per G Tube BID    zinc oxide   Topical BID    medihoney   Topical q8h    lidocaine 1 % injection  5 mL IntraDERmal Once    sodium chloride flush  5-40 mL IntraVENous 2 times per day    nystatin  5 mL Oral 4x Daily    sodium hypochlorite   Irrigation Daily    apixaban  5 mg Per G Tube BID    ceftaroline fosamil (TEFLARO) 600 MG IVPB  600 mg IntraVENous V6S    folic acid  1 mg Per G Tube Daily    gabapentin  100 mg Per G Tube TID    melatonin  5 mg Per G Tube Nightly    sennosides-docusate sodium  2 tablet Per G Tube Nightly    doxazosin  1 mg Oral Nightly    thiamine  100 mg Per G Tube Daily     Continuous Infusions:   sodium chloride       PRN Meds:. Lip Balm, sodium chloride flush, sodium chloride, acetaminophen, bisacodyl, oxymetazoline, sodium chloride, clonazePAM, oxyCODONE, albuterol    History of Present Illness on 6/9/2022:    28 y.o. yo male with PMH of squamous cell carcinoma of the tongue (s/p right hemiglossectomy, b/l ND, and RFFF reconstruction on 1/3/22, radiation, and weekly cisplatin completed 4/25/22) who is admitted to ARU from  for rehabilitation. Nephrology has been consulted for hyponatremia and hyperkalemia  Patient is being fed through the feeding tube. His sodium on 6/8 at [facility] was 133 and has been around 130-135 in June.   Earlier in admission, he was hypernatremic in the 149 range as well    He was admitted at [facility] from 5/2/22 and had MRSA bacteremia from infected port s/p removal. Per Dr Peyton Kidd, while at [facility],  \"pt had right hydropneumothorax with worsening hypoxia requiring multiple intubations and chest tube placements. He underwent right internal jugular and right atrial thrombectomy due to concern that this was causing ongoing bacteremia. ID was consulted and he is to remain on IV ceftaroline with plan for long term treatment until 7/2/22. His course was complicated by severe penile edema and antonio was placed. Urology followed during acute stay.  Patient failed several voiding trials    Physical exam:   Constitutional:  VITALS:  /62   Pulse 88   Temp 98.3 °F (36.8 °C) (Oral)   Resp 18   Ht 5' 6\" (1.676 m)   Wt 109 lb 2 oz (49.5 kg)   SpO2 96%   BMI 17.61 kg/m²   Gen: alert, awake  Neck: No JVD  Skin: Unremarkable  Cardiovascular:  S1, S2 without m/r/g   Respiratory: CTA B without w/r/r; respiratory effort normal  Abdomen:  soft, nt, nd,   Extremities: no lower extremity edema  Neuro/Psy: AAoriented times 3 ; moves all 4 ext    Data/  Recent Labs     06/13/22  0505   WBC 3.9*   HGB 7.7*   HCT 22.2*   MCV 84.0   *     Recent Labs     06/11/22  0615 06/12/22  0624 06/13/22  0505   * 131* 133*   K 4.9  4.9 4.8 4.7   CL 97* 95* 96*   CO2 25 29 30   GLUCOSE 142* 109* 102*   PHOS 4.8  --   --    MG  --   --  2.00   BUN 18 18 17   CREATININE 0.7* 0.6* 0.6*   LABGLOM >60 >60 >60   GFRAA >60 >60 >60     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 ( pt had multiple chest tubes for right hydro/pneumothorax)   Sodium is 133 / Stable       -Hyperkalemia    -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

## 2022-06-14 LAB
ANION GAP SERPL CALCULATED.3IONS-SCNC: 9 MMOL/L (ref 3–16)
BUN BLDV-MCNC: 22 MG/DL (ref 7–20)
CALCIUM SERPL-MCNC: 9.4 MG/DL (ref 8.3–10.6)
CHLORIDE BLD-SCNC: 94 MMOL/L (ref 99–110)
CO2: 29 MMOL/L (ref 21–32)
CREAT SERPL-MCNC: 0.6 MG/DL (ref 0.9–1.3)
GFR AFRICAN AMERICAN: >60
GFR NON-AFRICAN AMERICAN: >60
GLUCOSE BLD-MCNC: 114 MG/DL (ref 70–99)
GLUCOSE BLD-MCNC: 122 MG/DL (ref 70–99)
GLUCOSE BLD-MCNC: 126 MG/DL (ref 70–99)
GLUCOSE BLD-MCNC: 140 MG/DL (ref 70–99)
GLUCOSE BLD-MCNC: 96 MG/DL (ref 70–99)
HCT VFR BLD CALC: 21.8 % (ref 40.5–52.5)
HEMOGLOBIN: 7.4 G/DL (ref 13.5–17.5)
MCH RBC QN AUTO: 29.3 PG (ref 26–34)
MCHC RBC AUTO-ENTMCNC: 33.9 G/DL (ref 31–36)
MCV RBC AUTO: 86.5 FL (ref 80–100)
PDW BLD-RTO: 15.6 % (ref 12.4–15.4)
PERFORMED ON: ABNORMAL
PLATELET # BLD: 111 K/UL (ref 135–450)
PMV BLD AUTO: 7 FL (ref 5–10.5)
POTASSIUM REFLEX MAGNESIUM: 4.5 MMOL/L (ref 3.5–5.1)
RBC # BLD: 2.52 M/UL (ref 4.2–5.9)
SODIUM BLD-SCNC: 132 MMOL/L (ref 136–145)
WBC # BLD: 3.6 K/UL (ref 4–11)

## 2022-06-14 PROCEDURE — 97530 THERAPEUTIC ACTIVITIES: CPT

## 2022-06-14 PROCEDURE — 85027 COMPLETE CBC AUTOMATED: CPT

## 2022-06-14 PROCEDURE — 97110 THERAPEUTIC EXERCISES: CPT

## 2022-06-14 PROCEDURE — 6370000000 HC RX 637 (ALT 250 FOR IP): Performed by: PHYSICAL MEDICINE & REHABILITATION

## 2022-06-14 PROCEDURE — 1280000000 HC REHAB R&B

## 2022-06-14 PROCEDURE — 97116 GAIT TRAINING THERAPY: CPT

## 2022-06-14 PROCEDURE — 2580000003 HC RX 258: Performed by: STUDENT IN AN ORGANIZED HEALTH CARE EDUCATION/TRAINING PROGRAM

## 2022-06-14 PROCEDURE — 97535 SELF CARE MNGMENT TRAINING: CPT

## 2022-06-14 PROCEDURE — 6370000000 HC RX 637 (ALT 250 FOR IP): Performed by: STUDENT IN AN ORGANIZED HEALTH CARE EDUCATION/TRAINING PROGRAM

## 2022-06-14 PROCEDURE — 97129 THER IVNTJ 1ST 15 MIN: CPT

## 2022-06-14 PROCEDURE — 99254 IP/OBS CNSLTJ NEW/EST MOD 60: CPT | Performed by: INTERNAL MEDICINE

## 2022-06-14 PROCEDURE — 92526 ORAL FUNCTION THERAPY: CPT

## 2022-06-14 PROCEDURE — 6360000002 HC RX W HCPCS: Performed by: STUDENT IN AN ORGANIZED HEALTH CARE EDUCATION/TRAINING PROGRAM

## 2022-06-14 PROCEDURE — 80048 BASIC METABOLIC PNL TOTAL CA: CPT

## 2022-06-14 PROCEDURE — 97130 THER IVNTJ EA ADDL 15 MIN: CPT

## 2022-06-14 RX ORDER — OXYCODONE HYDROCHLORIDE 15 MG/1
7.5 TABLET ORAL EVERY 4 HOURS PRN
Status: DISCONTINUED | OUTPATIENT
Start: 2022-06-14 | End: 2022-06-21

## 2022-06-14 RX ORDER — OXYCODONE HYDROCHLORIDE 5 MG/1
5 TABLET ORAL EVERY 4 HOURS PRN
Status: DISCONTINUED | OUTPATIENT
Start: 2022-06-14 | End: 2022-06-21

## 2022-06-14 RX ADMIN — APIXABAN 5 MG: 5 TABLET, FILM COATED ORAL at 21:39

## 2022-06-14 RX ADMIN — GABAPENTIN 100 MG: 100 CAPSULE ORAL at 21:39

## 2022-06-14 RX ADMIN — DOCUSATE SODIUM AND SENNOSIDES 2 TABLET: 8.6; 5 TABLET, FILM COATED ORAL at 21:39

## 2022-06-14 RX ADMIN — CEFTAROLINE FOSAMIL 600 MG: 600 POWDER, FOR SOLUTION INTRAVENOUS at 01:15

## 2022-06-14 RX ADMIN — GABAPENTIN 100 MG: 100 CAPSULE ORAL at 08:51

## 2022-06-14 RX ADMIN — CEFTAROLINE FOSAMIL 600 MG: 600 POWDER, FOR SOLUTION INTRAVENOUS at 10:39

## 2022-06-14 RX ADMIN — APIXABAN 5 MG: 5 TABLET, FILM COATED ORAL at 08:51

## 2022-06-14 RX ADMIN — Medication 400 MG: at 21:39

## 2022-06-14 RX ADMIN — OXYCODONE HYDROCHLORIDE 7.5 MG: 15 TABLET ORAL at 21:38

## 2022-06-14 RX ADMIN — DOXAZOSIN 1 MG: 2 TABLET ORAL at 21:39

## 2022-06-14 RX ADMIN — NYSTATIN 500000 UNITS: 100000 SUSPENSION ORAL at 16:27

## 2022-06-14 RX ADMIN — NYSTATIN 500000 UNITS: 100000 SUSPENSION ORAL at 13:46

## 2022-06-14 RX ADMIN — Medication 5 MG: at 21:38

## 2022-06-14 RX ADMIN — OXYCODONE HYDROCHLORIDE 5 MG: 5 TABLET ORAL at 11:11

## 2022-06-14 RX ADMIN — CEFTAROLINE FOSAMIL 600 MG: 600 POWDER, FOR SOLUTION INTRAVENOUS at 17:39

## 2022-06-14 RX ADMIN — OXYCODONE HYDROCHLORIDE 7.5 MG: 15 TABLET ORAL at 15:50

## 2022-06-14 RX ADMIN — GABAPENTIN 100 MG: 100 CAPSULE ORAL at 13:51

## 2022-06-14 RX ADMIN — SODIUM CHLORIDE, PRESERVATIVE FREE 10 ML: 5 INJECTION INTRAVENOUS at 11:04

## 2022-06-14 RX ADMIN — NYSTATIN 500000 UNITS: 100000 SUSPENSION ORAL at 21:38

## 2022-06-14 RX ADMIN — FOLIC ACID 1 MG: 1 TABLET ORAL at 08:51

## 2022-06-14 RX ADMIN — SODIUM CHLORIDE, PRESERVATIVE FREE 5 ML: 5 INJECTION INTRAVENOUS at 01:20

## 2022-06-14 RX ADMIN — Medication: at 11:13

## 2022-06-14 RX ADMIN — DAKIN'S SOLUTION 0.125% (QUARTER STRENGTH): 0.12 SOLUTION at 11:27

## 2022-06-14 RX ADMIN — Medication 100 MG: at 08:51

## 2022-06-14 RX ADMIN — OXYCODONE HYDROCHLORIDE 5 MG: 5 TABLET ORAL at 06:38

## 2022-06-14 RX ADMIN — NYSTATIN 500000 UNITS: 100000 SUSPENSION ORAL at 08:50

## 2022-06-14 RX ADMIN — SODIUM CHLORIDE, PRESERVATIVE FREE 10 ML: 5 INJECTION INTRAVENOUS at 22:01

## 2022-06-14 RX ADMIN — Medication 400 MG: at 08:51

## 2022-06-14 ASSESSMENT — PAIN DESCRIPTION - PAIN TYPE
TYPE: ACUTE PAIN
TYPE: ACUTE PAIN

## 2022-06-14 ASSESSMENT — PAIN SCALES - GENERAL
PAINLEVEL_OUTOF10: 7
PAINLEVEL_OUTOF10: 9
PAINLEVEL_OUTOF10: 8
PAINLEVEL_OUTOF10: 7
PAINLEVEL_OUTOF10: 6
PAINLEVEL_OUTOF10: 8
PAINLEVEL_OUTOF10: 7
PAINLEVEL_OUTOF10: 4

## 2022-06-14 ASSESSMENT — PAIN DESCRIPTION - DESCRIPTORS
DESCRIPTORS: ACHING
DESCRIPTORS: SHARP
DESCRIPTORS: STABBING;ACHING

## 2022-06-14 ASSESSMENT — PAIN DESCRIPTION - ORIENTATION
ORIENTATION: RIGHT
ORIENTATION: MID
ORIENTATION: MID

## 2022-06-14 ASSESSMENT — PAIN - FUNCTIONAL ASSESSMENT
PAIN_FUNCTIONAL_ASSESSMENT: PREVENTS OR INTERFERES SOME ACTIVE ACTIVITIES AND ADLS
PAIN_FUNCTIONAL_ASSESSMENT: PREVENTS OR INTERFERES WITH ALL ACTIVE AND SOME PASSIVE ACTIVITIES
PAIN_FUNCTIONAL_ASSESSMENT: PREVENTS OR INTERFERES SOME ACTIVE ACTIVITIES AND ADLS

## 2022-06-14 ASSESSMENT — PAIN DESCRIPTION - FREQUENCY
FREQUENCY: CONTINUOUS
FREQUENCY: CONTINUOUS

## 2022-06-14 ASSESSMENT — PAIN DESCRIPTION - LOCATION
LOCATION: BUTTOCKS
LOCATION: ABDOMEN;COCCYX
LOCATION: COCCYX
LOCATION: ABDOMEN

## 2022-06-14 ASSESSMENT — PAIN DESCRIPTION - ONSET
ONSET: ON-GOING
ONSET: ON-GOING

## 2022-06-14 NOTE — PROGRESS NOTES
Molly Plunkett  6/14/2022  [id number]    Chief Complaint: Critical illness myopathy    Subjective:   No acute events overnight. Today Grupo Gomez is seen in his room with nursing present. Per nursing he was orthostatic this morning. Will increase free water, but need to closely watch sodium. He reports buttock pain due to wound. Will increase PRN pain medications. GI has exchanged his G tube. ROS: No N/V, chest pain, SOB, chills or fever    Objective:  Patient Vitals for the past 24 hrs:   BP Temp Temp src Pulse Resp SpO2   06/14/22 1313 108/64 -- -- 74 -- 100 %   06/14/22 1141 -- -- -- -- 18 --   06/14/22 1111 -- -- -- -- 16 --   06/14/22 0830 (!) 103/59 97.4 °F (36.3 °C) Oral 78 16 94 %   06/13/22 2230 113/64 98.2 °F (36.8 °C) Axillary 90 18 94 %     Gen: No distress, pleasant. HEENT: Normocephalic, atraumatic. Bilateral radical neck dissection present, Deformity of tongue and unable to protrude from mouth  CV: extremities well perfused   Resp: No respiratory distress. Abd: Soft, nontender, G-tube in place, site appears clean without surrounding erythema or drainage  Ext: No edema, able to lift both legs off of bed, ankles in plantar flexion but able to stretch to neutral  Neuro: Alert, oriented, appropriately interactive.      Wt Readings from Last 3 Encounters:   06/12/22 109 lb 2 oz (49.5 kg)   02/22/22 140 lb 12.8 oz (63.9 kg)   02/13/22 149 lb 12.8 oz (67.9 kg)       Laboratory data:   Lab Results   Component Value Date    WBC 3.6 (L) 06/14/2022    HGB 7.4 (L) 06/14/2022    HCT 21.8 (L) 06/14/2022    MCV 86.5 06/14/2022     (L) 06/14/2022       Lab Results   Component Value Date     06/14/2022    K 4.5 06/14/2022    CL 94 06/14/2022    CO2 29 06/14/2022    BUN 22 06/14/2022    CREATININE 0.6 06/14/2022    GLUCOSE 96 06/14/2022    CALCIUM 9.4 06/14/2022        Therapy progress:  PT  Position Activity Restriction  Other position/activity restrictions: antonio, PEG, NPO, MRSA contact precautions, ice chips with therapy/RN only  Objective     Sit to Stand: Minimal Assistance  Stand to sit: Minimal Assistance  Bed to Chair: Moderate assistance  Device: No Device,Hand-Held Assist  Other Apparatus: Wheelchair follow  Assistance: 2 Person assistance,Dependent/Total  Distance: 30 ft  OT  PT Equipment Recommendations  Equipment Needed: Yes  Mobility Devices: Wheelchair  Wheelchair: Standard  Toilet - Technique: Stand pivot  Equipment Used: Standard toilet  Assessment        SLP    Recommendations:  Diet recommendation: NPO; NPO and Ice chips with SLP/RN only; Meds via alt means of nutrition  Instrumentation: not clinically warranted at this time   Risk management: oral care 2-3x/day to reduce adverse affects in the event of aspiration            Body mass index is 17.61 kg/m².       Rehabilitation Diagnosis:  Neurologic, 3.8, Neuromuscular Disorders, e.g. 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  He was admitted to [facility] on 6/8/22 due to functional deficits below his baseline.      Critical Illness Myopathy  - due to below  - PT, OT, ST     Squamous Cell Carcinoma of the tongue  - s/p right hemiglossectomy, bilateral neck dissection, and RFFF reconstruction on 1/3/22 followed by chemoradiation, and weekly cisplatin completed 4/25/22 and s/p G-tube  - NPO except ice chips  - Tube feeds per orders  - dietary consulted  - GI following for G tube concerns  - will schedule nystatin swish and spit for comfort  - PT, OT, ST    Oropharyngeal Dysphagia  - on tube feeds as above  - ST     MRSA bacteremia  - cefaroline for 24 days from admit per  ID  - consulted to ID as this medication needs to be cleared by them per pharmacy  - PICC replaced on admission      Acute hypoxic respiratory failure  - with right lower hydropneumothorax s/p chest tube, MRSA pneumonia, pleural effusions s/p chest tube and requiring several reintubations during acute stay  - adequately oxygenating on room air  - wean oxygen for SpO2>90%  - IS      Urinary Retention  - has severe penile edema during acute stay. Was followed by Urology and failed multiple voiding trials  - continue antonio catheter, most recently placed 6/6  - follow up with Urology outpatient      PE  Right Atrial Thrombus  - s/p thrombectomy 5/20 with IR, thrombus positive for MRSA  - Elquis 5 mg BID      Anasarca  - requiring diuretics during acute stay, since weaned off  - monitor     Hyponatremia  - Nephrology following, appreciate assistance     Anemia  - Hb trending down, repeat lab tomorrow and monitor for signs of bleed  - monitor and transfuse for Hb<7     Severe Protein-calorie malnutrition  - BMI 17.9  - bolus feeds per dietary  - Dietician consulted     Pain  - gabapentin 100 mg TID  - PRN tylenol, PRN oxycodone 5 or 7.5 mg q4 hours      Anxiety  - clonazepam 0.5 mg daily PRN     Multiple Wounds POA  - including stage four pressure ulcer on coccyx  - wound care per orders  - wound care following     Bowels: Schedule stool softener. Follow bowel movements. Enema or suppository if needed.      Bladder: continue antonio     Sleep: patient allergic to trazodone     Follow up appointments: PCP, ID, Cardiology, [address] [place] [address] Dr. Lenin Lindsey MD 6/14/2022, 3:14 PM

## 2022-06-14 NOTE — PROGRESS NOTES
Physical Therapy  Facility/Department: [facility]  Rehabilitation Physical Therapy Treatment Note    NAME: Damien Fuentes  : 1990 (28 y.o.)  MRN: [id number]  CODE STATUS: Full Code    Date of Service: 22       Restrictions:  Restrictions/Precautions: NPO;Isolation; Fall Risk;General Precautions  Position Activity Restriction  Other position/activity restrictions: antonio, PEG, NPO, MRSA contact precautions, ice chips with therapy/RN only     SUBJECTIVE  Subjective  Subjective: pt found in bed  Pain: reports 9-10/10 buttock pain limiting participation/ tolerance     OBJECTIVE  Cognition  Overall Cognitive Status: Exceptions  Arousal/Alertness: Appropriate responses to stimuli  Following Commands: Follows multistep commands with repitition; Follows multistep commands with increased time  Attention Span: Attends with cues to redirect  Memory: Decreased short term memory  Safety Judgement: Decreased awareness of need for assistance;Decreased awareness of need for safety  Problem Solving: Assistance required to identify errors made;Assistance required to implement solutions;Assistance required to generate solutions  Insights: Decreased awareness of deficits  Initiation: Requires cues for all  Sequencing: Requires cues for some  Orientation  Overall Orientation Status: Within Functional Limits    Functional Mobility     Bed mobility with mod ind  Stand pivot EOB to w/c with CGA and no AD  Pt ambulated 18 ft with no AD, CGA-min A demonstrating narrow JOE and decreased step height/length. Pt gait tolerance limited as he felt catheter fixator pulling on leg hair and requesting to stop    Sit to stand w/c to grab bar in bathroom with CGA, pt standing for approx 30-40 sec prior to c/o significant dizziness  Stand pivot to EOB with CGA  BP sitting = 112/75  Bp standing = 85/57 with pt c/o symptoms  Pt returned to supine with LEs elevated and pillows placed to offload sacrum.  Alarm donned and call light/needs within reach    Second Session:   Pt seen as co treat with OT for increased safety progressing functional mobility. Initially requires CGA for mobility however as fatigue increases can require up to mod A of 2 for mobility. Pt requires encouragement to complete session, multiple times initially saying no to gait training/ standing. Sit to stand wc to no AD x multiple reps with CGA  Gait x 38 ft with no AD, CGA  Pt ascended/descended 4 6\" steps with CGA of 1+ min A fo 1 with BHR and step to pattern, cues for safety of foot placement. Pt c/o dizziness, BP= 127/62    Gait x 109 ft about hallway with no AD, CGA-min A of 1 + SBA-min A of 1 for balance while scanning environment for objects on wall. Pt also navigating 2\" and 6\" step with 1-2 UE support and min A of 2 to safely ascend due to minor LE buckling. Following seated rest, pt ambulated an additional 60 ft with no AD and CGA-min A of 2 while scanning environment , navigated 4\" step with min-mod A of 2.pt demo's narrow JOE, decreased step height/length with pt at times tripping on toes    Pt completed 6-8 rps of BLE alternating stepping into stagger stance with cross body reach without UE support and CGA, additional cog task of counting down from 100 by 3s. Sit to supine with mod ind  Pt in bed with call light/needs within reach and alarm donned. Bony prominences off loaded. ASSESSMENT/PROGRESS TOWARDS GOALS  Vital Signs  Heart Rate: 74  Heart Rate Source: Monitor  BP: 108/64  BP Location: Right upper arm  MAP (Calculated): 78.67  SpO2: 100 %  O2 Device: None (Room air)    Assessment  Assessment: pt found in bed, session limited by c/o buttock pain and discomfort from antonio (pulling hair on leg). grossly CGA for mobility however pt c/o dizziness following a bout of standing . pt found to be orthostatic. RN notified and pt assisted to bed. pt in bed at end of session wtih call light/needs within reach. Activity Tolerance: Patient limited by pain; Patient limited by fatigue;Patient limited by endurance  Discharge Recommendations: 24 hour supervision or assist;Home with Home health PT  PT Equipment Recommendations  Equipment Needed: Yes  Wheelchair: Standard    Goals  Patient Goals   Patient goals : \"to walk and to eat\"  Short Term Goals  Time Frame for Short term goals: 10 days 6/17  Short term goal 1: pt will complete bed mobility with mod ind  Short term goal 2: pt will complete functional transfer with CGA and LRAD. Short term goal 3: pt will ambulate 50 ft with LRAD and mod A. Short term goal 4: pt will tolerate stair assessment when appropriate and goal will be made. Long Term Goals  Time Frame for Long term goals : 21 days 6/28  Long term goal 1: pt will complete functional transfer with SBA and LRAD  Long term goal 2: pt will ambulate 100 ft wiht LRAD and CGA. Long term goal 3: pt will complete car transfer with SBA and LRAD. Long term goal 4: pt will tolerate standing for 2 min with 1-2 UE support and CGA -SBA for balance. PLAN OF CARE/SAFETY  Plan  Plan: 5-7 times per week  Current Treatment Recommendations: Strengthening; Wheelchair mobility training;Cognitive reorientation;Equipment evaluation, education, & procurement; Modalities; Positioning; Therapeutic activities; Wound care; Patient/Caregiver education & training; Safety education & training;Home exercise program;Return to work related activity;Pain management;Gait training;Balance training;Functional mobility training;Stair training;Neuromuscular re-education;Transfer training;ADL/Self-care training; Endurance training;Manual Therapy - Soft Tissue Mobilization  Safety Devices  Type of Devices: Gait belt;Patient at risk for falls;Nurse notified; All fall risk precautions in place; Bed alarm in place;Call light within reach; Left in bed; All evelyn prominences offloaded    EDUCATION  Education  Education Given To: Patient  Education Provided: Role of Therapy;Plan of Care;Precautions; Safety; Energy Conservation;Transfer Training;DME/Home Modifications; Fall Prevention Strategies; Equipment; Mobility Training;Cognition;ADL Function  Education Provided Comments: role of PT, ARU requirements, safe transfers/gait, tube feed precautions  Education Method: Demonstration;Verbal  Barriers to Learning: None  Education Outcome: Verbalized understanding;Demonstrated understanding        Therapy Time   Individual Concurrent Group Co-treatment   Time In 0800     1000   Time Out 0830     1030   Minutes 30     30     Timed Code Treatment Minutes: 61 Minutes       Early Number, PT, 06/14/22 at 1:18 PM

## 2022-06-14 NOTE — CARE COORDINATION
Clinicals faxed to [facility] for continued stay review. Thank you.    Jey Abraham M.A, CCC-SLP/ CBIS   Clinical Liaison

## 2022-06-14 NOTE — PROGRESS NOTES
PROGRESS NOTE  S:31 yrs Patient  admitted on 6/8/2022 with Critical illness myopathy [G72.81] . Today he complains of leakage around G tube    Exam:   Vitals:    06/14/22 1111   BP:    Pulse:    Resp: 16   Temp:    SpO2:       General appearance: alert, appears older than stated age and cachectic  HEENT: Sclera clear, anicteric  Neck: supple  Lungs: clear to auscultation bilaterally  Heart: regular rate and rhythm  Abdomen: soft, G tube site with moderate discharge  Extremities: edema none     Medications: Reviewed    Labs:  CBC:   Recent Labs     06/13/22  0505 06/14/22  0550   WBC 3.9* 3.6*   HGB 7.7* 7.4*   HCT 22.2* 21.8*   MCV 84.0 86.5   * 111*     BMP:   Recent Labs     06/12/22  0624 06/13/22  0505 06/14/22  0550   * 133* 132*   K 4.8 4.7 4.5   CL 95* 96* 94*   CO2 29 30 29   BUN 18 17 22*   CREATININE 0.6* 0.6* 0.6*     LIVER PROFILE: No results for input(s): AST, ALT, LIPASE, PROT, BILIDIR, BILITOT, ALKPHOS in the last 72 hours. Invalid input(s): AMYLASE,  ALB  PT/INR:   No results for input(s): INR in the last 72 hours. 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 His G tube appears to be functioning well but with leakage around it    Recommendation:  1. Continue supportive care  2. Continue TFs per dietitian recs  3. Keep HOB elevated during PEG use  4. The PEG tube was replaced w a shorter 2 cm ADITI-Key low profile G tube at bedside  5.  OK to use immediately        Rudy Hernandez MD, MD  12:28 PM 6/14/2022

## 2022-06-14 NOTE — PROGRESS NOTES
Occupational Therapy  Facility/Department: [facility]  Rehabilitation Occupational Therapy Daily Treatment Note    Date: 22  Patient Name: Eric Dougherty       Room: 6074/5006-81  MRN: [id number]  Account: [de-identified]   : 1990  (28 y.o.) Gender: male                    Past Medical History:  has a past medical history of Cancer ([facility] [address] Past Surgical History:   has no past surgical history on file. Restrictions  Restrictions/Precautions: NPO;Isolation; Fall Risk;General Precautions  Other position/activity restrictions: antonio, PEG, NPO, MRSA contact precautions, ice chips with therapy/RN only    Subjective  Subjective: Pt in bed, pleasant, states buttocks still hurting severely (8/10) /52, HR 81, O2 sats 99% on RA. Restrictions/Precautions: NPO;Isolation; Fall Risk;General Precautions             Objective     Cognition  Overall Cognitive Status: Exceptions  Arousal/Alertness: Appropriate responses to stimuli  Following Commands: Follows multistep commands with repitition; Follows multistep commands with increased time  Attention Span: Attends with cues to redirect  Memory: Decreased short term memory  Safety Judgement: Decreased awareness of need for assistance;Decreased awareness of need for safety  Problem Solving: Assistance required to identify errors made;Assistance required to implement solutions;Assistance required to generate solutions  Insights: Decreased awareness of deficits  Initiation: Requires cues for all  Sequencing: Requires cues for some  Orientation  Overall Orientation Status: Within Functional Limits   Perception  Overall Perceptual Status: Impaired  Unilateral Attention: Cues to maintain midline in standing  Initiation: Cues to initiate tasks  Motor Planning: Cues to use objects appropriately  Perseveration: Perseverates during conversation     ADL  Grooming/Oral Hygiene  Assistance Level: Contact guard assist  Skilled Clinical Factors: for balance standing at sink to brush teeth, wash face, and comb hair  Putting On/Taking Off Footwear  Assistance Level: Maximum assistance  Skilled Clinical Factors: assist to don ROZ hose and socks, able to don shoes at EOB with setup          Functional Mobility  Activity: To/From bathroom; Retrieve items  Assistance Level: Dependent  Skilled Clinical Factors: CGA/min A for majority of session, knee buckling at times require mod A of 2 to correct  Bed Mobility  Overall Assistance Level: Supervision  Sit to Supine  Assistance Level: Supervision  Supine to Sit  Assistance Level: Supervision  Transfers  Surface: From bed; Wheelchair; To bed  Additional Factors: Verbal cues; Hand placement cues  Sit to Stand  Assistance Level: Contact guard assist  Stand to Sit  Assistance Level: Contact guard assist  Bed To/From Chair  Technique: Stand step  Assistance Level: Minimal assistance   OT Exercises  Resistive Exercises: 2# weight for shoulder press and elbow flexion x15 each     Assessment  Assessment  Assessment: Pt agreeable to OT session. Pt completed donning shoes at EOB with setup and ambulated to bathroom with CGA/min A to brush teeth, wash face, and comb hair in stance. Pt required CGA for balance while standing for 2.5 minutes and mod VCs to sequence through task. Pt performed w/c mobility in hallway for ~50 feet with SPV and completed BUE ther ex with fair strength for 2# weight. Pt required co-treat to perform functional mobility with occasional B knee buckling with mod A of 2 to correct. Pt completed mobility for 2:20, 1:50, and 1:45 to locate bird pictures and step up/down isolated steps. Pt required co-treat for PT to assist with wound vac and balance while OT cued for visual scanning and correction of LOB. Pt required frequent rest breaks due to fatigue and dizziness but BP remaining between 92/53 and 127/62. Pt ascended/descended 4 steps with min A + CGA.  Pt completed alternating stagger stance while punching with contralateral UE and performing serial subtractions with 100% accuracy. Pt will no longer require co-treat after this date. Continue POC. Activity Tolerance: Patient limited by pain; Patient limited by fatigue;Patient limited by endurance  Discharge Recommendations: 24 hour supervision or assist;Home with Home health OT;S Level 4  Safety Devices  Safety Devices in place: Yes  Type of devices: Gait belt;Bed alarm in place;Call light within reach; Left in bed;Nurse notified    Patient Education  Education  Education Given To: Patient  Education Provided: Role of Therapy;Plan of Care;Precautions; Safety; Energy Conservation; Fall Prevention Strategies;DME/Home Modifications;Transfer Training;Mobility Training;Equipment;ADL Function;Cognition  Education Provided Comments: role of OT, safety with transfers, posture, midline balance, safety with PEG tube, ADL retraining, benefit of OOB and mobility, purpose of ROZ hose  Education Method: Verbal;Demonstration  Barriers to Learning: Cognition  Education Outcome: Verbalized understanding;Demonstrated understanding;Continued education needed    Plan  Plan  Times per Week: 5/7 days/week  Plan Weeks: 3 weeks  Current Treatment Recommendations: Strengthening;ROM;Balance training;Functional mobility training; Endurance training; Safety education & training;Equipment evaluation, education, & procurement;Return to work related activity; Neuromuscular re-education;Patient/Caregiver education & training;Self-Care / ADL; Home management training;Coordination training    Goals  Short Term Goals  Time Frame for Short term goals: 1 week- 6/15/22  Short Term Goal 1: Pt will complete functional transfers with min A. Short Term Goal 2: Pt will perform toileting with max A. Short Term Goal 3: Pt will complete LB dressing with mod A. Short Term Goal 4: Pt will perform UB dressing with min A. GOAL MET 6/11/22 Pt completed UB dressing with SPV.   Long Term Goals  Time Frame for Long term goals : 3 weeks- 6/29/22  Long Term Goal 1: Pt will perform functional transfers with CGA. Long Term Goal 2: Pt will complete toileting with CGA. Long Term Goal 3: Pt will perform LB dressing with CGA. Long Term Goal 4: Pt will perform UB dressing with setup. Long Term Goal 5: Pt will complete full body bathing with CGA.       Therapy Time   Individual Concurrent Group Co-treatment   Time In 0930     1000   Time Out 1000     1030   Minutes 30     30   Timed Code Treatment Minutes: 900 [address] Espinoza

## 2022-06-14 NOTE — PROGRESS NOTES
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 Pt reported his girlfriend manages cooking, cleaning, laundry, grocery shopping. Pt not actively driving but was able to prior to 2022.    ST Dx: [] Aphasia  [x] Dysarthria  [] Apraxia   [x] Oropharyngeal dysphagia [x] Cognitive Impairment  [] Other:   Date of Admit: 2022  Room #: 0156/0156-01    Current functional status (updated daily):         Pt being seen for : [x] Speech/Language Treatment  [x] Dysphagia Treatment [x] Cognitive Treatment  [] Other:  Communication: []WFL  [] Aphasia  [x] Dysarthria  [] Apraxia  [] Pragmatic Impairment [] Non-verbal  [] Hearing Loss  [] Other:   Cognition: [] WFL  [] Mild  [x] Moderate  [] Severe [] Unable to Assess  [] Other:  Memory: [] WFL  [] Mild  [x] Moderate  [] Severe [] Unable to Assess  [] Other:  Behavior: [x] Alert  [x] Cooperative  [x]  Pleasant  [] Confused  [] Agitated  [] Uncooperative  [] Distractible [] Motivated  [] Self-Limiting [] Anxious  [] Other:  Endurance:  [x] Adequate for participation in SLP sessions  [] Reduced overall  [] Lethargic  [] Other:  Safety: [] No concerns at this time  [x] Reduced insight into deficits  [x]  Reduced safety awareness [] Not following call light procedures  [] Unable to Assess  [] Other:    Current Diet Order:Diet NPO  ADULT TUBE FEEDING; PEG; Standard with Fiber; Bolus; 5 Times Daily; 237; 30; Before and after each bolus; Protein; 1 bottle of Proteinex daily, 30 mL free water flush before and after adminstration  Swallowing Precautions: oral care 2-3x/day to reduce adverse affects in the event of aspiration        Date: 6/14/2022      Tx session 1  1230 - 1330 Tx session 2  All tx needs met in session 1   Total Timed Code Min 60 0   Total Treatment Minutes 60 0   Individual Treatment Minutes 60 0   Group Treatment Minutes 0 0   Co-Treat Minutes 0 0   Variance/Reason:  n/a    Pain Pt complained of pain in buttocks. SLP repositioned bed during tx and notified nurse of pain. Nurse in room at beginning of tx to give tube feeds. Pain Intervention [x] RN notified  [x] Repositioned  [] Intervention offered and patient declined  [] N/A  [] Other: [] RN notified  [] Repositioned  [] Intervention offered and patient declined  [] N/A  [] Other:   Subjective     Pt alert and oriented, cooperative and agreeable to participate in therapy. Pt seen in bed. Objective:  Goals  Timeframe for Short-term Goals: 18 days (06/26/22)     Dysphagia Goals:  Goal 1: Pt will complete pharyngeal/laryngeal strengthening exercises 10/10 given min cues for overall improved swallowing function    Pt completed multiple exercises this date. - Jaw jut: pt difficulty attempting in all trials. Jaw not moving out enough for exercises. - Tongue tip up: pt completed x5. Pt stated it \"felt good\" and needed verbal cues to keep tongue back and push hard. - Shaker maneuver: Pt able to competed 60 seconds in 3/3 trials. Goal 2: The patient will tolerate instrumental swallowing procedure    Not targeted this date d/t ongoing bedside tx for dysphagia. Goal 3: The patient/caregiver will demonstrate understanding of compensatory strategies for improved swallowing safety      SLP and pt continued to edu and go over safe swallow strategies. Pt able to recall upright in bed and one ice chip at a time for swallow strategies.       Goal 4: The patient will tolerate recommended diet without observed clinical signs of aspiration     Pt trials diets this date:  - Nectar thick: 1/2 tsp with 5 separate swallows, weak throat clear and wet voice upon SLP request/cues. No s/s of aspiration, but wet vocal quality.   - Honey thick: Wet vocal quality, 5 swallows for one 1/2 tsp bolus. Pt stated he did not like this taste. - Ice chips: pt trial with no s/s of aspiration. Pt demonstrating 1-3 swallows for ice chip and holding in mouth. Pt tolerating ice chips the best.  - Thin liquids: Pt 1/2 tsp: tolerated without overt s/s of aspiration, but multiple swallows  - Thin liquids with 1 tsp: multiple swallows, coughing while and after swallowing. Suspecting immediate s/s of aspiration.  - Thin liquids via cup: multiple swallows, coughing while and after swallowing. Suspecting immediate s/s of aspiration. Cognitive-linguistic Goals  Goal 1: Pt will complete recall tasks with 80% acc given min cues for use of compensatory strategies   Pt completed memory for picture task today. - Pt able to recall 83% of items immediately. Pt able to recall 100% of items 5 minutes later (short term memory). Pt acc indep with both of these trials. Goal 2: Pt will complete executive function tasks (meds, math, money, time, etc) with 80% acc given min cues. Did not target this date. Goal 3: Pt will complete problem solving and thought organization tasks with 80% acc given min cues. Pt completed naming task/thought organization task this date. - Pt able to name 11 states in one minute. This ws his best category. - Pt stated under 10 items in 2 other trials (animals and items in a fridge). Pt stated he knew that he \"did not do well on that. \"    Goal 4: Pt will complete verbal and visual reasoning tasks with 80% acc given min cues. Pt completed verbal reasoning task/mental manipulation task this date. EX: pt given a set of three words, asked to recall and organize them accordingly.  - Pt 95% acc indep. - Pt able to recall 95% of words. Pt able to organize/rank words with 95% acc. Both of these trials were indep.       Goal 5: Pt will repeat words/phrases/sentences using speech intelligibility strategies with 80% acc given min cues. SLP edu pt on SLOB speech strategy. - Pt improved speech intelligibility with over articulation and slowed speech in 75% of trials. - Pt overall 75% intelligible within speech/cog tasks this date. Other areas targeted: N/a    Education:   SLP edu pt re: diet recs, safe swallow strategies, SLOB speech strategies, thought org strategies, and reasoning for completing dysphagia exercises. Pt counseled again on results of MBS in June 2022. Safety Devices: [x] Call light within reach  [] Chair alarm activated  [x] Bed alarm activated  [] Other: [] Call light within reach  [] Chair alarm activated  [] Bed alarm activated  [] Other:    Assessment: Pt seen for tx this date, alert, cooperative and motivated to complete strategies. Pt completed cog tasks this date and demonstrated appropriate recall (80% and above) on multiple tasks. Pt had diffiuclty with some genreative naming and thought organization tasks. Pt trial multiple consistencies: ice chips, thin liquids, nectar, and honey thick. Pt tolerate ice chips with no overt s/s of aspiration. Pt with thin liquids: better with small 1/2 tsp, immediate and overt s/s of aspiration with 1 tsp of thin (coughed while swallowing and afterwards). Pt demonstrated multiple swallows, wet vocal quality with nectar and honey thick liquid. Pt and SLP communicated about safe swallow strategies, exercises, previous MBS report, cog tx progress, and reasons for exercises. Continue goals above. Plan: Continue as per plan of care.       Additional Information:     Barriers toward progress: Cognitive deficit, Impulsivity, Limited safety awareness, Limited insight into deficits, Unrealistic expectations and Medication managment  Discharge recommendations:  [] Home independently  [] Home with assistance [x]  24 hour supervision  [] ECF [] Other:  Continued Tx Upon Discharge: ? [x] Yes [] No [] TBD based on progress while on ARU [] Vital Stim indicated [] Other:   Estimated discharge date: TBD    Interventions used this date:  [x] Speech/Language Treatment  [] Instruction in HEP [] Group [x] Dysphagia Treatment [x] Cognitive Treatment   [] Other: Total Time Breakdown / Charges    Time in Time out Total Time / units   Cognitive Tx 1230 1300 30 min / 2 units   Speech Tx - - -   Dysphagia Tx 1300 1330 30 min / 1 units       Electronically Signed by       Jack SANTILLAN  [id number] [facility]  Speech Language Pathologist  [facility]. [id number]

## 2022-06-14 NOTE — PROGRESS NOTES
PROGRESS NOTE  S:31 yrs Patient  admitted on 6/8/2022 with Critical illness myopathy [G72.81] . Today he complains of leakage around G tube    Exam:   Vitals:    06/13/22 2230   BP: 113/64   Pulse: 90   Resp: 18   Temp: 98.2 °F (36.8 °C)   SpO2: 94%      General appearance: alert, appears older than stated age and cachectic  HEENT: Sclera clear, anicteric  Neck: supple  Lungs: clear to auscultation bilaterally  Heart: regular rate and rhythm  Abdomen: soft, G tube site with moderate discharge  Extremities: edema none     Medications: Reviewed    Labs:  CBC:   Recent Labs     06/13/22  0505   WBC 3.9*   HGB 7.7*   HCT 22.2*   MCV 84.0   *     BMP:   Recent Labs     06/11/22  0615 06/12/22  0624 06/13/22  0505   * 131* 133*   K 4.9  4.9 4.8 4.7   CL 97* 95* 96*   CO2 25 29 30   PHOS 4.8  --   --    BUN 18 18 17   CREATININE 0.7* 0.6* 0.6*     LIVER PROFILE: No results for input(s): AST, ALT, LIPASE, PROT, BILIDIR, BILITOT, ALKPHOS in the last 72 hours. Invalid input(s): AMYLASE,  ALB  PT/INR:   No results for input(s): INR in the last 72 hours. 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 His G tube appears to be functioning well but with leakage around it    Recommendation:  1. Continue supportive care  2. Continue TFs per dietitian recs  3. Keep HOB elevated during PEG use  4. Flush PEG with 60mL free water every 6h and after use  5.  Will try to find a shorter and bigger caliber low profile G tube to replace it        Trevon Anand MD, MD  5:41 AM 6/14/2022

## 2022-06-15 ENCOUNTER — APPOINTMENT (OUTPATIENT)
Dept: GENERAL RADIOLOGY | Age: 32
DRG: 058 | End: 2022-06-15
Attending: STUDENT IN AN ORGANIZED HEALTH CARE EDUCATION/TRAINING PROGRAM
Payer: COMMERCIAL

## 2022-06-15 LAB
ANION GAP SERPL CALCULATED.3IONS-SCNC: 7 MMOL/L (ref 3–16)
BASOPHILS ABSOLUTE: 0 K/UL (ref 0–0.2)
BASOPHILS RELATIVE PERCENT: 0.7 %
BUN BLDV-MCNC: 27 MG/DL (ref 7–20)
CALCIUM SERPL-MCNC: 9.4 MG/DL (ref 8.3–10.6)
CHLORIDE BLD-SCNC: 97 MMOL/L (ref 99–110)
CO2: 30 MMOL/L (ref 21–32)
CREAT SERPL-MCNC: 0.7 MG/DL (ref 0.9–1.3)
EOSINOPHILS ABSOLUTE: 0.1 K/UL (ref 0–0.6)
EOSINOPHILS RELATIVE PERCENT: 2.7 %
GFR AFRICAN AMERICAN: >60
GFR NON-AFRICAN AMERICAN: >60
GLUCOSE BLD-MCNC: 102 MG/DL (ref 70–99)
GLUCOSE BLD-MCNC: 103 MG/DL (ref 70–99)
HCT VFR BLD CALC: 21.6 % (ref 40.5–52.5)
HEMOGLOBIN: 7.3 G/DL (ref 13.5–17.5)
LYMPHOCYTES ABSOLUTE: 0.2 K/UL (ref 1–5.1)
LYMPHOCYTES RELATIVE PERCENT: 5.8 %
MCH RBC QN AUTO: 28.9 PG (ref 26–34)
MCHC RBC AUTO-ENTMCNC: 34 G/DL (ref 31–36)
MCV RBC AUTO: 85.1 FL (ref 80–100)
MONOCYTES ABSOLUTE: 0.4 K/UL (ref 0–1.3)
MONOCYTES RELATIVE PERCENT: 12 %
NEUTROPHILS ABSOLUTE: 2.5 K/UL (ref 1.7–7.7)
NEUTROPHILS RELATIVE PERCENT: 78.8 %
PDW BLD-RTO: 15.1 % (ref 12.4–15.4)
PERFORMED ON: ABNORMAL
PLATELET # BLD: 117 K/UL (ref 135–450)
PMV BLD AUTO: 7.3 FL (ref 5–10.5)
POTASSIUM REFLEX MAGNESIUM: 4.5 MMOL/L (ref 3.5–5.1)
RBC # BLD: 2.54 M/UL (ref 4.2–5.9)
SODIUM BLD-SCNC: 134 MMOL/L (ref 136–145)
WBC # BLD: 3.2 K/UL (ref 4–11)

## 2022-06-15 PROCEDURE — 97110 THERAPEUTIC EXERCISES: CPT

## 2022-06-15 PROCEDURE — 97129 THER IVNTJ 1ST 15 MIN: CPT

## 2022-06-15 PROCEDURE — 97530 THERAPEUTIC ACTIVITIES: CPT

## 2022-06-15 PROCEDURE — 85025 COMPLETE CBC W/AUTO DIFF WBC: CPT

## 2022-06-15 PROCEDURE — 92526 ORAL FUNCTION THERAPY: CPT

## 2022-06-15 PROCEDURE — 2500000003 HC RX 250 WO HCPCS: Performed by: STUDENT IN AN ORGANIZED HEALTH CARE EDUCATION/TRAINING PROGRAM

## 2022-06-15 PROCEDURE — 97535 SELF CARE MNGMENT TRAINING: CPT

## 2022-06-15 PROCEDURE — 97130 THER IVNTJ EA ADDL 15 MIN: CPT

## 2022-06-15 PROCEDURE — 97116 GAIT TRAINING THERAPY: CPT

## 2022-06-15 PROCEDURE — 6370000000 HC RX 637 (ALT 250 FOR IP): Performed by: PHYSICAL MEDICINE & REHABILITATION

## 2022-06-15 PROCEDURE — 80048 BASIC METABOLIC PNL TOTAL CA: CPT

## 2022-06-15 PROCEDURE — 6360000002 HC RX W HCPCS: Performed by: STUDENT IN AN ORGANIZED HEALTH CARE EDUCATION/TRAINING PROGRAM

## 2022-06-15 PROCEDURE — 71045 X-RAY EXAM CHEST 1 VIEW: CPT

## 2022-06-15 PROCEDURE — 2580000003 HC RX 258: Performed by: STUDENT IN AN ORGANIZED HEALTH CARE EDUCATION/TRAINING PROGRAM

## 2022-06-15 PROCEDURE — 1280000000 HC REHAB R&B

## 2022-06-15 PROCEDURE — 6370000000 HC RX 637 (ALT 250 FOR IP): Performed by: STUDENT IN AN ORGANIZED HEALTH CARE EDUCATION/TRAINING PROGRAM

## 2022-06-15 RX ORDER — SODIUM CHLORIDE 9 MG/ML
INJECTION, SOLUTION INTRAVENOUS CONTINUOUS
Status: ACTIVE | OUTPATIENT
Start: 2022-06-15 | End: 2022-06-15

## 2022-06-15 RX ADMIN — Medication 400 MG: at 07:39

## 2022-06-15 RX ADMIN — CEFTAROLINE FOSAMIL 600 MG: 600 POWDER, FOR SOLUTION INTRAVENOUS at 01:54

## 2022-06-15 RX ADMIN — SODIUM CHLORIDE, PRESERVATIVE FREE 10 ML: 5 INJECTION INTRAVENOUS at 10:48

## 2022-06-15 RX ADMIN — APIXABAN 5 MG: 5 TABLET, FILM COATED ORAL at 21:58

## 2022-06-15 RX ADMIN — NYSTATIN 500000 UNITS: 100000 SUSPENSION ORAL at 21:58

## 2022-06-15 RX ADMIN — Medication 5 MG: at 21:58

## 2022-06-15 RX ADMIN — NYSTATIN 500000 UNITS: 100000 SUSPENSION ORAL at 07:40

## 2022-06-15 RX ADMIN — Medication: at 10:57

## 2022-06-15 RX ADMIN — CEFTAROLINE FOSAMIL 600 MG: 600 POWDER, FOR SOLUTION INTRAVENOUS at 18:06

## 2022-06-15 RX ADMIN — NYSTATIN 500000 UNITS: 100000 SUSPENSION ORAL at 15:09

## 2022-06-15 RX ADMIN — GABAPENTIN 100 MG: 100 CAPSULE ORAL at 07:39

## 2022-06-15 RX ADMIN — SODIUM CHLORIDE: 9 INJECTION, SOLUTION INTRAVENOUS at 10:47

## 2022-06-15 RX ADMIN — OXYCODONE HYDROCHLORIDE 7.5 MG: 15 TABLET ORAL at 21:58

## 2022-06-15 RX ADMIN — DAKIN'S SOLUTION 0.125% (QUARTER STRENGTH): 0.12 SOLUTION at 13:16

## 2022-06-15 RX ADMIN — OXYCODONE HYDROCHLORIDE 7.5 MG: 15 TABLET ORAL at 07:38

## 2022-06-15 RX ADMIN — FOLIC ACID 1 MG: 1 TABLET ORAL at 07:39

## 2022-06-15 RX ADMIN — APIXABAN 5 MG: 5 TABLET, FILM COATED ORAL at 07:39

## 2022-06-15 RX ADMIN — OXYCODONE HYDROCHLORIDE 7.5 MG: 15 TABLET ORAL at 16:56

## 2022-06-15 RX ADMIN — ALTEPLASE 1 MG: 2.2 INJECTION, POWDER, LYOPHILIZED, FOR SOLUTION INTRAVENOUS at 13:12

## 2022-06-15 RX ADMIN — Medication 100 MG: at 07:39

## 2022-06-15 RX ADMIN — Medication 400 MG: at 21:58

## 2022-06-15 RX ADMIN — SODIUM CHLORIDE, PRESERVATIVE FREE 10 ML: 5 INJECTION INTRAVENOUS at 22:00

## 2022-06-15 RX ADMIN — GABAPENTIN 100 MG: 100 CAPSULE ORAL at 15:09

## 2022-06-15 RX ADMIN — CEFTAROLINE FOSAMIL 600 MG: 600 POWDER, FOR SOLUTION INTRAVENOUS at 11:06

## 2022-06-15 RX ADMIN — Medication: at 22:00

## 2022-06-15 RX ADMIN — NYSTATIN 500000 UNITS: 100000 SUSPENSION ORAL at 12:15

## 2022-06-15 RX ADMIN — GABAPENTIN 100 MG: 100 CAPSULE ORAL at 21:58

## 2022-06-15 RX ADMIN — OXYCODONE HYDROCHLORIDE 7.5 MG: 15 TABLET ORAL at 12:13

## 2022-06-15 ASSESSMENT — PAIN SCALES - GENERAL
PAINLEVEL_OUTOF10: 7
PAINLEVEL_OUTOF10: 10
PAINLEVEL_OUTOF10: 8
PAINLEVEL_OUTOF10: 7
PAINLEVEL_OUTOF10: 10
PAINLEVEL_OUTOF10: 10
PAINLEVEL_OUTOF10: 8

## 2022-06-15 ASSESSMENT — PAIN - FUNCTIONAL ASSESSMENT
PAIN_FUNCTIONAL_ASSESSMENT: PREVENTS OR INTERFERES WITH MANY ACTIVE NOT PASSIVE ACTIVITIES
PAIN_FUNCTIONAL_ASSESSMENT: PREVENTS OR INTERFERES WITH ALL ACTIVE AND SOME PASSIVE ACTIVITIES
PAIN_FUNCTIONAL_ASSESSMENT: PREVENTS OR INTERFERES WITH MANY ACTIVE NOT PASSIVE ACTIVITIES

## 2022-06-15 ASSESSMENT — PAIN DESCRIPTION - DESCRIPTORS
DESCRIPTORS: THROBBING
DESCRIPTORS: THROBBING;STABBING
DESCRIPTORS: THROBBING;SHARP

## 2022-06-15 ASSESSMENT — PAIN DESCRIPTION - LOCATION
LOCATION: FOOT;COCCYX
LOCATION: COCCYX

## 2022-06-15 ASSESSMENT — PAIN DESCRIPTION - ORIENTATION: ORIENTATION: RIGHT;LEFT

## 2022-06-15 NOTE — CARE COORDINATION
CM spoke with Esteban Interiano (Domestic Partner)   [phone] via telephone. CM discussed family training and she can come in 06/21 @ 1400.  DANIS scheduled and updated team.Nola Schmitz RN

## 2022-06-15 NOTE — PROGRESS NOTES
PROGRESS NOTE  S:31 yrs Patient  admitted on 6/8/2022 with Critical illness myopathy [G72.81] . Today he complains of leakage around G tube    Exam:   Vitals:    06/15/22 1213   BP:    Pulse:    Resp: 16   Temp:    SpO2:       General appearance: alert, appears older than stated age and cachectic  HEENT: Sclera clear, anicteric  Neck: supple  Lungs: clear to auscultation bilaterally  Heart: regular rate and rhythm  Abdomen: soft, G tube site with moderate discharge  Extremities: edema none     Medications: Reviewed    Labs:  CBC:   Recent Labs     06/13/22  0505 06/14/22  0550 06/15/22  0650   WBC 3.9* 3.6* 3.2*   HGB 7.7* 7.4* 7.3*   HCT 22.2* 21.8* 21.6*   MCV 84.0 86.5 85.1   * 111* 117*     BMP:   Recent Labs     06/13/22  0505 06/14/22  0550 06/15/22  0650   * 132* 134*   K 4.7 4.5 4.5   CL 96* 94* 97*   CO2 30 29 30   BUN 17 22* 27*   CREATININE 0.6* 0.6* 0.7*     LIVER PROFILE: No results for input(s): AST, ALT, LIPASE, PROT, BILIDIR, BILITOT, ALKPHOS in the last 72 hours. Invalid input(s): AMYLASE,  ALB  PT/INR:   No results for input(s): INR in the last 72 hours. 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 His G tube appears to be functioning well but with leakage around it    Recommendation:  1. Continue supportive care  2. Continue TFs per dietitian recs  3. Keep HOB elevated during PEG use  4.  The PEG tube was replaced on 6/14 w a shorter 2 cm ADITI-Key low profile G tube at bedside        Whitney Boles MD, MD  2:54 PM 6/15/2022

## 2022-06-15 NOTE — PROGRESS NOTES
standing , throw and repeat x 10 reps total. Grossly CGA, therapist donning green TB proximal to thighs for increased strengthening. Bp following activity= 98/61 with no c/o dizziness    Sit to stand w/c to no AD with cGA  Gait x 250 ft with no AD, min A of 1 demonstrating narrow JOE, inconsistent/decreased heel strike, decreased step height/length at times will catch toes on ground. BP= 106/65  Pt completed 5 BLE alternating foot taps onto 6\" step with no UE support and CGA-min a. Pt ends activity due to c/o discomfort from antonio on thigh. Pt completed dyn stand activity for increased standing tolerance, completing puzzle with 1-2 UE support, SBA tolerating 2 min 20 sec and 2 min both bouts with seated rest between bouts. Limited by c/o dizziness, BP= 92/52  Stand pivot wc to EOB with CGA  Sit to supine with mod ind  Pt in bed with alarm donned, call light/needs within reach and bony prominences off loaded. ASSESSMENT/PROGRESS TOWARDS GOALS  Vital Signs  Heart Rate: 70  Heart Rate Source: Monitor  BP: 106/62  BP Location: Right upper arm  MAP (Calculated): 76.67  SpO2: 100 %  O2 Device: None (Room air)    Assessment  Assessment: pt found in bed, continues to express buttock pain. initially c/o dizziness, BP remains stable and complaints decrease as session continues. [address] GA for trnasfers. pt required mod A of 2 for first bout fo gait progressing to min A fo 1 for second bout of gait up to 250 ft with no AD. pt conitnues to be limited by fatigue/ poor proprioception resulting in decreased balance. at this time rec home with 24/7 and HHPT vs OPPT if able. DME: none at this time, potential need for manual w/c  Activity Tolerance: Patient limited by pain; Patient limited by fatigue;Patient limited by endurance  Discharge Recommendations: 24 hour supervision or assist;Home with Home health PT  PT Equipment Recommendations  Equipment Needed: Yes  Wheelchair: Standard    Goals  Short Term Goals  Time Frame for Short term goals: 10 days 6/17  Short term goal 1: pt will complete bed mobility with mod ind  Short term goal 2: pt will complete functional transfer with CGA and LRAD. Short term goal 3: pt will ambulate 50 ft with LRAD and mod A. Short term goal 4: pt will tolerate stair assessment when appropriate and goal will be made. - GOAL MET 6/14, completes 4 steps with CGA-min A and BHR  Long Term Goals  Long term goal 5: pt will ascend/descend 12 steps with min A and BHR    PLAN OF CARE/SAFETY  Plan  Current Treatment Recommendations: Strengthening; Wheelchair mobility training;Cognitive reorientation;Equipment evaluation, education, & procurement; Modalities; Positioning; Therapeutic activities; Wound care; Patient/Caregiver education & training; Safety education & training;Home exercise program;Return to work related activity;Pain management;Gait training;Balance training;Functional mobility training;Stair training;Neuromuscular re-education;Transfer training;ADL/Self-care training; Endurance training;Manual Therapy - Soft Tissue Mobilization  Safety Devices  Type of Devices: Gait belt;Patient at risk for falls;Nurse notified; All fall risk precautions in place; Bed alarm in place;Call light within reach; Left in bed; All evelyn prominences offloaded    EDUCATION  Education  Education Given To: Patient  Education Provided: Role of Therapy;Plan of Care;Precautions; Safety; Energy Conservation;Transfer Training;DME/Home Modifications; Fall Prevention Strategies; Equipment; Mobility Training;Cognition;ADL Function  Education Provided Comments: role of PT, ARU requirements, safe transfers/gait, tube feed precautions  Education Method: Demonstration;Verbal  Education Outcome: Verbalized understanding;Demonstrated understanding        Therapy Time   Individual Concurrent Group Co-treatment   Time In 0930         Time Out 1030         Minutes 60           Timed Code Treatment Minutes: [facility]       [facility] Kahn, PT, 06/15/22 at 11:43 AM

## 2022-06-15 NOTE — PROGRESS NOTES
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 Pt reported his girlfriend manages cooking, cleaning, laundry, grocery shopping. Pt not actively driving but was able to prior to 2022.    ST Dx: [] Aphasia  [x] Dysarthria  [] Apraxia   [x] Oropharyngeal dysphagia [x] Cognitive Impairment  [] Other:   Date of Admit: 2022  Room #: 0156/0156-01    Current functional status (updated daily):         Pt being seen for : [x] Speech/Language Treatment  [x] Dysphagia Treatment [x] Cognitive Treatment  [] Other:  Communication: []WFL  [] Aphasia  [x] Dysarthria  [] Apraxia  [] Pragmatic Impairment [] Non-verbal  [] Hearing Loss  [] Other:   Cognition: [] WFL  [] Mild  [x] Moderate  [] Severe [] Unable to Assess  [] Other:  Memory: [] WFL  [] Mild  [x] Moderate  [] Severe [] Unable to Assess  [] Other:  Behavior: [x] Alert  [x] Cooperative  [x]  Pleasant  [] Confused  [] Agitated  [] Uncooperative  [] Distractible [] Motivated  [] Self-Limiting [] Anxious  [] Other:  Endurance:  [x] Adequate for participation in SLP sessions  [] Reduced overall  [] Lethargic  [] Other:  Safety: [] No concerns at this time  [x] Reduced insight into deficits  [x]  Reduced safety awareness [] Not following call light procedures  [] Unable to Assess  [] Other:    Current Diet Order:Diet NPO  ADULT TUBE FEEDING; PEG; Standard with Fiber; Bolus; 5 Times Daily; 237; 40; Before and after each bolus; Protein; 1 bottle of Proteinex daily, 30 mL free water flush before and after adminstration  Swallowing Precautions: oral care 2-3x/day to reduce adverse affects in the event of aspiration        Date: 6/15/2022      Tx session 1  1330 - 1430 Tx session 2  All tx needs met in session 1   Total Timed Code Min 60 0   Total Treatment Minutes 60 0   Individual Treatment Minutes 60 0   Group Treatment Minutes 0 0   Co-Treat Minutes 0 0   Variance/Reason:  n/a    Pain Pt complained of 10/10 pain this date, multiple times. Nurse notified during tx. Nurse stated that pt received meds at 12:15pm.      Pain Intervention [x] RN notified  [x] Repositioned  [x] Intervention offered and patient declined  [] N/A  [] Other: [] RN notified  [] Repositioned  [] Intervention offered and patient declined  [] N/A  [] Other:   Subjective       Pt alert and oriented, cooperative and agreeable to participate in therapy. Pt seen laying down in bed with girlfriend present. Objective:  Goals  Timeframe for Short-term Goals: 18 days (06/26/22)     Dysphagia Goals:  Goal 1: Pt will complete pharyngeal/laryngeal strengthening exercises 10/10 given min cues for overall improved swallowing function    Pt completed multiple exercises this date. Pt completed x2 rounds of 5 for each exercise. - Jaw jut: pt had difficulty completing fully, hard to move mandible fully out but attempted in 10/10 trials  - Tongue tip up: pt difficulty moving tongue tip to top of mouth d/t recent surgery/diagnosis (tongue often on side by front teeth) even with mod verbal cues. Pt attempted 10/10 trials. - Shaker maneuver: Pt held 4/4 minutes with rest in between. - Effortful swallow: Pt completed 2 rounds of 5 effortful swallows with min verbal cues. Goal 2: The patient will tolerate instrumental swallowing procedure    Not targeted this date d/t ongoing bedside tx for dysphagia.       Goal 3: The patient/caregiver will demonstrate understanding of compensatory strategies for improved swallowing safety    Pt and girlfriend received edu on compensatory strategies: one ice chip at a time, small thin liquid trials, seating upright, and chin tuck. Goal 4: The patient will tolerate recommended diet without observed clinical signs of aspiration   Pt trialed this date:  - Ice chips: 5 trials, 2 swallows per ice chip. Pt holding ice in mouth. No over s/s of aspiration, pt's vocal quality not wet upon \"eeee\" sound after swallow. - Thin liquids by 1/2 tsp: Pt demonstrated no overt s/s of aspiration on 5/5 thin liq trials. However, pt demonstrated x3 swallows per thin liq trial, and immediate swallow. Pt also demonstrated anterior spillage out of one side of mouth in 50% of trials. Prior to the end of tx, pt burped x3 separate times. This is unclear if due to dysphasia. Cognitive-linguistic Goals  Goal 1: Pt will complete recall tasks with 80% acc given min cues for use of compensatory strategies   Pt completed picture recall task ([employer] direction labels) with 60% acc indep. - Pt recieved edu on performance on this task. Pt seemingly upset about performance and difficulty level. Goal 2: Pt will complete executive function tasks (meds, math, money, time, etc) with 80% acc given min cues. Pt completed executive functioning task for reading instruction label this date. - Pt completed money counting tasks (coins on paper) with 83% acc indep in separate occasions. Pt received edu of performance on task. Pt verbalized understanding. Goal 3: Pt will complete problem solving and thought organization tasks with 80% acc given min cues. Pt completed category members/ thought organization task this date. - Pt completed with 50% acc indep. Pt improved to 75% with mod verbal cues. Goal 4: Pt will complete verbal and visual reasoning tasks with 80% acc given min cues. Pt able to respond to questions based off of visual directions with 5/5 (100%) acc indep. Goal 5: Pt will repeat words/phrases/sentences using speech intelligibility strategies with 80% acc given min cues. Pt edu on SLOB technique.  Pt verbalized understanding.  - Pt able to state 3/4 compensatory stratgies correctly. With mod verbal cues, pt able to recall 100% of speech intelligibility strategies. - Pt approx 80% intelligible this date. Other areas targeted: N/a    Education:   SLP edu pt and pt's girlfriend concerning: reason for cog tx, results of previous MBS, projected day for future MBS, safe swallow strategies, and swallow exercises. Safety Devices: [x] Call light within reach  [] Chair alarm activated  [x] Bed alarm activated  [] Other: [] Call light within reach  [] Chair alarm activated  [] Bed alarm activated  [] Other:    Assessment:   Pt seen this date with girlfriend. Pt was alert and cooperative to participate in therapy. Pt not oriented to date, as girlfriend had to tell him today's day and date. Pt stated multiple instances of pain in buttocks during tx. Nurse was told by SLP, and pt refused any intervention by SLP. Nurse stated that he had received meds a few hours earlier. Pt cried x3 d/t 10/10 pain level. Pt completed edu and practice on SLOB speech intelligiblity strategies as part of cog/recall tx. Girlfriend and pt verbalized understanding. Pt received cog tx this date to target: recall, thought organization, visual reasoning/daily problem solving. Pt difficulty with recall tasks, but strengths in counting money. Pt trial with ice chips and 1/2 tsp of thin liquids this date. Pt did not demonstrate any over s/s of aspiration. However, pt demonstrated anterior spillage, ice chip falling out of mouth, consecutive swallows, and immediate swallowing (thin liq). Pt did burp x3 a few minutes after dysphagia trials were completed. Continue goals above. Plan: Continue as per plan of care.       Additional Information:     Barriers toward progress: Cognitive deficit, Impulsivity, Limited safety awareness, Limited insight into deficits, Unrealistic expectations and Medication managment  Discharge recommendations:  [] Home independently  [] Home with assistance [x]  24 hour supervision  [] ECF [] Other:  Continued Tx Upon Discharge: ? [x] Yes [] No [] TBD based on progress while on ARU [] Vital Stim indicated [] Other:   Estimated discharge date: TBD    Interventions used this date:  [x] Speech/Language Treatment  [] Instruction in HEP [] Group [x] Dysphagia Treatment [x] Cognitive Treatment   [] Other: Total Time Breakdown / Charges    Time in Time out Total Time / units   Cognitive Tx 1330 1400 30 min / 2 units   Speech Tx - - -   Dysphagia Tx 1400 1430 30 min / 1 units       Electronically Signed by       Annel SANTILLAN  [id number] [facility]  Speech Language Pathologist  [phone]

## 2022-06-15 NOTE — CONSULTS
Infectious Diseases Inpatient Consult Note    Reason for Consult:   MRSA bacteremia  Requesting Physician:   Dr Pastor Fine  Primary Care Physician:  Davie Cosme DO  History Obtained From:   Pt, EPIC    Admit Date: 6/8/2022  Hospital Day: 7    CHIEF COMPLAINT:     Bacteremia     HISTORY OF PRESENT ILLNESS:      27 yo man with hx HTN, tongue ca  Dx squamous cell ca tongue, resection (with neck dissection, 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 He had postsurgical / adjuvant chemo (cisplatin) and XRT. G-tube placed    [facility] 5/2 with septic shock. Required vent. BC + MRSA, port removed. Seen by ID, recommended Ceftaroline to 7/2/22. Other issues - urinary retention, PU on coccyx, R atrial thrombus (thrombectomy, cult + MRSA), pain  Seen by [facility] ID, recommended Ceftaroline 600 q8 x 24 d / through 7/2    [address] 6/8  Pt has been seen by GI, Renal, Wound Care  L PICC placed 6/9    Today 6/14, afeb, WBC 3.6, Cr 0.6      Past Medical History:    Past Medical History:   Diagnosis Date    Cancer [facility])        Past Surgical History:    No past surgical history on file.     Current Medications:     sodium zirconium cyclosilicate  10 g Oral Once    magnesium oxide  400 mg Per G Tube BID    zinc oxide   Topical BID    lidocaine 1 % injection  5 mL IntraDERmal Once    sodium chloride flush  5-40 mL IntraVENous 2 times per day    nystatin  5 mL Oral 4x Daily    sodium hypochlorite   Irrigation Daily    apixaban  5 mg Per G Tube BID    ceftaroline fosamil (TEFLARO) 600 MG IVPB  600 mg IntraVENous I7M    folic acid  1 mg Per G Tube Daily    gabapentin  100 mg Per G Tube TID    melatonin  5 mg Per G Tube Nightly    sennosides-docusate sodium  2 tablet Per G Tube Nightly    doxazosin  1 mg Oral Nightly    thiamine  100 mg Per G Tube Daily       Allergies:  Trazodone and nefazodone, Tramadol, and Trazodone    Social History:    TOBACCO:    Past cig  ETOH:    Past EtOH use  DRUGS:   Past marijuana use  MARITAL STATUS:     OCCUPATION:       Family History:   No immunodeficiency    REVIEW OF SYSTEMS:    No fever / chills / sweats. No weight loss. No visual change, eye pain, eye discharge. No oral lesion, sore throat, dysphagia. Denies cough / sputum. Denies chest pain, palpitations. Denies n / v / abd pain. No diarrhea. Denies dysuria or change in urinary function. Denies joint swelling or pain. No myalgia, arthralgia. Denies skin changes, itching  Denies focal weakness, sensory change or other neurologic symptom    Denies new / worse depression, psychiatric symptoms    PHYSICAL EXAM:      Vitals:    /64   Pulse 74   Temp 97.4 °F (36.3 °C) (Oral)   Resp 16   Ht 5' 6\" (1.676 m)   Wt 109 lb 2 oz (49.5 kg)   SpO2 100%   BMI 17.61 kg/m²     GENERAL: No apparent distress.     HEENT: Membranes moist, no oral lesion, PERRL  NECK:  Supple, no lymphadenopathy  LUNGS: Clear b/l, no rales, no dullness  CARDIAC: RRR, no murmur appreciated  ABD:  + BS, soft / NT  EXT:  No rash, no edema, no lesions  NEURO: No focal neurologic findings  PSYCH: Orientation, sensorium, mood normal  LINES:  Peripheral iv    DATA:    Lab Results   Component Value Date    WBC 3.6 (L) 06/14/2022    HGB 7.4 (L) 06/14/2022    HCT 21.8 (L) 06/14/2022    MCV 86.5 06/14/2022     (L) 06/14/2022     Lab Results   Component Value Date    CREATININE 0.6 (L) 06/14/2022    BUN 22 (H) 06/14/2022     (L) 06/14/2022    K 4.5 06/14/2022    CL 94 (L) 06/14/2022    CO2 29 06/14/2022       Hepatic Function Panel:   Lab Results   Component Value Date    ALKPHOS 108 02/06/2022    ALT 17 02/06/2022    AST 17 02/06/2022    PROT 7.1 02/06/2022    BILITOT 0.4 02/06/2022    LABALBU 2.4 06/11/2022       Micro:  5/20 BC + MRSA, [facility]  Organism Antibiotic Method Susceptibility   Staphylococcus aureus, mrsa (methicillin resistant) Clindamycin ADITI <=0.12: Susceptible   Staphylococcus aureus, mrsa (methicillin resistant) Daptomycin ADITI 0.25: Susceptible   Staphylococcus aureus, mrsa (methicillin resistant) Doxycycline ADITI <=0.5: Susceptible   Staphylococcus aureus, mrsa (methicillin resistant) Erythromycin ADITI 4: Intermediate   Staphylococcus aureus, mrsa (methicillin resistant) Oxacillin ADITI >=4: Resistant   Staphylococcus aureus, mrsa (methicillin resistant) Sulfa/Trimethoprim ADITI <=10: Susceptible   Staphylococcus aureus, mrsa (methicillin resistant) Vancomycin ADITI 1: Susceptible   Staphylococcus aureus, mrsa (methicillin resistant) Ceftaroline ADITI 0.38: Susceptible     Imagin/9 x-rays  Chest:   - Heart size is normal.  Mediastinal contours are normal   - Scattered parenchymal opacities are seen throughout the lungs, right greater than left   - Clips are seen in the right supraclavicular region.   - G-tube projects in the region of the stomach   - A midline is not clearly included on the field of view     Abdomen:   - G-tube projects in region of the stomach   - No significant small bowel distention noted.  Mild-to-moderate stool load   seen in the colon.  Nonspecific bowel gas pattern is seen   - Villatoro catheter is seen       IMPRESSION:      Patient Active Problem List   Diagnosis    Chronic bilateral low back pain without sciatica    Anxiety    Insomnia due to other mental disorder    Poor dentition    Class 1 obesity due to excess calories with serious comorbidity and body mass index (BMI) of 34.0 to 34.9 in adult    Primary squamous cell carcinoma of tongue (HCC)    Recurrent major depressive disorder (HCC)    Prediabetes    Critical illness myopathy    Severe malnutrition (HCC)       Hx HTN, tongue ca  Dx squamous cell ca tongue, resection (with neck dissection, 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  - postsurgical / adjuvant chemo (cisplatin) and XRT. G-tube placed     [address]  with septic shock. In ICU on vent. BC + MRSA, port removed. Seen by ID, recommended Ceftaroline to 22.   Other issues - urinary retention, PU on coccyx, R atrial thrombus (thrombectomy, cult + MRSA), pain    Admit [facility] 6/8  Complicated MRSA bacteremia   - mult positive BC, last positive 5/20, see sensitivities above; BC no growth 5/21, 5/24  -  Port removed  -  atrial thrombus removed    RECOMMENDATIONS:    Cont Ceftaroline 600 mg q 8 hr through 7/2/22      Medical Decision Making: The following items were considered in medical decision making:  Discussion of patient care with other providers  Reviewed clinical lab tests  Reviewed radiology tests  Reviewed other diagnostic tests/interventions  Microbiology cultures and other micro tests reviewed      Risk of Complications/Morbidity: High   Illness(es)/ Infection present that pose threat to bodily function. There is potential for severe exacerbation of infection/side effects of treatment.   Therapy requires intensive monitoring for antimicrobial agent toxicity    Discussed with pt  Von Osorio MD

## 2022-06-15 NOTE — CONSULTS
Dr Arianna Bautista added to the treatment team on 6/15/2022 @ [facility],[address] [place] [address]

## 2022-06-15 NOTE — PROGRESS NOTES
[facility] Nurse  Follow-up Progress Note       NAME:  Radha Story  MEDICAL RECORD NUMBER:  [id number]  AGE:  28 y.o. GENDER:  male  :  1990  TODAY'S DATE:  6/15/2022    Subjective: Tries to speak , types on phone messages. Wound Identification:Deep wound to sacrum, open area throat from radiation treatment, area right neck, right chest open area red.  Left radial scrap area.  Left lateral leg open wound    Wound Type:  pressure, non-healing surgical and traumatic    Contributing Factors: chronic pressure, decreased mobility and shear forceRadiation treatment         Patient Goal of Care:  [x] Wound Healing  [] Odor Control  [] Palliative Care  [x] Pain Control   [] Other:     Objective:Peg tube capped off, dressing intact. /62   Pulse 70   Temp (!) 94 °F (34.4 °C) (Oral)   Resp 16   Ht 5' 6\" (1.676 m)   Wt 109 lb 2 oz (49.5 kg)   SpO2 100%   BMI 17.61 kg/m²   Hiram Risk Score: Hiram Scale Score: 18  Assessment:Sacrum slough decreasing in size with slough at anterior end. Left leg granulating in red. Throat slough present, moist.  Chest areas scab. left arm red granulation  . Measurements:  Negative Pressure Wound Therapy Sacrum Mid (Active)   $ Standard NPWT >50 sq cm PER TX $ Yes 06/15/22 1308   Wound Type Pressure ulcer: Stage IV 06/15/22 1308   Unit Type hospital 06/15/22 1308   Dressing Type Other (Comment) 06/15/22 1308   Number of pieces used 5 06/15/22 1308   Number of pieces removed 4 06/15/22 1308   Cycle Continuous 06/15/22 1308   Target Pressure (mmHg) 125 06/15/22 1308   Intensity 1 06/15/22 1308   Irrigation Solution Sodium chloride 0.9% 06/15/22 1308   Instillation Volume  24 mL 06/15/22 1308   Soak Time  10 minutes 06/15/22 1308   Vac Frequency 3 hours 06/15/22 1308   Canister changed?  No 06/15/22 1308   Dressing Status Intact 06/15/22 1308   Dressing Changed Changed/New 06/15/22 1308   Drainage Amount Moderate 06/15/22 1308   Drainage Description Serosanguinous 06/15/22 1308   Dressing Change Due 06/17/22 06/15/22 1308   Output (ml) 450 ml 06/12/22 0915   Wound Assessment Red;Slough; Tan 06/15/22 1308   Shape oval 06/15/22 1308   Odor None 06/15/22 1308   Number of days: 4       Wound 06/08/22 Throat Right (Active)   Wound Image   06/15/22 1308   Wound Etiology Other 06/15/22 1308   Dressing Status Clean;Dry; Intact 06/15/22 1308   Wound Cleansed Cleansed with saline 06/15/22 1308   Dressing/Treatment Alginate with Ag;Dry dressing; Foam 06/15/22 1308   Dressing Change Due 06/16/22 06/15/22 1308   Wound Length (cm) 4.5 cm 06/15/22 1308   Wound Width (cm) 4 cm 06/15/22 1308   Wound Depth (cm) 0.1 cm 06/15/22 1308   Wound Surface Area (cm^2) 18 cm^2 06/15/22 1308   Change in Wound Size % (l*w) -2.86 06/15/22 1308   Wound Volume (cm^3) 1.8 cm^3 06/15/22 1308   Wound Healing % -3 06/15/22 1308   Wound Assessment Slough 06/15/22 1308   Drainage Amount Scant 06/15/22 1308   Drainage Description Serosanguinous 06/15/22 1308   Odor None 06/15/22 1308   Kristel-wound Assessment Blanchable erythema 06/15/22 1308   Margins Attached edges 06/15/22 1308   Wound Thickness Description not for Pressure Injury Partial thickness 06/15/22 1308   Number of days: 6    throat         Wound 06/08/22 Chest Right;Upper (Active)   Wound Image   06/10/22 1758   Wound Etiology Other 06/15/22 1308   Dressing Status Clean;Dry; Intact 06/15/22 1308   Wound Cleansed Cleansed with saline 06/15/22 1308   Dressing/Treatment Honey gel/honey paste;Foam;Dry dressing 06/15/22 1308   Dressing Change Due 06/16/22 06/15/22 1308   Wound Length (cm) 1 cm 06/15/22 1308   Wound Width (cm) 1 cm 06/15/22 1308   Wound Depth (cm) 0.1 cm 06/15/22 1308   Wound Surface Area (cm^2) 1 cm^2 06/15/22 1308   Change in Wound Size % (l*w) 33.33 06/15/22 1308   Wound Volume (cm^3) 0.1 cm^3 06/15/22 1308   Wound Healing % 33 06/15/22 1308   Wound Assessment Pink/red 06/15/22 1308   Drainage Amount Scant 06/15/22 1308   Drainage Description Serosanguinous 06/15/22 1308   Odor None 06/15/22 1308   Kristel-wound Assessment Blanchable erythema 06/15/22 1308   Margins Attached edges 06/15/22 1308   Wound Thickness Description not for Pressure Injury Partial thickness 06/14/22 0830   Number of days: 6       Wound 06/08/22 Radial Distal;Left (Active)   Wound Image   06/10/22 1758   Wound Etiology Traumatic 06/15/22 1308   Dressing Status Clean;Dry; Intact 06/15/22 1308   Wound Cleansed Cleansed with saline 06/15/22 1308   Dressing/Treatment Honey gel/honey paste; Eye pad;Foam;Roll gauze 06/15/22 1308   Dressing Change Due 06/15/22 06/13/22 1132   Wound Length (cm) 3 cm 06/15/22 1308   Wound Width (cm) 1 cm 06/15/22 1308   Wound Depth (cm) 0.1 cm 06/15/22 1308   Wound Surface Area (cm^2) 3 cm^2 06/15/22 1308   Change in Wound Size % (l*w) 50 06/15/22 1308   Wound Volume (cm^3) 0.3 cm^3 06/15/22 1308   Wound Healing % 50 06/15/22 1308   Wound Assessment Pink/red;Granulation tissue 06/15/22 1308   Drainage Amount None 06/15/22 1308   Drainage Description Serosanguinous 06/15/22 1308   Odor None 06/15/22 1308   Kristel-wound Assessment Blanchable erythema 06/15/22 1308   Margins Attached edges 06/15/22 1308   Wound Thickness Description not for Pressure Injury Partial thickness 06/15/22 1308   Number of days: 6       Wound 06/08/22 Knee Left;Posterior (Active)   Wound Image   06/15/22 1308   Wound Etiology Pressure Stage 3 06/15/22 1308   Dressing Status Clean;Dry; Intact 06/15/22 1308   Wound Cleansed Cleansed with saline 06/15/22 1308   Dressing/Treatment Honey gel/honey paste 06/15/22 1308   Offloading for Diabetic Foot Ulcers Offloading ordered 06/15/22 1308   Dressing Change Due 06/16/22 06/15/22 1308   Wound Length (cm) 3.5 cm 06/15/22 1308   Wound Width (cm) 1.5 cm 06/15/22 1308   Wound Depth (cm) 0.1 cm 06/15/22 1308   Wound Surface Area (cm^2) 5.25 cm^2 06/15/22 1308   Change in Wound Size % (l*w) 40 06/15/22 1308   Wound Volume (cm^3) 0.525 cm^3 06/15/22 1308   Wound Healing % 70 06/15/22 1308   Wound Assessment Pink/red 06/15/22 1308   Drainage Amount None 06/15/22 1308   Drainage Description Sanguinous 06/13/22 1132   Odor None 06/15/22 1308   Kristel-wound Assessment Blanchable erythema 06/15/22 1308   Margins Attached edges 06/15/22 1308   Wound Thickness Description not for Pressure Injury Partial thickness 06/15/22 1308   Number of days: 6    left knee         Wound 06/08/22 Sacrum (Active)   Wound Image   06/15/22 1308   Wound Etiology Pressure Stage 4 06/15/22 1308   Dressing Status Clean;Dry; Intact 06/15/22 1308   Wound Cleansed Cleansed with saline 06/15/22 1308   Dressing/Treatment Negative pressure wound therapy 06/15/22 1308   Offloading for Diabetic Foot Ulcers Offloading ordered 06/15/22 1308   Dressing Change Due 06/17/22 06/15/22 1308   Wound Length (cm) 10 cm 06/15/22 1308   Wound Width (cm) 5.5 cm 06/15/22 1308   Wound Depth (cm) 1.5 cm 06/15/22 1308   Wound Surface Area (cm^2) 55 cm^2 06/15/22 1308   Change in Wound Size % (l*w) 36.12 06/15/22 1308   Wound Volume (cm^3) 82.5 cm^3 06/15/22 1308   Wound Healing % 44 06/15/22 1308   Undermining Starts ___ O'Clock 1200 06/15/22 1308   Undermining Ends___ O'Clock 0400 06/15/22 1308   Undermining Maxium Distance (cm) 1.8 06/15/22 1308   Wound Assessment Pink/red;Slough; Other (Comment) 06/15/22 1308   Drainage Amount Small 06/15/22 1308   Drainage Description Serosanguinous 06/15/22 1308   Odor Mild 06/15/22 1308   Kristel-wound Assessment Blanchable erythema 06/15/22 1308   Margins Attached edges 06/15/22 1308   Wound Thickness Description not for Pressure Injury Full thickness 06/15/22 1308   Number of days: 6      sacrum      Response to treatment:  With complaints of pain.      Pain Assessment:  Severity:  9 / 10  Quality of pain: sharp, aching, burning, shooting  Wound Pain Timing/Severity: intermittent  Premedicated: No  Nurse did bring pain prn medication in.  Plan:   Plan of Care: Wound 06/08/22 Throat Right-Dressing/Treatment: Alginate with Ag,Dry dressing,Foam  Wound 06/08/22 Chest Right;Upper-Dressing/Treatment: Honey gel/honey paste,Foam,Dry dressing  Wound 06/08/22 Radial Distal;Left-Dressing/Treatment: Honey gel/honey paste,Eye pad,Foam,Roll gauze  Wound 06/08/22 Knee Left;Posterior-Dressing/Treatment: Honey gel/honey paste  Wound 06/08/22 Sacrum-Dressing/Treatment: Negative pressure wound therapy    Recommendation  Daily Chin/throat, cleanse with normal saline, pat dry, apply Alginate AG cover with foam.     BID   Areas to right Neck/right Chest/L wrist and left leg wounds: Cleanse with Cleanse with normal salinealine, pat dry. Peak and Peel BID  Dressing to Apply medihoney gel to wound bed of chin, neck & chest, left arm and left leg. Cover with guaze and bordered foam dressing. Change every 3 days foam dressing and PRN for saturation.      Cleanse sacral wound with normal saline, pat dry, apply barrier wipe then hydrocolloid to wound edges.  Hydrocolloid strips to edges of posterior wound. Apply VAC drape to wound edges.  Insert gray foam with into tunnel areas. [place] foam with holes to body of wound with solid gray over top, with tracking  Going over right hip to attach vacuum suction to. Cover with VAC drape to seal.     5 pieces of gray foam used in sacrum wound.   Cleanse choice  machine;set at 24 flush normal saline, soak for 10minutes every 3 hours.  Treatment is 125 mmHg continuous suction.     Change cannister once a week or when full  Not changed   If suction fails or patient is discharged:  -remove vac dressing  -cleanse wound with normal saline  -pack wound with saline moistened guaze and change every 12 hours.    Call wound care for deterioration [phone] or call [phone] and leave message.           Specialty Bed Required : Yes power pro elite  [x] Low Air Loss   [x] Pressure Redistribution  [] Fluid Immersion  [] Bariatric  [] Total Pressure Relief  [] Other:     Current Diet: Diet NPO  ADULT TUBE FEEDING; PEG; Standard with Fiber; Bolus; 5 Times Daily; 237; 40; Before and after each bolus; Protein; 1 bottle of Proteinex daily, 30 mL free water flush before and after adminstration  Dietician consult:  Yes    Discharge Plan:  Placement for patient upon discharge: home with support   Patient appropriate for Outpatient [address]: Yes    Referrals:  [x]  following  [] [address] [place] [address] [facility]  [] Supplies  [] Other    Patient/Caregiver Teaching: To reposition to aleve pressure to bony prominences.   Level of patient/caregiver understanding able to:   [] Indicates understanding       [x] Needs reinforcement  [] Unsuccessful      [] Verbal Understanding  [] Demonstrated understanding       [] No evidence of learning  [] Refused teaching         [] N/A       Electronically signed by Agatha Galan RN, on 6/15/2022 at 1:27 PM

## 2022-06-15 NOTE — PROGRESS NOTES
[facility] [employer] [facility]  Nephrology follow-up note           Reason for Consult: Hyponatremia  Requesting Physician:  Dr. Morales Ear history  Sodium stabilized in low 130 range. Complains of pain in the area of the sacral decubital ulcer. Now on bolus feeding and says he is getting some abdominal pain post feeding     ROS:   +weak. No pain or shortness of breath. Scheduled Meds:   sodium zirconium cyclosilicate  10 g Oral Once    magnesium oxide  400 mg Per G Tube BID    zinc oxide   Topical BID    lidocaine 1 % injection  5 mL IntraDERmal Once    sodium chloride flush  5-40 mL IntraVENous 2 times per day    nystatin  5 mL Oral 4x Daily    sodium hypochlorite   Irrigation Daily    apixaban  5 mg Per G Tube BID    ceftaroline fosamil (TEFLARO) 600 MG IVPB  600 mg IntraVENous U8Y    folic acid  1 mg Per G Tube Daily    gabapentin  100 mg Per G Tube TID    melatonin  5 mg Per G Tube Nightly    sennosides-docusate sodium  2 tablet Per G Tube Nightly    thiamine  100 mg Per G Tube Daily     Continuous Infusions:   sodium chloride 125 mL/hr at 06/15/22 1047    sodium chloride       PRN Meds:.medihoney, oxyCODONE, oxyCODONE, Lip Balm, sodium chloride flush, sodium chloride, acetaminophen, bisacodyl, sodium chloride, clonazePAM, albuterol    History of Present Illness on 6/9/2022:    28 y.o. yo male with PMH of squamous cell carcinoma of the tongue (s/p right hemiglossectomy, b/l ND, and RFFF reconstruction on 1/3/22, radiation, and weekly cisplatin completed 4/25/22) who is admitted to ARU from  for rehabilitation. Nephrology has been consulted for hyponatremia and hyperkalemia  Patient is being fed through the feeding tube. His sodium on 6/8 at [facility] was 133 and has been around 130-135 in June.   Earlier in admission, he was hypernatremic in the 149 range as well    He was admitted at [facility] from 5/2/22 and had MRSA bacteremia from infected port s/p removal. Per Dr Zenaida Londono, while at [facility], \"pt had right hydropneumothorax with worsening hypoxia requiring multiple intubations and chest tube placements. He underwent right internal jugular and right atrial thrombectomy due to concern that this was causing ongoing bacteremia. ID was consulted and he is to remain on IV ceftaroline with plan for long term treatment until 7/2/22. His course was complicated by severe penile edema and antonio was placed. Urology followed during acute stay.  Patient failed several voiding trials    Physical exam:   Constitutional:  VITALS:  /62   Pulse 70   Temp (!) 94 °F (34.4 °C) (Oral)   Resp 16   Ht 5' 6\" (1.676 m)   Wt 109 lb 2 oz (49.5 kg)   SpO2 100%   BMI 17.61 kg/m²   Gen: alert, awake  Neck: No JVD  Skin: Unremarkable  Cardiovascular:  S1, S2 without m/r/g   Respiratory: CTA B without w/r/r; respiratory effort normal  Abdomen:  soft, nt, nd,   Extremities: no lower extremity edema  Neuro/Psy: AAoriented times 3 ; moves all 4 ext    Data/  Recent Labs     06/13/22  0505 06/14/22  0550 06/15/22  0650   WBC 3.9* 3.6* 3.2*   HGB 7.7* 7.4* 7.3*   HCT 22.2* 21.8* 21.6*   MCV 84.0 86.5 85.1   * 111* 117*     Recent Labs     06/13/22  0505 06/14/22  0550 06/15/22  0650   * 132* 134*   K 4.7 4.5 4.5   CL 96* 94* 97*   CO2 30 29 30   GLUCOSE 102* 96 103*   MG 2.00  --   --    BUN 17 22* 27*   CREATININE 0.6* 0.6* 0.7*   LABGLOM >60 >60 >60   GFRAA >60 >60 >60     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 ( pt had multiple chest tubes for right hydro/pneumothorax)   Sodium is 134 / Stable       -Hyperkalemia    -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

## 2022-06-15 NOTE — PROGRESS NOTES
Occupational Therapy  Facility/Department: [facility]  Rehabilitation Occupational Therapy Daily Treatment Note    Date: 6/15/22  Patient Name: Michelle Reid       Room: 9128/3145-31  MRN: [id number]  Account: [de-identified]   : 1990  (28 y.o.) Gender: male                    Past Medical History:  has a past medical history of Cancer ([facility] [address] Past Surgical History:   has no past surgical history on file. Restrictions  Restrictions/Precautions: NPO;Isolation; Fall Risk;General Precautions  Other position/activity restrictions: antonio, PEG, NPO, MRSA contact precautions, ice chips with therapy/RN only    Subjective  Subjective: Pt in bed, states feet cramping, pleasant, agreeable to OT session, pain 10/10, /65, HR 72, O2 sats 97% on RA. Restrictions/Precautions: NPO;Isolation; Fall Risk;General Precautions             Objective     Cognition  Overall Cognitive Status: Exceptions  Arousal/Alertness: Appropriate responses to stimuli  Following Commands: Follows multistep commands with repitition; Follows multistep commands with increased time  Attention Span: Attends with cues to redirect  Memory: Decreased short term memory  Safety Judgement: Decreased awareness of need for assistance;Decreased awareness of need for safety  Problem Solving: Assistance required to identify errors made;Assistance required to implement solutions;Assistance required to generate solutions  Insights: Decreased awareness of deficits  Initiation: Requires cues for all  Sequencing: Requires cues for some  Orientation  Overall Orientation Status: Within Functional Limits   Perception  Overall Perceptual Status: Impaired  Unilateral Attention: Cues to maintain midline in standing  Initiation: Cues to initiate tasks  Motor Planning: Cues to use objects appropriately  Perseveration: Perseverates during conversation     ADL  Grooming/Oral Hygiene  Assistance Level: Supervision  Skilled Clinical Factors: SPV to brush teeth while seated at sink  Upper Extremity Bathing  Assistance Level: Supervision;Verbal cues  Skilled Clinical Factors: cues for thoroughness  Lower Extremity Bathing  Assistance Level: Supervision;Verbal cues  Upper Extremity Dressing  Assistance Level: Supervision  Skilled Clinical Factors: to doff/don shirt  Lower Extremity Dressing  Assistance Level: Maximum assistance  Skilled Clinical Factors: assist to thread BLEs into brief/pants, able to pull over hips  Putting On/Taking Off Footwear  Assistance Level: Maximum assistance  Skilled Clinical Factors: assist to don ROZ hose and socks, able to don shoes at EOB with setup          Functional Mobility  Activity: To/From bathroom  Assistance Level: Minimal assistance  Skilled Clinical Factors: no knee buckling  Bed Mobility  Overall Assistance Level: Modified Independent  Sit to Supine  Assistance Level: Modified independent  Supine to Sit  Assistance Level: Modified independent  Transfers  Surface: From bed; Wheelchair; To bed  Additional Factors: Verbal cues; Hand placement cues  Sit to Stand  Assistance Level: Contact guard assist  Stand to Sit  Assistance Level: Contact guard assist  Bed To/From Chair  Technique: Stand step  Assistance Level: Contact guard assist  Stand Pivot  Assistance Level: Contact guard assist   OT Exercises  Resistive Exercises: 1# weight for standing ther ex in stagger stance with elbow flexion, shoulder press, shoulder horizontal abduction, chest press, wrist flexion, wrist extension, supination/pronation x15, half exercises performed with unilateral foot on 4inch step     Assessment  Assessment  Assessment: Pt agreeable to OT session. Pt performed functional transfers with CGA/min A for stand pivot transfers and stand step transfers. Pt limited by dizziness this date initially but improved throughout session.  BP 73/45 in w/c at sink, 84/46 in w/c prior to standing after bathing at sink, 87/47 supine, 103/61 in supine after ~5 minutes, and 98/57 after standing ther ex and 92/54 after second standing ther ex. Pt completed bathing with SPV while seated and LB dressing with max A due to pain for bending toward feet. Pt stood for 2:20 x2 for ther ex with CGA/min A for balance while standing in stagger stance and one foot on 4inch step. Continue POC. Activity Tolerance: Patient limited by pain; Patient limited by fatigue;Patient limited by endurance  Discharge Recommendations: 24 hour supervision or assist;Home with Home health OT;S Level 4  OT Equipment Recommendations  Other: TBD  Safety Devices  Safety Devices in place: Yes  Type of devices: Gait belt;Bed alarm in place;Call light within reach; Left in bed;Nurse notified    Patient Education  Education  Education Given To: Patient  Education Provided: Role of Therapy;Plan of Care;Precautions; Safety; Energy Conservation; Fall Prevention Strategies;DME/Home Modifications;Transfer Training;Mobility Training;Equipment;ADL Function;Cognition  Education Provided Comments: role of OT, safety with transfers, posture, midline balance, safety with PEG tube, ADL retraining, benefit of OOB and mobility, purpose of ROZ hose  Education Method: Verbal;Demonstration  Barriers to Learning: Cognition  Education Outcome: Verbalized understanding;Demonstrated understanding;Continued education needed    Plan  Plan  Times per Week: 5/7 days/week  Plan Weeks: 3 weeks  Current Treatment Recommendations: Strengthening;ROM;Balance training;Functional mobility training; Endurance training; Safety education & training;Equipment evaluation, education, & procurement;Return to work related activity; Neuromuscular re-education;Patient/Caregiver education & training;Self-Care / ADL; Home management training;Coordination training    Goals  Short Term Goals  Time Frame for Short term goals: 1 week- 6/15/22  Short Term Goal 1: Pt will complete functional transfers with min A. GOAL MET 6/15/22 Pt performed functional transfers with min A.   Short Term Goal 2: Pt will perform toileting with max A. progressing Pt requires total assist for toileting. Short Term Goal 3: Pt will complete LB dressing with mod A. progressing. Pt requires max A for LB dressing. Short Term Goal 4: Pt will perform UB dressing with min A. GOAL MET 6/11/22 Pt completed UB dressing with SPV. Long Term Goals  Time Frame for Long term goals : 3 weeks- 6/29/22  Long Term Goal 1: Pt will perform functional transfers with CGA. Long Term Goal 2: Pt will complete toileting with CGA. Long Term Goal 3: Pt will perform LB dressing with CGA. Long Term Goal 4: Pt will perform UB dressing with setup. Long Term Goal 5: Pt will complete full body bathing with CGA.       Therapy Time   Individual Concurrent Group Co-treatment   Time In 0730         Time Out 0900         Minutes 90         Timed Code Treatment Minutes: Vesna Vizcarra

## 2022-06-15 NOTE — PROGRESS NOTES
John Ramsey  6/15/2022  [id number]    Chief Complaint: Critical illness myopathy    Subjective:   No acute events overnight. Today Denisa Dyson is seen in his room, resting in bed. He reports that he is feeling depressed about his situation. He is agreeable to talking with Psychiatry. His goals are to walk and eat. He reports that his pain is improved on increased dose of oxycodone. ROS: No N/V, chest pain, SOB, chills or fever    Objective:  Patient Vitals for the past 24 hrs:   BP Temp Temp src Pulse Resp SpO2   06/15/22 1213 -- -- -- -- 16 --   06/15/22 1139 106/62 -- -- 70 -- 100 %   06/15/22 0810 (!) 87/47 -- -- 68 -- --   06/15/22 0800 (!) 73/45 -- -- 85 -- --   06/15/22 0745 100/65 (!) 94 °F (34.4 °C) Oral 72 -- 99 %   06/15/22 0738 -- -- -- -- 18 --   06/14/22 2115 95/63 97.9 °F (36.6 °C) Oral 79 16 100 %   06/14/22 1620 -- -- -- -- 16 --     Gen: No distress, pleasant. Cachectic   HEENT: Normocephalic, atraumatic. Bilateral radical neck dissection present, Deformity of tongue and unable to protrude from mouth  CV: extremities well perfused   Resp: No respiratory distress. Abd: Soft, nontender, G-tube in place, site appears clean without surrounding erythema or drainage  Ext: No edema, able to lift both legs off of bed, ankles in plantar flexion but able to stretch to neutral  Neuro: Alert, oriented, appropriately interactive.      Wt Readings from Last 3 Encounters:   06/12/22 109 lb 2 oz (49.5 kg)   02/22/22 140 lb 12.8 oz (63.9 kg)   02/13/22 149 lb 12.8 oz (67.9 kg)       Laboratory data:   Lab Results   Component Value Date    WBC 3.2 (L) 06/15/2022    HGB 7.3 (L) 06/15/2022    HCT 21.6 (L) 06/15/2022    MCV 85.1 06/15/2022     (L) 06/15/2022       Lab Results   Component Value Date     06/15/2022    K 4.5 06/15/2022    CL 97 06/15/2022    CO2 30 06/15/2022    BUN 27 06/15/2022    CREATININE 0.7 06/15/2022    GLUCOSE 103 06/15/2022    CALCIUM 9.4 06/15/2022        Therapy progress:  PT  Position Activity Restriction  Other position/activity restrictions: antonio, PEG, NPO, MRSA contact precautions, ice chips with therapy/RN only  Objective     Sit to Stand: Minimal Assistance  Stand to sit: Minimal Assistance  Bed to Chair: Moderate assistance  Device: No Device,Hand-Held Assist  Other Apparatus: Wheelchair follow  Assistance: 2 Person assistance,Dependent/Total  Distance: 30 ft  OT  PT Equipment Recommendations  Equipment Needed: Yes  Mobility Devices: Wheelchair  Wheelchair: Standard  Toilet - Technique: Stand pivot  Equipment Used: Standard toilet  Assessment        SLP    Recommendations:  Diet recommendation: NPO; NPO and Ice chips with SLP/RN only; Meds via alt means of nutrition  Instrumentation: not clinically warranted at this time   Risk management: oral care 2-3x/day to reduce adverse affects in the event of aspiration            Body mass index is 17.61 kg/m².       Rehabilitation Diagnosis:  Neurologic, 3.8, Neuromuscular Disorders, e.g. 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  He was admitted to [facility] on 6/8/22 due to functional deficits below his baseline.      Critical Illness Myopathy  - due to below  - PT, OT, ST     Squamous Cell Carcinoma of the tongue  - s/p right hemiglossectomy, bilateral neck dissection, and RFFF reconstruction on 1/3/22 followed by chemoradiation, and weekly cisplatin completed 4/25/22 and s/p G-tube  - NPO except ice chips  - Tube feeds per orders  - dietary consulted  - GI following for G tube concerns  - will schedule nystatin swish and spit for comfort  - PT, OT, ST    Oropharyngeal Dysphagia  - on tube feeds as above  - ST    Hypotension   - give 1 L NS  - increase fluids through G-tube     MRSA bacteremia  - cefaroline for 24 days from admit per  ID  - consulted to ID as this medication needs to be cleared by them per pharmacy  - PICC replaced on admission      Acute hypoxic respiratory failure  - with right lower hydropneumothorax s/p chest tube, MRSA pneumonia, pleural effusions s/p chest tube and requiring several reintubations during acute stay  - adequately oxygenating on room air  - wean oxygen for SpO2>90%  - IS      Urinary Retention  - has severe penile edema during acute stay. Was followed by Urology and failed multiple voiding trials  - discontinued doxazosin due to hypotension  - continue antonio catheter, most recently placed 6/6  - follow up with Urology outpatient      PE  Right Atrial Thrombus  - s/p thrombectomy 5/20 with IR, thrombus positive for MRSA  - Elquis 5 mg BID      Anasarca  - requiring diuretics during acute stay, since weaned off  - monitor     Hyponatremia  - Nephrology following, appreciate assistance     Anemia  - Hb trending down, repeat lab tomorrow and monitor for signs of bleed  - monitor and transfuse for Hb<7     Severe Protein-calorie malnutrition  - BMI 17.9  - bolus feeds per dietary  - Dietician consulted     Pain  - gabapentin 100 mg TID  - PRN tylenol, PRN oxycodone 5 or 7.5 mg q4 hours      Anxiety, Depression  - clonazepam 0.5 mg daily PRN  - consult to Psychiatry     Multiple Wounds POA  - including stage four pressure ulcer on coccyx  - wound care per orders  - wound care following     Bowels: Schedule stool softener. Follow bowel movements.  Enema or suppository if needed.      Bladder: continue antonio     Sleep: patient allergic to trazodone     Follow up appointments: PCP, ID, Cardiology, Oncology    Services: home health PT, OT, ST, nursing  EDOD: 6/25/22    Interdisciplinary team conference was held today with entire rehab treatment team including PT, OT, SLP, Dietician, RN, and SW. Discussion focused on progress toward rehab goals and discharge planning. Barriers: pain, tube feeds, IV antibiotics, level of assistance, availability of support. Total treatment time >35 min with greater than 50% spent in care coordination. [address] [facility]  Meseret Mishra MD 6/15/2022, 2:50 PM

## 2022-06-16 LAB
ANION GAP SERPL CALCULATED.3IONS-SCNC: 7 MMOL/L (ref 3–16)
BUN BLDV-MCNC: 27 MG/DL (ref 7–20)
CALCIUM SERPL-MCNC: 9.1 MG/DL (ref 8.3–10.6)
CHLORIDE BLD-SCNC: 97 MMOL/L (ref 99–110)
CO2: 28 MMOL/L (ref 21–32)
CREAT SERPL-MCNC: 0.6 MG/DL (ref 0.9–1.3)
GFR AFRICAN AMERICAN: >60
GFR NON-AFRICAN AMERICAN: >60
GLUCOSE BLD-MCNC: 111 MG/DL (ref 70–99)
GLUCOSE BLD-MCNC: 98 MG/DL (ref 70–99)
PERFORMED ON: ABNORMAL
POTASSIUM REFLEX MAGNESIUM: 4.7 MMOL/L (ref 3.5–5.1)
SODIUM BLD-SCNC: 132 MMOL/L (ref 136–145)

## 2022-06-16 PROCEDURE — 97116 GAIT TRAINING THERAPY: CPT

## 2022-06-16 PROCEDURE — 6370000000 HC RX 637 (ALT 250 FOR IP): Performed by: STUDENT IN AN ORGANIZED HEALTH CARE EDUCATION/TRAINING PROGRAM

## 2022-06-16 PROCEDURE — 6370000000 HC RX 637 (ALT 250 FOR IP): Performed by: PHYSICAL MEDICINE & REHABILITATION

## 2022-06-16 PROCEDURE — 6370000000 HC RX 637 (ALT 250 FOR IP): Performed by: PSYCHIATRY & NEUROLOGY

## 2022-06-16 PROCEDURE — 99232 SBSQ HOSP IP/OBS MODERATE 35: CPT | Performed by: INTERNAL MEDICINE

## 2022-06-16 PROCEDURE — 6360000002 HC RX W HCPCS: Performed by: STUDENT IN AN ORGANIZED HEALTH CARE EDUCATION/TRAINING PROGRAM

## 2022-06-16 PROCEDURE — 99255 IP/OBS CONSLTJ NEW/EST HI 80: CPT | Performed by: PSYCHIATRY & NEUROLOGY

## 2022-06-16 PROCEDURE — 97535 SELF CARE MNGMENT TRAINING: CPT

## 2022-06-16 PROCEDURE — 97530 THERAPEUTIC ACTIVITIES: CPT

## 2022-06-16 PROCEDURE — 92526 ORAL FUNCTION THERAPY: CPT

## 2022-06-16 PROCEDURE — 2580000003 HC RX 258: Performed by: STUDENT IN AN ORGANIZED HEALTH CARE EDUCATION/TRAINING PROGRAM

## 2022-06-16 PROCEDURE — 80048 BASIC METABOLIC PNL TOTAL CA: CPT

## 2022-06-16 PROCEDURE — 1280000000 HC REHAB R&B

## 2022-06-16 PROCEDURE — 97110 THERAPEUTIC EXERCISES: CPT

## 2022-06-16 PROCEDURE — 97129 THER IVNTJ 1ST 15 MIN: CPT

## 2022-06-16 RX ORDER — QUETIAPINE FUMARATE 25 MG/1
50 TABLET, FILM COATED ORAL NIGHTLY
Status: DISCONTINUED | OUTPATIENT
Start: 2022-06-16 | End: 2022-06-23 | Stop reason: HOSPADM

## 2022-06-16 RX ORDER — SALIVA STIMULANT COMB. NO.3
SPRAY, NON-AEROSOL (ML) MUCOUS MEMBRANE PRN
Status: DISCONTINUED | OUTPATIENT
Start: 2022-06-16 | End: 2022-06-21

## 2022-06-16 RX ORDER — VENLAFAXINE HYDROCHLORIDE 37.5 MG/1
75 CAPSULE, EXTENDED RELEASE ORAL
Status: DISCONTINUED | OUTPATIENT
Start: 2022-06-17 | End: 2022-06-23 | Stop reason: HOSPADM

## 2022-06-16 RX ADMIN — OXYCODONE HYDROCHLORIDE 7.5 MG: 15 TABLET ORAL at 17:37

## 2022-06-16 RX ADMIN — CEFTAROLINE FOSAMIL 600 MG: 600 POWDER, FOR SOLUTION INTRAVENOUS at 02:04

## 2022-06-16 RX ADMIN — Medication: at 22:03

## 2022-06-16 RX ADMIN — APIXABAN 5 MG: 5 TABLET, FILM COATED ORAL at 21:47

## 2022-06-16 RX ADMIN — NYSTATIN 500000 UNITS: 100000 SUSPENSION ORAL at 09:06

## 2022-06-16 RX ADMIN — SODIUM CHLORIDE, PRESERVATIVE FREE 10 ML: 5 INJECTION INTRAVENOUS at 22:09

## 2022-06-16 RX ADMIN — CEFTAROLINE FOSAMIL 600 MG: 600 POWDER, FOR SOLUTION INTRAVENOUS at 17:39

## 2022-06-16 RX ADMIN — OXYCODONE HYDROCHLORIDE 7.5 MG: 15 TABLET ORAL at 13:41

## 2022-06-16 RX ADMIN — FOLIC ACID 1 MG: 1 TABLET ORAL at 09:05

## 2022-06-16 RX ADMIN — OXYCODONE HYDROCHLORIDE 7.5 MG: 15 TABLET ORAL at 21:47

## 2022-06-16 RX ADMIN — Medication 5 MG: at 21:47

## 2022-06-16 RX ADMIN — Medication 100 MG: at 09:05

## 2022-06-16 RX ADMIN — OXYCODONE HYDROCHLORIDE 7.5 MG: 15 TABLET ORAL at 09:05

## 2022-06-16 RX ADMIN — DOCUSATE SODIUM AND SENNOSIDES 2 TABLET: 8.6; 5 TABLET, FILM COATED ORAL at 21:46

## 2022-06-16 RX ADMIN — NYSTATIN 500000 UNITS: 100000 SUSPENSION ORAL at 21:46

## 2022-06-16 RX ADMIN — GABAPENTIN 100 MG: 100 CAPSULE ORAL at 13:41

## 2022-06-16 RX ADMIN — CEFTAROLINE FOSAMIL 600 MG: 600 POWDER, FOR SOLUTION INTRAVENOUS at 10:42

## 2022-06-16 RX ADMIN — GABAPENTIN 100 MG: 100 CAPSULE ORAL at 21:46

## 2022-06-16 RX ADMIN — NYSTATIN 500000 UNITS: 100000 SUSPENSION ORAL at 13:42

## 2022-06-16 RX ADMIN — GABAPENTIN 100 MG: 100 CAPSULE ORAL at 09:05

## 2022-06-16 RX ADMIN — Medication: at 10:43

## 2022-06-16 RX ADMIN — OXYCODONE HYDROCHLORIDE 7.5 MG: 15 TABLET ORAL at 02:17

## 2022-06-16 RX ADMIN — SODIUM CHLORIDE, PRESERVATIVE FREE 10 ML: 5 INJECTION INTRAVENOUS at 09:06

## 2022-06-16 RX ADMIN — QUETIAPINE FUMARATE 50 MG: 25 TABLET ORAL at 21:47

## 2022-06-16 RX ADMIN — DAKIN'S SOLUTION 0.125% (QUARTER STRENGTH): 0.12 SOLUTION at 09:06

## 2022-06-16 RX ADMIN — NYSTATIN 500000 UNITS: 100000 SUSPENSION ORAL at 17:37

## 2022-06-16 RX ADMIN — Medication 400 MG: at 21:47

## 2022-06-16 RX ADMIN — APIXABAN 5 MG: 5 TABLET, FILM COATED ORAL at 09:05

## 2022-06-16 RX ADMIN — Medication 400 MG: at 09:07

## 2022-06-16 RX ADMIN — Medication: at 09:06

## 2022-06-16 ASSESSMENT — PAIN SCALES - GENERAL
PAINLEVEL_OUTOF10: 8
PAINLEVEL_OUTOF10: 8
PAINLEVEL_OUTOF10: 9

## 2022-06-16 ASSESSMENT — PAIN DESCRIPTION - ORIENTATION: ORIENTATION: MID

## 2022-06-16 ASSESSMENT — PAIN DESCRIPTION - LOCATION
LOCATION: COCCYX;BACK
LOCATION: COCCYX

## 2022-06-16 ASSESSMENT — PAIN DESCRIPTION - DESCRIPTORS: DESCRIPTORS: ACHING;THROBBING

## 2022-06-16 NOTE — PROGRESS NOTES
Luis Fernando Cipro  6/16/2022  [id number]    Chief Complaint: Critical illness myopathy    Subjective:   No acute events overnight. Today Amado Cui is seen in the gym, working with therapy. He reports that he is feeling improved from yesterday. He reports continued buttock pain, but states that pain is manageable on increased dose of pain medication. Provided brother with update over the phone on 6/15. ROS: No N/V, chest pain, SOB, chills or fever    Objective:  Patient Vitals for the past 24 hrs:   BP Temp Temp src Pulse Resp SpO2   06/16/22 0901 (!) 107/58 98.1 °F (36.7 °C) Axillary 77 18 97 %   06/15/22 2155 105/64 98 °F (36.7 °C) Axillary 82 18 98 %   06/15/22 1726 -- -- -- -- 18 --   06/15/22 1656 -- -- -- -- 14 --   06/15/22 1243 -- -- -- -- 16 --   06/15/22 1213 -- -- -- -- 16 --   06/15/22 1139 106/62 -- -- 70 -- 100 %     Gen: No distress, pleasant. Cachectic   HEENT: Normocephalic, atraumatic. Bilateral radical neck dissection present  CV: extremities well perfused   Resp: No respiratory distress. Abd: Soft, nontender, G-tube in place  Ext: No edema  Neuro: Alert, oriented, appropriately interactive.  Stands on elevated surface without LOB    Wt Readings from Last 3 Encounters:   06/12/22 109 lb 2 oz (49.5 kg)   02/22/22 140 lb 12.8 oz (63.9 kg)   02/13/22 149 lb 12.8 oz (67.9 kg)       Laboratory data:   Lab Results   Component Value Date    WBC 3.2 (L) 06/15/2022    HGB 7.3 (L) 06/15/2022    HCT 21.6 (L) 06/15/2022    MCV 85.1 06/15/2022     (L) 06/15/2022       Lab Results   Component Value Date     06/16/2022    K 4.7 06/16/2022    CL 97 06/16/2022    CO2 28 06/16/2022    BUN 27 06/16/2022    CREATININE 0.6 06/16/2022    GLUCOSE 98 06/16/2022    CALCIUM 9.1 06/16/2022        Therapy progress:  PT  Position Activity Restriction  Other position/activity restrictions: antonio, PEG, NPO, MRSA contact precautions, ice chips with therapy/RN only  Objective     Sit to Stand: Minimal Assistance  Stand to sit: Minimal Assistance  Bed to Chair: Moderate assistance  Device: No Device,Hand-Held Assist  Other Apparatus: Wheelchair follow  Assistance: 2 Person assistance,Dependent/Total  Distance: 30 ft  OT  PT Equipment Recommendations  Equipment Needed: Yes  Mobility Devices: Wheelchair  Wheelchair: Standard  Toilet - Technique: Stand pivot  Equipment Used: Standard toilet  Assessment        SLP    Recommendations:  Diet recommendation: NPO; NPO and Ice chips with SLP/RN only; Meds via alt means of nutrition  Instrumentation: not clinically warranted at this time   Risk management: oral care 2-3x/day to reduce adverse affects in the event of aspiration            Body mass index is 17.61 kg/m².       Rehabilitation Diagnosis:  Neurologic, 3.8, Neuromuscular Disorders, e.g. 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 on 5/2/22 for acute hypoxic respiratory failure and septic shock, found to have persistent MRSA bacteremia.  He was admitted to [facility] on 6/8/22 due to functional deficits below his baseline.      Critical Illness Myopathy  - due to below  - PT, OT, ST     Squamous Cell Carcinoma of the tongue  - s/p right hemiglossectomy, bilateral neck dissection, and RFFF reconstruction on 1/3/22 followed by chemoradiation, and weekly cisplatin completed 4/25/22 and s/p G-tube  - NPO except ice chips  - Tube feeds per orders  - dietary consulted  - GI following for G tube concerns  - will schedule nystatin swish and spit for comfort  - PT, OT, ST    Oropharyngeal Dysphagia  - on tube feeds as above  - ST    Hypotension, improved  - s/p 1 L NS  - increased fluids through G-tube     MRSA bacteremia  - cefaroline for 24 days from admit per [facility] ID  - consulted to ID as this medication needs to be cleared by them per pharmacy  - PICC replaced on admission      Acute hypoxic respiratory failure  - with right lower hydropneumothorax s/p chest tube, MRSA pneumonia, pleural effusions s/p chest tube and requiring several reintubations during acute stay  - adequately oxygenating on room air  - wean oxygen for SpO2>90%  - IS      Urinary Retention  - has severe penile edema during acute stay. Was followed by Urology and failed multiple voiding trials  - discontinued doxazosin due to hypotension  - continue antonio catheter, most recently placed 6/6  - follow up with Urology outpatient      PE  Right Atrial Thrombus  - s/p thrombectomy 5/20 with IR, thrombus positive for MRSA  - Elquis 5 mg BID      Anasarca  - requiring diuretics during acute stay, since weaned off  - monitor     Hyponatremia  - Nephrology following, appreciate assistance     Anemia  - Hb stable, suspect that 10.8 lab value was error  - monitor and transfuse for Hb<7     Severe Protein-calorie malnutrition  - BMI 17.9  - bolus feeds per dietary  - Dietician consulted     Pain  - gabapentin 100 mg TID  - PRN tylenol, PRN oxycodone 5 or 7.5 mg q4 hours      Anxiety, Depression  - clonazepam 0.5 mg daily PRN  - Psychiatry consulted     Multiple Wounds POA  - including stage four pressure ulcer on coccyx  - wound care per orders  - wound care following     Bowels: Schedule stool softener. Follow bowel movements. Enema or suppository if needed.      Bladder: continue antonio     Sleep: patient allergic to trazodone     Follow up appointments: PCP, ID, Cardiology, Oncology    Services: home health PT, OT, [address] [place] [address] , nursing  EDOD: 6/25/22    Giancarlo Mendes.  Meche Flowers MD 6/16/2022, 11:08 AM

## 2022-06-16 NOTE — PROGRESS NOTES
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 Pt reported his girlfriend manages cooking, cleaning, laundry, grocery shopping. Pt not actively driving but was able to prior to 2022.    ST Dx: [] Aphasia  [x] Dysarthria  [] Apraxia   [x] Oropharyngeal dysphagia [x] Cognitive Impairment  [] Other:   Date of Admit: 2022  Room #: 0156/0156-01    Current functional status (updated daily):         Pt being seen for : [x] Speech/Language Treatment  [x] Dysphagia Treatment [x] Cognitive Treatment  [] Other:  Communication: []WFL  [] Aphasia  [x] Dysarthria  [] Apraxia  [] Pragmatic Impairment [] Non-verbal  [] Hearing Loss  [] Other:   Cognition: [] WFL  [] Mild  [x] Moderate  [] Severe [] Unable to Assess  [] Other:  Memory: [] WFL  [] Mild  [x] Moderate  [] Severe [] Unable to Assess  [] Other:  Behavior: [x] Alert  [x] Cooperative  [x]  Pleasant  [] Confused  [] Agitated  [] Uncooperative  [] Distractible [] Motivated  [] Self-Limiting [] Anxious  [] Other:  Endurance:  [x] Adequate for participation in SLP sessions  [] Reduced overall  [] Lethargic  [] Other:  Safety: [] No concerns at this time  [x] Reduced insight into deficits  [x]  Reduced safety awareness [] Not following call light procedures  [] Unable to Assess  [] Other:    Current Diet Order:Diet NPO  ADULT TUBE FEEDING; PEG; Standard with Fiber; Bolus; 5 Times Daily; 237; 60; Before and after each bolus; Protein; 1 bottle of Proteinex daily, 30 mL free water flush before and after adminstration  Swallowing Precautions: oral care 2-3x/day to reduce adverse affects in the event of aspiration        Date: 6/16/2022      Tx session 1  1430 - 1530 Tx session 2  All tx needs met in session 1   Total Timed Code Min 60 0   Total Treatment Minutes 60 0   Individual Treatment Minutes 60 0   Group Treatment Minutes 0 0   Co-Treat Minutes 0 0   Variance/Reason:  n/a    Pain Yes, buttock wound, pt repositioned with RN and was able to tolerate HOB at 50 degrees      Pain Intervention [x] RN notified  [x] Repositioned  [x] Intervention offered and patient declined  [] N/A  [] Other: [] RN notified  [] Repositioned  [] Intervention offered and patient declined  [] N/A  [] Other:   Subjective       Pt alert and oriented, cooperative and agreeable to participate in therapy. Pt seen sitting up in bed (HOB 50 degrees). No visitors in room. Objective:  Goals  Timeframe for Short-term Goals: 18 days (06/26/22)     Dysphagia Goals:  Goal 1: Pt will complete pharyngeal/laryngeal strengthening exercises 10/10 given min cues for overall improved swallowing function    Pt completed multiple exercises this date. Pt completed x 10 each  - Jaw jut: pt had difficulty completing fully; decreased jaw ROM likely due to radiation treatment. Pt agreeable to attempting gentle ROM throughout the day on his own. - Shaker maneuver: Pt held for 15-20 second intervals x 10. - Effortful swallow: Pt completed x 15-20. Goal 2: The patient will tolerate instrumental swallowing procedure    Not targeted this date d/t ongoing bedside tx for dysphagia. Goal 3: The patient/caregiver will demonstrate understanding of compensatory strategies for improved swallowing safety   SLP reviewed with pt and he demonstrates comprehension by repeating strategies back to SLP      Goal 4: The patient will tolerate recommended diet without observed clinical signs of aspiration   Pt trialed this date:  - Ice chips: 10 trials, 2+ swallows per ice chip. Pt holding ice in mouth. 2 instances of anterior loss of ice chip.  No over s/s of aspiration, pt's vocal quality dry upon phonation following trials. - Spoon sip trials of honey thick liquids: pt swallows x 3+ per small sip. No rosey s/s of aspiration noted. - Spoon sip trials of nectar thick liquids: pt swallows x 3+ per small sip. No rosey s/s of aspiration noted. Cognitive-linguistic Goals  Goal 1: Pt will complete recall tasks with 80% acc given min cues for use of compensatory strategies   Did not target      Goal 2: Pt will complete executive function tasks (meds, math, money, time, etc) with 80% acc given min cues. Pt completed executive functioning task for math equations/money counting comparatives  - 84% acc (without cues) comparing 2 amounts of coins and stating whether or not the amounts are equal.      Goal 3: Pt will complete problem solving and thought organization tasks with 80% acc given min cues. Pt named members in simple categories with 80% acc, min cues. Goal 4: Pt will complete verbal and visual reasoning tasks with 80% acc given min cues. Did not target this date      Goal 5: Pt will repeat words/phrases/sentences using speech intelligibility strategies with 80% acc given min cues. Did not target this date      Other areas targeted: N/a    Education:   SLP edu regarding results results of previous MBS, rationale behind pharyngeal/laryngeal exercises, projected day for future MBS, safe swallow strategies, and swallow exercises. Safety Devices: [x] Call light within reach  [] Chair alarm activated  [x] Bed alarm activated  [] Other: [] Call light within reach  [] Chair alarm activated  [] Bed alarm activated  [] Other:    Assessment:   Pt seen this date sitting upright in bed. Pt able to tolerate HOB at 50 degrees after PCA assisted SLP with repositioning pt. Pt tolerated all po trials this date (ice chips, nectar thick sips via spoon, honey thick sips via spoon) without overt s/s of aspiration.  Pt may be nearing appropriate time to complete instrumental swallow assessment. Pt required only initial cue to complete pharyngeal and laryngeal strengthening exercises and then was able to execute all following reps independently. Pt participated well in all cognitive linguistic focused activities, requiring only min cues during thought organization tasks. Plan: Continue as per plan of care. Additional Information:     Barriers toward progress: Cognitive deficit, Impulsivity, Limited safety awareness, Limited insight into deficits, Unrealistic expectations and Medication managment  Discharge recommendations:  [] Home independently  [] Home with assistance [x]  24 hour supervision  [] ECF [] Other:  Continued Tx Upon Discharge: ? [x] Yes [] No [] TBD based on progress while on ARU [] Vital Stim indicated [] Other:   Estimated discharge date: TBD    Interventions used this date:  [] Speech/Language Treatment  [] Instruction in HEP [] Group [x] Dysphagia Treatment [x] Cognitive Treatment   [] Other: Total Time Breakdown / Charges    Time in Time out Total Time / units   Cognitive Tx 1515 1530 15 min / 1 unit   Speech Tx - - -   Dysphagia Tx 1430 1515 45 min / 1 unit       Electronically Signed by       Mandie Stanley, [id number] [facility] [id number]  Speech-Language Pathologist

## 2022-06-16 NOTE — PLAN OF CARE
Problem: Pain  Goal: Verbalizes/displays adequate comfort level or baseline comfort level  Flowsheets (Taken 6/16/2022 2142)  Verbalizes/displays adequate comfort level or baseline comfort level:   Assess pain using appropriate pain scale   Administer analgesics based on type and severity of pain and evaluate response   Implement non-pharmacological measures as appropriate and evaluate response

## 2022-06-16 NOTE — PROGRESS NOTES
kcal, 76 g protein, and 900 ml free water. +1 bottle of proteinex daily for additional 26 g protein and 104 kcal. +30 mL free water before and after adminstration. Anthropometric Measures:  Height: 5' 6\" (167.6 cm)  Ideal Body Weight (IBW): 142 lbs (65 kg)    Admission Body Weight: 110 lb (49.9 kg)  Current Body Weight: 109 lb (49.4 kg), 77.5 % IBW. Weight Source: Bed Scale  Current BMI (kg/m2): 17.6  Usual Body Weight: 250 lb (113.4 kg) (June 2021)  % Weight Change (Calculated): -56  Weight Adjustment For: No Adjustment                 BMI Categories: Underweight (BMI less than 18.5)    Estimated Daily Nutrient Needs:  Energy Requirements Based On: Kcal/kg (35-40)  Weight Used for Energy Requirements: Current  Energy (kcal/day): 7328-9057 kcal  Weight Used for Protein Requirements: Current (1.5-2.0 g/kg)  Protein (g/day):  g  Method Used for Fluid Requirements: 1 ml/kcal  Fluid (ml/day): 0387-7118 mL    Nutrition Diagnosis:   · Severe malnutrition related to inadequate protein-energy intake,catabolic illness as evidenced by weight loss greater than or equal to 20% in 1 year,wounds,severe loss of subcutaneous fat,severe muscle loss,nutrition support - enteral nutrition      Nutrition Interventions:   Food and/or Nutrient Delivery: Continue Current Tube Feeding  Nutrition Education/Counseling: No recommendation at this time  Coordination of Nutrition Care: Continue to monitor while inpatient,Interdisciplinary Rounds  Plan of Care discussed with: Patient and RN    Goals:  Previous Goal Met: Progressing toward Goal(s)  Goals: Tolerate nutrition support at goal rate,prior to discharge       Nutrition Monitoring and Evaluation:   Behavioral-Environmental Outcomes: None Identified  Food/Nutrient Intake Outcomes: Enteral Nutrition Intake/Tolerance  Physical Signs/Symptoms Outcomes: Biochemical Data,GI Status,Nutrition Focused Physical Findings,Skin,Weight    Discharge Planning:     Too soon to determine     Ashley Sandro Talavera, [address], [address] [place] [address], LD  Contact: [phone]

## 2022-06-16 NOTE — PROGRESS NOTES
PROGRESS NOTE  S:31 yrs Patient  admitted on 6/8/2022 with Critical illness myopathy [G72.81] . Today he complains of leakage around G tube    Exam:   Vitals:    06/15/22 2155   BP: 105/64   Pulse: 82   Resp: 18   Temp: 98 °F (36.7 °C)   SpO2: 98%      General appearance: alert, appears older than stated age and cachectic  HEENT: Sclera clear, anicteric  Neck: supple  Lungs: clear to auscultation bilaterally  Heart: regular rate and rhythm  Abdomen: soft, G tube site with moderate discharge  Extremities: edema none     Medications: Reviewed    Labs:  CBC:   Recent Labs     06/14/22  0550 06/15/22  0650   WBC 3.6* 3.2*   HGB 7.4* 7.3*   HCT 21.8* 21.6*   MCV 86.5 85.1   * 117*     BMP:   Recent Labs     06/14/22  0550 06/15/22  0650   * 134*   K 4.5 4.5   CL 94* 97*   CO2 29 30   BUN 22* 27*   CREATININE 0.6* 0.7*     LIVER PROFILE: No results for input(s): AST, ALT, LIPASE, PROT, BILIDIR, BILITOT, ALKPHOS in the last 72 hours. Invalid input(s): AMYLASE,  ALB  PT/INR:   No results for input(s): INR in the last 72 hours. 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 His G tube appears to be functioning well but with leakage around it    Recommendation:  1. Continue supportive care  2. Continue TFs per dietitian recs  3. Keep HOB elevated during PEG use  4.  The PEG tube was replaced on 6/14 w a shorter 2 cm ADITI-Key low profile G tube at bedside        Juliann Espinosa MD, MD  5:09 AM 6/16/2022

## 2022-06-16 NOTE — PROGRESS NOTES
Physical Therapy  Facility/Department: [facility]  Rehabilitation Physical Therapy Treatment Note    NAME: Molly Plunkett  : 1990 (28 y.o.)  MRN: [id number]  CODE STATUS: Full Code    Date of Service: 22       Restrictions:  Restrictions/Precautions: NPO;Isolation; Fall Risk;General Precautions  Position Activity Restriction  Other position/activity restrictions: antonio, PEG, NPO, MRSA contact precautions, ice chips with therapy/RN only     SUBJECTIVE  Subjective  Subjective: pt in bed, agreeable to therapy  Pain: 10/10 pain in buttocks        OBJECTIVE  Cognition  Overall Cognitive Status: Exceptions  Arousal/Alertness: Appropriate responses to stimuli  Following Commands: Follows multistep commands with repitition; Follows multistep commands with increased time  Attention Span: Attends with cues to redirect  Memory: Decreased short term memory  Safety Judgement: Decreased awareness of need for assistance;Decreased awareness of need for safety  Problem Solving: Assistance required to identify errors made;Assistance required to implement solutions;Assistance required to generate solutions  Insights: Decreased awareness of deficits  Initiation: Requires cues for all  Sequencing: Requires cues for some  Orientation  Overall Orientation Status: Within Functional Limits    Functional Mobility  Supine to Sit  Assistance Level: Stand by assist  Sit to Stand  Assistance Level: Contact guard assist  Stand to Sit  Assistance Level: Contact guard assist      Environmental Mobility  Ambulation  Surface: Level surface  Distance: 78 ft, 120 ft, 110 ft  Activity Comments: Pt ambulates with slow anthony, narrow JOE, intermittent crossing over of feet/scissor gait pattern, increased trunk flexion, decreased step length and clearance, and externally rotated feet. Pt demos path deviation with lateral sway as well. Pt demos some improvement with cues for wider JOE and slowing down, particularly on turns.  Pt's L knee andres when turning toward R on first bout of gait, leading to major LOB, requiring dependence to return to upright position and sit in w/c. W/C follow provided throughout. Assistance Level: Minimal assistance;Dependent (mostly min A, up to dependent on 1st bout when pt turning)  Gait Deviations: Slow anthony;Decreased step length bilateral    Exercises:  -10 consecutive reps of 6\" step ups with BUE progressing to 1 UE support in // bars with CGA-min A  -1x 1 min standing 6\" box taps with BUE progressing to 1 UE support in // bars with CGA  -1x 1 min cone taps anterior and crossbody to pt in // bars with BUE progressing to 1 UE support in // bars with CGA-min A. Pt knocks over two cones and requires assistance to set them back up.   -10 reps of mini squats with BUE support on // bars with CGA for safety and cues for technique  -1x 1 min standing marches in place with BUE support with decreased clearance RLE    ASSESSMENT/PROGRESS TOWARDS GOALS  Vital Signs  Heart Rate: 77  BP: 109/61  BP Location: Right upper arm  MAP (Calculated): 77  SpO2: 99 %  O2 Device: None (Room air)    Assessment  Assessment: Patient seen for gait, strengthening, and endurance training. Patient completed transfers with CGA. Pt required significant cueing for gait to widen JOE, slow turn, especially on turns, and to take rest breaks when fatigued. Pt ambulates with mostly min A without AD and demos 1 major LOB, pt dependent to recover to sit in w/c d/t L knee buckle when turning. Pt completes ~1 min standing dynamic activity at a time. Pt will continue to benefit from skilled PT in ARU to progress strength, endurance, and balance to decrease fall risk and progress independence with mobility.   Activity Tolerance: Patient tolerated treatment well  Discharge Recommendations: 24 hour supervision or assist;Home with Home health PT  PT Equipment Recommendations  Equipment Needed: Yes  Mobility Devices: Wheelchair  Wheelchair: Standard    Goals  Patient Goals   Patient goals : \"to walk and to eat\"  Short Term Goals  Time Frame for Short term goals: 10 days 6/17  Short term goal 1: pt will complete bed mobility with mod ind  Short term goal 2: pt will complete functional transfer with CGA and LRAD. Short term goal 3: pt will ambulate 50 ft with LRAD and mod A. Short term goal 4: pt will tolerate stair assessment when appropriate and goal will be made. - GOAL MET 6/14, completes 4 steps with CGA-min A and BHR  Long Term Goals  Time Frame for Long term goals : 21 days 6/28  Long term goal 1: pt will complete functional transfer with SBA and LRAD  Long term goal 2: pt will ambulate 100 ft wiht LRAD and CGA. Long term goal 3: pt will complete car transfer with SBA and LRAD. Long term goal 4: pt will tolerate standing for 2 min with 1-2 UE support and CGA -SBA for balance. Long term goal 5: pt will ascend/descend 12 steps with min A and BHR    PLAN OF CARE/SAFETY  Plan  Plan: 5-7 times per week  Current Treatment Recommendations: Strengthening; Wheelchair mobility training;Cognitive reorientation;Equipment evaluation, education, & procurement; Modalities; Positioning; Therapeutic activities; Wound care; Patient/Caregiver education & training; Safety education & training;Home exercise program;Return to work related activity;Pain management;Gait training;Balance training;Functional mobility training;Stair training;Neuromuscular re-education;Transfer training;ADL/Self-care training; Endurance training;Manual Therapy - Soft Tissue Mobilization  Safety Devices  Type of Devices: Gait belt;Patient at risk for falls;Nurse notified; All fall risk precautions in place; Bed alarm in place;Call light within reach; Left in bed; All evelyn prominences offloaded  Restraints  Restraints Initially in Place: No    EDUCATION  Education  Education Given To: Patient  Education Provided: Role of Therapy;Plan of Care;Precautions; Safety; Energy Conservation;Transfer Training;DME/Home Modifications; Fall Prevention Strategies; Equipment; Mobility Training;Cognition;ADL Function  Education Provided Comments: pt educated on need for safety and slowing down when turning, wider JOE for balance with gait - requires reinforcement  Education Method: Demonstration;Verbal  Barriers to Learning: None  Education Outcome: Verbalized understanding;Demonstrated understanding        Therapy Time   Individual Concurrent Group Co-treatment   Time In 1230         Time Out 1330         Minutes 60           Timed Code Treatment Minutes: [address], PT, 06/16/22 at 2:49 PM

## 2022-06-16 NOTE — PROGRESS NOTES
Occupational Therapy  Facility/Department: [facility]  Rehabilitation Occupational Therapy Daily Treatment Note    Date: 22  Patient Name: Radha Story       Room: [facility]5047-93  MRN: [id number]  Account: [de-identified]   : 1990  (28 y.o.) Gender: male                    Past Medical History:  has a past medical history of Cancer ([address] Past Surgical History:   has no past surgical history on file. Restrictions  Restrictions/Precautions: NPO;Isolation; Fall Risk;General Precautions  Other position/activity restrictions: antonio, PEG, NPO, MRSA contact precautions, ice chips with therapy/RN only    Subjective  Subjective: Pt in bed, states pain doing better today and feeling better, agreeable to OT session, pain rated 8/10 in buttocks, /62, HR 79, O2 sats 99% on RA. Restrictions/Precautions: NPO;Isolation; Fall Risk;General Precautions             Objective     Cognition  Overall Cognitive Status: Exceptions  Arousal/Alertness: Appropriate responses to stimuli  Following Commands: Follows multistep commands with repitition; Follows multistep commands with increased time  Attention Span: Attends with cues to redirect  Memory: Decreased short term memory  Safety Judgement: Decreased awareness of need for assistance;Decreased awareness of need for safety  Problem Solving: Assistance required to identify errors made;Assistance required to implement solutions;Assistance required to generate solutions  Insights: Decreased awareness of deficits  Initiation: Requires cues for all  Sequencing: Requires cues for some  Orientation  Overall Orientation Status: Within Functional Limits   Perception  Overall Perceptual Status: Impaired  Unilateral Attention: Appears intact  Initiation: Cues to initiate tasks  Motor Planning: Cues to use objects appropriately  Perseveration: Not present     ADL  Putting On/Taking Off Footwear  Assistance Level:  Moderate assistance  Skilled Clinical Factors: assist to don ROZ hose, max VCs and setup for placing sock on sock aid, able to thread socks onto feet with sock aid, able to don shoes at EOB with setup          Functional Mobility  Device: Wheelchair  Activity: To/From therapy gym  Assistance Level: Stand by assist  Bed Mobility  Overall Assistance Level: Modified Independent  Sit to Supine  Assistance Level: Modified independent  Supine to Sit  Assistance Level: Modified independent  Transfers  Surface: From bed; Wheelchair; To bed  Additional Factors: Verbal cues; Hand placement cues  Sit to Stand  Assistance Level: Stand by assist  Stand to Sit  Assistance Level: Stand by assist  Bed To/From Chair  Technique: Stand step  Assistance Level: Stand by assist  Skilled Clinical Factors: no AD   OT Exercises  Resistive Exercises: 3# weight for  elbow flexion, shoulder press, chest press, wrist flexion, wrist extension, supination/pronation x20     Assessment  Assessment  Assessment: Pt agreeable to OT session. Pt performed functional transfers with SBA and improved balance this date. Pt completed doffing socks with reacher and improved ROM to reach feet when pulling socks down. Pt required assist to thread sock onto sock aid but able to pull onto feet with cues. Pt required assist to don B ROZ hose and setup for shoes. Pt compled standing tasks for 3.5 minutes and 5 minutes at table top with no episodes of knee buckling and minimal to no dizziness. BP above 100/55 throughout session. Pt stood on black balance board for 5 minute stand with SBA. Pt demonstrated fair UE strength for exercises and fair to good coordination for pegboard task and use of a/e. Pt completed collection of [place] [employer] around room for 2 minutes with CGA and good grasp of items. Pt demonstrated mild improvement in w/c mobility to [facility] room<>gym with SBA and increased speed from previous dates. Continue POC.   Activity Tolerance: Patient tolerated treatment well  Discharge Recommendations: 24 hour supervision or assist;Home with Home health OT;S Level 4  OT Equipment Recommendations  Equipment Needed: No  Safety Devices  Safety Devices in place: Yes  Type of devices: Gait belt;Bed alarm in place;Call light within reach; Left in bed;Nurse notified    Patient Education  Education  Education Given To: Patient  Education Provided: Role of Therapy;Plan of Care;Precautions; Safety; Energy Conservation; Fall Prevention Strategies;DME/Home Modifications;Transfer Training;Mobility Training;Equipment;ADL Function;Cognition  Education Provided Comments: role of OT, safety with transfers, posture, midline balance, safety with PEG tube, ADL retraining, benefit of OOB and mobility, purpose of ROZ hose, use of a/e  Education Method: Verbal;Demonstration  Barriers to Learning: Cognition  Education Outcome: Verbalized understanding;Demonstrated understanding;Continued education needed    Plan  Plan  Times per Week: 5/7 days/week  Plan Weeks: 3 weeks  Current Treatment Recommendations: Strengthening;ROM;Balance training;Functional mobility training; Endurance training; Safety education & training;Equipment evaluation, education, & procurement;Return to work related activity; Neuromuscular re-education;Patient/Caregiver education & training;Self-Care / ADL; Home management training;Coordination training    Goals  Short Term Goals  Time Frame for Short term goals: 1 week- 6/15/22  Short Term Goal 1: Pt will complete functional transfers with min A. GOAL MET 6/15/22 Pt performed functional transfers with min A. Short Term Goal 2: Pt will perform toileting with max A. progressing Pt requires total assist for toileting. Short Term Goal 3: Pt will complete LB dressing with mod A. progressing. Pt requires max A for LB dressing. Short Term Goal 4: Pt will perform UB dressing with min A. GOAL MET 6/11/22 Pt completed UB dressing with SPV.   Long Term Goals  Time Frame for Long term goals : 3 weeks- 6/29/22  Long Term Goal 1: Pt will perform functional transfers with CGA.  Long Term Goal 2: Pt will complete toileting with CGA. Long Term Goal 3: Pt will perform LB dressing with CGA. Long Term Goal 4: Pt will perform UB dressing with setup. Long Term Goal 5: Pt will complete full body bathing with CGA.       Therapy Time   Individual Concurrent Group Co-treatment   Time In 0930         Time Out 1030         Minutes 60         Timed Code Treatment Minutes: [address]

## 2022-06-16 NOTE — CONSULTS
Full note dictated. Reviewed chart including psychiatry notes from Dr Derick Farrar    Dx:  Depressive disorder    Rec:  1). Pt is not acutely suicidal and will not need inpatient psychiatry. However, he does endorse symptoms of depression more so than anxiety. He didn't like cymbalta but was non specific about it and was on a low dose. Despite that I am going to start Effexor XR because I believe it works quicker than other agents. Also, for sleep he lists an allergy to trazodone so will use Seroquel to also augment mood. I will follow while here. He has some history of alcohol problems as a late teen and his father had both drug and alcohol problems. Need to be mindful of that in choosing meds.       Carmelina Mitchell MD

## 2022-06-17 LAB
ANION GAP SERPL CALCULATED.3IONS-SCNC: 5 MMOL/L (ref 3–16)
BASOPHILS ABSOLUTE: 0 K/UL (ref 0–0.2)
BASOPHILS RELATIVE PERCENT: 0.6 %
BUN BLDV-MCNC: 29 MG/DL (ref 7–20)
CALCIUM SERPL-MCNC: 9.3 MG/DL (ref 8.3–10.6)
CHLORIDE BLD-SCNC: 97 MMOL/L (ref 99–110)
CO2: 30 MMOL/L (ref 21–32)
CREAT SERPL-MCNC: 0.6 MG/DL (ref 0.9–1.3)
EOSINOPHILS ABSOLUTE: 0.2 K/UL (ref 0–0.6)
EOSINOPHILS RELATIVE PERCENT: 3.8 %
GFR AFRICAN AMERICAN: >60
GFR NON-AFRICAN AMERICAN: >60
GLUCOSE BLD-MCNC: 94 MG/DL (ref 70–99)
HCT VFR BLD CALC: 21.1 % (ref 40.5–52.5)
HEMOGLOBIN: 7.1 G/DL (ref 13.5–17.5)
LYMPHOCYTES ABSOLUTE: 0.3 K/UL (ref 1–5.1)
LYMPHOCYTES RELATIVE PERCENT: 7.2 %
MCH RBC QN AUTO: 28.8 PG (ref 26–34)
MCHC RBC AUTO-ENTMCNC: 33.5 G/DL (ref 31–36)
MCV RBC AUTO: 85.8 FL (ref 80–100)
MONOCYTES ABSOLUTE: 0.5 K/UL (ref 0–1.3)
MONOCYTES RELATIVE PERCENT: 12.4 %
NEUTROPHILS ABSOLUTE: 3.1 K/UL (ref 1.7–7.7)
NEUTROPHILS RELATIVE PERCENT: 76 %
PDW BLD-RTO: 15.5 % (ref 12.4–15.4)
PLATELET # BLD: 112 K/UL (ref 135–450)
PMV BLD AUTO: 7 FL (ref 5–10.5)
POTASSIUM REFLEX MAGNESIUM: 4.5 MMOL/L (ref 3.5–5.1)
RBC # BLD: 2.46 M/UL (ref 4.2–5.9)
SODIUM BLD-SCNC: 132 MMOL/L (ref 136–145)
WBC # BLD: 4 K/UL (ref 4–11)

## 2022-06-17 PROCEDURE — 97535 SELF CARE MNGMENT TRAINING: CPT

## 2022-06-17 PROCEDURE — 80048 BASIC METABOLIC PNL TOTAL CA: CPT

## 2022-06-17 PROCEDURE — 1280000000 HC REHAB R&B

## 2022-06-17 PROCEDURE — 6370000000 HC RX 637 (ALT 250 FOR IP): Performed by: STUDENT IN AN ORGANIZED HEALTH CARE EDUCATION/TRAINING PROGRAM

## 2022-06-17 PROCEDURE — 97530 THERAPEUTIC ACTIVITIES: CPT

## 2022-06-17 PROCEDURE — 6370000000 HC RX 637 (ALT 250 FOR IP): Performed by: PSYCHIATRY & NEUROLOGY

## 2022-06-17 PROCEDURE — 6360000002 HC RX W HCPCS: Performed by: STUDENT IN AN ORGANIZED HEALTH CARE EDUCATION/TRAINING PROGRAM

## 2022-06-17 PROCEDURE — 85025 COMPLETE CBC W/AUTO DIFF WBC: CPT

## 2022-06-17 PROCEDURE — 2580000003 HC RX 258: Performed by: STUDENT IN AN ORGANIZED HEALTH CARE EDUCATION/TRAINING PROGRAM

## 2022-06-17 PROCEDURE — 92507 TX SP LANG VOICE COMM INDIV: CPT

## 2022-06-17 PROCEDURE — 97110 THERAPEUTIC EXERCISES: CPT

## 2022-06-17 PROCEDURE — 6370000000 HC RX 637 (ALT 250 FOR IP): Performed by: PHYSICAL MEDICINE & REHABILITATION

## 2022-06-17 PROCEDURE — 92526 ORAL FUNCTION THERAPY: CPT

## 2022-06-17 PROCEDURE — 97112 NEUROMUSCULAR REEDUCATION: CPT

## 2022-06-17 RX ADMIN — Medication 400 MG: at 09:11

## 2022-06-17 RX ADMIN — CEFTAROLINE FOSAMIL 600 MG: 600 POWDER, FOR SOLUTION INTRAVENOUS at 02:30

## 2022-06-17 RX ADMIN — NYSTATIN 500000 UNITS: 100000 SUSPENSION ORAL at 13:09

## 2022-06-17 RX ADMIN — SODIUM CHLORIDE 25 ML: 9 INJECTION, SOLUTION INTRAVENOUS at 10:35

## 2022-06-17 RX ADMIN — FOLIC ACID 1 MG: 1 TABLET ORAL at 09:11

## 2022-06-17 RX ADMIN — OXYCODONE HYDROCHLORIDE 7.5 MG: 15 TABLET ORAL at 22:23

## 2022-06-17 RX ADMIN — OXYCODONE HYDROCHLORIDE 7.5 MG: 15 TABLET ORAL at 09:11

## 2022-06-17 RX ADMIN — SODIUM CHLORIDE, PRESERVATIVE FREE 10 ML: 5 INJECTION INTRAVENOUS at 10:39

## 2022-06-17 RX ADMIN — Medication: at 09:19

## 2022-06-17 RX ADMIN — Medication: at 13:24

## 2022-06-17 RX ADMIN — GABAPENTIN 100 MG: 100 CAPSULE ORAL at 09:11

## 2022-06-17 RX ADMIN — GABAPENTIN 100 MG: 100 CAPSULE ORAL at 13:09

## 2022-06-17 RX ADMIN — QUETIAPINE FUMARATE 50 MG: 25 TABLET ORAL at 22:23

## 2022-06-17 RX ADMIN — SODIUM CHLORIDE, PRESERVATIVE FREE 20 ML: 5 INJECTION INTRAVENOUS at 22:23

## 2022-06-17 RX ADMIN — NYSTATIN 500000 UNITS: 100000 SUSPENSION ORAL at 22:26

## 2022-06-17 RX ADMIN — VENLAFAXINE HYDROCHLORIDE 75 MG: 37.5 CAPSULE, EXTENDED RELEASE ORAL at 09:14

## 2022-06-17 RX ADMIN — CEFTAROLINE FOSAMIL 600 MG: 600 POWDER, FOR SOLUTION INTRAVENOUS at 18:14

## 2022-06-17 RX ADMIN — Medication: at 13:26

## 2022-06-17 RX ADMIN — GABAPENTIN 100 MG: 100 CAPSULE ORAL at 22:23

## 2022-06-17 RX ADMIN — Medication 100 MG: at 09:14

## 2022-06-17 RX ADMIN — DAKIN'S SOLUTION 0.125% (QUARTER STRENGTH): 0.12 SOLUTION at 13:24

## 2022-06-17 RX ADMIN — APIXABAN 5 MG: 5 TABLET, FILM COATED ORAL at 22:23

## 2022-06-17 RX ADMIN — Medication: at 10:49

## 2022-06-17 RX ADMIN — OXYCODONE HYDROCHLORIDE 7.5 MG: 15 TABLET ORAL at 15:35

## 2022-06-17 RX ADMIN — Medication 400 MG: at 22:23

## 2022-06-17 RX ADMIN — NYSTATIN 500000 UNITS: 100000 SUSPENSION ORAL at 18:14

## 2022-06-17 RX ADMIN — CEFTAROLINE FOSAMIL 600 MG: 600 POWDER, FOR SOLUTION INTRAVENOUS at 10:37

## 2022-06-17 RX ADMIN — APIXABAN 5 MG: 5 TABLET, FILM COATED ORAL at 09:11

## 2022-06-17 RX ADMIN — Medication 5 MG: at 22:23

## 2022-06-17 RX ADMIN — DOCUSATE SODIUM AND SENNOSIDES 2 TABLET: 8.6; 5 TABLET, FILM COATED ORAL at 22:23

## 2022-06-17 ASSESSMENT — PAIN - FUNCTIONAL ASSESSMENT
PAIN_FUNCTIONAL_ASSESSMENT: PREVENTS OR INTERFERES WITH MANY ACTIVE NOT PASSIVE ACTIVITIES
PAIN_FUNCTIONAL_ASSESSMENT: PREVENTS OR INTERFERES WITH ALL ACTIVE AND SOME PASSIVE ACTIVITIES

## 2022-06-17 ASSESSMENT — PAIN SCALES - GENERAL
PAINLEVEL_OUTOF10: 8
PAINLEVEL_OUTOF10: 10
PAINLEVEL_OUTOF10: 10

## 2022-06-17 ASSESSMENT — PAIN DESCRIPTION - DESCRIPTORS
DESCRIPTORS: THROBBING
DESCRIPTORS: SQUEEZING;THROBBING

## 2022-06-17 ASSESSMENT — PAIN DESCRIPTION - LOCATION
LOCATION: COCCYX;BUTTOCKS
LOCATION: BUTTOCKS;COCCYX
LOCATION: COCCYX

## 2022-06-17 ASSESSMENT — PAIN DESCRIPTION - ORIENTATION
ORIENTATION: MID
ORIENTATION: MID
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
## 2022-06-17 NOTE — CARE COORDINATION
Case Management/Follow up:    Chart reviewed for length of stay. Hospital day # 9  Unit: IP ARU   Diagnosis and current status as per MD progress: Critical illness myopathy, Severe malnutrition. Patient on IV ATB until 7/2 and wound vac placement. Nutrition Recommendations/Plan:   1. Continue bolus feeds of Jevity 1.5 x5 cans daily   2. +60 mL free water flush before and after administration - per MD  3. +1 bottle of proteinex daily, 30 mL free water flush before and after administration   4. Obtain updated weight   5. Monitor TF tolerance (abdominal pain, bloating, distention, diarrhea)  Monitor nutrition adequacy    Family train set for 06/21 @ 1400 with Girlfriend. Anticipated d/c date: 06/25/2022  Expected plan for discharge: Therapy recommendations are home with 24 hour supervision or assist,Home with Home health PT/OT. [facility] following with IV ABX and tube feeds. Potential barriers: Patient progress    CM will continue to support for discharge needs.  Carlos Gregory RN

## 2022-06-17 NOTE — PROGRESS NOTES
PROGRESS NOTE  S:31 yrs Patient  admitted on 6/8/2022 with Critical illness myopathy [G72.81] . Today he complains of leakage around G tube    Exam:   Vitals:    06/17/22 1535   BP:    Pulse:    Resp: 16   Temp:    SpO2:       General appearance: alert, appears older than stated age and cachectic  HEENT: Sclera clear, anicteric  Neck: supple  Lungs: clear to auscultation bilaterally  Heart: regular rate and rhythm  Abdomen: soft, G tube site with moderate discharge  Extremities: edema none     Medications: Reviewed    Labs:  CBC:   Recent Labs     06/15/22  0650 06/17/22  0600   WBC 3.2* 4.0   HGB 7.3* 7.1*   HCT 21.6* 21.1*   MCV 85.1 85.8   * 112*     BMP:   Recent Labs     06/15/22  0650 06/16/22  0630 06/17/22  0600   * 132* 132*   K 4.5 4.7 4.5   CL 97* 97* 97*   CO2 30 28 30   BUN 27* 27* 29*   CREATININE 0.7* 0.6* 0.6*     LIVER PROFILE: No results for input(s): AST, ALT, LIPASE, PROT, BILIDIR, BILITOT, ALKPHOS in the last 72 hours. Invalid input(s): AMYLASE,  ALB  PT/INR:   No results for input(s): INR in the last 72 hours. 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 His G tube appears to be functioning well but with leakage around it    Recommendation:  1. Continue supportive care  2. Continue TFs per dietitian recs  3. Keep HOB elevated during PEG use  4.  The PEG tube was replaced on 6/14 w a shorter 2 cm ADITI-Key low profile G tube at bedside        Edward Garcia MD, MD  4:02 PM 6/17/2022

## 2022-06-17 NOTE — PROGRESS NOTES
Physical Therapy  Facility/Department: [facility]  Physical Therapy Initial Assessment    Name: Valentina Eli  : 1990  MRN: [id number]  Date of Service: 2022    Discharge Recommendations:  24 hour supervision or assist,Home with Home health PT   PT Equipment Recommendations  Equipment Needed: Yes  Wheelchair: Standard      Patient Diagnosis(es): There were no encounter diagnoses. Past Medical History:  has a past medical history of Cancer ([address] Past Surgical History:  has no past surgical history on file. Assessment   Body Structures, Functions, Activity Limitations Requiring Skilled Therapeutic Intervention: Decreased functional mobility ; Decreased coordination;Decreased endurance;Decreased sensation; Increased pain;Decreased balance;Decreased body mechanics; Decreased tolerance to work activity; Decreased strength;Decreased safe awareness;Decreased ADL status  Assessment: Pt was more fatigued this date and became emotional at end of session stating that \"I'm having a bad day\" but unable to verbalize exactly why. Pt became fatigued following static and dynamic standing activities and further ambulation was not tolerated this date. Continue with POC. Activity Tolerance  Activity Tolerance: Patient limited by endurance; Patient limited by pain; Patient limited by fatigue  Activity Tolerance Comments: At start in supine /69, spO2 95%, HR 83; siting EOB: BP 99/63, HR 88, standing 97/65, 102. After standing tolerance with pericare and changing briefs/pants, pt complained of light-headedness and \"not feeling well\":BP 92/69, HR 89 and symptoms resolved after seated rest.  RN notified and present/aware.      Plan   Plan  Plan: 5-7 times per week  Current Treatment Recommendations: Strengthening,Wheelchair mobility training,Cognitive reorientation,Equipment evaluation, education, & procurement,Modalities,Positioning,Therapeutic activities,Wound care,Patient/Caregiver education & training,Safety education & training,Home exercise program,Return to work related activity,Pain management,Gait training,Balance training,Functional mobility training,Stair training,Neuromuscular re-education,Transfer training,ADL/Self-care training,Endurance training,Manual Therapy - Soft Tissue Mobilization  Safety Devices  Type of Devices: Gait belt,Patient at risk for falls,Nurse notified,All fall risk precautions in place,Bed alarm in place,Call light within reach,Left in bed,All evelyn prominences offloaded  Restraints  Restraints Initially in Place: No     Restrictions  Restrictions/Precautions  Restrictions/Precautions: NPO,Isolation,Fall Risk,General Precautions  Position Activity Restriction  Other position/activity restrictions: antonio, PEG, NPO, MRSA contact precautions, ice chips with therapy/RN only     Subjective   General  Patient assessed for rehabilitation services?: Yes  General Comment  Comments: RN cleared pt to participate and provided pt with IV meds at start of session. Subjective  Subjective: Pt reports 2/10 sacral pain. Cognition   Orientation  Overall Orientation Status: Within Functional Limits  Cognition  Overall Cognitive Status: Exceptions  Arousal/Alertness: Appropriate responses to stimuli  Following Commands: Follows multistep commands with repitition; Follows multistep commands with increased time  Attention Span: Attends with cues to redirect  Safety Judgement: Decreased awareness of need for assistance;Decreased awareness of need for safety  Problem Solving: Assistance required to identify errors made;Assistance required to implement solutions;Assistance required to generate solutions  Insights: Decreased awareness of deficits  Initiation: Requires cues for some  Sequencing: Requires cues for some     Objective   Heart Rate: 85  Heart Rate Source: Monitor  BP: 115/68  BP Location: Right upper arm  Patient Position: Semi fowlers  MAP (Calculated): 83.67  Resp: 16  SpO2: 96 %  O2 Device: None (Room air)                             Bed mobility  Supine to Sit: Stand by assistance (slightly elev HOB)  Sit to Supine: Stand by assistance (slightly elev HOB)  Transfers  Sit to Stand: Minimal Assistance (at IV pole)  Stand to sit: Minimal Assistance (at IV pole)  Ambulation  Surface: level tile  Other Apparatus:  (IV pole with L UE support)  Assistance: Moderate assistance;Maximum assistance  Quality of Gait: narrow JOE, occasional LOB requiring mod A to max A to help recover as pt turned and maneuvered transfer from bed to chair within room (note multiple lines needing to be managed: IV, wound vac, antonio)  Gait Deviations: Staggers;Decreased step length;Decreased step height  Distance: 5 ft x 2  More Ambulation?: No (pt too fatigued)     Balance  Sitting - Static: Fair;+  Sitting - Dynamic: Fair  Standing - Static: Fair;-  Standing - Dynamic: Poor  A/AROM Exercises: supine ankle pumps x 20 B, heel slides x 20 B, hip add/abd x 20 B, glut sets x 20, quad sets x 20 B  Static Standing Balance Exercises: standing with feet shoulder width apart, eyes closed and LUE support on IV pole for 45 sec before pt became dizzy and had to discontinue exercise  Dynamic Standing Balance Exercises: MIP with LUE support on IV pole x 40 before pt needed to sit (due to briefs/pants falling down), anterior/cross midline toe tapping x 20, mini squats in standing x 20 -- all requiring min to mod A support          Goals  Short Term Goals  Time Frame for Short term goals: 10 days 6/17  Short term goal 1: pt will complete bed mobility with mod ind - 6/17 not met, progressing (continue goal)  Short term goal 2: pt will complete functional transfer with CGA and LRAD. - 6/17 not met, progressing (continue goal)  Short term goal 3: pt will ambulate 50 ft with LRAD and mod A. - 6/17 not met today (previously partially met for distance)  Short term goal 4: pt will tolerate stair assessment when appropriate and goal will be made. - GOAL MET 6/14, completes 4 steps with CGA-min A and BHR  Long Term Goals  Time Frame for Long term goals : 21 days 6/28  Long term goal 1: pt will complete functional transfer with SBA and LRAD  Long term goal 2: pt will ambulate 100 ft wiht LRAD and CGA. Long term goal 3: pt will complete car transfer with SBA and LRAD. Long term goal 4: pt will tolerate standing for 2 min with 1-2 UE support and CGA -SBA for balance.   Long term goal 5: pt will ascend/descend 12 steps with min A and BHR  Patient Goals   Patient goals : \"to walk and to eat\"       Education  Patient Education  Education Given To: Patient  Education Provided: Role of Therapy;Plan of Care;Home Exercise Program;Transfer Training  Education Method: Demonstration;Verbal  Education Outcome: Verbalized understanding;Continued education needed      Therapy Time   Individual Concurrent Group Co-treatment   Time In 1030         Time Out 1130         Minutes 60         Timed Code Treatment Minutes: 60 Minutes       Yuliana Powell, PT

## 2022-06-17 NOTE — PROGRESS NOTES
ID Follow-up NOTE    CC:   MRSA bacteremia  Antibiotics: Ceftaroline    Admit Date: 6/8/2022  Hospital Day: 9    Subjective:     Patient feel good, increase activity      Objective:     No data found. I/O last 3 completed shifts: In: 2965 [P.O.:240; I.V.:5; NG/GT:2670; IV Piggyback:50]  Out: 2750 [Urine:2300; Drains:450]  No intake/output data recorded. EXAM:  GENERAL: No apparent distress. RA  HEENT: Membranes moist, no oral lesion  NECK:  Supple, no lymphadenopathy  LUNGS: Clear b/l, no rales, no dullness  CARDIAC: RRR, no murmur appreciated  ABD:  Thin, + BS, soft / NT, G-tube  EXT:  No rash, no edema, no lesions  Sacral wound with VAC  NEURO: No focal neurologic findings  PSYCH: Orientation, sensorium, mood normal  LINES:  L PICC in place       Data Review:  Lab Results   Component Value Date    WBC 3.2 (L) 06/15/2022    HGB 7.3 (L) 06/15/2022    HCT 21.6 (L) 06/15/2022    MCV 85.1 06/15/2022     (L) 06/15/2022     Lab Results   Component Value Date    CREATININE 0.6 (L) 06/16/2022    BUN 27 (H) 06/16/2022     (L) 06/16/2022    K 4.7 06/16/2022    CL 97 (L) 06/16/2022    CO2 28 06/16/2022       Hepatic Function Panel:   Lab Results   Component Value Date    ALKPHOS 108 02/06/2022    ALT 17 02/06/2022    AST 17 02/06/2022    PROT 7.1 02/06/2022    BILITOT 0.4 02/06/2022    LABALBU 2.4 06/11/2022       Micro:  5/20 BC + MRSA, [facility]  Organism Antibiotic Method Susceptibility   Staphylococcus aureus, mrsa (methicillin resistant) Clindamycin ADITI <=0.12: Susceptible   Staphylococcus aureus, mrsa (methicillin resistant) Daptomycin ADITI 0.25:  Susceptible   Staphylococcus aureus, mrsa (methicillin resistant) Doxycycline ADITI <=0.5: Susceptible   Staphylococcus aureus, mrsa (methicillin resistant) Erythromycin ADITI 4: Intermediate   Staphylococcus aureus, mrsa (methicillin resistant) Oxacillin ADITI >=4: Resistant   Staphylococcus aureus, mrsa (methicillin resistant) Sulfa/Trimethoprim ADITI <=10: Susceptible   Staphylococcus aureus, mrsa (methicillin resistant) Vancomycin ADITI 1: Susceptible   Staphylococcus aureus, mrsa (methicillin resistant) Ceftaroline ADITI 0.38: Susceptible      Imagin/9 x-rays  Chest:   - Heart size is normal.  Mediastinal contours are normal   - Scattered parenchymal opacities are seen throughout the lungs, right greater than left   - Clips are seen in the right supraclavicular region.   - G-tube projects in the region of the stomach   - A midline is not clearly included on the field of view     Abdomen:   - G-tube projects in region of the stomach   - No significant small bowel distention noted.  Mild-to-moderate stool load   seen in the colon.  Nonspecific bowel gas pattern is seen   - Villatoro catheter is seen       Scheduled Meds:   [START ON 2022] venlafaxine  75 mg Oral Daily with breakfast    QUEtiapine  50 mg Oral Nightly    sodium zirconium cyclosilicate  10 g Oral Once    magnesium oxide  400 mg Per G Tube BID    zinc oxide   Topical BID    lidocaine 1 % injection  5 mL IntraDERmal Once    sodium chloride flush  5-40 mL IntraVENous 2 times per day    nystatin  5 mL Oral 4x Daily    sodium hypochlorite   Irrigation Daily    apixaban  5 mg Per G Tube BID    ceftaroline fosamil (TEFLARO) 600 MG IVPB  600 mg IntraVENous H3V    folic acid  1 mg Per G Tube Daily    gabapentin  100 mg Per G Tube TID    melatonin  5 mg Per G Tube Nightly    sennosides-docusate sodium  2 tablet Per G Tube Nightly    thiamine  100 mg Per G Tube Daily       Continuous Infusions:   sodium chloride         PRN Meds:  saliva substitute, medihoney, oxyCODONE, oxyCODONE, Lip Balm, sodium chloride flush, sodium chloride, acetaminophen, bisacodyl, sodium chloride, clonazePAM, albuterol      Assessment:     Hx HTN, tongue ca  Dx squamous cell ca tongue, resection (with neck dissection, 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  - postsurgical / adjuvant chemo (cisplatin) and XRT.   G-tube placed     Admit [facility] 5/2 with septic shock. In ICU on vent. BC + MRSA, port removed. Seen by ID, recommended Ceftaroline to 7/2/22. Other issues - urinary retention, PU on coccyx, R atrial thrombus (thrombectomy, cult + MRSA), pain     Admit [facility] 6/8  Complicated MRSA bacteremia   - mult positive BC, last positive 5/20, see sensitivities above; BC no growth 5/21, 5/24  -  Port removed  -  atrial thrombus removed   - seen by  ID - due to complicated course, + BC on antibiotics choose high dose ceftraroline    Plan:     Cont Ceftaroline 600 mg q 8 hr through 7/2/22    Medical Decision Making:   The following items were considered in medical decision making:  Discussion of patient care with other providers  Reviewed clinical lab tests  Reviewed radiology tests  Reviewed other diagnostic tests/interventions  Independent review of radiologic images  Microbiology cultures and other micro tests reviewed      Discussed with pt  Will sign off, call with ID issues  Nagi Teague MD

## 2022-06-17 NOTE — PROGRESS NOTES
Occupational Therapy  Facility/Department: [address] [place] [address]  Rehabilitation Occupational Therapy Daily Treatment Note    Date: 22  Patient Name: Juan Pace       Room: [facility]/1176-95  MRN: [id number]  Account: [de-identified]   : 1990  (28 y.o.) Gender: male                    Past Medical History:  has a past medical history of Cancer ([address] Past Surgical History:   has no past surgical history on file. Restrictions  Restrictions/Precautions: NPO;Isolation; Fall Risk;General Precautions  Other position/activity restrictions: antonio, PEG, NPO, MRSA contact precautions, ice chips with therapy/RN only    Subjective  Subjective: Pt in bed, \"not having a good day\", 10/10 pain in buttocks, BP 99/51, HR 74, O2 sats 96% on RA. Restrictions/Precautions: NPO;Isolation; Fall Risk;General Precautions             Objective     Cognition  Overall Cognitive Status: Exceptions  Arousal/Alertness: Appropriate responses to stimuli  Following Commands: Follows multistep commands with repitition; Follows multistep commands with increased time  Attention Span: Attends with cues to redirect  Memory: Decreased short term memory  Safety Judgement: Decreased awareness of need for assistance;Decreased awareness of need for safety  Problem Solving: Assistance required to identify errors made;Assistance required to implement solutions;Assistance required to generate solutions  Insights: Decreased awareness of deficits  Initiation: Requires cues for some  Sequencing: Requires cues for some  Orientation  Overall Orientation Status: Within Functional Limits   Perception  Overall Perceptual Status: Impaired  Unilateral Attention: Appears intact  Initiation: Cues to initiate tasks  Motor Planning: Cues to use objects appropriately  Perseveration: Not present     ADL  Putting On/Taking Off Footwear  Assistance Level:  Moderate assistance  Skilled Clinical Factors: to don B ROZ hose, able to don socks with use of sock aid and no VCs, setup for shoes          Functional Mobility  Device: Wheelchair  Activity: To/From therapy gym  Assistance Level: Modified independent  Skilled Clinical Factors: good maneuverability and endurance this date to propel w/c to/from gym  Bed Mobility  Overall Assistance Level: Modified Independent  Sit to Supine  Assistance Level: Modified independent  Supine to Sit  Assistance Level: Modified independent  Transfers  Surface: From bed; Wheelchair; To bed  Additional Factors: Verbal cues; Hand placement cues  Sit to Stand  Assistance Level: Stand by assist  Stand to Sit  Assistance Level: Stand by assist  Bed To/From Chair  Technique: Stand step  Assistance Level: Stand by assist  Skilled Clinical Factors: no AD  Stand Pivot  Assistance Level: Stand by assist   OT Exercises  A/AROM Exercises: x10 AROM for shoulder flexion  Resistive Exercises: 2# weight for elbow flexion, wrist extension, wrist flexion     Assessment  Assessment  Assessment: Pt agreeable to OT session. Pt performed functional transfers with SBA and improved balance this date with brief stands. Pt demonstrated poor standing/activity tolerance this date and stated \"having a bad day\". Pt performed standing dynamic task to step on/off balance discs with CGA for 60-90 seconds x2 and good coordination for tossing bean bags. BP 99/65 and 101/59 after each stand. Pt completed BUE ther ex with fair strenght and cues for full movement through ROM. Pt completed coordination task with fair coordination and good problem solving for clothespin and bolt/drill tasks. Pt located 7/7 Squigz in hallway while propelling self back to room with no VCs. Continue POC. Activity Tolerance: Patient limited by endurance; Patient limited by pain; Patient limited by fatigue  Discharge Recommendations: 24 hour supervision or assist;Home with Home health OT;S Level 4  OT Equipment Recommendations  Equipment Needed: No  Safety Devices  Safety Devices in place: Yes  Type of devices: Gait belt; Bed alarm in place;Call light within reach; Left in bed;Nurse notified    Patient Education  Education  Education Given To: Patient  Education Provided: Role of Therapy;Plan of Care;Precautions; Safety; Energy Conservation; Fall Prevention Strategies;DME/Home Modifications;Transfer Training;Mobility Training;Equipment;ADL Function;Cognition  Education Provided Comments: role of OT, safety with transfers, posture, midline balance, ADL retraining, benefit of OOB and mobility, purpose of ROZ hose, use of a/e  Education Method: Verbal;Demonstration  Barriers to Learning: Cognition  Education Outcome: Verbalized understanding;Demonstrated understanding;Continued education needed    Plan  Plan  Times per Week: 5/7 days/week  Plan Weeks: 3 weeks  Current Treatment Recommendations: Strengthening;ROM;Balance training;Functional mobility training; Endurance training; Safety education & training;Equipment evaluation, education, & procurement;Return to work related activity; Neuromuscular re-education;Patient/Caregiver education & training;Self-Care / ADL; Home management training;Coordination training    Goals  Short Term Goals  Time Frame for Short term goals: 1 week- 6/15/22  Short Term Goal 1: Pt will complete functional transfers with min A. GOAL MET 6/15/22 Pt performed functional transfers with min A. Short Term Goal 2: Pt will perform toileting with max A. progressing Pt requires total assist for toileting. Short Term Goal 3: Pt will complete LB dressing with mod A. progressing. Pt requires max A for LB dressing. Short Term Goal 4: Pt will perform UB dressing with min A. GOAL MET 6/11/22 Pt completed UB dressing with SPV. Long Term Goals  Time Frame for Long term goals : 3 weeks- 6/29/22  Long Term Goal 1: Pt will perform functional transfers with CGA. Long Term Goal 2: Pt will complete toileting with CGA. Long Term Goal 3: Pt will perform LB dressing with CGA. Long Term Goal 4: Pt will perform UB dressing with setup.   Long Term Goal 5: Pt will complete full body bathing with CGA.       Therapy Time   Individual Concurrent Group Co-treatment   Time In 1330         Time Out 1430         Minutes 60         Timed Code Treatment Minutes: 16 Karen Yen

## 2022-06-17 NOTE — PROGRESS NOTES
Mary Benitezalgo  6/17/2022  [id number]    Chief Complaint: Critical illness myopathy    Subjective:   No acute events overnight. Today Mp Miramontes is seen in his room, resting in bed. He reports feeling fatigued and having dry mouth. He also reports continued buttock pain. ROS: No N/V, chest pain, SOB, chills or fever    Objective:  Patient Vitals for the past 24 hrs:   BP Temp Temp src Pulse Resp SpO2   06/17/22 1535 -- -- -- -- 16 --   06/17/22 0900 115/68 97.5 °F (36.4 °C) Axillary 85 16 96 %   06/16/22 2144 (!) 104/54 98 °F (36.7 °C) Axillary 74 18 94 %     Gen: No distress, pleasant. Cachectic, on side in bed  HEENT: Normocephalic, atraumatic. Bilateral radical neck dissection present  CV: extremities well perfused   Resp: No respiratory distress. Abd: Soft, nontender, G-tube in place  Ext: No edema  Neuro: Alert, oriented, appropriately interactive. Wt Readings from Last 3 Encounters:   06/12/22 109 lb 2 oz (49.5 kg)   02/22/22 140 lb 12.8 oz (63.9 kg)   02/13/22 149 lb 12.8 oz (67.9 kg)       Laboratory data:   Lab Results   Component Value Date    WBC 4.0 06/17/2022    HGB 7.1 (L) 06/17/2022    HCT 21.1 (L) 06/17/2022    MCV 85.8 06/17/2022     (L) 06/17/2022       Lab Results   Component Value Date     06/17/2022    K 4.5 06/17/2022    CL 97 06/17/2022    CO2 30 06/17/2022    BUN 29 06/17/2022    CREATININE 0.6 06/17/2022    GLUCOSE 94 06/17/2022    CALCIUM 9.3 06/17/2022        Therapy progress:  PT  Position Activity Restriction  Other position/activity restrictions: antonio, PEG, NPO, MRSA contact precautions, ice chips with therapy/RN only  Objective     Sit to Stand: Minimal Assistance (at IV pole)  Stand to sit: Minimal Assistance (at IV pole)  Bed to Chair: Moderate assistance  Device: No Device,Hand-Held Assist  Other Apparatus:  (IV pole with L UE support)  Assistance:  Moderate assistance,Maximum assistance  Distance: 5 ft x 2  OT  PT Equipment Recommendations  Equipment Needed: Yes  Mobility Devices: Wheelchair  Wheelchair: Standard  Toilet - Technique: Stand pivot  Equipment Used: Standard toilet  Assessment        SLP    Recommendations:  Diet recommendation: NPO; NPO and Ice chips with SLP/RN only; Meds via alt means of nutrition  Instrumentation: not clinically warranted at this time   Risk management: oral care 2-3x/day to reduce adverse affects in the event of aspiration            Body mass index is 17.61 kg/m².       Rehabilitation Diagnosis:  Neurologic, 3.8, Neuromuscular Disorders, e.g. 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  He was admitted to [facility] on 6/8/22 due to functional deficits below his baseline.      Critical Illness Myopathy  - due to below  - PT, OT, ST     Squamous Cell Carcinoma of the tongue  - s/p right hemiglossectomy, bilateral neck dissection, and RFFF reconstruction on 1/3/22 followed by chemoradiation, and weekly cisplatin completed 4/25/22 and s/p G-tube  - NPO except ice chips  - Tube feeds per orders  - dietary following  - GI following for G tube concerns  - will schedule nystatin swish and spit for comfort, biotene   - PT, OT, ST    Oropharyngeal Dysphagia  - on tube feeds as above  - ST    Hypotension, improved  - s/p 1 L NS  - increased fluids through G-tube     MRSA bacteremia  - cefaroline for 24 days from admit per  ID  - consulted to ID as this medication needs to be cleared by them per pharmacy  - PICC replaced on admission      Acute hypoxic respiratory failure  - with right lower hydropneumothorax s/p chest tube, MRSA pneumonia, pleural effusions s/p chest tube and requiring several reintubations during acute stay  - adequately oxygenating on room air  - wean oxygen for SpO2>90%  - IS      Urinary Retention  - has severe penile edema during acute stay. Was followed by Urology and failed multiple voiding trials  - discontinued doxazosin due to hypotension  - continue antonio catheter, most recently placed 6/6  - follow up with Urology outpatient      PE  Right Atrial Thrombus  - s/p thrombectomy 5/20 with IR, thrombus positive for MRSA  - Elquis 5 mg BID      Anasarca  - requiring diuretics during acute stay, since weaned off  - monitor     Hyponatremia  - Nephrology following, appreciate assistance     Anemia  - Hb stable, suspect that 10.8 lab value was error  - monitor and transfuse for Hb<7     Severe Protein-calorie malnutrition  - BMI 17.9  - bolus feeds per dietary  - Dietician consulted     Pain  - gabapentin 100 mg TID  - PRN tylenol, PRN oxycodone 5 or 7.5 mg q4 hours      Anxiety, Depression  - clonazepam 0.5 mg daily PRN  - Psychiatry consulted     Multiple Wounds POA  - including stage four pressure ulcer on coccyx  - wound care per orders  - wound care following     Bowels: Schedule stool softener. Follow bowel movements. Enema or suppository if needed.      Bladder: continue antonio     Sleep: patient allergic to trazodone     Follow up appointments: PCP, ID, Cardiology, Oncology    Services: home health PT, OT, [address] [place] [address] , nursing  EDOD: 6/25/22    Aldo Erazo.  Dusty Estrada MD 6/17/2022, 5:20 PM

## 2022-06-17 NOTE — PROGRESS NOTES
[facility]  Follow-up Progress Note       NAME:  Bertha Ramirez  MEDICAL RECORD NUMBER:  [id number]  AGE:  28 y.o. GENDER:  male  :  1990  TODAY'S DATE:  2022    Subjective: patient tries to talk but grabble voice, uses telephone messages to ask questions. Wound Identification:Wound Identification:Deep wound to sacrum, open area throat from radiation treatment, area right neck, right chest open area red.  Left radial scrap area.  Left lateral leg open wound    Only changed sacrum today. Floor nurse will need to change rest of dressings. Wound Type: pressure, non-healing surgical and traumatic    Contributing Factors: chronic pressure, decreased mobility and shear forceRadiation treatment         Patient Goal of Care:  [x] Wound Healing  [] Odor Control  [] Palliative Care  [x] Pain Control   [] Other:     Objective: Peg tube sealed, antonio in place. IV antibiotics infusing, Teflaro. /68   Pulse 85   Temp 97.5 °F (36.4 °C) (Axillary)   Resp 16   Ht 5' 6\" (1.676 m)   Wt 109 lb 2 oz (49.5 kg)   SpO2 96%   BMI 17.61 kg/m²   Hiram Risk Score: Hiram Scale Score: 17  Assessment: sacrum wound bed anterior slight slough present, granulation red rising. Measurements:  Negative Pressure Wound Therapy Sacrum Mid (Active)   $ Standard NPWT >50 sq cm PER TX $ Yes 22 1240   Wound Type Pressure ulcer: Stage IV 22 1240   Unit Type hospital 22 1240   Dressing Type Other (Comment); Black Foam 22 1240   Number of pieces used 5 22 1240   Number of pieces removed 2 22 1240   Cycle Continuous 22 1240   Target Pressure (mmHg) 125 22 1240   Intensity 1 22 1240   Irrigation Solution Sodium chloride 0.9% 22 1240   Instillation Volume  24 mL 22 1240   Soak Time  10 minutes 22 1240   Vac Frequency 3 hours 22 1240   Canister changed?  No 22 1240   Dressing Status Clean;Dry; Intact 06/17/22 1240   Dressing Changed Changed/New 06/17/22 1240   Drainage Amount Small 06/17/22 1240   Drainage Description Serosanguinous 06/17/22 1240   Dressing Change Due 06/20/22 06/17/22 1240   Output (ml) 450 ml 06/16/22 0901   Wound Assessment Slough;Granulation tissue 06/17/22 1240   Shape oval 06/17/22 1240   Odor None 06/17/22 1240   Number of days: 6       Wound 06/08/22 Throat Right (Active)   Wound Image   06/15/22 1308   Wound Etiology Other 06/16/22 2144   Dressing Status Clean;Dry; Intact 06/16/22 2144   Wound Cleansed Cleansed with saline 06/15/22 1308   Dressing/Treatment Alginate with Ag;Dry dressing; Foam 06/15/22 1308   Dressing Change Due 06/16/22 06/15/22 1308   Wound Length (cm) 4.5 cm 06/15/22 1308   Wound Width (cm) 4 cm 06/15/22 1308   Wound Depth (cm) 0.1 cm 06/15/22 1308   Wound Surface Area (cm^2) 18 cm^2 06/15/22 1308   Change in Wound Size % (l*w) -2.86 06/15/22 1308   Wound Volume (cm^3) 1.8 cm^3 06/15/22 1308   Wound Healing % -3 06/15/22 1308   Wound Assessment Slough 06/15/22 1308   Drainage Amount Scant 06/15/22 1308   Drainage Description Serosanguinous 06/15/22 1308   Odor None 06/15/22 1308   Kristel-wound Assessment Blanchable erythema 06/15/22 1308   Margins Attached edges 06/15/22 1308   Wound Thickness Description not for Pressure Injury Partial thickness 06/15/22 1308   Number of days: 8       Wound 06/08/22 Chest Right;Upper (Active)   Wound Image   06/10/22 1758   Wound Etiology Other 06/16/22 2144   Dressing Status Clean;Dry; Intact 06/16/22 2144   Wound Cleansed Cleansed with saline 06/15/22 1308   Dressing/Treatment Honey gel/honey paste;Foam;Dry dressing 06/16/22 2144   Dressing Change Due 06/16/22 06/15/22 1308   Wound Length (cm) 1 cm 06/15/22 1308   Wound Width (cm) 1 cm 06/15/22 1308   Wound Depth (cm) 0.1 cm 06/15/22 1308   Wound Surface Area (cm^2) 1 cm^2 06/15/22 1308   Change in Wound Size % (l*w) 33.33 06/15/22 1308   Wound Volume (cm^3) 0.1 cm^3 06/15/22 1308   Wound Healing % 33 06/15/22 1308   Wound Assessment Pink/red 06/16/22 2144   Drainage Amount Scant 06/15/22 1308   Drainage Description Serosanguinous 06/15/22 1308   Odor None 06/15/22 1308   Kristel-wound Assessment Blanchable erythema 06/15/22 1308   Margins Attached edges 06/15/22 1308   Wound Thickness Description not for Pressure Injury Partial thickness 06/14/22 0830   Number of days: 8       Wound 06/08/22 Radial Distal;Left (Active)   Wound Image   06/10/22 1758   Wound Etiology Traumatic 06/16/22 2144   Dressing Status Clean;Dry; Intact 06/16/22 2144   Wound Cleansed Cleansed with saline 06/15/22 1308   Dressing/Treatment Honey gel/honey paste; Eye pad;Foam;Roll gauze 06/15/22 1308   Dressing Change Due 06/15/22 06/13/22 1132   Wound Length (cm) 3 cm 06/15/22 1308   Wound Width (cm) 1 cm 06/15/22 1308   Wound Depth (cm) 0.1 cm 06/15/22 1308   Wound Surface Area (cm^2) 3 cm^2 06/15/22 1308   Change in Wound Size % (l*w) 50 06/15/22 1308   Wound Volume (cm^3) 0.3 cm^3 06/15/22 1308   Wound Healing % 50 06/15/22 1308   Wound Assessment Pink/red;Granulation tissue 06/15/22 1308   Drainage Amount None 06/15/22 1308   Drainage Description Serosanguinous 06/15/22 1308   Odor None 06/15/22 1308   Kristel-wound Assessment Blanchable erythema 06/15/22 1308   Margins Attached edges 06/15/22 1308   Wound Thickness Description not for Pressure Injury Partial thickness 06/15/22 1308   Number of days: 8       Wound 06/08/22 Knee Left;Posterior (Active)   Wound Image   06/15/22 1308   Wound Etiology Pressure Stage 3 06/16/22 2144   Dressing Status Clean;Dry; Intact 06/16/22 2144   Wound Cleansed Cleansed with saline 06/15/22 1308   Dressing/Treatment Honey gel/honey paste 06/15/22 1308   Offloading for Diabetic Foot Ulcers Offloading ordered 06/15/22 1308   Dressing Change Due 06/16/22 06/15/22 1308   Wound Length (cm) 3.5 cm 06/15/22 1308   Wound Width (cm) 1.5 cm 06/15/22 1308   Wound Depth (cm) 0.1 cm 06/15/22 1308   Wound Surface Area (cm^2) 5.25 cm^2 06/15/22 1308   Change in Wound Size % (l*w) 40 06/15/22 1308   Wound Volume (cm^3) 0.525 cm^3 06/15/22 1308   Wound Healing % 70 06/15/22 1308   Wound Assessment Pink/red 06/15/22 1308   Drainage Amount None 06/15/22 1308   Drainage Description Sanguinous 06/13/22 1132   Odor None 06/15/22 1308   Kristel-wound Assessment Blanchable erythema 06/15/22 1308   Margins Attached edges 06/15/22 1308   Wound Thickness Description not for Pressure Injury Partial thickness 06/15/22 1308   Number of days: 8       Wound 06/08/22 Sacrum (Active)   Wound Image   06/17/22 1240   Wound Etiology Pressure Stage 4 06/17/22 1240   Dressing Status Clean;Dry; Intact 06/17/22 1240   Wound Cleansed Cleansed with saline 06/17/22 1240   Dressing/Treatment Negative pressure wound therapy 06/17/22 1240   Offloading for Diabetic Foot Ulcers Offloading ordered 06/17/22 1240   Dressing Change Due 06/20/22 06/17/22 1240   Wound Length (cm) 10 cm 06/17/22 1240   Wound Width (cm) 6 cm 06/17/22 1240   Wound Depth (cm) 1.4 cm 06/17/22 1240   Wound Surface Area (cm^2) 60 cm^2 06/17/22 1240   Change in Wound Size % (l*w) 30.31 06/17/22 1240   Wound Volume (cm^3) 84 cm^3 06/17/22 1240   Wound Healing % 43 06/17/22 1240   Undermining Starts ___ O'Clock 1200 06/17/22 1240   Undermining Ends___ O'Clock 0400 06/17/22 1240   Undermining Maxium Distance (cm) 1.4 06/17/22 1240   Wound Assessment Pink/red;Slough 06/17/22 1240   Drainage Amount Small 06/17/22 1240   Drainage Description Serosanguinous 06/17/22 1240   Odor Mild 06/17/22 1240   Kristel-wound Assessment Blanchable erythema 06/17/22 1240   Margins Attached edges 06/17/22 1240   Wound Thickness Description not for Pressure Injury Full thickness 06/17/22 1240   Number of days: 8   Sacrum      5 pieces foam out 2 pieces replaced. Response to treatment:  With complaints of pain.      Pain Assessment:  Severity:  5 / 10  Quality of pain: aching  Wound Pain Timing/Severity: intermittent  Premedicated: No  Plan:   Plan of Care: Wound 06/08/22 Throat Right-Dressing/Treatment: Alginate with Ag,Dry dressing,Foam  Wound 06/08/22 Chest Right;Upper-Dressing/Treatment: Honey gel/honey paste,Foam,Dry dressing  Wound 06/08/22 Radial Distal;Left-Dressing/Treatment: Honey gel/honey paste,Eye pad,Foam,Roll gauze  Wound 06/08/22 Knee Left;Posterior-Dressing/Treatment: Honey gel/honey paste  Wound 06/08/22 Sacrum-Dressing/Treatment: Negative pressure wound therapy    Recommendation  Daily Chin/throat, cleanse with normal saline, pat dry, apply Alginate AG cover with foam.     BID   Areas to right Neck/right Chest/L wrist and left leg wounds: Cleanse with Cleanse with normal salinealine, pat dry. Peak and Peel BID  Dressing to Apply medihoney gel to wound bed of chin, neck & chest, left arm and left leg. Cover with guaze and bordered foam dressing. Change every 3 days foam dressing and PRN for saturation.       Changed Mom, Wed, Fri. Cleanse sacral wound with normal saline, pat dry, apply barrier wipe then hydrocolloid to wound edges.  Hydrocolloid strips to edges of posterior wound near rectum. Apply VAC drape to wound edges.  Insert gray foam with into tunnel areas. [place] foam with holes to body of wound with solid gray over top, with tracking  Going over right hip to attach vacuum suction to. Cover with VAC drape to seal.     2 pieces of solid black Vera flow  foam used in sacrum wound.   Cleanse choice  machine;set at 24 flush normal saline, soak for 10minutes every 3 hours.  Treatment is 125 mmHg continuous suction.     Change cannister once a week or when full  Not changed   If suction fails or patient is discharged:  -remove vac dressing  -cleanse wound with normal saline  -pack wound with saline moistened guaze and change every 12 hours.    Call wound care for deterioration [phone] or call [phone] and leave message.         Specialty Bed Required : Yes power pro elite  [x] Low Air Loss   [x] Pressure Redistribution  [] Fluid Immersion  [] Bariatric  [] Total Pressure Relief  [] Other:     Current Diet: Diet NPO  ADULT TUBE FEEDING; PEG; Standard with Fiber; Bolus; 5 Times Daily; 237; 60; Before and after each bolus; Protein; 1 bottle of Proteinex daily, 30 mL free water flush before and after adminstration  Dietician consult:  Yes    Discharge Plan:  Placement for patient upon discharge: home with support   Patient appropriate for Outpatient [address]: Yes    Referrals:  [x]  following  [] [employer]  [] Supplies  [] Other    Patient/Caregiver Teaching:  Level of patient/caregiver understanding able to:   [] Indicates understanding       [] Needs reinforcement  [] Unsuccessful      [] Verbal Understanding  [] Demonstrated understanding       [] No evidence of learning  [] Refused teaching         [x] N/A       Electronically signed by Yovana Suresh RN, on 6/17/2022 at 1:00 PM

## 2022-06-17 NOTE — PROGRESS NOTES
[facility] [employer]  Nephrology follow-up note           Reason for Consult: Hyponatremia  Requesting Physician:  Dr. Gumaro Turner history  Sodium stabilized in low 130 range. On current feeds. Still very weak   Complains of pain in the area of the sacral decubital ulcer. ROS:   +weak. No pain or shortness of breath. Scheduled Meds:   venlafaxine  75 mg Oral Daily with breakfast    QUEtiapine  50 mg Oral Nightly    sodium zirconium cyclosilicate  10 g Oral Once    magnesium oxide  400 mg Per G Tube BID    zinc oxide   Topical BID    lidocaine 1 % injection  5 mL IntraDERmal Once    sodium chloride flush  5-40 mL IntraVENous 2 times per day    nystatin  5 mL Oral 4x Daily    sodium hypochlorite   Irrigation Daily    apixaban  5 mg Per G Tube BID    ceftaroline fosamil (TEFLARO) 600 MG IVPB  600 mg IntraVENous D2F    folic acid  1 mg Per G Tube Daily    gabapentin  100 mg Per G Tube TID    melatonin  5 mg Per G Tube Nightly    sennosides-docusate sodium  2 tablet Per G Tube Nightly    thiamine  100 mg Per G Tube Daily     Continuous Infusions:   sodium chloride       PRN Meds:.saliva substitute, medihoney, oxyCODONE, oxyCODONE, Lip Balm, sodium chloride flush, sodium chloride, acetaminophen, bisacodyl, sodium chloride, clonazePAM, albuterol    History of Present Illness on 6/9/2022:    28 y.o. yo male with PMH of squamous cell carcinoma of the tongue (s/p right hemiglossectomy, b/l ND, and RFFF reconstruction on 1/3/22, radiation, and weekly cisplatin completed 4/25/22) who is admitted to ARU from  for rehabilitation. Nephrology has been consulted for hyponatremia and hyperkalemia  Patient is being fed through the feeding tube. His sodium on 6/8 at [facility] was 133 and has been around 130-135 in June.   Earlier in admission, he was hypernatremic in the 149 range as well    He was admitted at [facility] from 5/2/22 and had MRSA bacteremia from infected port s/p removal. Per Dr Nilton Obando, while at [facility],  \"pt had right hydropneumothorax with worsening hypoxia requiring multiple intubations and chest tube placements. He underwent right internal jugular and right atrial thrombectomy due to concern that this was causing ongoing bacteremia. ID was consulted and he is to remain on IV ceftaroline with plan for long term treatment until 7/2/22. His course was complicated by severe penile edema and antonio was placed. Urology followed during acute stay. Patient failed several voiding trials    Physical exam:   Constitutional:  VITALS:  /68   Pulse 85   Temp 97.5 °F (36.4 °C) (Axillary)   Resp 16   Ht 5' 6\" (1.676 m)   Wt 109 lb 2 oz (49.5 kg)   SpO2 96%   BMI 17.61 kg/m²   Gen: alert, awake  Neck: No JVD  Skin: Unremarkable  Cardiovascular:  S1, S2 without m/r/g   Respiratory: CTA B without w/r/r; respiratory effort normal  Abdomen:  soft, nt, nd,   Extremities: no lower extremity edema  Neuro/Psy: AAoriented times 3 ; moves all 4 ext    Data/  Recent Labs     06/15/22  0650 06/17/22  0600   WBC 3.2* 4.0   HGB 7.3* 7.1*   HCT 21.6* 21.1*   MCV 85.1 85.8   * 112*     Recent Labs     06/15/22  0650 06/16/22  0630 06/17/22  0600   * 132* 132*   K 4.5 4.7 4.5   CL 97* 97* 97*   CO2 30 28 30   GLUCOSE 103* 98 94   BUN 27* 27* 29*   CREATININE 0.7* 0.6* 0.6*   LABGLOM >60 >60 >60   GFRAA >60 >60 >60     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 High BUN so getting adequate solute. 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  ID following     -Chronic antonio d/t penile edema and failed voiding trials    -Elevated TSH, mild    -Anemia per rehab physician    -Hyperkalemia - better with prn lokelma    -Hypomagnesemia - on oral Mg replacement      Plan    Sodium has been stable  Following labs periodically   No changes needed at this time

## 2022-06-18 PROCEDURE — 6360000002 HC RX W HCPCS: Performed by: STUDENT IN AN ORGANIZED HEALTH CARE EDUCATION/TRAINING PROGRAM

## 2022-06-18 PROCEDURE — 2580000003 HC RX 258: Performed by: STUDENT IN AN ORGANIZED HEALTH CARE EDUCATION/TRAINING PROGRAM

## 2022-06-18 PROCEDURE — 6370000000 HC RX 637 (ALT 250 FOR IP): Performed by: STUDENT IN AN ORGANIZED HEALTH CARE EDUCATION/TRAINING PROGRAM

## 2022-06-18 PROCEDURE — 1280000000 HC REHAB R&B

## 2022-06-18 PROCEDURE — 6370000000 HC RX 637 (ALT 250 FOR IP): Performed by: PHYSICAL MEDICINE & REHABILITATION

## 2022-06-18 PROCEDURE — 6370000000 HC RX 637 (ALT 250 FOR IP): Performed by: PSYCHIATRY & NEUROLOGY

## 2022-06-18 RX ADMIN — Medication 100 MG: at 08:43

## 2022-06-18 RX ADMIN — APIXABAN 5 MG: 5 TABLET, FILM COATED ORAL at 21:35

## 2022-06-18 RX ADMIN — Medication 5 MG: at 21:34

## 2022-06-18 RX ADMIN — DAKIN'S SOLUTION 0.125% (QUARTER STRENGTH): 0.12 SOLUTION at 12:01

## 2022-06-18 RX ADMIN — SODIUM CHLORIDE, PRESERVATIVE FREE 10 ML: 5 INJECTION INTRAVENOUS at 21:36

## 2022-06-18 RX ADMIN — CEFTAROLINE FOSAMIL 600 MG: 600 POWDER, FOR SOLUTION INTRAVENOUS at 11:13

## 2022-06-18 RX ADMIN — GABAPENTIN 100 MG: 100 CAPSULE ORAL at 14:33

## 2022-06-18 RX ADMIN — GABAPENTIN 100 MG: 100 CAPSULE ORAL at 08:36

## 2022-06-18 RX ADMIN — Medication 400 MG: at 08:36

## 2022-06-18 RX ADMIN — FOLIC ACID 1 MG: 1 TABLET ORAL at 08:36

## 2022-06-18 RX ADMIN — VENLAFAXINE HYDROCHLORIDE 75 MG: 37.5 CAPSULE, EXTENDED RELEASE ORAL at 08:36

## 2022-06-18 RX ADMIN — NYSTATIN 500000 UNITS: 100000 SUSPENSION ORAL at 08:36

## 2022-06-18 RX ADMIN — NYSTATIN 500000 UNITS: 100000 SUSPENSION ORAL at 14:31

## 2022-06-18 RX ADMIN — NYSTATIN 500000 UNITS: 100000 SUSPENSION ORAL at 21:35

## 2022-06-18 RX ADMIN — Medication 400 MG: at 21:34

## 2022-06-18 RX ADMIN — OXYCODONE HYDROCHLORIDE 7.5 MG: 15 TABLET ORAL at 21:34

## 2022-06-18 RX ADMIN — QUETIAPINE FUMARATE 50 MG: 25 TABLET ORAL at 21:34

## 2022-06-18 RX ADMIN — Medication: at 12:02

## 2022-06-18 RX ADMIN — Medication: at 21:44

## 2022-06-18 RX ADMIN — GABAPENTIN 100 MG: 100 CAPSULE ORAL at 21:35

## 2022-06-18 RX ADMIN — APIXABAN 5 MG: 5 TABLET, FILM COATED ORAL at 08:36

## 2022-06-18 RX ADMIN — OXYCODONE HYDROCHLORIDE 7.5 MG: 15 TABLET ORAL at 14:32

## 2022-06-18 RX ADMIN — DOCUSATE SODIUM AND SENNOSIDES 2 TABLET: 8.6; 5 TABLET, FILM COATED ORAL at 21:34

## 2022-06-18 RX ADMIN — SODIUM CHLORIDE, PRESERVATIVE FREE 10 ML: 5 INJECTION INTRAVENOUS at 12:02

## 2022-06-18 RX ADMIN — CEFTAROLINE FOSAMIL 600 MG: 600 POWDER, FOR SOLUTION INTRAVENOUS at 02:38

## 2022-06-18 RX ADMIN — CEFTAROLINE FOSAMIL 600 MG: 600 POWDER, FOR SOLUTION INTRAVENOUS at 17:54

## 2022-06-18 ASSESSMENT — PAIN - FUNCTIONAL ASSESSMENT
PAIN_FUNCTIONAL_ASSESSMENT: PREVENTS OR INTERFERES WITH MANY ACTIVE NOT PASSIVE ACTIVITIES
PAIN_FUNCTIONAL_ASSESSMENT: PREVENTS OR INTERFERES SOME ACTIVE ACTIVITIES AND ADLS
PAIN_FUNCTIONAL_ASSESSMENT: PREVENTS OR INTERFERES SOME ACTIVE ACTIVITIES AND ADLS

## 2022-06-18 ASSESSMENT — PAIN DESCRIPTION - DESCRIPTORS
DESCRIPTORS: ACHING;SHARP
DESCRIPTORS: ACHING;THROBBING
DESCRIPTORS: ACHING

## 2022-06-18 ASSESSMENT — PAIN DESCRIPTION - FREQUENCY
FREQUENCY: CONTINUOUS
FREQUENCY: CONTINUOUS

## 2022-06-18 ASSESSMENT — PAIN DESCRIPTION - ONSET
ONSET: ON-GOING
ONSET: ON-GOING

## 2022-06-18 ASSESSMENT — PAIN DESCRIPTION - PAIN TYPE
TYPE: ACUTE PAIN
TYPE: ACUTE PAIN

## 2022-06-18 ASSESSMENT — PAIN SCALES - GENERAL
PAINLEVEL_OUTOF10: 10
PAINLEVEL_OUTOF10: 8
PAINLEVEL_OUTOF10: 7
PAINLEVEL_OUTOF10: 6
PAINLEVEL_OUTOF10: 7
PAINLEVEL_OUTOF10: 8

## 2022-06-18 ASSESSMENT — PAIN DESCRIPTION - LOCATION
LOCATION: THROAT
LOCATION: COCCYX
LOCATION: COCCYX

## 2022-06-18 NOTE — PROGRESS NOTES
PROGRESS NOTE  S:31 yrs Patient  admitted on 6/8/2022 with Critical illness myopathy [G72.81] . Today he complains of leakage around G tube    Exam:   Vitals:    06/17/22 2214   BP: (!) 105/59   Pulse: 76   Resp: 18   Temp: 96.9 °F (36.1 °C)   SpO2: 99%      General appearance: alert, appears older than stated age and cachectic  HEENT: Sclera clear, anicteric  Neck: supple  Lungs: clear to auscultation bilaterally  Heart: regular rate and rhythm  Abdomen: soft, G tube site with moderate discharge  Extremities: edema none     Medications: Reviewed    Labs:  CBC:   Recent Labs     06/17/22  0600   WBC 4.0   HGB 7.1*   HCT 21.1*   MCV 85.8   *     BMP:   Recent Labs     06/16/22  0630 06/17/22  0600   * 132*   K 4.7 4.5   CL 97* 97*   CO2 28 30   BUN 27* 29*   CREATININE 0.6* 0.6*     LIVER PROFILE: No results for input(s): AST, ALT, LIPASE, PROT, BILIDIR, BILITOT, ALKPHOS in the last 72 hours. Invalid input(s): AMYLASE,  ALB  PT/INR:   No results for input(s): INR in the last 72 hours. 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 His G tube appears to be functioning well but with leakage around it    Recommendation:  1. Continue supportive care  2. Continue TFs per dietitian recs  3. Keep HOB elevated during PEG use  4.  The PEG tube was replaced on 6/14 w a shorter 2 cm ADITI-Key low profile G tube at bedside w improvement in leaking        Chelsie Martinez MD, MD  7:38 AM 6/18/2022

## 2022-06-18 NOTE — PLAN OF CARE
Problem: Pain  Goal: Verbalizes/displays adequate comfort level or baseline comfort level  Outcome: Progressing  Flowsheets (Taken 6/17/2022 2214)  Verbalizes/displays adequate comfort level or baseline comfort level:   Encourage patient to monitor pain and request assistance   Assess pain using appropriate pain scale     Problem: Safety - Adult  Goal: Free from fall injury  Outcome: Progressing     Problem: Nutrition Deficit:  Goal: Optimize nutritional status  Outcome: Progressing

## 2022-06-19 PROCEDURE — 6370000000 HC RX 637 (ALT 250 FOR IP): Performed by: STUDENT IN AN ORGANIZED HEALTH CARE EDUCATION/TRAINING PROGRAM

## 2022-06-19 PROCEDURE — 6370000000 HC RX 637 (ALT 250 FOR IP): Performed by: PHYSICAL MEDICINE & REHABILITATION

## 2022-06-19 PROCEDURE — 1280000000 HC REHAB R&B

## 2022-06-19 PROCEDURE — 6360000002 HC RX W HCPCS: Performed by: STUDENT IN AN ORGANIZED HEALTH CARE EDUCATION/TRAINING PROGRAM

## 2022-06-19 PROCEDURE — 2580000003 HC RX 258: Performed by: STUDENT IN AN ORGANIZED HEALTH CARE EDUCATION/TRAINING PROGRAM

## 2022-06-19 PROCEDURE — 6370000000 HC RX 637 (ALT 250 FOR IP): Performed by: PSYCHIATRY & NEUROLOGY

## 2022-06-19 RX ADMIN — QUETIAPINE FUMARATE 50 MG: 25 TABLET ORAL at 21:12

## 2022-06-19 RX ADMIN — Medication 100 MG: at 09:25

## 2022-06-19 RX ADMIN — Medication: at 21:16

## 2022-06-19 RX ADMIN — Medication 5 MG: at 21:12

## 2022-06-19 RX ADMIN — OXYCODONE HYDROCHLORIDE 7.5 MG: 15 TABLET ORAL at 09:24

## 2022-06-19 RX ADMIN — Medication 400 MG: at 09:26

## 2022-06-19 RX ADMIN — FOLIC ACID 1 MG: 1 TABLET ORAL at 09:26

## 2022-06-19 RX ADMIN — DOCUSATE SODIUM AND SENNOSIDES 2 TABLET: 8.6; 5 TABLET, FILM COATED ORAL at 21:12

## 2022-06-19 RX ADMIN — GABAPENTIN 100 MG: 100 CAPSULE ORAL at 09:26

## 2022-06-19 RX ADMIN — CEFTAROLINE FOSAMIL 600 MG: 600 POWDER, FOR SOLUTION INTRAVENOUS at 10:31

## 2022-06-19 RX ADMIN — NYSTATIN 500000 UNITS: 100000 SUSPENSION ORAL at 09:26

## 2022-06-19 RX ADMIN — NYSTATIN 500000 UNITS: 100000 SUSPENSION ORAL at 12:48

## 2022-06-19 RX ADMIN — APIXABAN 5 MG: 5 TABLET, FILM COATED ORAL at 09:26

## 2022-06-19 RX ADMIN — VENLAFAXINE HYDROCHLORIDE 75 MG: 37.5 CAPSULE, EXTENDED RELEASE ORAL at 09:25

## 2022-06-19 RX ADMIN — CEFTAROLINE FOSAMIL 600 MG: 600 POWDER, FOR SOLUTION INTRAVENOUS at 02:21

## 2022-06-19 RX ADMIN — DAKIN'S SOLUTION 0.125% (QUARTER STRENGTH): 0.12 SOLUTION at 11:03

## 2022-06-19 RX ADMIN — OXYCODONE HYDROCHLORIDE 7.5 MG: 15 TABLET ORAL at 21:12

## 2022-06-19 RX ADMIN — NYSTATIN 500000 UNITS: 100000 SUSPENSION ORAL at 16:58

## 2022-06-19 RX ADMIN — OXYCODONE HYDROCHLORIDE 7.5 MG: 15 TABLET ORAL at 16:58

## 2022-06-19 RX ADMIN — SODIUM CHLORIDE, PRESERVATIVE FREE 10 ML: 5 INJECTION INTRAVENOUS at 10:25

## 2022-06-19 RX ADMIN — Medication: at 21:15

## 2022-06-19 RX ADMIN — NYSTATIN 500000 UNITS: 100000 SUSPENSION ORAL at 21:12

## 2022-06-19 RX ADMIN — Medication 400 MG: at 21:12

## 2022-06-19 RX ADMIN — CLONAZEPAM 0.5 MG: 0.5 TABLET ORAL at 21:14

## 2022-06-19 RX ADMIN — APIXABAN 5 MG: 5 TABLET, FILM COATED ORAL at 21:12

## 2022-06-19 RX ADMIN — GABAPENTIN 100 MG: 100 CAPSULE ORAL at 12:48

## 2022-06-19 RX ADMIN — CEFTAROLINE FOSAMIL 600 MG: 600 POWDER, FOR SOLUTION INTRAVENOUS at 17:05

## 2022-06-19 RX ADMIN — GABAPENTIN 100 MG: 100 CAPSULE ORAL at 21:12

## 2022-06-19 RX ADMIN — SODIUM CHLORIDE, PRESERVATIVE FREE 10 ML: 5 INJECTION INTRAVENOUS at 21:12

## 2022-06-19 RX ADMIN — Medication: at 11:05

## 2022-06-19 ASSESSMENT — PAIN SCALES - GENERAL
PAINLEVEL_OUTOF10: 10
PAINLEVEL_OUTOF10: 8
PAINLEVEL_OUTOF10: 5
PAINLEVEL_OUTOF10: 10
PAINLEVEL_OUTOF10: 10

## 2022-06-19 ASSESSMENT — PAIN DESCRIPTION - LOCATION
LOCATION: COCCYX;SACRUM
LOCATION: COCCYX
LOCATION: COCCYX
LOCATION: COCCYX;BUTTOCKS

## 2022-06-19 ASSESSMENT — PAIN - FUNCTIONAL ASSESSMENT
PAIN_FUNCTIONAL_ASSESSMENT: PREVENTS OR INTERFERES SOME ACTIVE ACTIVITIES AND ADLS
PAIN_FUNCTIONAL_ASSESSMENT: PREVENTS OR INTERFERES SOME ACTIVE ACTIVITIES AND ADLS
PAIN_FUNCTIONAL_ASSESSMENT: PREVENTS OR INTERFERES WITH MANY ACTIVE NOT PASSIVE ACTIVITIES
PAIN_FUNCTIONAL_ASSESSMENT: PREVENTS OR INTERFERES SOME ACTIVE ACTIVITIES AND ADLS

## 2022-06-19 ASSESSMENT — PAIN DESCRIPTION - PAIN TYPE
TYPE: ACUTE PAIN
TYPE: ACUTE PAIN

## 2022-06-19 ASSESSMENT — PAIN DESCRIPTION - FREQUENCY
FREQUENCY: CONTINUOUS
FREQUENCY: CONTINUOUS

## 2022-06-19 ASSESSMENT — PAIN DESCRIPTION - DESCRIPTORS
DESCRIPTORS: THROBBING;SHARP
DESCRIPTORS: SHOOTING
DESCRIPTORS: THROBBING
DESCRIPTORS: THROBBING;STABBING;ACHING

## 2022-06-19 ASSESSMENT — PAIN DESCRIPTION - ONSET: ONSET: ON-GOING

## 2022-06-19 NOTE — PROGRESS NOTES
PROGRESS NOTE  S:31 yrs Patient  admitted on 6/8/2022 with Critical illness myopathy [G72.81] . Today he complains of leakage around G tube    Exam:   Vitals:    06/19/22 0745   BP: 137/85   Pulse: 76   Resp: 16   Temp: 97.1 °F (36.2 °C)   SpO2:       General appearance: alert, appears older than stated age and cachectic  HEENT: Sclera clear, anicteric  Neck: supple  Lungs: clear to auscultation bilaterally  Heart: regular rate and rhythm  Abdomen: soft, G tube site with moderate discharge  Extremities: edema none     Medications: Reviewed    Labs:  CBC:   Recent Labs     06/17/22  0600   WBC 4.0   HGB 7.1*   HCT 21.1*   MCV 85.8   *     BMP:   Recent Labs     06/17/22  0600   *   K 4.5   CL 97*   CO2 30   BUN 29*   CREATININE 0.6*     LIVER PROFILE: No results for input(s): AST, ALT, LIPASE, PROT, BILIDIR, BILITOT, ALKPHOS in the last 72 hours. Invalid input(s): AMYLASE,  ALB  PT/INR:   No results for input(s): INR in the last 72 hours. 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 His G tube appears to be functioning well but with leakage around it    Recommendation:  1. Continue supportive care  2. Continue TFs per dietitian recs  3. Keep HOB elevated during PEG use  4.  The PEG tube was replaced on 6/14 w a shorter 2 cm ADITI-Key low profile G tube at bedside w improvement in leaking        Kurt De La Cruz MD, MD  7:49 AM 6/19/2022

## 2022-06-19 NOTE — PLAN OF CARE
Problem: Pain  Goal: Verbalizes/displays adequate comfort level or baseline comfort level  6/19/2022 0130 by Megha Christensen RN  Outcome: Progressing  6/18/2022 1842 by Breanne Torres  Outcome: Progressing     Problem: Skin/Tissue Integrity  Goal: Absence of new skin breakdown  Description: 1. Monitor for areas of redness and/or skin breakdown  2. Assess vascular access sites hourly  3. Every 4-6 hours minimum:  Change oxygen saturation probe site  4. Every 4-6 hours:  If on nasal continuous positive airway pressure, respiratory therapy assess nares and determine need for appliance change or resting period.   6/19/2022 0130 by Megha Christensen RN  Outcome: Progressing  6/18/2022 1842 by Breanne Torres  Outcome: Progressing

## 2022-06-20 ENCOUNTER — APPOINTMENT (OUTPATIENT)
Dept: GENERAL RADIOLOGY | Age: 32
DRG: 058 | End: 2022-06-20
Attending: STUDENT IN AN ORGANIZED HEALTH CARE EDUCATION/TRAINING PROGRAM
Payer: COMMERCIAL

## 2022-06-20 LAB
ANION GAP SERPL CALCULATED.3IONS-SCNC: 6 MMOL/L (ref 3–16)
BASOPHILS ABSOLUTE: 0 K/UL (ref 0–0.2)
BASOPHILS RELATIVE PERCENT: 0.8 %
BUN BLDV-MCNC: 32 MG/DL (ref 7–20)
CALCIUM SERPL-MCNC: 9.5 MG/DL (ref 8.3–10.6)
CHLORIDE BLD-SCNC: 96 MMOL/L (ref 99–110)
CO2: 32 MMOL/L (ref 21–32)
CREAT SERPL-MCNC: <0.5 MG/DL (ref 0.9–1.3)
EOSINOPHILS ABSOLUTE: 0.2 K/UL (ref 0–0.6)
EOSINOPHILS RELATIVE PERCENT: 8.5 %
GFR AFRICAN AMERICAN: >60
GFR NON-AFRICAN AMERICAN: >60
GLUCOSE BLD-MCNC: 115 MG/DL (ref 70–99)
HCT VFR BLD CALC: 21.2 % (ref 40.5–52.5)
HEMOGLOBIN: 7.1 G/DL (ref 13.5–17.5)
LYMPHOCYTES ABSOLUTE: 0.3 K/UL (ref 1–5.1)
LYMPHOCYTES RELATIVE PERCENT: 11.9 %
MCH RBC QN AUTO: 29.2 PG (ref 26–34)
MCHC RBC AUTO-ENTMCNC: 33.6 G/DL (ref 31–36)
MCV RBC AUTO: 86.9 FL (ref 80–100)
MONOCYTES ABSOLUTE: 0.3 K/UL (ref 0–1.3)
MONOCYTES RELATIVE PERCENT: 14 %
NEUTROPHILS ABSOLUTE: 1.5 K/UL (ref 1.7–7.7)
NEUTROPHILS RELATIVE PERCENT: 64.8 %
PDW BLD-RTO: 15.6 % (ref 12.4–15.4)
PLATELET # BLD: 79 K/UL (ref 135–450)
PMV BLD AUTO: 7.4 FL (ref 5–10.5)
POTASSIUM REFLEX MAGNESIUM: 4.6 MMOL/L (ref 3.5–5.1)
RBC # BLD: 2.43 M/UL (ref 4.2–5.9)
SODIUM BLD-SCNC: 134 MMOL/L (ref 136–145)
WBC # BLD: 2.4 K/UL (ref 4–11)

## 2022-06-20 PROCEDURE — 92526 ORAL FUNCTION THERAPY: CPT

## 2022-06-20 PROCEDURE — 97530 THERAPEUTIC ACTIVITIES: CPT

## 2022-06-20 PROCEDURE — 85025 COMPLETE CBC W/AUTO DIFF WBC: CPT

## 2022-06-20 PROCEDURE — 6360000002 HC RX W HCPCS: Performed by: STUDENT IN AN ORGANIZED HEALTH CARE EDUCATION/TRAINING PROGRAM

## 2022-06-20 PROCEDURE — 6370000000 HC RX 637 (ALT 250 FOR IP): Performed by: STUDENT IN AN ORGANIZED HEALTH CARE EDUCATION/TRAINING PROGRAM

## 2022-06-20 PROCEDURE — 99233 SBSQ HOSP IP/OBS HIGH 50: CPT | Performed by: PSYCHIATRY & NEUROLOGY

## 2022-06-20 PROCEDURE — 97116 GAIT TRAINING THERAPY: CPT

## 2022-06-20 PROCEDURE — 97535 SELF CARE MNGMENT TRAINING: CPT

## 2022-06-20 PROCEDURE — 92611 MOTION FLUOROSCOPY/SWALLOW: CPT

## 2022-06-20 PROCEDURE — 1280000000 HC REHAB R&B

## 2022-06-20 PROCEDURE — 6370000000 HC RX 637 (ALT 250 FOR IP): Performed by: PHYSICAL MEDICINE & REHABILITATION

## 2022-06-20 PROCEDURE — 2580000003 HC RX 258: Performed by: STUDENT IN AN ORGANIZED HEALTH CARE EDUCATION/TRAINING PROGRAM

## 2022-06-20 PROCEDURE — 97110 THERAPEUTIC EXERCISES: CPT

## 2022-06-20 PROCEDURE — 80048 BASIC METABOLIC PNL TOTAL CA: CPT

## 2022-06-20 PROCEDURE — 6370000000 HC RX 637 (ALT 250 FOR IP): Performed by: PSYCHIATRY & NEUROLOGY

## 2022-06-20 PROCEDURE — 74230 X-RAY XM SWLNG FUNCJ C+: CPT

## 2022-06-20 RX ADMIN — GABAPENTIN 100 MG: 100 CAPSULE ORAL at 14:42

## 2022-06-20 RX ADMIN — QUETIAPINE FUMARATE 50 MG: 25 TABLET ORAL at 22:55

## 2022-06-20 RX ADMIN — NYSTATIN 500000 UNITS: 100000 SUSPENSION ORAL at 19:02

## 2022-06-20 RX ADMIN — Medication 100 MG: at 10:40

## 2022-06-20 RX ADMIN — SODIUM CHLORIDE, PRESERVATIVE FREE 10 ML: 5 INJECTION INTRAVENOUS at 11:50

## 2022-06-20 RX ADMIN — DOCUSATE SODIUM AND SENNOSIDES 2 TABLET: 8.6; 5 TABLET, FILM COATED ORAL at 22:55

## 2022-06-20 RX ADMIN — CEFTAROLINE FOSAMIL 600 MG: 600 POWDER, FOR SOLUTION INTRAVENOUS at 01:59

## 2022-06-20 RX ADMIN — Medication: at 11:51

## 2022-06-20 RX ADMIN — CEFTAROLINE FOSAMIL 600 MG: 600 POWDER, FOR SOLUTION INTRAVENOUS at 11:49

## 2022-06-20 RX ADMIN — Medication 400 MG: at 22:55

## 2022-06-20 RX ADMIN — SODIUM CHLORIDE 25 ML: 9 INJECTION, SOLUTION INTRAVENOUS at 11:46

## 2022-06-20 RX ADMIN — APIXABAN 5 MG: 5 TABLET, FILM COATED ORAL at 22:55

## 2022-06-20 RX ADMIN — Medication 400 MG: at 10:40

## 2022-06-20 RX ADMIN — Medication: at 14:37

## 2022-06-20 RX ADMIN — Medication 5 MG: at 22:55

## 2022-06-20 RX ADMIN — GABAPENTIN 100 MG: 100 CAPSULE ORAL at 22:55

## 2022-06-20 RX ADMIN — OXYCODONE HYDROCHLORIDE 7.5 MG: 15 TABLET ORAL at 14:59

## 2022-06-20 RX ADMIN — VENLAFAXINE HYDROCHLORIDE 75 MG: 37.5 CAPSULE, EXTENDED RELEASE ORAL at 10:40

## 2022-06-20 RX ADMIN — NYSTATIN 500000 UNITS: 100000 SUSPENSION ORAL at 14:42

## 2022-06-20 RX ADMIN — FOLIC ACID 1 MG: 1 TABLET ORAL at 10:40

## 2022-06-20 RX ADMIN — OXYCODONE HYDROCHLORIDE 7.5 MG: 15 TABLET ORAL at 10:40

## 2022-06-20 RX ADMIN — NYSTATIN 500000 UNITS: 100000 SUSPENSION ORAL at 22:58

## 2022-06-20 RX ADMIN — OXYCODONE HYDROCHLORIDE 7.5 MG: 15 TABLET ORAL at 22:55

## 2022-06-20 RX ADMIN — CEFTAROLINE FOSAMIL 600 MG: 600 POWDER, FOR SOLUTION INTRAVENOUS at 18:43

## 2022-06-20 RX ADMIN — SODIUM CHLORIDE, PRESERVATIVE FREE 5 ML: 5 INJECTION INTRAVENOUS at 19:45

## 2022-06-20 RX ADMIN — APIXABAN 5 MG: 5 TABLET, FILM COATED ORAL at 10:51

## 2022-06-20 RX ADMIN — DAKIN'S SOLUTION 0.125% (QUARTER STRENGTH): 0.12 SOLUTION at 14:45

## 2022-06-20 RX ADMIN — Medication: at 14:55

## 2022-06-20 RX ADMIN — GABAPENTIN 100 MG: 100 CAPSULE ORAL at 10:40

## 2022-06-20 ASSESSMENT — PAIN - FUNCTIONAL ASSESSMENT: PAIN_FUNCTIONAL_ASSESSMENT: PREVENTS OR INTERFERES WITH MANY ACTIVE NOT PASSIVE ACTIVITIES

## 2022-06-20 ASSESSMENT — PAIN DESCRIPTION - LOCATION: LOCATION: COCCYX

## 2022-06-20 ASSESSMENT — PAIN SCALES - GENERAL
PAINLEVEL_OUTOF10: 9
PAINLEVEL_OUTOF10: 10

## 2022-06-20 ASSESSMENT — PAIN DESCRIPTION - ORIENTATION: ORIENTATION: MID

## 2022-06-20 ASSESSMENT — PAIN DESCRIPTION - DESCRIPTORS: DESCRIPTORS: ACHING

## 2022-06-20 NOTE — PLAN OF CARE
Problem: Pain  Goal: Verbalizes/displays adequate comfort level or baseline comfort level  6/20/2022 0116 by Monica Cage RN  Outcome: Progressing  6/19/2022 1746 by FAITH SMART  Outcome: Progressing     Problem: Skin/Tissue Integrity  Goal: Absence of new skin breakdown  Description: 1. Monitor for areas of redness and/or skin breakdown  2. Assess vascular access sites hourly  3. Every 4-6 hours minimum:  Change oxygen saturation probe site  4. Every 4-6 hours:  If on nasal continuous positive airway pressure, respiratory therapy assess nares and determine need for appliance change or resting period.   6/20/2022 0116 by Monica Cage RN  Outcome: Progressing  6/19/2022 1746 by Jared John  Outcome: Progressing     Problem: Safety - Adult  Goal: Free from fall injury  6/20/2022 0116 by Monica Cage RN  Outcome: Progressing  6/19/2022 1746 by Jared John  Outcome: Progressing     Problem: Nutrition Deficit:  Goal: Optimize nutritional status  6/20/2022 0116 by Monica Cage RN  Outcome: Progressing  6/19/2022 1746 by Jared John  Outcome: Progressing

## 2022-06-20 NOTE — PROGRESS NOTES
[facility] Nurse  Follow-up Progress Note       NAME:  Ulyess Cipro  MEDICAL RECORD NUMBER:  [id number]  AGE:  28 y.o. GENDER:  male  :  1990  TODAY'S DATE:  2022    Subjective:patient tries to talk but grabble voice, uses telephone messages to ask questions. Wound Identification:  Wound Type: pressure, non-healing surgical and traumatic    Contributing Factors: chronic pressure, decreased mobility and shear forceRadiation treatment         Patient Goal of Care:    [x] Wound Healing  [] Odor Control  [] Palliative Care  [x] Pain Control   [] Other:     Objective:Peg tube sealed, antonio in place. /61   Pulse 86   Temp 97.6 °F (36.4 °C) (Axillary)   Resp 16   Ht 5' 6\" (1.676 m)   Wt 109 lb 2 oz (49.5 kg)   SpO2 98%   BMI 17.61 kg/m²   Hiram Risk Score: Hiram Scale Score: 16  Assessment:wound red  With slough at anterior end. Tunneling continues. Neck, arm, shoulder wounds moist slough. Measurements:  Negative Pressure Wound Therapy Sacrum Mid (Active)   $ Standard NPWT >50 sq cm PER TX $ Yes 22 1240   Wound Type Pressure ulcer: Stage IV 22 185   Unit Type hospital 22 185   Dressing Type Other (Comment) 22 185   Number of pieces used 2 22 185   Number of pieces removed 5 22 185   Cycle Continuous 22 185   Target Pressure (mmHg) 125 22 1851   Intensity 1 22 185   Irrigation Solution Sodium chloride 0.9% 22 185   Instillation Volume  24 mL 22 185   Soak Time  10 minutes 22 185   Vac Frequency 3 hours 22 185   Canister changed?  No 22 185   Dressing Status New dressing applied 22 185   Dressing Changed Changed/New 22 185   Drainage Amount Small 22 185   Drainage Description Serosanguinous 22 185   Dressing Change Due 22 185   Output (ml) 450 ml 22 0901   Wound Assessment Slough;Granulation tissue 06/20/22 1851   Shape oval 06/20/22 1851   Odor None 06/17/22 1240   Number of days: 10       Wound 06/08/22 Throat Right (Active)   Wound Image   06/15/22 1308   Wound Etiology Other 06/20/22 1851   Dressing Status New dressing applied 06/20/22 1851   Wound Cleansed Cleansed with saline 06/20/22 1851   Dressing/Treatment Alginate with Ag; Foam 06/20/22 1851   Dressing Change Due 06/21/22 06/20/22 1851   Wound Length (cm) 4.5 cm 06/15/22 1308   Wound Width (cm) 4 cm 06/15/22 1308   Wound Depth (cm) 0.1 cm 06/15/22 1308   Wound Surface Area (cm^2) 18 cm^2 06/15/22 1308   Change in Wound Size % (l*w) -2.86 06/15/22 1308   Wound Volume (cm^3) 1.8 cm^3 06/15/22 1308   Wound Healing % -3 06/15/22 1308   Wound Assessment [facility] 06/20/22 1851   Drainage Amount None 06/20/22 1851   Drainage Description Serosanguinous 06/20/22 1851   Odor None 06/20/22 1851   Kristel-wound Assessment Blanchable erythema 06/20/22 1851   Margins Attached edges 06/20/22 1851   Wound Thickness Description not for Pressure Injury Partial thickness 06/19/22 0800   Number of days: 11       Wound 06/08/22 Chest Right;Upper (Active)   Wound Image   06/10/22 1758   Wound Etiology Other 06/20/22 1851   Dressing Status Clean;Dry; Intact 06/20/22 1851   Wound Cleansed Cleansed with saline 06/20/22 1851   Dressing/Treatment Alginate with Ag; Foam 06/20/22 1851   Dressing Change Due 06/21/22 06/20/22 1851   Wound Length (cm) 1 cm 06/15/22 1308   Wound Width (cm) 1 cm 06/15/22 1308   Wound Depth (cm) 0.1 cm 06/15/22 1308   Wound Surface Area (cm^2) 1 cm^2 06/15/22 1308   Change in Wound Size % (l*w) 33.33 06/15/22 1308   Wound Volume (cm^3) 0.1 cm^3 06/15/22 1308   Wound Healing % 33 06/15/22 1308   Wound Assessment Pink/red 06/20/22 1851   Drainage Amount Scant 06/20/22 1851   Drainage Description Serous 06/20/22 1851   Odor None 06/20/22 1851   Kristel-wound Assessment Blanchable erythema 06/20/22 1851   Margins Attached edges 06/20/22 1851   Wound Thickness Description not for Pressure Injury Partial thickness 06/20/22 1851   Number of days: 11       Wound 06/08/22 Radial Distal;Left (Active)   Wound Image   06/10/22 1758   Wound Etiology Traumatic 06/20/22 1851   Dressing Status New dressing applied; Old drainage noted 06/20/22 1851   Wound Cleansed Cleansed with saline 06/20/22 1851   Dressing/Treatment Alginate with Ag; Foam 06/20/22 1851   Dressing Change Due 06/21/22 06/20/22 1851   Wound Length (cm) 3 cm 06/15/22 1308   Wound Width (cm) 1 cm 06/15/22 1308   Wound Depth (cm) 0.1 cm 06/15/22 1308   Wound Surface Area (cm^2) 3 cm^2 06/15/22 1308   Change in Wound Size % (l*w) 50 06/15/22 1308   Wound Volume (cm^3) 0.3 cm^3 06/15/22 1308   Wound Healing % 50 06/15/22 1308   Wound Assessment Pink/red;Slough 06/20/22 1851   Drainage Amount None 06/20/22 1851   Drainage Description Serosanguinous;Green 06/20/22 1851   Odor None 06/20/22 1422   Kristel-wound Assessment Blanchable erythema 06/20/22 1851   Margins Attached edges 06/20/22 1851   Wound Thickness Description not for Pressure Injury Partial thickness 06/20/22 1851   Number of days: 11       Wound 06/08/22 Knee Left;Posterior (Active)   Wound Image   06/15/22 1308   Wound Etiology Pressure Stage 3 06/19/22 2109   Dressing Status New dressing applied 06/20/22 1422   Wound Cleansed Cleansed with saline; Wound cleanser 06/20/22 1422   Dressing/Treatment Foam;Honey gel/honey paste 06/20/22 1422   Offloading for Diabetic Foot Ulcers Offloading ordered 06/15/22 1308   Dressing Change Due 06/20/22 06/19/22 2109   Wound Length (cm) 3.5 cm 06/15/22 1308   Wound Width (cm) 1.5 cm 06/15/22 1308   Wound Depth (cm) 0.1 cm 06/15/22 1308   Wound Surface Area (cm^2) 5.25 cm^2 06/15/22 1308   Change in Wound Size % (l*w) 40 06/15/22 1308   Wound Volume (cm^3) 0.525 cm^3 06/15/22 1308   Wound Healing % 70 06/15/22 1308   Wound Assessment Pink/red 06/20/22 1422   Drainage Amount Moderate 06/20/22 1422   Drainage Description Serosanguinous 06/20/22 1422   Odor None 06/20/22 1422   Kristel-wound Assessment Blanchable erythema 06/19/22 0800   Margins Attached edges 06/20/22 1422   Wound Thickness Description not for Pressure Injury Partial thickness 06/15/22 1308   Number of days: 11       Wound 06/08/22 Sacrum (Active)   Wound Image   06/20/22 1851   Wound Etiology Pressure Stage 4 06/20/22 1851   Dressing Status New dressing applied 06/20/22 1851   Wound Cleansed Cleansed with saline 06/20/22 1851   Dressing/Treatment Negative pressure wound therapy 06/20/22 1851   Offloading for Diabetic Foot Ulcers Offloading ordered 06/20/22 1851   Dressing Change Due 06/22/22 06/20/22 1851   Wound Length (cm) 9.6 cm 06/20/22 1851   Wound Width (cm) 6 cm 06/20/22 1851   Wound Depth (cm) 1.4 cm 06/20/22 1851   Wound Surface Area (cm^2) 57.6 cm^2 06/20/22 1851   Change in Wound Size % (l*w) 33.1 06/20/22 1851   Wound Volume (cm^3) 80.64 cm^3 06/20/22 1851   Wound Healing % 45 06/20/22 1851   Undermining Starts ___ O'Clock 1100 06/20/22 1851   Undermining Ends___ O'Clock 200 06/20/22 1851   Undermining Maxium Distance (cm) 1.4 06/20/22 1851   Wound Assessment Pink/red;Slough 06/20/22 1851   Drainage Amount Small 06/20/22 1851   Drainage Description Serosanguinous 06/20/22 1851   Odor Mild 06/20/22 1851   Kristel-wound Assessment Blanchable erythema 06/20/22 1851   Margins Attached edges 06/20/22 1851   Wound Thickness Description not for Pressure Injury Full thickness 06/20/22 1851   Number of days: 11      sacrum          Response to treatment:  With complaints of pain.      Pain Assessment:  Severity:  4 / 10  Quality of pain: aching  Wound Pain Timing/Severity: intermittent  Premedicated: No  Plan:   Plan of Care: Wound 06/08/22 Throat Right-Dressing/Treatment: Alginate with Ag,Foam  Wound 06/08/22 Chest Right;Upper-Dressing/Treatment: Alginate with Ag,Foam  Wound 06/08/22 Radial Distal;Left-Dressing/Treatment: Alginate with Ag,Foam  Wound 06/08/22 Knee and after adminstration  Dietician consult:  Yes    Discharge Plan:  Placement for patient upon discharge: intermediate care facility   Patient appropriate for Outpatient [address]: Yes    Referrals:  [x]  following  [] [facility]  [] Supplies  [] Other    Patient/Caregiver Teaching:  Level of patient/caregiver understanding able to:   [] Indicates understanding       [] Needs reinforcement  [] Unsuccessful      [] Verbal Understanding  [] Demonstrated understanding       [] No evidence of learning  [] Refused teaching         [x] N/A       Electronically signed by Melany Garcia RN, on 6/20/2022 at 6:56 PM

## 2022-06-20 NOTE — PROGRESS NOTES
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 Pt reported his girlfriend manages cooking, cleaning, laundry, grocery shopping. Pt not actively driving but was able to prior to 2022.    ST Dx: [] Aphasia  [x] Dysarthria  [] Apraxia   [x] Oropharyngeal dysphagia [x] Cognitive Impairment  [] Other:   Date of Admit: 2022  Room #: 0156/0156-01    Current functional status (updated daily):         Pt being seen for : [x] Speech/Language Treatment  [x] Dysphagia Treatment [x] Cognitive Treatment  [] Other:  Communication: []WFL  [] Aphasia  [x] Dysarthria  [] Apraxia  [] Pragmatic Impairment [] Non-verbal  [] Hearing Loss  [] Other:   Cognition: [] WFL  [] Mild  [x] Moderate  [] Severe [] Unable to Assess  [] Other:  Memory: [] WFL  [] Mild  [x] Moderate  [] Severe [] Unable to Assess  [] Other:  Behavior: [x] Alert  [x] Cooperative  [x]  Pleasant  [] Confused  [] Agitated  [] Uncooperative  [] Distractible [] Motivated  [] Self-Limiting [] Anxious  [] Other:  Endurance:  [x] Adequate for participation in SLP sessions  [] Reduced overall  [] Lethargic  [] Other:  Safety: [] No concerns at this time  [x] Reduced insight into deficits  [x]  Reduced safety awareness [] Not following call light procedures  [] Unable to Assess  [] Other:    Current Diet Order:Diet NPO  ADULT TUBE FEEDING; PEG; Standard with Fiber; Bolus; 5 Times Daily; 237; 60; Before and after each bolus; Protein; 1 bottle of Proteinex daily, 30 mL free water flush before and after adminstration  Swallowing Precautions: oral care 2-3x/day to reduce adverse affects in the event of aspiration        Date: 6/20/2022      Tx session 1  0830 - 0930  Tx session 2  1330 - 1400   Total Timed Code Min 0 0   Total Treatment Minutes 60 30   Individual Treatment Minutes 60 30   Group Treatment Minutes 0 0   Co-Treat Minutes 0 0   Variance/Reason:  n/a n/a   Pain Buttocks wound  Buttocks pain    Pain Intervention [x] RN notified  [x] Repositioned  [] Intervention offered and patient declined  [] N/A  [] Other:  [x] RN notified  [] Repositioned  [] Intervention offered and patient declined  [] N/A  [] Other:   Subjective     Pt alert and cooperative, agreeable to tx. MBSS completed this date for session, thus majority of session pt seen seated upright in MBSS chair. Pt agreeable to have SLP speak with his significant other (Samantha) and his brother (goes by Felicia Blas). Objective:  Goals  Timeframe for Short-term Goals: 18 days (06/26/22)     Dysphagia Goals:  Goal 1: Pt will complete pharyngeal/laryngeal strengthening exercises 10/10 given min cues for overall improved swallowing function    Goal not directly targeted. SLP edu pt's brother re: handout in room with swallowing exercises; encouraging pt to complete 10x reps of each exercise 2-3x/day. Goal 2: The patient will tolerate instrumental swallowing procedure MBSS completed this date. Discussed results with pt, RN, and MD.    Goal met 06/20/22. MBSS completed. See separate speech therapy report for details. SLP spoke with pt' significant other (Samantha) and his brother (goes by Felicia Riderrigan) over the phone (two different phone calls). Extensive edu re: results of MBSS and rationale of NPO recs. Goal 3: The patient/caregiver will demonstrate understanding of compensatory strategies for improved swallowing safety   SLP provided edu during the MBSS re: safe swallow strategies - rationale of giving small PO intake, swallowing hard, swallowing twice, cough and then re-swallow. Pt would complete when cued by SLP.      SLP provided extensive edu to significant other and brother re: safe swallow strategies with ice chips PRN (and with staff) - single ice chips, fully upright. Goal 4: The patient will tolerate recommended diet without observed clinical signs of aspiration   MBSS completed this date. Pt continues to present with severe oropharyngeal dysphagia. Continue to recommend NPO with nutrition via PEG. Ice chips PRN with SLP, RN, PCA Only. SLP provided extensive edu to significant other and brother re: NPO recs and ice chips PRN (with staff only). SLP did edu them re: palliative care consult if he wishes to eat by mouth. Both the significant other and brother asked questions re: prognosis - SLP edu them that it is difficulty to tell, but swallowing function is very severe and he is at high risk of aspiration. Cognitive-linguistic Goals  Goal 1: Pt will complete recall tasks with 80% acc given min cues for use of compensatory strategies   Goal not directly targeted. Goal not directly targeted. Goal 2: Pt will complete executive function tasks (meds, math, money, time, etc) with 80% acc given min cues. Goal not directly targeted. Goal not directly targeted. Goal 3: Pt will complete problem solving and thought organization tasks with 80% acc given min cues. Goal not directly targeted. Goal not directly targeted. Goal 4: Pt will complete verbal and visual reasoning tasks with 80% acc given min cues. Goal not directly targeted. Goal not directly targeted. Goal 5: Pt will repeat words/phrases/sentences using speech intelligibility strategies with 80% acc given min cues. Goal not directly targeted. Goal not directly targeted. Other areas targeted: N/a Spoke with significant other and brother on phone. See above. Education:   SLP edu pt re: rationale of MBSS, results of MBSS, recs, safe swallow strategies  Extensive edu to significant other and brother - see above.     Safety Devices: [x] Call light within reach  [] Chair alarm activated  [x] Bed alarm activated  [] Other: [] Call light within reach  [] Chair alarm activated  [] Bed alarm activated  [x] Other: N/A   Assessment: Tx session 1: Pt alert and cooperative, appeared lethargic. Pt transferred to [facility] chair with help of PCA. MBSS completed during session. Continue NPO recommendations with ice chips PRN (SLP, RN, PCA) with nutrition via PEG. Pt silently aspirated all PO trials. See full report for details. Extensive edu re: results of MBSS to pt. SLP also with with RN and MD re: MBSS results. Tx session 2: SLP spent second session talking to both pt's significant other (Samantha) and brother (goes by Fer Gastelum) on 2 separate phone calls. SLP provided extensive edu re: results of MBSS, NPO recs, ice chips PRN, possible palliative care consult if pt wants to eat/drink, rationale of exercises, how it is difficulty to determine prognosis. Significant other and brother verbalized understanding of results / recommendations, and had no further questions. Plan: Continue as per plan of care. Additional Information:     Barriers toward progress: Cognitive deficit, Impulsivity, Limited safety awareness, Limited insight into deficits, Unrealistic expectations and Medication managment  Discharge recommendations:  [] Home independently  [] Home with assistance [x]  24 hour supervision  [] ECF [] Other:  Continued Tx Upon Discharge: ? [x] Yes [] No [] TBD based on progress while on ARU [] Vital Stim indicated [] Other:   Estimated discharge date: 06/25/2022    Interventions used this date:  [] Speech/Language Treatment  [] Instruction in HEP [] Group [x] Dysphagia Treatment [x] Cognitive Treatment   [] Other:       Total Time Breakdown / Charges    Time In Time out Total Time / units   Cognitive Tx      Speech Tx      Dysphagia Tx 0830  1330 0930  1400 60 min / 2 units   30 min / 1 unit    *Swallow procedure (MBSS) and dysphagia tx = 2 units     Electronically Signed by     Han Lozada MA Gissell Gunderson #[id number]  Speech Language Pathologist

## 2022-06-20 NOTE — PROGRESS NOTES
Comprehensive Nutrition Assessment    Type and Reason for Visit:  Reassess    Nutrition Recommendations/Plan:   1. Continue bolus feeds of Jevity 1.5 x5 cans daily   2. +60 mL free water flush before and after administration - per MD  3. +1 bottle of proteinex daily, 30 mL free water flush before and after administration   4. Obtain updated weight - recommend increase bolus to 6 cans daily if weight is not trending up  5. Monitor TF tolerance (abdominal pain, bloating, distention, diarrhea)  6. Monitor nutrition adequacy, pertinent labs, bowel habits, wt changes, and clinical progress     Malnutrition Assessment:  Malnutrition Status:  Severe malnutrition (06/09/22 1408)    Context:  Chronic Illness     Findings of the 6 clinical characteristics of malnutrition:  Energy Intake:  Unable to assess  Weight Loss:  Greater than 20% over 1 year     Body Fat Loss:  Severe body fat loss Orbital,Buccal region   Muscle Mass Loss:  Severe muscle mass loss Temples (temporalis),Clavicles (pectoralis & deltoids),Hand (interosseous)  Fluid Accumulation:  Unable to assess     Strength:  Not Performed    Nutrition Assessment:    Follow up: Pt stable from a nutrition standpoint AEB pt report of tolerating TF at goal rate. Pt currently receiving bolus feeds of 5 cans of Jevity 1.5 daily. Pt reports that he has not had any N/V, bloating, or pain r/t TF. Unsure of pt's weight has changed this admission. Weight ordered on 6/20. Will monitor weight and need to adjust TF order. Nutrition Related Findings:    Na 134 mmol/L.  Wound Type: Pressure Injury,Stage II,Stage III,Stage IV,Wound Vac       Current Nutrition Intake & Therapies:    Average Meal Intake: NPO  Average Supplements Intake: NPO  Diet NPO  ADULT TUBE FEEDING; PEG; Standard with Fiber; Bolus; 5 Times Daily; 237; 60; Before and after each bolus; Protein; 1 bottle of Proteinex daily, 30 mL free water flush before and after adminstration  Current Tube Feeding (TF) Orders:  · Feeding Route: PEG  · Formula: Standard with Fiber  · Schedule: Bolus  · Additives/Modulars: Protein  · Goal TF & Flush Orders Provides: Bolus feeds of Jevity 1.5, 5 cans daily to provide 1185 mL TV, 1775 kcal, 76 g protein, and 900 ml free water. +1 bottle of proteinex daily for additional 26 g protein and 104 kcal. +30 mL free water before and after adminstration. Anthropometric Measures:  Height: 5' 6\" (167.6 cm)  Ideal Body Weight (IBW): 142 lbs (65 kg)    Admission Body Weight: 110 lb (49.9 kg)  Current Body Weight: 109 lb 2 oz (49.5 kg), 77.5 % IBW. Weight Source: Bed Scale  Current BMI (kg/m2): 17.6  Usual Body Weight: 250 lb (113.4 kg) (June 2021)  % Weight Change (Calculated): -56  Weight Adjustment For: No Adjustment                 BMI Categories: Underweight (BMI less than 18.5)    Estimated Daily Nutrient Needs:  Energy Requirements Based On: Kcal/kg (35-40)  Weight Used for Energy Requirements: Current  Energy (kcal/day): 5609-5583 kcal  Weight Used for Protein Requirements: Current (1.5-2.0 g/kg)  Protein (g/day):  g  Method Used for Fluid Requirements: 1 ml/kcal  Fluid (ml/day): 4515-4525 mL    Nutrition Diagnosis:   · Severe malnutrition related to inadequate protein-energy intake,catabolic illness as evidenced by weight loss greater than or equal to 20% in 1 year,wounds,severe loss of subcutaneous fat,severe muscle loss,nutrition support - enteral nutrition      Nutrition Interventions:   Food and/or Nutrient Delivery: Continue Current Tube Feeding  Nutrition Education/Counseling: No recommendation at this time  Coordination of Nutrition Care: Continue to monitor while inpatient,Interdisciplinary Rounds  Plan of Care discussed with: Patient and RN    Goals:  Previous Goal Met: Progressing toward Goal(s)  Goals:  Tolerate nutrition support at goal rate,prior to discharge       Nutrition Monitoring and Evaluation:   Behavioral-Environmental Outcomes: None Identified  Food/Nutrient Intake Outcomes: Enteral Nutrition Intake/Tolerance  Physical Signs/Symptoms Outcomes: Biochemical Data,GI Status,Nutrition Focused Physical Findings,Skin,Weight    Discharge Planning:     Too soon to determine     Zachery Garibay RD, LD  Contact: [phone]

## 2022-06-20 NOTE — PROGRESS NOTES
Occupational Therapy  Facility/Department: [facility]  Rehabilitation Occupational Therapy Daily Treatment Note    Date: 22  Patient Name: Dilma Louie       Room: 2689/0655-95  MRN: [id number]  Account: [de-identified]   : 1990  (28 y.o.) Gender: male                    Past Medical History:  has a past medical history of Cancer ([address] Past Surgical History:   has no past surgical history on file. Restrictions  Restrictions/Precautions: NPO;Isolation; Fall Risk;General Precautions  Other position/activity restrictions: antonio, PEG, NPO, MRSA contact precautions, ice chips with therapy/RN only    Subjective  Subjective: Pt in bed, states pain severe today and not feeling great, pain 10/10 in buttocks, BP 91/52, HR 80, O2 sats 96% on RA. Restrictions/Precautions: NPO;Isolation; Fall Risk;General Precautions             Objective     Cognition  Overall Cognitive Status: Exceptions  Arousal/Alertness: Appropriate responses to stimuli  Following Commands: Follows multistep commands with repitition; Follows multistep commands with increased time  Attention Span: Attends with cues to redirect  Memory: Appears intact  Safety Judgement: Decreased awareness of need for assistance;Decreased awareness of need for safety  Problem Solving: Assistance required to identify errors made;Assistance required to implement solutions;Assistance required to generate solutions  Insights: Decreased awareness of deficits  Initiation: Requires cues for some  Sequencing: Requires cues for some  Orientation  Overall Orientation Status: Within Functional Limits   Perception  Overall Perceptual Status: Impaired  Unilateral Attention: Appears intact  Initiation: Cues to initiate tasks  Motor Planning: Cues to use objects appropriately  Perseveration: Not present     ADL  Grooming/Oral Hygiene  Assistance Level: Modified independent  Skilled Clinical Factors: to brush teeth while seated at sink  Upper Extremity Bathing  Assistance Level: Supervision  Skilled Clinical Factors: cues for thoroughness  Lower Extremity Bathing  Assistance Level: Moderate assistance  Skilled Clinical Factors: to bathe buttocks in stance and assist to bathe lower legs and feet  Upper Extremity Dressing  Assistance Level: Set-up  Skilled Clinical Factors: to doff/don shirt  Lower Extremity Dressing  Assistance Level: Minimal assistance  Skilled Clinical Factors: fair use of reacher to thread BLEs into brief but difficulty with pants, requiring min A to thread LLE fully into pants, SBA to stand and pull over hips, assist to thread antonio into R pants/brief leg  Putting On/Taking Off Footwear  Assistance Level: Supervision;Verbal cues; Set-up  Skilled Clinical Factors: min VCs for use of sock aid, setup for shoes          Functional Mobility  Device: Wheelchair  Activity: To/From therapy gym; To/From bathroom  Assistance Level: Supervision  Skilled Clinical Factors: mild difficulty with keeping w/c straight  Bed Mobility  Overall Assistance Level: Modified Independent  Sit to Supine  Assistance Level: Modified independent  Supine to Sit  Assistance Level: Modified independent  Transfers  Surface: From bed; Wheelchair; To bed  Additional Factors: Verbal cues; Hand placement cues  Sit to Stand  Assistance Level: Stand by assist  Stand to Sit  Assistance Level: Stand by assist  Bed To/From Chair  Technique: Stand pivot  Assistance Level: Stand by assist  Skilled Clinical Factors: no AD  Stand Pivot  Assistance Level: Stand by assist   OT Exercises  A/AROM Exercises: x20 50% assist for AAROM for full shoulder flexion  Resistive Exercises: 3# for wrist flexion, wrist extension, elbow flexion, chest press x20     Assessment  Assessment  Assessment: Pt agreeable to OT session but not feeling great this date. Pt reports dizziness throughout session but BP improving from 91/52 to 107/69 during session.  Pt c/o ringing in ears which he states started over the weekend and thinks is related to ear wax buildup. Pt completed sponge bathing at sink with mod A for bathing buttocks/lower legs/feet. Pt completed grooming with mod I, UB dressing with setup, and LB dressing with min A with improved use of a/e. Pt completed w/c mobility to gym with SPV and min VCs to avoid obstacles on L. Pt completed BUE ther ex in courtyard and stood for 1 minute to take weight off of buttocks due to increased pain. Continue POC. Activity Tolerance: Patient limited by endurance; Patient limited by pain; Patient limited by fatigue  Discharge Recommendations: 24 hour supervision or assist;Home with Home health OT;S Level 4  OT Equipment Recommendations  Equipment Needed: No  Safety Devices  Safety Devices in place: Yes  Type of devices: Gait belt;Bed alarm in place;Call light within reach; Left in bed;Nurse notified    Patient Education  Education  Education Given To: Patient  Education Provided: Role of Therapy;Plan of Care;Precautions; Safety; Energy Conservation; Fall Prevention Strategies;DME/Home Modifications;Transfer Training;Mobility Training;Equipment;ADL Function;Cognition  Education Provided Comments: role of OT, safety with transfers, posture, midline balance, ADL retraining, benefit of OOB and mobility, use of a/e, plan for assist with family in future  Education Method: Verbal;Demonstration  Barriers to Learning: Cognition  Education Outcome: Verbalized understanding;Demonstrated understanding;Continued education needed    Plan  Plan  Times per Week: 5/7 days/week  Plan Weeks: 3 weeks  Current Treatment Recommendations: Strengthening;ROM;Balance training;Functional mobility training; Endurance training; Safety education & training;Equipment evaluation, education, & procurement;Return to work related activity; Neuromuscular re-education;Patient/Caregiver education & training;Self-Care / ADL; Home management training;Coordination training    Goals  Short Term Goals  Time Frame for Short term goals: 1 week- 6/15/22  Short Term Goal 1: Pt will complete functional transfers with min A. GOAL MET 6/15/22 Pt performed functional transfers with min A. Short Term Goal 2: Pt will perform toileting with max A. progressing Pt requires total assist for toileting. Short Term Goal 3: Pt will complete LB dressing with mod A. GOAL MET 6/20/22 Pt completed LB dressing with min A. Short Term Goal 4: Pt will perform UB dressing with min A. GOAL MET 6/11/22 Pt completed UB dressing with SPV. Long Term Goals  Time Frame for Long term goals : 3 weeks- 6/29/22  Long Term Goal 1: Pt will perform functional transfers with CGA. Long Term Goal 2: Pt will complete toileting with CGA. Long Term Goal 3: Pt will perform LB dressing with CGA. Long Term Goal 4: Pt will perform UB dressing with setup. GOAL MET 6/20/22 Pt performed UB dressing with setup. Long Term Goal 5: Pt will complete full body bathing with CGA.       Therapy Time   Individual Concurrent Group Co-treatment   Time In 1000         Time Out 1130         Minutes 90         Timed Code Treatment Minutes: Vesna Bennett

## 2022-06-20 NOTE — PROGRESS NOTES
Physical Therapy  Facility/Department: [facility]  Rehabilitation Physical Therapy Treatment Note    NAME: Dandy Castillo  : 1990 (28 y.o.)  MRN: [id number]  CODE STATUS: Full Code    Date of Service: 22       Restrictions:  Restrictions/Precautions: NPO;Isolation; Fall Risk;General Precautions  Position Activity Restriction  Other position/activity restrictions: antonio, PEG, NPO, MRSA contact precautions, ice chips with therapy/RN only     SUBJECTIVE  Subjective  Subjective: pt in bed, agreeable to therapy  Pain: 9/10 pain in buttocks        OBJECTIVE  Cognition  Overall Cognitive Status: Exceptions  Arousal/Alertness: Appropriate responses to stimuli  Following Commands: Follows multistep commands with repitition; Follows multistep commands with increased time  Attention Span: Attends with cues to redirect  Memory: Appears intact  Safety Judgement: Decreased awareness of need for assistance;Decreased awareness of need for safety  Problem Solving: Assistance required to identify errors made;Assistance required to implement solutions;Assistance required to generate solutions  Insights: Decreased awareness of deficits  Initiation: Requires cues for some  Sequencing: Requires cues for some  Orientation  Overall Orientation Status: Within Functional Limits    Functional Mobility  Supine to Sit  Assistance Level: Stand by assist  Sit to Stand  Assistance Level: Contact guard assist  Stand to Sit  Assistance Level: Contact guard assist      Environmental Mobility  Ambulation  Surface: Level surface  Distance: 495 ft  Activity Comments: Pt ambulates through doorways and makes multiple turns on path through hallways, gym, and community room. Pt demos narrow JOE with minor unsteadiness for majority of walk with instances where pt demos moderate unsteadiness but is able to correct with min A. Pt demos slower anthony on turns for safety.   Assistance Level: Minimal assistance;Contact guard assist;Dependent (CGA-min A (dependent d/t w/c follow))  Gait Deviations: Slow anthony;Decreased step length bilateral;Decreased heel strike right;Decreased heel strike left      Exercises:  -10 consecutive reps of STS with CGA from w/c    -30x BLE standing marches with BUE support on // bars and CGA with 1.5 lb ankle weights donned suman    -20x BLE standing hamstring curls with BUE support on // bars and CGA with 1.5 lb ankle weights donned suman    -1x 40 ft lateral stepping wit BUE support on // bars and CGA with 1.5 lb ankle weights donned suman    -10x 6\" step ups with BUE support on // bars and CGA with 1.5 lb ankle weights donned suman        ASSESSMENT/PROGRESS TOWARDS GOALS  Vital Signs  Heart Rate: 86  BP: 109/61  BP Location: Right upper arm  MAP (Calculated): 77  SpO2: 98 %  O2 Device: None (Room air)    Assessment  Assessment: Patient seen for gait, endurance, and LE strengthening. Patient completed transfers with CGA, ambulates ~500 ft with CGA-min A without overt LOB, and completes ~1 min of standing dynamic exercises to progress strength, balance, and endurance. Pt will benefit from continued skilled PT in ARU to progress mobility and decrease fall risk. Activity Tolerance: Patient limited by endurance; Patient limited by pain; Patient limited by fatigue  Discharge Recommendations: 24 hour supervision or assist;Home with Home health PT  PT Equipment Recommendations  Equipment Needed: Yes  Mobility Devices: Wheelchair  Wheelchair: Standard    Goals  Patient Goals   Patient goals : \"to walk and to eat\"  Short Term Goals  Time Frame for Short term goals: 10 days 6/17  Short term goal 1: pt will complete bed mobility with mod ind - 6/17 not met, progressing (continue goal)  Short term goal 2: pt will complete functional transfer with CGA and LRAD. - 6/17 not met, progressing (continue goal)  Short term goal 3: pt will ambulate 50 ft with LRAD and mod A. - 6/17 not met today (previously partially met for distance)  Short term goal 4: pt will tolerate stair assessment when appropriate and goal will be made. - GOAL MET 6/14, completes 4 steps with CGA-min A and BHR  Long Term Goals  Time Frame for Long term goals : 21 days 6/28  Long term goal 1: pt will complete functional transfer with SBA and LRAD  Long term goal 2: pt will ambulate 100 ft wiht LRAD and CGA. Long term goal 3: pt will complete car transfer with SBA and LRAD. Long term goal 4: pt will tolerate standing for 2 min with 1-2 UE support and CGA -SBA for balance. Long term goal 5: pt will ascend/descend 12 steps with min A and BHR    PLAN OF CARE/SAFETY  Plan  Plan: 5-7 times per week  Current Treatment Recommendations: Strengthening; Wheelchair mobility training;Cognitive reorientation;Equipment evaluation, education, & procurement; Modalities; Positioning; Therapeutic activities; Wound care; Patient/Caregiver education & training; Safety education & training;Home exercise program;Return to work related activity;Pain management;Gait training;Balance training;Functional mobility training;Stair training;Neuromuscular re-education;Transfer training;ADL/Self-care training; Endurance training;Manual Therapy - Soft Tissue Mobilization  Safety Devices  Type of Devices: Gait belt;Patient at risk for falls;Nurse notified; All fall risk precautions in place; Bed alarm in place;Call light within reach; Left in bed; All evelyn prominences offloaded  Restraints  Restraints Initially in Place: No    EDUCATION  Education  Education Given To: Patient  Education Provided: Role of Therapy;Plan of Care;Precautions; Safety; Energy Conservation;Transfer Training;DME/Home Modifications; Fall Prevention Strategies; Equipment; Mobility Training;Cognition;ADL Function  Education Provided Comments: pt educated on benefits of progressive ambulation, progressing endurance with dynamic standing chris stroud understanding  Education Method: Demonstration;Verbal  Barriers to Learning: None  Education Outcome: Verbalized understanding;Demonstrated understanding        Therapy Time   Individual Concurrent Group Co-treatment   Time In 1230         Time Out 1330         Minutes 60           Timed Code Treatment Minutes: [address], PT, 06/20/22 at 3:44 PM

## 2022-06-20 NOTE — PROGRESS NOTES
Keyshawn Hines  6/20/2022  [id number]    Chief Complaint: Critical illness myopathy    Subjective:   No acute events overnight. Today Janina Muir is seen with therapy present. He had an MBS this morning that showed penetration and aspiration. He reports feeling down about this. He states that talking with the Psychiatrist has been helpful. He reports continued buttock pain. ROS: No N/V, chest pain, SOB, chills or fever    Objective:  Patient Vitals for the past 24 hrs:   BP Temp Temp src Pulse Resp SpO2   06/20/22 1237 109/61 -- -- 86 -- 98 %   06/20/22 0953 (!) 104/58 97.6 °F (36.4 °C) Axillary 92 16 97 %   06/19/22 2142 -- -- -- -- 16 --   06/19/22 2109 (!) 104/58 98 °F (36.7 °C) Oral 90 16 96 %   06/19/22 1728 -- -- -- -- 16 --   06/19/22 1658 -- -- -- -- 16 --     Gen: No distress, pleasant. Cachectic, seated in wheelchair  HEENT: Normocephalic, atraumatic. Bilateral radical neck dissection present  CV: extremities well perfused   Resp: No respiratory distress. Abd: Soft, nontender, G-tube in place  Ext: No edema  Neuro: Alert, oriented, appropriately interactive.  Dysarthria    Wt Readings from Last 3 Encounters:   06/12/22 109 lb 2 oz (49.5 kg)   02/22/22 140 lb 12.8 oz (63.9 kg)   02/13/22 149 lb 12.8 oz (67.9 kg)       Laboratory data:   Lab Results   Component Value Date    WBC 2.4 (L) 06/20/2022    HGB 7.1 (L) 06/20/2022    HCT 21.2 (L) 06/20/2022    MCV 86.9 06/20/2022    PLT 79 (L) 06/20/2022       Lab Results   Component Value Date     06/20/2022    K 4.6 06/20/2022    CL 96 06/20/2022    CO2 32 06/20/2022    BUN 32 06/20/2022    CREATININE <0.5 06/20/2022    GLUCOSE 115 06/20/2022    CALCIUM 9.5 06/20/2022        Therapy progress:  PT  Position Activity Restriction  Other position/activity restrictions: antonio, PEG, NPO, MRSA contact precautions, ice chips with therapy/RN only  Objective     Sit to Stand: Minimal Assistance (at IV pole)  Stand to sit: Minimal Assistance (at IV pole)  Bed to Chair: Moderate assistance  Device: No Device,Hand-Held Assist  Other Apparatus:  (IV pole with L UE support)  Assistance: Moderate assistance,Maximum assistance  Distance: 5 ft x 2  OT  PT Equipment Recommendations  Equipment Needed: Yes  Mobility Devices: Wheelchair  Wheelchair: Standard  Toilet - Technique: Stand pivot  Equipment Used: Standard toilet  Assessment        SLP    Recommendations:  Diet recommendation: NPO; NPO and Ice chips with SLP/RN only; Meds via alt means of nutrition  Instrumentation: not clinically warranted at this time   Risk management: oral care 2-3x/day to reduce adverse affects in the event of aspiration            Body mass index is 17.61 kg/m².       Rehabilitation Diagnosis:  Neurologic, 3.8, Neuromuscular Disorders, e.g. 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  He was admitted to [facility] on 6/8/22 due to functional deficits below his baseline.      Critical Illness Myopathy  - due to below  - PT, OT, ST     Squamous Cell Carcinoma of the tongue  - s/p right hemiglossectomy, bilateral neck dissection, and RFFF reconstruction on 1/3/22 followed by chemoradiation, and weekly cisplatin completed 4/25/22 and s/p G-tube  - NPO except ice chips  - Tube feeds per orders  - dietary following  - GI following for G tube concerns  - nystatin swish and spit for comfort, biotene   - PT, OT, ST    Oropharyngeal Dysphagia  - on tube feeds as above  - MBS 6/20 showing continued penetration and aspiration  - ST    Hypotension, improved  - s/p 1 L NS  - increased fluids through G-tube     MRSA bacteremia  - cefaroline for 24 days from admit per [facility] ID  - consulted to ID as this medication needs to be cleared by them per pharmacy  - PICC replaced on admission      Acute hypoxic respiratory failure  - with right lower hydropneumothorax s/p chest tube, MRSA pneumonia, pleural effusions s/p chest tube and requiring several reintubations during acute stay  - adequately oxygenating on room air  - wean oxygen for SpO2>90%  - IS      Urinary Retention  - has severe penile edema during acute stay. Was followed by Urology and failed multiple voiding trials  - discontinued doxazosin due to hypotension  - continue antonio catheter, most recently placed 6/6  - follow up with Urology outpatient      PE  Right Atrial Thrombus  - s/p thrombectomy 5/20 with IR, thrombus positive for MRSA  - Elquis 5 mg BID      Anasarca  - requiring diuretics during acute stay, since weaned off  - monitor     Hyponatremia  - Nephrology following, appreciate assistance     Anemia  - Hb stable, suspect that 10.8 lab value was error  - monitor and transfuse for Hb<7     Severe Protein-calorie malnutrition  - BMI 17.9  - bolus feeds per dietary  - Dietician consulted     Pain  - gabapentin 100 mg TID  - PRN tylenol, PRN oxycodone 5 or 7.5 mg q4 hours      Anxiety, Depression  - Psychiatry consulted, appreciated assistance  - meds per Psych     Multiple Wounds POA  - including stage four pressure ulcer on coccyx  - wound care per orders  - wound care following     Bowels: Schedule stool softener. Follow bowel movements. Enema or suppository if needed.      Bladder: continue antonio     Sleep: patient allergic to trazodone     Follow up appointments: PCP, ID, Cardiology, Oncology    Services: home health PT, OT, [address] [place] [address] , nursing  EDOD: 6/25/22    Sarkis Obando MD 6/20/2022, 4:40 PM

## 2022-06-20 NOTE — PROGRESS NOTES
[email or web address]  Nephrology follow-up note           Reason for Consult: Hyponatremia  Requesting Physician:  Dr. King Bee history  Sodium at 134. On current feeds. Still very weak   Complains of pain in the area of the sacral decubital ulcer. Failed ba swallow 6/20    ROS:   +weak. No pain or shortness of breath. Scheduled Meds:   venlafaxine  75 mg Oral Daily with breakfast    QUEtiapine  50 mg Oral Nightly    sodium zirconium cyclosilicate  10 g Oral Once    magnesium oxide  400 mg Per G Tube BID    zinc oxide   Topical BID    lidocaine 1 % injection  5 mL IntraDERmal Once    sodium chloride flush  5-40 mL IntraVENous 2 times per day    nystatin  5 mL Oral 4x Daily    sodium hypochlorite   Irrigation Daily    apixaban  5 mg Per G Tube BID    ceftaroline fosamil (TEFLARO) 600 MG IVPB  600 mg IntraVENous M1A    folic acid  1 mg Per G Tube Daily    gabapentin  100 mg Per G Tube TID    melatonin  5 mg Per G Tube Nightly    sennosides-docusate sodium  2 tablet Per G Tube Nightly    thiamine  100 mg Per G Tube Daily     Continuous Infusions:   sodium chloride 25 mL (06/17/22 1035)     PRN Meds:.saliva substitute, medihoney, oxyCODONE, oxyCODONE, Lip Balm, sodium chloride flush, sodium chloride, acetaminophen, bisacodyl, sodium chloride, clonazePAM, albuterol    History of Present Illness on 6/9/2022:    28 y.o. yo male with PMH of squamous cell carcinoma of the tongue (s/p right hemiglossectomy, b/l ND, and RFFF reconstruction on 1/3/22, radiation, and weekly cisplatin completed 4/25/22) who is admitted to ARU from  for rehabilitation. Nephrology has been consulted for hyponatremia and hyperkalemia  Patient is being fed through the feeding tube. His sodium on 6/8 at [facility] was 133 and has been around 130-135 in June.   Earlier in admission, he was hypernatremic in the 149 range as well    He was admitted at [facility] from 5/2/22 and had MRSA bacteremia from infected port s/p removal. Per Dr Malachi Hatchet, while at [facility],  \"pt had right hydropneumothorax with worsening hypoxia requiring multiple intubations and chest tube placements. He underwent right internal jugular and right atrial thrombectomy due to concern that this was causing ongoing bacteremia. ID was consulted and he is to remain on IV ceftaroline with plan for long term treatment until 7/2/22. His course was complicated by severe penile edema and antonio was placed. Urology followed during acute stay. Patient failed several voiding trials    Physical exam:   Constitutional:  VITALS:  BP (!) 104/58   Pulse 92   Temp 97.6 °F (36.4 °C) (Axillary)   Resp 16   Ht 5' 6\" (1.676 m)   Wt 109 lb 2 oz (49.5 kg)   SpO2 97%   BMI 17.61 kg/m²   Gen: alert, awake  Neck: No JVD  Skin: Unremarkable  Cardiovascular:  S1, S2 without m/r/g   Respiratory: CTA B without w/r/r; respiratory effort normal  Abdomen:  soft, nt, nd,   Extremities: no lower extremity edema  Neuro/Psy: AAoriented times 3 ; moves all 4 ext    Data/  Recent Labs     06/20/22  0630   WBC 2.4*   HGB 7.1*   HCT 21.2*   MCV 86.9   PLT 79*     Recent Labs     06/20/22  0630   *   K 4.6   CL 96*   CO2 32   GLUCOSE 115*   BUN 32*   CREATININE <0.5*   LABGLOM >60   GFRAA >60     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 High BUN so getting adequate solute. 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  ID following     -Chronic antonio d/t penile edema and failed voiding trials    -Elevated TSH, mild    -Anemia per rehab physician    -Hyperkalemia - better with prn lokelma    -Hypomagnesemia - on oral Mg replacement      Plan    Sodium has been stable  Following labs periodically   No changes needed at this time

## 2022-06-20 NOTE — PROCEDURES
Speech Language Pathology  Modified Barium Swallow Study  Facility/Department: [facility]        Recommendations:  Diet recommendation: NPO; Ice chips PRN with SLP and RN only; Meds via alt means of nutrition  Risk management: for ice chips - upright for all intake, stay upright for at least 30 mins after intake, 1:1 supervision with intake and oral care 2-3x/day to reduce adverse affects in the event of aspiration      NAME:Maverick Austin  : 1990 (28 y.o.)   MRN: [id number]  ROOM: [address] [place] [address]  ADMISSION DATE: 2022  PATIENT DIAGNOSIS(ES): Critical illness myopathy [G72.81]  No chief complaint on file. Patient Active Problem List    Diagnosis Date Noted    Severe malnutrition ([facility].) 2022    Critical illness myopathy 2022    Recurrent major depressive disorder ([place] [address].) 2022    Prediabetes 2022    Primary squamous cell carcinoma of tongue ([place] [address].) 2021    Poor dentition 2021    Class 1 obesity due to excess calories with serious comorbidity and body mass index (BMI) of 34.0 to 34.9 in adult 2021    Chronic bilateral low back pain without sciatica 2021    Anxiety 2021    Insomnia due to other mental disorder 2021     Past Medical History:   Diagnosis Date    Cancer [facility])      No past surgical history on file. Allergies   Allergen Reactions    Trazodone And Nefazodone Nausea And Vomiting, Hives and Other (See Comments)     Does not remember  Does not remember    Tramadol Swelling and Other (See Comments)    Trazodone Other (See Comments) and Hives     Does not remember           Current Diet Solid Consistency: NPO  Current Diet Liquid Consistency: NPO; ice chips PRN only with SLP and RN    Date of Prior Study: 2022 at [facility]  Type of Study: Repeat MBSS  Results of Prior Study:  \"Assessment: Patient presents with oropharyngeal dysphagia secondary to perioral weakness, reduced hyolaryngeal elevation, nearly absent hyoid excursion, absent epiglottic inversion, reduced BOT retraction and reduced laryngeal sensation, complicated by comorbidities resulting in observed deep laryngeal penetration of thin liquids and nectar thick liquid to the TVFs which was not always able to be expelled out of laryngeal vestibule. There was moderate vallecular residue with thin liquid and severe vallecular residue with nectar thick liquid and puree. This severe residue was eventually penetrated into laryngeal vestibule with nectar thick liquid and eventual aspiration of puree. Aspirated material was expelled from vestibule with an independent cough (with visual of monitor and visualization of aspirated material. Penetration and aspiration episodes were silent and expelled from pt visualizing on monitor. Honey thick liquid was not trialed due to risk of aspiration and increased pharyngeal residue with thicker viscosities. Based on today's MBS, recommend NPO with TFs except he can have ice chips and sips of H2O via tsp for pleasure, intermittent cough/re-swallow. Patient is at an increased risk of aspiration, including silent. \"    Recent CXR/CT of Chest: 06/15/2022  Impression   Stable mild left ventricular enlargement.       Left PICC line in place with the tip probably in the right atrium.  Recommend   readjusting the catheter 2-3 cm proximally for more physiologic positioning       Slowly resolving bibasilar and right upper lobe infiltrates or atelectasis vs   residual scarring. Patient Complaints/Reason for Referral:  [place] Mabel was referred for a MBS to assess the efficiency of his/her swallow function, assess for aspiration, and to make recommendations regarding safe dietary consistencies, effective compensatory strategies, and safe eating environment.     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 He was found to have persistent MRSA bacteremia with concern for infected port, which was removed. His hospital course was complicated by right hydropneumothorax with worsening hypoxia requiring multiple intubations and chest tube placements. He underwent right internal jugular and right atrial thrombectomy due to concern that this was causing ongoing bacteremia. ID was consulted and he is to remain on IV ceftaroline with plan for long term treatment until 7/2/22. His course was complicated by severe penile edema and antonio was placed. Urology followed during acute stay. Patient failed several voiding trials. Plan to keep antonio and follow up with outpatient. He was admitted to [facility] on 6/8/22 due to functional deficits below his baseline. Today he is seen with nursing present. He reports that his mouth is dry. He reports feeling weak. He denies pain currently\"    Pt has been receiving ST services at [address] [place] [address] since 06/09/2022. Pharyngeal / laryngeal strengthening exercises have been introduced and completed. PO trials at bedside indicated overt s/s of aspiration, multiple swallows per PO intake. Pt is at a significantly high risk of aspiration - currently NPO. Repeat MBSS due to be completed in order to assess pharyngeal phase of swallow and make appropriate recommendations.      Medical record review/interview:   Predisposing dysphagia risk factors: Hx of PEG, Hx of dysphagia and Hx of aspiration  Clinical signs of possible chronic dysphagia: current use of PEG/TF, weight loss, hx of dysphagia and hx of aspiration  Precipitating dysphagia risk factors: reduced physical mobility, currently NPO, H&N cancer with radiation     Impressions:  Treatment Dx and ICD 10: Oropharyngeal dysphagia; R13.12  Radiologist: Dr. Karrie Albrecht  Referring MD: Dr. Brando Jenkins: Pt continues to present with severe oropharyngeal dysphagia. · Oral phase deficits characterized by weak lingual manipulation and minimal to no tongue base retraction - negatively impacted from right hemiglossectomy. Reduced A-P transit, resulting in pocketing / residue in anterior sulcus. Reduced bolus control resulting in premature spillage across consistencies. · Pharyngeal phase deficits characterized by no-minimal anterior hyoid movement, no epiglottic inversion, reduced PPW movement, and delayed laryngeal elevation. Due to anatomical features and reduced movement overall, difficult to determine trigger of pharyngeal swallow - but delayed across consistencies. Per the radiologist, \"there is mild thickening of the epiglottis suggested, likely post treatment related. \"  This resulted in significant pooling on the epiglottis and very limited / no valleculae space. Significantly reduced BOT retraction. Pt with trace penetration x1 before the swallow with thin tsp. Pt with deep penetration during the swallow with all consistencies - eventual silent aspiration. Pt having to swallow 4-5x per tsp. Significant post-swallow residue, appeared to increase as the consistency became thicker. Pt with deep penetration after the swallow due to pharyngeal residue across consistencies - residue in valleculae began to spill over the epiglottic rim and into the pharyngeal vestibule. Eventual aspiration across consistencies. Aspiration is silent; pt unable to clear despite cues to cough. Overall, pt continues to be at a high risk of aspiration across all consistencies. Recommend continue NPO with nutrition via alternative means. Recommend ice chips PRN with SLP and RN only. RN and MD made aware of recommendations.  SLP also spoke with significant other and brother via phone calls. Oral Preparation / Oral Phase  Impaired  Oral Phase - Major Contributing Deficits  Weak Lingual Manipulation: All  Reduced Posterior Propulsion: All  Reduced Bolus Control: All  Pocketing in Anterior Sulcus: All  Decreased Bolus Cohesion: All  Lingual / Palatal Residue: All  Piecemeal Swallowing: All  Premature Bolus Loss to Pharynx: All  Reduced Tongue Base Retraction: All - pt with right hemiglossectomy   Oral phase comment: Pt presents with severe oral phase of the swallow, negatively impacted by right hemiglossectomy. Pharyngeal Phase  Impaired  Pharyngeal Phase - Major Contributing Deficits  Delayed Swallow Initiation: All  Premature Spillage to Valleculae: All  Premature Spillage to Pyriform: All  Reduced Pharyngeal Peristalsis: All  Reduced Epiglottic Retroversion: All - no epiglottic inversion   Reduced Laryngeal Elevation:  All  Reduced Anterior Laryngeal Movement: All  Reduced Thyrohyoid Approximation: All  Pooling Valleculae: All  Reduced Airway/laryngeal Closure: All  Reduced Tongue Base Retraction:  All  Shallow Penetration Before: Thin tsp  Parital Self-clearing (shallow): Thin tsp  Deep Penetration During: Thin tsp, Mildly (nectar) thick tsp, Moderately (honey) thick tsp , Puree  Deep Penetration After: Thin tsp, Mildly (nectar) thick tsp, Moderately (honey) thick tsp , Puree  Partial Retrieval (deep): Thin tsp  No Retrieval (deep): Thin tsp, Mildly (nectar) thick tsp, Moderately (honey) thick tsp , Puree  Aspiration After: Thin tsp, Mildly (nectar) thick tsp, Moderately (honey) thick tsp , Puree  Gross Aspiration (amount): Thin tsp, Mildly (nectar) thick tsp, Moderately (honey) thick tsp , Puree (all trials given)  No Cough Reflex: Thin tsp, Mildly (nectar) thick tsp, Moderately (honey) thick tsp , Puree (all trials given)   No Bolus Expelled: Thin tsp, Mildly (nectar) thick tsp, Moderately (honey) thick tsp , Puree (all trials given)   Pharyngeal Residue - Valleculae:  Thin tsp, Mildly (nectar) thick tsp, Moderately (honey) thick tsp , Puree (all trials given)   Pharyngeal Residue - Posterior Pharynx: Thin tsp, Mildly (nectar) thick tsp, Moderately (honey) thick tsp , Puree (all trials given)   Pharyngeal Wall - Weakness: All  Fatigue of Mechanism: All  Pharyngeal phase comment: Severe pharyngeal phase of the swallow characterized by no - minimal anterior hyoid movement, no epiglottic inversion, reduced PPW movement, and delayed laryngeal elevation. Due to anatomical features and reduced movement overall, difficult to determine trigger of pharyngeal swallow - but delayed across consistencies. Pt with deep penetration during the swallow with all consistencies. Pt having to swallow 4-5x per tsp. Pt with deep penetration after the swallow due to pharyngeal residue. Eventual aspiration across constistencies. Aspiration is silent; pt unable to clear despite cues to cough. Esophageal Phase  Esophageal phase comment: Not fully assessed. No backflow noted. Aspiration Scale    1 Material does not enter the airway    2 Material enters the airway, remains above the vocal folds, and is ejected from the airway    3 Material enters the airway, remains above the vocal folds, and is not ejected from the airway    4 Material enters the airway, contacts the vocal folds, an is ejected from the airway    5 Material enters the airway, contacts the vocal folds, and is not ejected from the airway    6 Material enters the airway, passes below the vocal folds and is ejected into the larynx or out of the airway    7 Material enters the airway, passes below the vocal folds, and is not ejected from the trachea despite effort        X 8 Material enters the airway, passes below the vocal folds, and no effort is made to eject.            Compensatory Swallowing Strategies Attempted: Small sips/bites, slow rate, hard swallow, double swallow   Postural Changes and/or Swallow Maneuvers Trialed: N/A  Patient Position: Lateral and Patient Degrees: 90 degrees, Seated upright in [facility] chair  Consistencies Administered: Thin tsp, nectar tsp, honey tsp, puree       Recommendations:  Diet recommendation: NPO; Ice chips PRN with SLP and RN only; Meds via alt means of nutrition  Risk management: for ice chips - upright for all intake, stay upright for at least 30 mins after intake, 1:1 supervision with intake and oral care 2-3x/day to reduce adverse affects in the event of aspiration    Safe Swallow Protocol:  Supervision: Ice chips PRN with SLP and RN only - total supervision needed due to impulsivity   Compensatory Swallowing Strategies: : for ice chips - upright for all intake, stay upright for at least 30 mins after intake, 1:1 supervision with intake and oral care 2-3x/day to reduce adverse affects in the event of aspiration      Behavior/Cognition/Vision/Hearing:  Behavior/Cognition: Alert and cooperative. Vision: WFL  Hearing: WFL    Recommendations/Treatment  Requires SLP Intervention: Yes  D/C Recommendations: 24 hr supervision; NPO with ice chips PRN   Postural Changes and/or Swallow Maneuvers: Hard swallows, multiple swallows, cough and re-swallow   Referral To: N/A    Recommended Exercises: Continue current POC   Therapeutic Interventions: diet tolerance monitoring, patient/family education, oral care     Education: Images and recommendations were reviewed with pt following this exam.   Patient Education Response: Pt verbalized understanding. Prognosis for safe diet advancement: Poor  Barriers to reach goals: Severity of swallow, no significant improvement from previous MBSS  Duration/Frequency of Treatment: 5x/week for LOS   Safety Devices in place: Yes  Type of devices: Pt left [facility] in no distress; upright in bed following transfer with SLP and PCA. Bed alarm on and call light within reach. Goals:    Continue current goals.          Therapy Time:   Individual   Time In 0830   Time Out 0930   Minutes 60 *swallow procedure and dysphagia tx       Signature:  Federica [place] [id number] [facility] #[id number]  Speech Language Pathologist

## 2022-06-20 NOTE — PROGRESS NOTES
Department of Psychiatry  Consultant Progress Note  Chief Complaint: follow-up mood. States he still feels sad, mostly about the neck dissection and ramifications of that. No SI. Sleeping better on the seroquel. Discussed effexor dosing plans. He feels hopeful he will not need the feeding tube forever. Patient's chart was reviewed and collaborated with  about the treatment plan. .  SUBJECTIVE:    Patient is feeling unchanged. Suicidal ideation: denies suicidal ideation  Patient denies    ROS: Patient has new complaints: no  Sleeping adequately:  Yes  Appetite adequate: yes  Attending groups: N/A  Visitors:no    OBJECTIVE    Physical  VITALS:  BP (!) 104/58   Pulse 92   Temp 97.6 °F (36.4 °C) (Axillary)   Resp 16   Ht 5' 6\" (1.676 m)   Wt 109 lb 2 oz (49.5 kg)   SpO2 97%   BMI 17.61 kg/m²   Patients appearance street clothes and lying in bed. Musculoskeletal  Patient gait not tested   STRENGTH: generalized weakness was noted TONE normal    Mental Status Examination:   No current loose associations  ConcentrationIntact   Thoughts are within normal limits  Insight and Judgement both fair  Knowledge: adequate  Language: 0 - no aphasia, normal  Speech: appropriate   Mood depressed, affect congruent with mood  Orientation: oriented to person, place, time/date and situation   Hallucinations Absent   Thought Processes: Goal-Directed  Memory intact  suicidal ideations absent with  no plan  Homicidal ideations no           Data  Labs:   No results displayed because visit has over 200 results.                Medications  Current Facility-Administered Medications: venlafaxine (EFFEXOR XR) extended release capsule 75 mg, 75 mg, Oral, Daily with breakfast  QUEtiapine (SEROQUEL) tablet 50 mg, 50 mg, Oral, Nightly  saliva substitute (BIOTENE/MOUTH KOTE) liquid, , Oral, PRN  medihoney gel, , Topical, BID PRN  oxyCODONE (OXY-IR) immediate release tablet 7.5 mg, 7.5 mg, Oral, Q4H PRN  oxyCODONE (ROXICODONE) immediate release tablet 5 mg, 5 mg, Per G Tube, Q4H PRN  Lip Balm ointment, , Topical, PRN  magnesium oxide (MAG-OX) tablet 400 mg, 400 mg, Per G Tube, BID  zinc oxide (TRIAD HYDROPHILIC) paste, , Topical, BID  lidocaine 1 % injection 5 mL, 5 mL, IntraDERmal, Once  sodium chloride flush 0.9 % injection 5-40 mL, 5-40 mL, IntraVENous, 2 times per day  sodium chloride flush 0.9 % injection 5-40 mL, 5-40 mL, IntraVENous, PRN  0.9 % sodium chloride infusion, 25 mL, IntraVENous, PRN  nystatin (MYCOSTATIN) [id number] UNIT/ML suspension 500,000 Units, 5 mL, Oral, 4x Daily  sodium hypochlorite (DAKINS) 0.125 % external solution, , Irrigation, Daily  acetaminophen (TYLENOL) tablet 650 mg, 650 mg, Per G Tube, Q6H PRN  apixaban (ELIQUIS) tablet 5 mg, 5 mg, Per G Tube, BID  bisacodyl (DULCOLAX) suppository 10 mg, 10 mg, Rectal, Daily PRN  ceftaroline fosamil (TEFLARO) 600 mg in sodium chloride 0.9 % 50 mL IVPB, 600 mg, IntraVENous, F8T  folic acid (FOLVITE) tablet 1 mg, 1 mg, Per G Tube, Daily  gabapentin (NEURONTIN) capsule 100 mg, 100 mg, Per G Tube, TID  melatonin disintegrating tablet 5 mg, 5 mg, Per G Tube, Nightly  sennosides-docusate sodium (SENOKOT-S) 8.6-50 MG tablet 2 tablet, 2 tablet, Per G Tube, Nightly  sodium chloride (OCEAN, BABY AYR) 0.65 % nasal spray 1 spray, 1 spray, Each Nostril, Q2H PRN  thiamine tablet 100 mg, 100 mg, Per G Tube, Daily  clonazePAM (KLONOPIN) tablet 0.5 mg, 0.5 mg, Per G Tube, Daily PRN  albuterol (PROVENTIL) nebulizer solution 2.5 mg, 2.5 mg, Nebulization, Q6H PRN    ASSESSMENT AND PLAN    PRIMARY PSYCH DIAGNOSIS: depressive disorder    Principal Problem:    Critical illness myopathy  Active Problems:    Severe malnutrition (HCC)  Resolved Problems:    * No resolved hospital problems. *       1. Patient s symptoms unchanged. Too soon to increase the Effexor  But probably on Wednesday will increase to 150. Keep the seroquel as is.     2.Probable discharge per treatment team 3.Discharge planning is complete  4 Suicidal ideation is denies suicidal ideation  5 total time 40 minutes more than 50% was spent in direct time with the patient

## 2022-06-21 PROCEDURE — 6370000000 HC RX 637 (ALT 250 FOR IP): Performed by: PHYSICAL MEDICINE & REHABILITATION

## 2022-06-21 PROCEDURE — 2580000003 HC RX 258: Performed by: STUDENT IN AN ORGANIZED HEALTH CARE EDUCATION/TRAINING PROGRAM

## 2022-06-21 PROCEDURE — 97530 THERAPEUTIC ACTIVITIES: CPT

## 2022-06-21 PROCEDURE — 97129 THER IVNTJ 1ST 15 MIN: CPT

## 2022-06-21 PROCEDURE — 92507 TX SP LANG VOICE COMM INDIV: CPT

## 2022-06-21 PROCEDURE — 97130 THER IVNTJ EA ADDL 15 MIN: CPT

## 2022-06-21 PROCEDURE — 6360000002 HC RX W HCPCS: Performed by: STUDENT IN AN ORGANIZED HEALTH CARE EDUCATION/TRAINING PROGRAM

## 2022-06-21 PROCEDURE — 92526 ORAL FUNCTION THERAPY: CPT

## 2022-06-21 PROCEDURE — 6370000000 HC RX 637 (ALT 250 FOR IP): Performed by: PSYCHIATRY & NEUROLOGY

## 2022-06-21 PROCEDURE — 97110 THERAPEUTIC EXERCISES: CPT

## 2022-06-21 PROCEDURE — 1280000000 HC REHAB R&B

## 2022-06-21 PROCEDURE — 6370000000 HC RX 637 (ALT 250 FOR IP): Performed by: STUDENT IN AN ORGANIZED HEALTH CARE EDUCATION/TRAINING PROGRAM

## 2022-06-21 PROCEDURE — 97116 GAIT TRAINING THERAPY: CPT

## 2022-06-21 PROCEDURE — 97535 SELF CARE MNGMENT TRAINING: CPT

## 2022-06-21 RX ORDER — SALIVA STIMULANT COMB. NO.3
SPRAY, NON-AEROSOL (ML) MUCOUS MEMBRANE
Status: DISCONTINUED | OUTPATIENT
Start: 2022-06-21 | End: 2022-06-23 | Stop reason: HOSPADM

## 2022-06-21 RX ORDER — OXYCODONE HYDROCHLORIDE 15 MG/1
7.5 TABLET ORAL EVERY 4 HOURS PRN
Status: DISCONTINUED | OUTPATIENT
Start: 2022-06-21 | End: 2022-06-23 | Stop reason: HOSPADM

## 2022-06-21 RX ORDER — OXYCODONE HYDROCHLORIDE 5 MG/1
10 TABLET ORAL EVERY 4 HOURS PRN
Status: DISCONTINUED | OUTPATIENT
Start: 2022-06-21 | End: 2022-06-23 | Stop reason: HOSPADM

## 2022-06-21 RX ADMIN — GABAPENTIN 100 MG: 100 CAPSULE ORAL at 22:56

## 2022-06-21 RX ADMIN — APIXABAN 5 MG: 5 TABLET, FILM COATED ORAL at 11:00

## 2022-06-21 RX ADMIN — NYSTATIN 500000 UNITS: 100000 SUSPENSION ORAL at 11:01

## 2022-06-21 RX ADMIN — Medication: at 18:34

## 2022-06-21 RX ADMIN — Medication 400 MG: at 10:59

## 2022-06-21 RX ADMIN — APIXABAN 5 MG: 5 TABLET, FILM COATED ORAL at 22:56

## 2022-06-21 RX ADMIN — Medication 400 MG: at 22:56

## 2022-06-21 RX ADMIN — CEFTAROLINE FOSAMIL 600 MG: 600 POWDER, FOR SOLUTION INTRAVENOUS at 18:23

## 2022-06-21 RX ADMIN — QUETIAPINE FUMARATE 50 MG: 25 TABLET ORAL at 22:56

## 2022-06-21 RX ADMIN — GABAPENTIN 100 MG: 100 CAPSULE ORAL at 10:59

## 2022-06-21 RX ADMIN — SODIUM CHLORIDE, PRESERVATIVE FREE 10 ML: 5 INJECTION INTRAVENOUS at 10:52

## 2022-06-21 RX ADMIN — NYSTATIN 500000 UNITS: 100000 SUSPENSION ORAL at 22:55

## 2022-06-21 RX ADMIN — OXYCODONE 10 MG: 5 TABLET ORAL at 22:56

## 2022-06-21 RX ADMIN — OXYCODONE 10 MG: 5 TABLET ORAL at 11:00

## 2022-06-21 RX ADMIN — Medication: at 14:22

## 2022-06-21 RX ADMIN — GABAPENTIN 100 MG: 100 CAPSULE ORAL at 14:22

## 2022-06-21 RX ADMIN — CEFTAROLINE FOSAMIL 600 MG: 600 POWDER, FOR SOLUTION INTRAVENOUS at 10:52

## 2022-06-21 RX ADMIN — DOCUSATE SODIUM AND SENNOSIDES 2 TABLET: 8.6; 5 TABLET, FILM COATED ORAL at 22:55

## 2022-06-21 RX ADMIN — Medication 100 MG: at 10:59

## 2022-06-21 RX ADMIN — SODIUM CHLORIDE, PRESERVATIVE FREE 10 ML: 5 INJECTION INTRAVENOUS at 23:29

## 2022-06-21 RX ADMIN — Medication 5 MG: at 22:56

## 2022-06-21 RX ADMIN — NYSTATIN 500000 UNITS: 100000 SUSPENSION ORAL at 18:20

## 2022-06-21 RX ADMIN — FOLIC ACID 1 MG: 1 TABLET ORAL at 11:01

## 2022-06-21 RX ADMIN — NYSTATIN 500000 UNITS: 100000 SUSPENSION ORAL at 14:22

## 2022-06-21 RX ADMIN — CEFTAROLINE FOSAMIL 600 MG: 600 POWDER, FOR SOLUTION INTRAVENOUS at 02:19

## 2022-06-21 RX ADMIN — VENLAFAXINE HYDROCHLORIDE 75 MG: 37.5 CAPSULE, EXTENDED RELEASE ORAL at 11:00

## 2022-06-21 RX ADMIN — Medication: at 22:55

## 2022-06-21 ASSESSMENT — PAIN SCALES - GENERAL
PAINLEVEL_OUTOF10: 8
PAINLEVEL_OUTOF10: 0
PAINLEVEL_OUTOF10: 8

## 2022-06-21 ASSESSMENT — PAIN DESCRIPTION - LOCATION: LOCATION: COCCYX

## 2022-06-21 NOTE — CARE COORDINATION
DANIS sent referral to Shanda Rivero with [address], awaiting review.  [facility], [place]

## 2022-06-21 NOTE — PROGRESS NOTES
Physical Therapy  Facility/Department: [address] [place] [address]  Rehabilitation Physical Therapy Treatment Note    NAME: Lashell Silva  : 1990 (28 y.o.)  MRN: [id number]  CODE STATUS: Full Code    Date of Service: 22       Restrictions:  Restrictions/Precautions: NPO;Isolation; Fall Risk;General Precautions  Position Activity Restriction  Other position/activity restrictions: antonio, PEG, NPO, MRSA contact precautions, ice chips with therapy/RN only     SUBJECTIVE  Subjective  Subjective: pt found in bed. pt willing to participate in therapy  Pain: 8/10 pain in buttocks          OBJECTIVE  Cognition  Arousal/Alertness: Appropriate responses to stimuli  Following Commands: Follows multistep commands with repitition; Follows multistep commands with increased time  Attention Span: Attends with cues to redirect  Memory: Appears intact  Safety Judgement: Decreased awareness of need for assistance;Decreased awareness of need for safety  Problem Solving: Assistance required to identify errors made;Assistance required to implement solutions;Assistance required to generate solutions  Insights: Decreased awareness of deficits  Initiation: Requires cues for some  Sequencing: Requires cues for some  Orientation  Overall Orientation Status: Within Functional Limits    Functional Mobility     Pt found in bed today. Supine to sit with mod I. Vitals assessed in sitting. STS from EOB to RW with SBA. Pt cued to pivot and sit in WC initially. STS from [facility] to RW with CGA. Pt cued on hand placement to push up from [facility] rather than pull on walker. Gait x110' to gym with CGA. Pt demonstrates brief moments of instability as he tends to ambulate with narrow JOE and with walker out in front of him. Cuing provided to widen JOE and keep walker close for UE support to improve stability. 2x 30 second taps onto 6\" box using RW and CGA. Pt completed x30 sec before needing a 30-40 sec standing rest break. Pt report mild to mod fatigue.  Pt able to complete 2nd set without further rest breaks. Cuing provided to stand up tall and place foot completely on step. 1x10 step up onto 6\" step with step to pattern each leg with RW and CGA. Pt cued to stand up tall with each repetition and go slow. Focus on control and quality of movement rather than speed or quantity of repetitions. Pt required 2 brief standing rest breaks to establish balance and catch his breath. x140' gait back to room with RW and CGA. Pt shows improved tolerance to gait training with increased endurance overall. Walker height adjusted prior to gait bout. Pt reports walker feeling fitting better. Pt returned to bed with call button and other needs within reach. Second Session:  Pt found in w/c with RN present and GF present for family training. Per discussion with OT and pt's gf, family wanting to pursue SNF upon dc due to medical complexity of pt's nutritional and wound care needs. OT notified . GF stating she would life pt to be ind with ambulation and eating prior to dc home. Pt slightly self limiting this date, requires encouragement to attempt stair navigation/ bed mobility (scooting). GF present to observe session but did not formally participate in family training    Sit to stand from w/c to no AD with CGA  Pt ambulated 120 ft with no AD, CGA demonstrating narrow JOE, decreased heel strike/ step length/step height with minor episodes of toe catching but pt able to catch self. Car transfer completed with CGA and cues for safety  Pt ascended/descended 4 6\" Steps with step to pattern. BHR and CGA following increased encouragement. Pt completed 10 BLE step ups onto 6\" Step with BUE support and CGA for increased strengthening. Gait x 130 ft with no AD< CGA to room  Pt doffed soiled pants at EOB with CGA for balance. At this time, requesting to lie supine and donned pants supine with assist from therapist to thread catheter.      Pt able to scoot self with 1-2 UE support and CGA -SBA for balance. Long term goal 5: pt will ascend/descend 12 steps with min A and BHR    PLAN OF CARE/SAFETY  Plan  Plan: 5-7 times per week  Current Treatment Recommendations: Strengthening; Wheelchair mobility training;Cognitive reorientation;Equipment evaluation, education, & procurement; Modalities; Positioning; Therapeutic activities; Wound care; Patient/Caregiver education & training; Safety education & training;Home exercise program;Return to work related activity;Pain management;Gait training;Balance training;Functional mobility training;Stair training;Neuromuscular re-education;Transfer training;ADL/Self-care training; Endurance training;Manual Therapy - Soft Tissue Mobilization  Safety Devices  Type of Devices: Gait belt;Patient at risk for falls;Nurse notified; All fall risk precautions in place; Bed alarm in place;Call light within reach; Left in bed; All evelyn prominences offloaded  Restraints  Restraints Initially in Place: No    EDUCATION  Education  Education Given To: Patient  Education Provided: Role of Therapy;Plan of Care;Precautions; Safety; Energy Conservation;Transfer Training;DME/Home Modifications; Fall Prevention Strategies; Equipment; Mobility Training;Cognition;ADL Function  Education Provided Comments: pt educated on benefits of progressive ambulation, progressing endurance with dynamic standing activiites - demos understanding  Education Method: Demonstration;Verbal  Barriers to Learning: None  Education Outcome: Verbalized understanding;Demonstrated understanding        Therapy Time   Individual Concurrent Group Co-treatment   Time In 1000         Time Out 1030         Minutes 30           Timed Code Treatment Minutes: [address], [address] [facility], 06/21/22 at 11:24 AM     Second Session Therapy Time:    Individual Concurrent Group Co-treatment   Time In 1430         Time Out 1500         Minutes 30           Timed Code Treatment Minutes:  30    Total Treatment Minutes:  60

## 2022-06-21 NOTE — PROGRESS NOTES
PROGRESS NOTE  S:31 yrs Patient  admitted on 6/8/2022 with Critical illness myopathy [G72.81] . Today he complains of leakage around G tube    Exam:   Vitals:    06/21/22 1118   BP: (!) 180/70   Pulse: (!) 103   Resp:    Temp:    SpO2: 98%      General appearance: alert, appears older than stated age and cachectic  HEENT: Sclera clear, anicteric  Neck: supple  Lungs: clear to auscultation bilaterally  Heart: regular rate and rhythm  Abdomen: soft, G tube site with moderate discharge  Extremities: edema none     Medications: Reviewed    Labs:  CBC:   Recent Labs     06/20/22  0630   WBC 2.4*   HGB 7.1*   HCT 21.2*   MCV 86.9   PLT 79*     BMP:   Recent Labs     06/20/22  0630   *   K 4.6   CL 96*   CO2 32   BUN 32*   CREATININE <0.5*     LIVER PROFILE: No results for input(s): AST, ALT, LIPASE, PROT, BILIDIR, BILITOT, ALKPHOS in the last 72 hours. Invalid input(s): AMYLASE,  ALB  PT/INR:   No results for input(s): INR in the last 72 hours. 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 His G tube appears to be functioning well but with leakage around it    Recommendation:  1. Continue supportive care  2. Continue TFs per dietitian recs  3. Keep HOB elevated during PEG use  4.  The PEG tube was replaced on 6/14 w a shorter 2 cm ADITI-Key low profile G tube at bedside w improvement in leaking        Georgina Valle MD, MD  11:28 AM 6/21/2022

## 2022-06-21 NOTE — PROGRESS NOTES
Feliberto Grace  6/21/2022  [id number]    Chief Complaint: Critical illness myopathy    Subjective:   No acute events overnight. Today Frida Raygoza is seen in his room. He reports continued severe buttock pain. He reports following with a pain physician prior to being in the hospital. He also reports continued dry mouth. ROS: No N/V, chest pain, SOB, chills or fever    Objective:  Patient Vitals for the past 24 hrs:   BP Temp Temp src Pulse Resp SpO2 Weight   06/21/22 0846 -- -- -- -- -- -- 101 lb 8 oz (46 kg)   06/20/22 2245 (!) 104/56 98 °F (36.7 °C) Axillary 77 16 97 % --   06/20/22 1237 109/61 -- -- 86 -- 98 % --     Gen: No distress, pleasant. Cachectic, on side in bed  HEENT: Normocephalic, atraumatic. Bilateral radical neck dissection present  CV: extremities well perfused   Resp: No respiratory distress. Abd: Soft, nontender, G-tube in place  Ext: No edema  Neuro: Alert, oriented, appropriately interactive. Dysarthria    Wt Readings from Last 3 Encounters:   06/21/22 101 lb 8 oz (46 kg)   02/22/22 140 lb 12.8 oz (63.9 kg)   02/13/22 149 lb 12.8 oz (67.9 kg)       Laboratory data:   Lab Results   Component Value Date    WBC 2.4 (L) 06/20/2022    HGB 7.1 (L) 06/20/2022    HCT 21.2 (L) 06/20/2022    MCV 86.9 06/20/2022    PLT 79 (L) 06/20/2022       Lab Results   Component Value Date     06/20/2022    K 4.6 06/20/2022    CL 96 06/20/2022    CO2 32 06/20/2022    BUN 32 06/20/2022    CREATININE <0.5 06/20/2022    GLUCOSE 115 06/20/2022    CALCIUM 9.5 06/20/2022        Therapy progress:  PT  Position Activity Restriction  Other position/activity restrictions: antonio, PEG, NPO, MRSA contact precautions, ice chips with therapy/RN only  Objective     Sit to Stand: Minimal Assistance (at IV pole)  Stand to sit: Minimal Assistance (at IV pole)  Bed to Chair: Moderate assistance  Device: No Device,Hand-Held Assist  Other Apparatus:  (IV pole with L UE support)  Assistance:  Moderate assistance,Maximum assistance  Distance: 5 ft x 2  OT  PT Equipment Recommendations  Equipment Needed: Yes  Mobility Devices: Wheelchair  Wheelchair: Standard  Toilet - Technique: Stand pivot  Equipment Used: Standard toilet  Assessment        SLP    Recommendations:  Diet recommendation: NPO; NPO and Ice chips with SLP/RN only; Meds via alt means of nutrition  Instrumentation: not clinically warranted at this time   Risk management: oral care 2-3x/day to reduce adverse affects in the event of aspiration            Body mass index is 16.38 kg/m².       Rehabilitation Diagnosis:  Neurologic, 3.8, Neuromuscular Disorders, e.g. Critical Illness Myopathy, Other Myopathy     Assessment and Plan:  Jeanne Perez is a 28year old male with a past medical history significant for HTN and stage IV squamous cell carcinoma of the tongue (s/p right hemiglossectomy, bilateral neck dissection, and RFFF reconstruction on 1/3/22 followed by chemoradiation, and weekly cisplatin completed 4/25/22 and s/p G-tube) who presented to [facility] on 5/2/22 for acute hypoxic respiratory failure and septic shock, found to have persistent MRSA bacteremia.  He was admitted to [facility] on 6/8/22 due to functional deficits below his baseline.      Critical Illness Myopathy  - due to below  - PT, OT, ST     Squamous Cell Carcinoma of the tongue  - s/p right hemiglossectomy, bilateral neck dissection, and RFFF reconstruction on 1/3/22 followed by chemoradiation, and weekly cisplatin completed 4/25/22 and s/p G-tube  - NPO except ice chips  - Tube feeds per orders  - dietary following  - GI following for G tube concerns  - nystatin swish and spit for comfort, schedule biotene   - PT, OT, ST    Oropharyngeal Dysphagia  - on tube feeds as above  - MBS 6/20 showing continued penetration and aspiration  - ST    Hypotension, improved  - s/p 1 L NS  - increased fluids through G-tube     MRSA bacteremia  - cefaroline for 24 days from admit per  ID  - consulted to ID as this medication needs to be cleared by them per pharmacy  - PICC replaced on admission      Acute hypoxic respiratory failure  - with right lower hydropneumothorax s/p chest tube, MRSA pneumonia, pleural effusions s/p chest tube and requiring several reintubations during acute stay  - adequately oxygenating on room air  - wean oxygen for SpO2>90%  - IS      Urinary Retention  - has severe penile edema during acute stay. Was followed by Urology and failed multiple voiding trials  - discontinued doxazosin due to hypotension  - continue antonio catheter, most recently placed 6/6  - follow up with Urology outpatient      PE  Right Atrial Thrombus  - s/p thrombectomy 5/20 with IR, thrombus positive for MRSA  - Elquis 5 mg BID      Anasarca  - requiring diuretics during acute stay, since weaned off  - monitor     Hyponatremia  - Nephrology following, appreciate assistance     Anemia  - Hb stable, suspect that 10.8 lab value was error  - monitor and transfuse for Hb<7     Severe Protein-calorie malnutrition  - BMI 17.9  - bolus feeds per dietary  - Dietician consulted     Pain  - gabapentin 100 mg TID  - PRN tylenol, PRN oxycodone 7.5 or 10 mg q4 hours. - will need follow up with his pain physician on discharge      Anxiety, Depression  - Psychiatry consulted, appreciated assistance  - meds per Psych     Multiple Wounds POA  - including stage four pressure ulcer on coccyx  - wound care per orders  - wound care following     Bowels: Schedule stool softener. Follow bowel movements. Enema or suppository if needed.      Bladder: continue antonio     Sleep: patient allergic to trazodone     Follow up appointments: PCP, ID, Cardiology, Oncology    Services: home health PT, OT, [address] [place] [address] , nursing  EDOD: 6/25/22    Giancarlo Mendes.  Meche Flowers MD 6/21/2022, 10:28 AM

## 2022-06-21 NOTE — CARE COORDINATION
Clinicals faxed to insurance for .   Carla Tang MPH, RRT  Clinical Liaison [address] [place] [address]  [phone] (Y)[phone]   Electronically signed by Carla Tang on 6/21/2022 at 9:47 AM

## 2022-06-21 NOTE — PATIENT CARE CONFERENCE
[facility]  Inpatient Rehabilitation  Weekly Team Conference Note    Date: 2022  Patient Name: Michell Caceres        MRN: [id number]    : 1990  (28 y.o.)  Gender: male   Referring Practitioner: Edwar Tsang MD  Diagnosis: CIM      Interventions to be utilized toward barriers to discharge, per discipline:  [address] observed barriers to dc: Limited safety awareness, Upper extremity weakness, Lower extremity weakness, Incontinence of bowel, Wound Care and Medication managment  Nursing interventions: ADLs, Tube Feeds, Medication Administration, Toileting, Wound Care  Family Education:No   Fall Risk:  No      PHYSICAL THERAPY  Physical therapy observed barriers to dc:      Baseline: per pt, ind with mobility prior to . Multiple hospitalization where s.o. assisted pt with mobility following, unsure of how much assist.              Current level: mod ind with bed mobility, CGA-mod A transfers (pending fatigue), CGA-min /mod A at times > 150 ft  CGA_min A of 2 for stairs               Barriers to DC: strength, endurance, balance, wounds/pain, c/o dizziness               Needs in order to achieve dc home/next level of care: anticipate pt will require / supv/assist upon dc. rec family training. DME: TBD pending progress.      Physical therapy interventions:   Current Treatment Recommendations: Strengthening,Wheelchair mobility training,Cognitive reorientation,Equipment evaluation, education, & procurement,Modalities,Positioning,Therapeutic activities,Wound care,Patient/Caregiver education & training,Safety education & training,Home exercise program,Return to work related activity,Pain management,Gait training,Balance training,Functional mobility training,Stair training,Neuromuscular re-education,Transfer training,ADL/Self-care training,Endurance training,Manual Therapy - Soft Tissue Mobilization    PT Goals:            Short Term Goals  Time Frame for Short term goals: 10 days 6/17  Short term goal 1: pt will complete bed mobility with mod ind - 6/17 not met, progressing (continue goal)  Short term goal 2: pt will complete functional transfer with CGA and LRAD. - 6/17 not met, progressing (continue goal)  Short term goal 3: pt will ambulate 50 ft with LRAD and mod A. - 6/17 not met today (previously partially met for distance)  Short term goal 4: pt will tolerate stair assessment when appropriate and goal will be made. - GOAL MET 6/14, completes 4 steps with CGA-min A and BHR            Long Term Goals  Time Frame for Long term goals : 21 days 6/28  Long term goal 1: pt will complete functional transfer with SBA and LRAD  Long term goal 2: pt will ambulate 100 ft wiht LRAD and CGA. Long term goal 3: pt will complete car transfer with SBA and LRAD. Long term goal 4: pt will tolerate standing for 2 min with 1-2 UE support and CGA -SBA for balance. Long term goal 5: pt will ascend/descend 12 steps with min A and BHR    PT Assessment:  Recommendation:   PT Equipment Recommendations  Equipment Needed: Yes  Mobility Devices: Wheelchair  Wheelchair: Standard        Patient seen for gait, endurance, and LE strengthening. Patient completed transfers with CGA, ambulates ~500 ft with CGA-min A without overt LOB, and completes ~1 min of standing dynamic exercises to progress strength, balance, and endurance. Pt will benefit from continued skilled PT in ARU to progress mobility and decrease fall risk.       OCCUPATIONAL THERAPY  Occupational therapy observed barriers to dc:         Baseline: mod I all ADLs and mobility, FT working at [employer], on tube feeds since January, not driving              Current level: min A LB ADLs, SPV UB ADLs, CGA transfers/mobility, poor activity tolerance              Barriers to DC: limited assist at home, pain, medical complexity, weakness              Needs in order to achieve dc home/next level of care: min A ADLs, mod I transfers to return home with girlfriend; girlfriend states she cannot take him home in current state    Occupational Therapy interventions:  Current Treatment Recommendations: Strengthening,ROM,Balance training,Functional mobility training,Endurance training,Safety education & training,Equipment evaluation, education, & procurement,Return to work related activity,Neuromuscular re-education,Patient/Caregiver education & training,Self-Care / ADL,Home management training,Coordination training    OT Goals:  Patient Goals   Patient goals : \"to walk and eat again\"  Short Term Goals  Time Frame for Short term goals: 1 week- 6/15/22  Short Term Goal 1: Pt will complete functional transfers with min A. GOAL MET 6/15/22 Pt performed functional transfers with min A. Short Term Goal 2: Pt will perform toileting with max A. progressing Pt requires total assist for toileting. Short Term Goal 3: Pt will complete LB dressing with mod A. GOAL MET 6/20/22 Pt completed LB dressing with min A. Short Term Goal 4: Pt will perform UB dressing with min A. GOAL MET 6/11/22 Pt completed UB dressing with SPV. Long Term Goals  Time Frame for Long term goals : 3 weeks- 6/29/22  Long Term Goal 1: Pt will perform functional transfers with CGA. Long Term Goal 2: Pt will complete toileting with CGA. Long Term Goal 3: Pt will perform LB dressing with CGA. Long Term Goal 4: Pt will perform UB dressing with setup. GOAL MET 6/20/22 Pt performed UB dressing with setup. Long Term Goal 5: Pt will complete full body bathing with CGA. OT Assessment:  Assessment  Activity Tolerance: Patient limited by endurance; Patient limited by pain; Patient limited by fatigue      Assessment: First Session: Pt agreeable to OT session. Pt completed mobility bed>bathroom with SBA and assist to manage wound vac. Pt performed standing for ~30 seconds prior to requesting seated rest break due to dizziness.  Pt completed grooming while seated with mod I. Pt completed w/c mobility to gym with SPV/mod I and no VCs for maneuverability. Pt performed BUE ther ex with fair strength for 2# weights. Second Session: Pt's girlfriend present for family training. Pt's girlfriend states that \"he needs to be eating and walking to go home. \" Pt and family educated on current diet recommendations, results of MBS yesterday, current level of assist for ADLs, and transfer level. Pt's girlfriend states she does not feel he is ready to go home and would like a referral to a SNF. Pt completed doff/don of socks and shoes with setup. Pt performed stand pivot transfer with SBA and propelled w/c to/from gym with SPV. Pt completed sit<>stands x5 from w/c with CGA to correct one posterior LOB. Pt left with RN and PT. SW updated on family stating they cannot take care of patient at current level. Continue POC. \      SPEECH THERAPY   Speech therapy observed barriers to dc:    Baseline: lives with girlfriend, was working full time at [employer] prior to surgery in Jan 2022, manages own meds/finances, assist with tube feeds from girlfriend, not currently driving    Current level: severe oropharyngeal dysphagia, mild-mod cognitive linguistic deficit, moderate dysarthria    Barriers to DC: severity of dysphagia, reduced insight into deficits    Needs in order to achieve dc home/next level of care: carryover of compensatory speech strategies for improved speech intelligibility, improved insight, tolerance of tube feeds, ongoing dysphagia tx     Speech Therapy interventions:  Dysphagia: safe swallow strategies, therapeutic PO trials with SLP, swallow strengthening exercises   Speech/Language/Cognition: Compensatory strategy training and carryover, recall/STM, problem solving, reasoning, exec function, thought organization, attention, speech strategies for improved intelligibility     Dysphagia Goals:  Timeframe for Long-term Goals: 21 days (06/29/22)   Goal 1: Pt will tolerate safest and least restrictive diet without any clinical s/s of aspiration. - 06/21 - not progressing; severe dysphagia       Short-term Goals  Timeframe for Short-term Goals: 18 days (06/26/22)  Goal 1: Pt will complete pharyngeal/laryngeal strengthening exercises 10/10 given min cues for overall improved swallowing function. - 06/21 - progressing, but continues to present with severe dysphagia     Goal 2: The patient will tolerate instrumental swallowing procedure - Goal met  Goal 3: The patient/caregiver will demonstrate understanding of compensatory strategies for improved swallowing safety - 06/21 - progressing re: ice chip trials   Goal 4: The patient will tolerate recommended diet without observed clinical signs of aspiration - 06/21 - not progressing; severe dysphagia      Speech/Language/Cog Goals:  Timeframe for Long-term Goals: 21 days (06/29/22)  Goal 1: Pt will improve overall cognitive linguistic goals to increase safety and indep to retun to PLOF  - 06/21 - progressing   Goal 2: Pt will utilize speech intelligibility strategies for improved communication with both familiar and unfamiliar listeners. - 06/21 - progressing,  but limited due to s/p hemiglossectomy      Short-term Goals  Timeframe for Short-term Goals: 18 days (06/26/22)  Goal 1: Pt will complete recall tasks with 80% acc given min cues for use of compensatory strategies. - 06/21 - progressing   Goal 2: Pt will complete executive function tasks (meds, math, money, time, etc) with 80% acc given min cues. - 06/21 - progressing   Goal 3: Pt will complete problem solving and thought organization tasks with 80% acc given min cues. - 06/21 - progressing   Goal 4: Pt will complete verbal and visual reasoning tasks with 80% acc given min cues. - 06/21 - progressing   Goal 5: Pt will repeat words/phrases/sentences using speech intelligibility strategies with 80% acc given min cues.  - 06/21 - progressing, but limited due to s/p hemiglossectomy   Timeframe for Short-term Goals: 18 days (06/26/22)    ST Assessment:  Pt alert and cooperative, agreeable to tx. Pt tolerated ice chip trials with no overt s/s of aspiration, but pt did demonstrate silent aspiration on MBSS. Pharyngeal / laryngeal strengthening exercises completed. Edu re: recall strategies - grouping/chunking, repetition, association. Use of these strategies improved pt's ability to recall 12 items in a sorting and remembering categories task indep and following a 10 min delay. Pt with more difficulty completing a word deduction task using reasoning abilities - required mod-max cues to increase acc. Edu re: SLOB speech strategies. Diadochokinesis completed with AMRs and SMRs - pt with most dificulty producing /ka/ due to lingual anatomy. Continue goals above. NUTRITION  Weight: 101 lb 8 oz (46 kg) / Body mass index is 16.38 kg/m². Diet Order: Diet NPO  ADULT TUBE FEEDING; PEG; Standard with Fiber; Bolus; 6 Times Daily; 237; 60; Before and after each bolus; Protein; 1 bottle of Proteinex daily, 30 mL free water flush before and after adminstration  PO Meals Eaten (%): 0%  Education: Not appropriate       CASE MANAGEMENT  Assessment: 28 yr old male. Dx:Critical illness myopathy. GI, nephrology, spiritual care, wound care and psych following. Independent prior level of function. Lives with significant other in an two level apartment. Pt has an elderly roommate that lives in the apartment. Therapy recommendations are 24 hour supervision or assist;Home with Home health PT/OT. Referral to [facility] for DME:Standard wheel chair with cushion. [employer] referral for wound vac, [facility] for IV ABX and tube feeds and will have home care set up. Family train with girlfriend scheduled on 06/21 @ 1400>completed. CM will continue to support for DCP. Referral to [address] awaiting document review.     Interdisciplinary Goals:   1.) pt will ambulate about unit with no AD and cGA  2.) Pt will carryover use of speech strategies for improved intelligibility across all disciplines given min cues   3.) Pt will complete toileting with mod A. [x]  Family Training completed yesterday. Discharge Plan   Estimated discharge date: 06/26/22  Destination: home health vs SNF   Pass:No   Services at Discharge: [address], Occupational Therapy, Speech Therapy and Nursing  Equipment at Discharge: RW, wheelchair, possible BSC. Team Members Present at Conference:  : Emerald Guadarrama    Occupational Therapist: Rolando Nation, OTR/L  Physical Therapist: Ismael Yu PT, DPT   Speech Therapist: Louis Sanchez MA [facility] SLP   Nurse:James Sanchez RN  Dietician: Donell Simmonds, RD, LD  : Obi Don, OTR/L  Psychiatry: N/A    Family members present at conference: No      I led this team conference and I approve the established interdisciplinary plan of care as documented within the medical record of [facility].     MD: Electronically signed by Kwame Padilla MD on 6/22/2022 at 4:40 PM

## 2022-06-21 NOTE — PROGRESS NOTES
PROGRESS NOTE  S:31 yrs Patient  admitted on 6/8/2022 with Critical illness myopathy [G72.81] . Today he complains of leakage around G tube    Exam:   Vitals:    06/21/22 1118   BP: 108/70   Pulse: (!) 103   Resp:    Temp:    SpO2: 98%      General appearance: alert, appears older than stated age and cachectic  HEENT: Sclera clear, anicteric  Neck: supple  Lungs: clear to auscultation bilaterally  Heart: regular rate and rhythm  Abdomen: soft, G tube site with moderate discharge  Extremities: edema none     Medications: Reviewed    Labs:  CBC:   Recent Labs     06/20/22  0630   WBC 2.4*   HGB 7.1*   HCT 21.2*   MCV 86.9   PLT 79*     BMP:   Recent Labs     06/20/22  0630   *   K 4.6   CL 96*   CO2 32   BUN 32*   CREATININE <0.5*     LIVER PROFILE: No results for input(s): AST, ALT, LIPASE, PROT, BILIDIR, BILITOT, ALKPHOS in the last 72 hours. Invalid input(s): AMYLASE,  ALB  PT/INR:   No results for input(s): INR in the last 72 hours. 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 His G tube appears to be functioning well but with leakage around it    Recommendation:  1. Continue supportive care  2. Continue TFs per dietitian recs  3. Keep HOB elevated during PEG use  4.  The PEG tube was replaced on 6/14 w a shorter 2 cm ADITI-Key low profile G tube at bedside w improvement in leaking        Kurt De La Cruz MD, MD  12:43 PM 6/21/2022

## 2022-06-21 NOTE — PROGRESS NOTES
Occupational Therapy  Facility/Department: [facility]  Rehabilitation Occupational Therapy Daily Treatment Note    Date: 22  Patient Name: Ana Avalos       Room: 1272/9416-21  MRN: [id number]  Account: [de-identified]   : 1990  (28 y.o.) Gender: male                    Past Medical History:  has a past medical history of Cancer ([address] Past Surgical History:   has no past surgical history on file. Restrictions  Restrictions/Precautions: NPO;Isolation; Fall Risk;General Precautions  Other position/activity restrictions: antonio, PEG, NPO, MRSA contact precautions, ice chips with therapy/RN only    Subjective  Subjective: Pt in bed, states pain hurting a lot today, 10/10 in buttocks, /64, HR 87, O2 sats 96% on RA. Restrictions/Precautions: NPO;Isolation; Fall Risk;General Precautions             Objective     Cognition  Overall Cognitive Status: Exceptions  Arousal/Alertness: Appropriate responses to stimuli  Following Commands: Follows multistep commands with repitition; Follows multistep commands with increased time  Attention Span: Appears intact  Memory: Appears intact  Safety Judgement: Decreased awareness of need for assistance;Decreased awareness of need for safety  Problem Solving: Assistance required to identify errors made;Assistance required to implement solutions;Assistance required to generate solutions  Insights: Decreased awareness of deficits  Initiation: Requires cues for some  Sequencing: Requires cues for some  Orientation  Overall Orientation Status: Within Functional Limits         ADL  Grooming/Oral Hygiene  Assistance Level: Modified independent  Skilled Clinical Factors: to brush teeth while seated at sink  Putting On/Taking Off Footwear  Equipment Provided: Sock aid;Reachers  Assistance Level: Set-up  Skilled Clinical Factors: no VCs for sock aid, setup for shoes          Functional Mobility  Device: Wheelchair  Activity: To/From therapy gym; To/From bathroom  Assistance Level: Supervision  Bed Mobility  Overall Assistance Level: Modified Independent  Sit to Supine  Assistance Level: Modified independent  Supine to Sit  Assistance Level: Modified independent  Transfers  Surface: From bed; Wheelchair; To bed  Additional Factors: Verbal cues; Hand placement cues  Sit to Stand  Assistance Level: Stand by assist  Stand to Sit  Assistance Level: Stand by assist  Bed To/From Chair  Technique: Stand pivot  Assistance Level: Stand by assist  Skilled Clinical Factors: no AD  Stand Pivot  Assistance Level: Stand by assist   OT Exercises  A/AROM Exercises: x10 50% assist for AAROM for full shoulder flexion  Resistive Exercises: 3# for wrist flexion, wrist extension, elbow flexion x20  Circulation/Endurance Exercises: sit<>stands x5     Assessment  Assessment  Assessment: First Session: Pt agreeable to OT session. Pt completed mobility bed>bathroom with SBA and assist to manage wound vac. Pt performed standing for ~30 seconds prior to requesting seated rest break due to dizziness. Pt completed grooming while seated with mod I. Pt completed w/c mobility to gym with SPV/mod I and no VCs for maneuverability. Pt performed BUE ther ex with fair strength for 2# weights. Second Session: Pt's girlfriend present for family training. Pt's girlfriend states that \"he needs to be eating and walking to go home. \" Pt and family educated on current diet recommendations, results of MBS yesterday, current level of assist for ADLs, and transfer level. Pt's girlfriend states she does not feel he is ready to go home and would like a referral to a SNF. Pt completed doff/don of socks and shoes with setup. Pt performed stand pivot transfer with SBA and propelled w/c to/from gym with SPV. Pt completed sit<>stands x5 from w/c with CGA to correct one posterior LOB. Pt left with RN and PT. SW updated on family stating they cannot take care of patient at current level. Continue POC.   Activity Tolerance: Patient limited by endurance; Patient limited by pain; Patient limited by fatigue  Discharge Recommendations: 24 hour supervision or assist;Home with Home health OT;S Level 4  OT Equipment Recommendations  Equipment Needed: No  Safety Devices  Safety Devices in place: Yes  Type of devices: Gait belt;Bed alarm in place;Call light within reach; Left in bed;Nurse notified    Patient Education  Education  Education Given To: Patient  Education Provided: Role of Therapy;Plan of Care;Precautions; Safety; Energy Conservation; Fall Prevention Strategies;DME/Home Modifications;Transfer Training;Mobility Training;Equipment;ADL Function;Cognition; Family Education  Education Provided Comments: role of OT, safety with transfers, posture, midline balance, ADL retraining, benefit of OOB and mobility, use of a/e, family training with girlfriend  Education Method: Verbal;Demonstration  Barriers to Learning: Cognition  Education Outcome: Verbalized understanding;Demonstrated understanding;Continued education needed    Plan  Plan  Times per Week: 5/7 days/week  Plan Weeks: 3 weeks  Current Treatment Recommendations: Strengthening;ROM;Balance training;Functional mobility training; Endurance training; Safety education & training;Equipment evaluation, education, & procurement;Return to work related activity; Neuromuscular re-education;Patient/Caregiver education & training;Self-Care / ADL; Home management training;Coordination training    Goals  Short Term Goals  Time Frame for Short term goals: 1 week- 6/15/22  Short Term Goal 1: Pt will complete functional transfers with min A. GOAL MET 6/15/22 Pt performed functional transfers with min A. Short Term Goal 2: Pt will perform toileting with max A. progressing Pt requires total assist for toileting. Short Term Goal 3: Pt will complete LB dressing with mod A. GOAL MET 6/20/22 Pt completed LB dressing with min A. Short Term Goal 4: Pt will perform UB dressing with min A.  GOAL MET 6/11/22 Pt completed UB dressing with SPV. Long Term Goals  Time Frame for Long term goals : 3 weeks- 6/29/22  Long Term Goal 1: Pt will perform functional transfers with CGA. Long Term Goal 2: Pt will complete toileting with CGA. Long Term Goal 3: Pt will perform LB dressing with CGA. Long Term Goal 4: Pt will perform UB dressing with setup. GOAL MET 6/20/22 Pt performed UB dressing with setup. Long Term Goal 5: Pt will complete full body bathing with CGA.       Therapy Time   Individual Concurrent Group Co-treatment   Time In 0830         Time Out 0900         Minutes 30         Timed Code Treatment Minutes: 30 Minutes    Second Session Therapy Time:   Individual Concurrent Group Co-treatment   Time In 1400        Time Out 1430        Minutes 30          Timed Code Treatment Minutes:  30 Minutes    Total Treatment Minutes:  60 minutes      Flaca Martinez OT

## 2022-06-21 NOTE — PROGRESS NOTES
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 Pt reported his girlfriend manages cooking, cleaning, laundry, grocery shopping. Pt not actively driving but was able to prior to 2022.    ST Dx: [] Aphasia  [x] Dysarthria  [] Apraxia   [x] Oropharyngeal dysphagia [x] Cognitive Impairment  [] Other:   Date of Admit: 2022  Room #: 0156/0156-01    Current functional status (updated daily):         Pt being seen for : [x] Speech/Language Treatment  [x] Dysphagia Treatment [x] Cognitive Treatment  [] Other:  Communication: []WFL  [] Aphasia  [x] Dysarthria  [] Apraxia  [] Pragmatic Impairment [] Non-verbal  [] Hearing Loss  [] Other:   Cognition: [] WFL  [] Mild  [x] Moderate  [] Severe [] Unable to Assess  [] Other:  Memory: [] WFL  [] Mild  [x] Moderate  [] Severe [] Unable to Assess  [] Other:  Behavior: [x] Alert  [x] Cooperative  [x]  Pleasant  [] Confused  [] Agitated  [] Uncooperative  [] Distractible [] Motivated  [] Self-Limiting [] Anxious  [] Other:  Endurance:  [x] Adequate for participation in SLP sessions  [] Reduced overall  [] Lethargic  [] Other:  Safety: [] No concerns at this time  [x] Reduced insight into deficits  [x]  Reduced safety awareness [] Not following call light procedures  [] Unable to Assess  [] Other:    Current Diet Order:Diet NPO  ADULT TUBE FEEDING; PEG; Standard with Fiber; Bolus; 5 Times Daily; 237; 60; Before and after each bolus; Protein; 1 bottle of Proteinex daily, 30 mL free water flush before and after adminstration  Swallowing Precautions: oral care 2-3x/day to reduce adverse affects in the event of aspiration        Date: 6/21/2022      Tx session 1  2576-0990 Tx session 2  All tx needs met in session 1    Total Timed Code Min 30    Total Treatment Minutes 60    Individual Treatment Minutes 60    Group Treatment Minutes 0    Co-Treat Minutes 0    Variance/Reason:  N/a    Pain Buttocks wound     Pain Intervention [x] RN notified  [x] Repositioned  [] Intervention offered and patient declined  [] N/A  [x] Other: RN administered pain meds via PEG [] RN notified  [] Repositioned  [] Intervention offered and patient declined  [] N/A  [] Other:   Subjective     Pt alert and cooperative, agreeable to tx. Pt upright in bed for session. Objective:  Goals  Timeframe for Short-term Goals: 18 days (06/26/22)     Dysphagia Goals:  Goal 1: Pt will complete pharyngeal/laryngeal strengthening exercises 10/10 given min cues for overall improved swallowing function    Pt completed the following pharyngeal / laryngeal strengthening exercises:  -shaker maneuver - held for 1 min x2, then completed 30 reps, then held for 1 min x1  -effortful swallow: 10x  -darwin maneuver: 10x  -isometric tongue tip press and hold 5x: 10x  -jaw jut: x10 - mod cues, significant difficulty completing task (reduced ROM and coordination - likely related to radiation treatment)         Goal met 06/20/22. MBSS completed. See separate speech therapy report for details. Goal 3: The patient/caregiver will demonstrate understanding of compensatory strategies for improved swallowing safety   SLP reviewed results of MBSS again - pt verbalized understanding. With ice chip trials this date, pt with increased awareness re: sitting fully upright.        Goal 4: The patient will tolerate recommended diet without observed clinical signs of aspiration   Ice chips - x7  -pt tolerated with no overt s/s of aspiration  -of note, pt did demonstrate silent aspiration on MBSS      Cognitive-linguistic Goals  Goal 1: Pt will complete recall tasks with 80% acc given min cues for use of compensatory strategies   Sorting and Remembering Categories  -pt given 12 items that could be sorted into 3 groups  -edu re: recall strategies - grouping/chunking, repetition, association  -immediate recall: pt recalled 10/12 items indep; +2 given min cues  -10 min delay: pt recalled 11/12 items indep; +1 given min cues       Goal 2: Pt will complete executive function tasks (meds, math, money, time, etc) with 80% acc given min cues. Goal not directly targeted. Goal 3: Pt will complete problem solving and thought organization tasks with 80% acc given min cues. Goal not directly targeted. Goal 4: Pt will complete verbal and visual reasoning tasks with 80% acc given min cues. Word Deduction Task   -pt given 3 clues that describe an item; asked to name the item being described  -e.g. time, hands, wristband = watch   -pt completed task with 54% acc indep, improved to 96% acc given mod-max cues       Goal 5: Pt will repeat words/phrases/sentences using speech intelligibility strategies with 80% acc given min cues. SLP edu pt re: SLOB speech strategies - slow, loud, over-exaggerate, breath support. Pt completed diadochokinesis:  -/pa pa pa/ x2  -/ta ta ta / x2  -/ka ka ka/ x2  -pa ta ka x10. Pt with most difficulty with /ka/ due to lingual anatomy s/o hemiglossectomy       Other areas targeted: N/a    Education:   SLP edu pt re: results of MBSS, safe swallow strategies, recall strategies, reasoning strategies, SLOB speech strategies     Safety Devices: [x] Call light within reach  [] Chair alarm activated  [x] Bed alarm activated  [] Other: [] Call light within reach  [] Chair alarm activated  [] Bed alarm activated  [x] Other: N/A   Assessment: Pt alert and cooperative, agreeable to tx. Pt tolerated ice chip trials with no overt s/s of aspiration, but pt did demonstrate silent aspiration on MBSS. Pharyngeal / laryngeal strengthening exercises completed.  Edu re: recall strategies - grouping/chunking, repetition, association. Use of these strategies improved pt's ability to recall 12 items in a sorting and remembering categories task indep and following a 10 min delay. Pt with more difficulty completing a word deduction task using reasoning abilities - required mod-max cues to increase acc. Edu re: SLOB speech strategies. Diadochokinesis completed with AMRs and SMRs - pt with most dificulty producing /ka/ due to lingual anatomy. Continue goals above. Plan: Continue as per plan of care. Additional Information:     Barriers toward progress: Cognitive deficit, Impulsivity, Limited safety awareness, Limited insight into deficits, Unrealistic expectations and Medication managment  Discharge recommendations:  [] Home independently  [] Home with assistance [x]  24 hour supervision  [] ECF [] Other:  Continued Tx Upon Discharge: ? [x] Yes [] No [] TBD based on progress while on ARU [] Vital Stim indicated [] Other:   Estimated discharge date: 06/25/2022    Interventions used this date:  [] Speech/Language Treatment  [] Instruction in HEP [] Group [x] Dysphagia Treatment [x] Cognitive Treatment   [] Other:       Total Time Breakdown / Charges    Time In Time out Total Time / units   Cognitive Tx 1030 1100 30 min / 2 units   Speech Tx 1120 1130 10 min / 1 unit   Dysphagia Tx 1100 1120 20 min / 1 unit       Electronically Signed by     Federica Stewart MA CCC-SLP #[id number]  Speech Language Pathologist

## 2022-06-22 ENCOUNTER — APPOINTMENT (OUTPATIENT)
Dept: GENERAL RADIOLOGY | Age: 32
DRG: 058 | End: 2022-06-22
Attending: STUDENT IN AN ORGANIZED HEALTH CARE EDUCATION/TRAINING PROGRAM
Payer: COMMERCIAL

## 2022-06-22 LAB
ABO/RH: NORMAL
ANION GAP SERPL CALCULATED.3IONS-SCNC: 4 MMOL/L (ref 3–16)
ANTIBODY SCREEN: NORMAL
BACTERIA: ABNORMAL /HPF
BILIRUBIN URINE: NEGATIVE
BLOOD, URINE: ABNORMAL
BUN BLDV-MCNC: 28 MG/DL (ref 7–20)
CALCIUM SERPL-MCNC: 9.2 MG/DL (ref 8.3–10.6)
CHLORIDE BLD-SCNC: 97 MMOL/L (ref 99–110)
CLARITY: ABNORMAL
CO2: 33 MMOL/L (ref 21–32)
COLOR: YELLOW
CREAT SERPL-MCNC: <0.5 MG/DL (ref 0.9–1.3)
FINE CASTS, UA: ABNORMAL /LPF (ref 0–2)
GFR AFRICAN AMERICAN: >60
GFR NON-AFRICAN AMERICAN: >60
GLUCOSE BLD-MCNC: 97 MG/DL (ref 70–99)
GLUCOSE URINE: NEGATIVE MG/DL
HCT VFR BLD CALC: 19.6 % (ref 40.5–52.5)
HCT VFR BLD CALC: 19.7 % (ref 40.5–52.5)
HEMATOLOGY PATH CONSULT: NORMAL
HEMOGLOBIN: 6.6 G/DL (ref 13.5–17.5)
HEMOGLOBIN: 6.6 G/DL (ref 13.5–17.5)
KETONES, URINE: NEGATIVE MG/DL
LACTIC ACID: 1 MMOL/L (ref 0.4–2)
LEUKOCYTE ESTERASE, URINE: ABNORMAL
MCH RBC QN AUTO: 29 PG (ref 26–34)
MCH RBC QN AUTO: 29.2 PG (ref 26–34)
MCHC RBC AUTO-ENTMCNC: 33.4 G/DL (ref 31–36)
MCHC RBC AUTO-ENTMCNC: 33.7 G/DL (ref 31–36)
MCV RBC AUTO: 86.7 FL (ref 80–100)
MCV RBC AUTO: 86.9 FL (ref 80–100)
MICROSCOPIC EXAMINATION: YES
NITRITE, URINE: NEGATIVE
PDW BLD-RTO: 15.5 % (ref 12.4–15.4)
PDW BLD-RTO: 15.6 % (ref 12.4–15.4)
PH UA: 8 (ref 5–8)
PLATELET # BLD: 100 K/UL (ref 135–450)
PLATELET # BLD: 96 K/UL (ref 135–450)
PMV BLD AUTO: 7.2 FL (ref 5–10.5)
PMV BLD AUTO: 7.4 FL (ref 5–10.5)
POTASSIUM REFLEX MAGNESIUM: 4.6 MMOL/L (ref 3.5–5.1)
PROTEIN UA: NEGATIVE MG/DL
RBC # BLD: 2.26 M/UL (ref 4.2–5.9)
RBC # BLD: 2.27 M/UL (ref 4.2–5.9)
RBC UA: ABNORMAL /HPF (ref 0–4)
SODIUM BLD-SCNC: 134 MMOL/L (ref 136–145)
SPECIFIC GRAVITY UA: 1.01 (ref 1–1.03)
URINE REFLEX TO CULTURE: ABNORMAL
URINE TYPE: ABNORMAL
UROBILINOGEN, URINE: 0.2 E.U./DL
WBC # BLD: 0.3 K/UL (ref 4–11)
WBC # BLD: 0.3 K/UL (ref 4–11)
WBC UA: ABNORMAL /HPF (ref 0–5)

## 2022-06-22 PROCEDURE — 87040 BLOOD CULTURE FOR BACTERIA: CPT

## 2022-06-22 PROCEDURE — 97110 THERAPEUTIC EXERCISES: CPT

## 2022-06-22 PROCEDURE — 6370000000 HC RX 637 (ALT 250 FOR IP): Performed by: PSYCHIATRY & NEUROLOGY

## 2022-06-22 PROCEDURE — 86860 RBC ANTIBODY ELUTION: CPT

## 2022-06-22 PROCEDURE — 97129 THER IVNTJ 1ST 15 MIN: CPT

## 2022-06-22 PROCEDURE — 6370000000 HC RX 637 (ALT 250 FOR IP): Performed by: PHYSICAL MEDICINE & REHABILITATION

## 2022-06-22 PROCEDURE — 86850 RBC ANTIBODY SCREEN: CPT

## 2022-06-22 PROCEDURE — 97530 THERAPEUTIC ACTIVITIES: CPT

## 2022-06-22 PROCEDURE — 97535 SELF CARE MNGMENT TRAINING: CPT

## 2022-06-22 PROCEDURE — 6360000002 HC RX W HCPCS: Performed by: INTERNAL MEDICINE

## 2022-06-22 PROCEDURE — 2580000003 HC RX 258: Performed by: STUDENT IN AN ORGANIZED HEALTH CARE EDUCATION/TRAINING PROGRAM

## 2022-06-22 PROCEDURE — 36415 COLL VENOUS BLD VENIPUNCTURE: CPT

## 2022-06-22 PROCEDURE — 80048 BASIC METABOLIC PNL TOTAL CA: CPT

## 2022-06-22 PROCEDURE — P9016 RBC LEUKOCYTES REDUCED: HCPCS

## 2022-06-22 PROCEDURE — 87186 SC STD MICRODIL/AGAR DIL: CPT

## 2022-06-22 PROCEDURE — 87077 CULTURE AEROBIC IDENTIFY: CPT

## 2022-06-22 PROCEDURE — 86999 UNLISTED TRANSFUSION MED PX: CPT

## 2022-06-22 PROCEDURE — 71045 X-RAY EXAM CHEST 1 VIEW: CPT

## 2022-06-22 PROCEDURE — 81001 URINALYSIS AUTO W/SCOPE: CPT

## 2022-06-22 PROCEDURE — 85027 COMPLETE CBC AUTOMATED: CPT

## 2022-06-22 PROCEDURE — 92507 TX SP LANG VOICE COMM INDIV: CPT

## 2022-06-22 PROCEDURE — 99232 SBSQ HOSP IP/OBS MODERATE 35: CPT | Performed by: INTERNAL MEDICINE

## 2022-06-22 PROCEDURE — 86902 BLOOD TYPE ANTIGEN DONOR EA: CPT

## 2022-06-22 PROCEDURE — 83605 ASSAY OF LACTIC ACID: CPT

## 2022-06-22 PROCEDURE — 2580000003 HC RX 258: Performed by: INTERNAL MEDICINE

## 2022-06-22 PROCEDURE — 92526 ORAL FUNCTION THERAPY: CPT

## 2022-06-22 PROCEDURE — 86901 BLOOD TYPING SEROLOGIC RH(D): CPT

## 2022-06-22 PROCEDURE — 1280000000 HC REHAB R&B

## 2022-06-22 PROCEDURE — 86900 BLOOD TYPING SEROLOGIC ABO: CPT

## 2022-06-22 PROCEDURE — 6370000000 HC RX 637 (ALT 250 FOR IP): Performed by: STUDENT IN AN ORGANIZED HEALTH CARE EDUCATION/TRAINING PROGRAM

## 2022-06-22 PROCEDURE — 86922 COMPATIBILITY TEST ANTIGLOB: CPT

## 2022-06-22 PROCEDURE — 86870 RBC ANTIBODY IDENTIFICATION: CPT

## 2022-06-22 PROCEDURE — 87086 URINE CULTURE/COLONY COUNT: CPT

## 2022-06-22 PROCEDURE — 6360000002 HC RX W HCPCS: Performed by: STUDENT IN AN ORGANIZED HEALTH CARE EDUCATION/TRAINING PROGRAM

## 2022-06-22 PROCEDURE — 85025 COMPLETE CBC W/AUTO DIFF WBC: CPT

## 2022-06-22 PROCEDURE — 86880 COOMBS TEST DIRECT: CPT

## 2022-06-22 RX ORDER — SODIUM CHLORIDE 9 MG/ML
INJECTION, SOLUTION INTRAVENOUS PRN
Status: DISCONTINUED | OUTPATIENT
Start: 2022-06-22 | End: 2022-06-23 | Stop reason: HOSPADM

## 2022-06-22 RX ORDER — SODIUM CHLORIDE 9 MG/ML
INJECTION, SOLUTION INTRAVENOUS CONTINUOUS
Status: DISCONTINUED | OUTPATIENT
Start: 2022-06-22 | End: 2022-06-23

## 2022-06-22 RX ADMIN — FOLIC ACID 1 MG: 1 TABLET ORAL at 10:13

## 2022-06-22 RX ADMIN — NYSTATIN 500000 UNITS: 100000 SUSPENSION ORAL at 16:18

## 2022-06-22 RX ADMIN — SODIUM CHLORIDE, PRESERVATIVE FREE 10 ML: 5 INJECTION INTRAVENOUS at 09:00

## 2022-06-22 RX ADMIN — Medication: at 14:05

## 2022-06-22 RX ADMIN — Medication: at 10:17

## 2022-06-22 RX ADMIN — QUETIAPINE FUMARATE 50 MG: 25 TABLET ORAL at 22:54

## 2022-06-22 RX ADMIN — GABAPENTIN 100 MG: 100 CAPSULE ORAL at 10:12

## 2022-06-22 RX ADMIN — CLONAZEPAM 0.5 MG: 0.5 TABLET ORAL at 22:33

## 2022-06-22 RX ADMIN — SODIUM CHLORIDE: 9 INJECTION, SOLUTION INTRAVENOUS at 10:40

## 2022-06-22 RX ADMIN — APIXABAN 5 MG: 5 TABLET, FILM COATED ORAL at 10:13

## 2022-06-22 RX ADMIN — CEFTAROLINE FOSAMIL 600 MG: 600 POWDER, FOR SOLUTION INTRAVENOUS at 09:32

## 2022-06-22 RX ADMIN — DAPTOMYCIN 500 MG: 500 INJECTION, POWDER, LYOPHILIZED, FOR SOLUTION INTRAVENOUS at 17:55

## 2022-06-22 RX ADMIN — GABAPENTIN 100 MG: 100 CAPSULE ORAL at 22:34

## 2022-06-22 RX ADMIN — OXYCODONE 10 MG: 5 TABLET ORAL at 10:13

## 2022-06-22 RX ADMIN — SODIUM CHLORIDE: 9 INJECTION, SOLUTION INTRAVENOUS at 19:44

## 2022-06-22 RX ADMIN — Medication 100 MG: at 10:13

## 2022-06-22 RX ADMIN — NYSTATIN 500000 UNITS: 100000 SUSPENSION ORAL at 10:11

## 2022-06-22 RX ADMIN — DOCUSATE SODIUM AND SENNOSIDES 2 TABLET: 8.6; 5 TABLET, FILM COATED ORAL at 22:33

## 2022-06-22 RX ADMIN — GABAPENTIN 100 MG: 100 CAPSULE ORAL at 16:17

## 2022-06-22 RX ADMIN — NYSTATIN 500000 UNITS: 100000 SUSPENSION ORAL at 22:34

## 2022-06-22 RX ADMIN — HYDROMORPHONE HYDROCHLORIDE 0.25 MG: 1 INJECTION, SOLUTION INTRAMUSCULAR; INTRAVENOUS; SUBCUTANEOUS at 12:27

## 2022-06-22 RX ADMIN — APIXABAN 5 MG: 5 TABLET, FILM COATED ORAL at 22:34

## 2022-06-22 RX ADMIN — CEFTAROLINE FOSAMIL 600 MG: 600 POWDER, FOR SOLUTION INTRAVENOUS at 02:34

## 2022-06-22 RX ADMIN — ACETAMINOPHEN 650 MG: 325 TABLET ORAL at 22:33

## 2022-06-22 RX ADMIN — Medication: at 22:34

## 2022-06-22 RX ADMIN — Medication 400 MG: at 22:33

## 2022-06-22 RX ADMIN — Medication 400 MG: at 10:12

## 2022-06-22 RX ADMIN — NYSTATIN 500000 UNITS: 100000 SUSPENSION ORAL at 17:51

## 2022-06-22 RX ADMIN — OXYCODONE 10 MG: 5 TABLET ORAL at 22:33

## 2022-06-22 RX ADMIN — VENLAFAXINE HYDROCHLORIDE 75 MG: 37.5 CAPSULE, EXTENDED RELEASE ORAL at 10:11

## 2022-06-22 RX ADMIN — Medication 5 MG: at 22:33

## 2022-06-22 ASSESSMENT — PAIN SCALES - GENERAL
PAINLEVEL_OUTOF10: 9
PAINLEVEL_OUTOF10: 10
PAINLEVEL_OUTOF10: 0
PAINLEVEL_OUTOF10: 7
PAINLEVEL_OUTOF10: 0
PAINLEVEL_OUTOF10: 10
PAINLEVEL_OUTOF10: 10

## 2022-06-22 ASSESSMENT — PAIN DESCRIPTION - DESCRIPTORS: DESCRIPTORS: STABBING

## 2022-06-22 ASSESSMENT — PAIN DESCRIPTION - ONSET: ONSET: AWAKENED FROM SLEEP

## 2022-06-22 ASSESSMENT — PAIN DESCRIPTION - PAIN TYPE: TYPE: ACUTE PAIN

## 2022-06-22 ASSESSMENT — PAIN - FUNCTIONAL ASSESSMENT: PAIN_FUNCTIONAL_ASSESSMENT: PREVENTS OR INTERFERES SOME ACTIVE ACTIVITIES AND ADLS

## 2022-06-22 ASSESSMENT — PAIN DESCRIPTION - LOCATION: LOCATION: COCCYX

## 2022-06-22 ASSESSMENT — PAIN DESCRIPTION - FREQUENCY: FREQUENCY: CONTINUOUS

## 2022-06-22 NOTE — PROGRESS NOTES
Occupational Therapy  Facility/Department: [facility]  Rehabilitation Occupational Therapy Daily Treatment Note    Date: 22  Patient Name: Yuni Martinez       Room: [id number]  MRN: [id number]  Account: [de-identified]   : 1990  (28 y.o.) Gender: male                    Past Medical History:  has a past medical history of Cancer ([address] Past Surgical History:   has no past surgical history on file. Restrictions  Restrictions/Precautions: NPO;Isolation; Fall Risk;General Precautions  Other position/activity restrictions: antonio, PEG, NPO, MRSA contact precautions, ice chips with therapy/RN only    Subjective  Subjective: Pt in bed, not feeling great, pain severe at 8/10 in buttocks; /58, HR 89, O2 sats 95% on RA  Restrictions/Precautions: NPO;Isolation; Fall Risk;General Precautions             Objective     Cognition  Overall Cognitive Status: Exceptions  Arousal/Alertness: Appropriate responses to stimuli  Following Commands: Follows multistep commands with repitition; Follows multistep commands with increased time  Attention Span: Appears intact  Memory: Appears intact  Safety Judgement: Decreased awareness of need for assistance;Decreased awareness of need for safety  Problem Solving: Assistance required to identify errors made;Assistance required to implement solutions;Assistance required to generate solutions  Insights: Decreased awareness of deficits  Initiation: Requires cues for some  Sequencing: Requires cues for some  Orientation  Overall Orientation Status: Within Functional Limits         ADL  Grooming/Oral Hygiene  Assistance Level: Modified independent  Skilled Clinical Factors: to brush teeth while seated at sink  Putting On/Taking Off Footwear  Assistance Level:  Moderate assistance  Skilled Clinical Factors: setup for shoes, assist for B ROZ hose          Functional Mobility  Activity: To/From bathroom  Assistance Level: Supervision  Bed Mobility  Overall Assistance Level: Modified Independent  Sit to Supine  Assistance Level: Modified independent  Supine to Sit  Assistance Level: Modified independent  Transfers  Surface: From bed; Wheelchair; To bed  Additional Factors: Verbal cues; Hand placement cues  Sit to Stand  Assistance Level: Stand by assist  Stand to Sit  Assistance Level: Stand by assist  Bed To/From Chair  Technique: Stand step  Assistance Level: Stand by assist  Skilled Clinical Factors: no AD  Stand Pivot  Assistance Level: Stand by assist   OT Exercises  A/AROM Exercises: x20 AROM shoulder flexion while supine  Resistive Exercises: 3# for wrist flexion, wrist extension, elbow flexion, chest press, supination/pronation while supine x20     Assessment  Assessment  Assessment: First Session: Pt agreeable to OT session this date with increased time due to pain in buttocks. Pt performed mobility to bathroom with SBA while managing own antonio but unable to manage wound vac. Pt given wound vac while seated to hold and pt unable to hold weight of wound vac in hand. Pt completed grooming while seated with mod I. Pt performed donning shoes with setup but continues to require assistance for donning B ROZ hose. Pt reports mild dizziness with standing with BP reading between 108/58 and 100/61 this date. Pt completed sit<>stands x10 with SBA. Pt returned to bed at end of session due to pain in buttocks and fatigue. Second Session: Wound care RN finishing with change of wound vac bandage on arrival. Pt reports pain too severe to get out of bed at this time. Pt completed BUE ther ex while supine with 3# weight for 2x20 reps of elbow flexion, chest press, and x20 reps for wrist flexion, wrist extension, and supination/pronation. Pt completed AROM for full shoulder flexion for 2x20 reps and shoulder horizontal abduction/adduction x20 reps. Continue POC. Activity Tolerance: Patient limited by endurance; Patient limited by pain; Patient limited by fatigue  Discharge Recommendations: 24 hour supervision or assist;Home with Home health OT;S Level 4  Safety Devices  Safety Devices in place: Yes  Type of devices: Gait belt;Bed alarm in place;Call light within reach; Left in bed;Nurse notified    Patient Education  Education  Education Given To: Patient  Education Provided: Role of Therapy;Plan of Care;Precautions; Safety; Energy Conservation; Fall Prevention Strategies;DME/Home Modifications;Transfer Training;Mobility Training;Equipment;ADL Function;Cognition; Family Education  Education Provided Comments: role of OT, safety with transfers, posture, midline balance, ADL retraining, benefit of OOB and mobility, use of a/e  Education Method: Verbal;Demonstration  Barriers to Learning: Cognition  Education Outcome: Verbalized understanding;Demonstrated understanding;Continued education needed    Plan  Plan  Times per Week: 5/7 days/week  Plan Weeks: 3 weeks  Current Treatment Recommendations: Strengthening;ROM;Balance training;Functional mobility training; Endurance training; Safety education & training;Equipment evaluation, education, & procurement;Return to work related activity; Neuromuscular re-education;Patient/Caregiver education & training;Self-Care / ADL; Home management training;Coordination training    Goals  Short Term Goals  Time Frame for Short term goals: 1 week- 6/15/22  Short Term Goal 1: Pt will complete functional transfers with min A. GOAL MET 6/15/22 Pt performed functional transfers with min A. Short Term Goal 2: Pt will perform toileting with max A. progressing Pt requires total assist for toileting. Short Term Goal 3: Pt will complete LB dressing with mod A. GOAL MET 6/20/22 Pt completed LB dressing with min A. Short Term Goal 4: Pt will perform UB dressing with min A. GOAL MET 6/11/22 Pt completed UB dressing with SPV. Long Term Goals  Time Frame for Long term goals : 3 weeks- 6/29/22  Long Term Goal 1: Pt will perform functional transfers with CGA.   Long Term Goal 2: Pt will complete toileting with CGA. Long Term Goal 3: Pt will perform LB dressing with CGA. Long Term Goal 4: Pt will perform UB dressing with setup. GOAL MET 6/20/22 Pt performed UB dressing with setup. Long Term Goal 5: Pt will complete full body bathing with CGA.       Therapy Time   Individual Concurrent Group Co-treatment   Time In 0800         Time Out 0830         Minutes 30         Timed Code Treatment Minutes: 30 Minutes    Second Session Therapy Time:   Individual Concurrent Group Co-treatment   Time In 1300        Time Out 1330        Minutes 30          Timed Code Treatment Minutes:  30 Minutes    Total Treatment Minutes:  60 minutes      Paul Stanton, OT

## 2022-06-22 NOTE — PROGRESS NOTES
PROGRESS NOTE  S:31 yrs Patient  admitted on 6/8/2022 with Critical illness myopathy [G72.81] . Today he complains of leakage around G tube    Exam:   Vitals:    06/21/22 2326   BP:    Pulse:    Resp: 16   Temp:    SpO2:       General appearance: alert, appears older than stated age and cachectic  HEENT: Sclera clear, anicteric  Neck: supple  Lungs: clear to auscultation bilaterally  Heart: regular rate and rhythm  Abdomen: soft, G tube site with moderate discharge  Extremities: edema none     Medications: Reviewed    Labs:  CBC:   Recent Labs     06/20/22  0630   WBC 2.4*   HGB 7.1*   HCT 21.2*   MCV 86.9   PLT 79*     BMP:   Recent Labs     06/20/22  0630   *   K 4.6   CL 96*   CO2 32   BUN 32*   CREATININE <0.5*     LIVER PROFILE: No results for input(s): AST, ALT, LIPASE, PROT, BILIDIR, BILITOT, ALKPHOS in the last 72 hours. Invalid input(s): AMYLASE,  ALB  PT/INR:   No results for input(s): INR in the last 72 hours. 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 His G tube appears to be functioning well but with leakage around it    Recommendation:  1. Continue supportive care  2. Continue TFs per dietitian recs  3. Keep HOB elevated during PEG use  4. The PEG tube was replaced on 6/14 w a shorter 2 cm ADITI-Key low profile G tube at bedside w improvement in leaking  5.  May later have to exchange it w a 2.5 cm tube if he gains weight        Edward Garcia MD, MD  7:14 AM 6/22/2022

## 2022-06-22 NOTE — CARE COORDINATION
DANIS spoke with Amaya Reynoso (Brother/Sister) [phone] via telephone. DANIS discussed discharge planning and referral to [address]. Brother would like an update from  After team conference. Melissa Santos RN

## 2022-06-22 NOTE — PROGRESS NOTES
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 Pt reported his girlfriend manages cooking, cleaning, laundry, grocery shopping. Pt not actively driving but was able to prior to 2022.    ST Dx: [] Aphasia  [x] Dysarthria  [] Apraxia   [x] Oropharyngeal dysphagia [x] Cognitive Impairment  [] Other:   Date of Admit: 2022  Room #: 0156/0156-01    Current functional status (updated daily):         Pt being seen for : [x] Speech/Language Treatment  [x] Dysphagia Treatment [x] Cognitive Treatment  [] Other:  Communication: []WFL  [] Aphasia  [x] Dysarthria  [] Apraxia  [] Pragmatic Impairment [] Non-verbal  [] Hearing Loss  [] Other:   Cognition: [] WFL  [] Mild  [x] Moderate  [] Severe [] Unable to Assess  [] Other:  Memory: [] WFL  [] Mild  [x] Moderate  [] Severe [] Unable to Assess  [] Other:  Behavior: [x] Alert  [x] Cooperative  [x]  Pleasant  [] Confused  [] Agitated  [] Uncooperative  [] Distractible [] Motivated  [] Self-Limiting [] Anxious  [] Other:  Endurance:  [x] Adequate for participation in SLP sessions  [] Reduced overall  [] Lethargic  [] Other:  Safety: [] No concerns at this time  [x] Reduced insight into deficits  [x]  Reduced safety awareness [] Not following call light procedures  [] Unable to Assess  [] Other:    Current Diet Order:Diet NPO  ADULT TUBE FEEDING; PEG; Standard with Fiber; Bolus; 6 Times Daily; 237; 60; Before and after each bolus; Protein; 1 bottle of Proteinex daily, 30 mL free water flush before and after adminstration  Swallowing Precautions: oral care 2-3x/day to reduce adverse affects in the event of aspiration        Date: 6/22/2022      Tx session 1  0900 - 0930 Tx session 2  1000 - 1030   Total Timed Code Min 0 15   Total Treatment Minutes 30 30   Individual Treatment Minutes 30 30   Group Treatment Minutes 0 0   Co-Treat Minutes 0 0   Variance/Reason:  N/a n/a   Pain Buttocks wound  Buttocks wound    Pain Intervention [x] RN notified  [x] Repositioned  [] Intervention offered and patient declined  [] N/A  [x] Other: RN administered pain meds via PEG [] RN notified  [x] Repositioned  [] Intervention offered and patient declined  [] N/A  [] Other:   Subjective     Pt alert and cooperative, agreeable to tx. Pt upright in bed for session. Pt alert and cooperative, agreeable to tx. Pt upright in bed for session. Objective:  Goals  Timeframe for Short-term Goals: 18 days (06/26/22)     Dysphagia Goals:  Goal 1: Pt will complete pharyngeal/laryngeal strengthening exercises 10/10 given min cues for overall improved swallowing function    Pt completed the following pharyngeal / laryngeal strengthening exercises:  -shaker maneuver - held for 1 min x2, then completed 30 reps, then held for ~30 sec x1 (pt only held for 30 sec 2/2 complaints of neck pain so asked to stop)   -effortful swallow: 5x  -isometric tongue tip press and hold 5 sec: 10x - mod cues   -jaw jut: x10 - mod cues, significant difficulty completing task (reduced ROM and coordination - likely related to radiation treatment)     Pt completed the following pharyngeal / laryngeal strengthening exercises:  -shaker maneuver - pt requested to not complete this exercise   -effortful swallow: 10x  -darwin maneuver: 5x  -isometric tongue tip press and hold 5x: 10x  -jaw jut: x10 - mod cues, significant difficulty completing task (reduced ROM and coordination - likely related to radiation treatment)     Goal met 06/20/22. MBSS completed. See separate speech therapy report for details. Goal met 06/20/22. MBSS completed.  See separate speech therapy report for details. Goal 3: The patient/caregiver will demonstrate understanding of compensatory strategies for improved swallowing safety   SLP edu pt re: sitting fully upright for ice chip trials. Pt with difficulty sitting fully upright 2/2 buttocks pain. Continued edu re: sitting fully upright for ice chips and oral care. Pt continues to require ongoing edu. Goal 4: The patient will tolerate recommended diet without observed clinical signs of aspiration   Ice chips  -pt tolerated with no overt s/s of aspiration  -of note, pt with silent aspiration on MBSS   Ice Chips   -pt with delayed wet cough  -no immediate coughing, but risk of silent aspiration     Cognitive-linguistic Goals  Goal 1: Pt will complete recall tasks with 80% acc given min cues for use of compensatory strategies   Goal not directly targeted. Goal not directly targeted. Goal 2: Pt will complete executive function tasks (meds, math, money, time, etc) with 80% acc given min cues. Goal not directly targeted. Time Word Problems  -e.g. if you went to the park at 9:00 AM and left at noon, how many hours were you there?  -pt completed with 29% acc indep, improved to 86% acc given mod-max cues  -pt easily distracted throughout task; required frequent repetition     Goal 3: Pt will complete problem solving and thought organization tasks with 80% acc given min cues. Goal not directly targeted. Category Members / Thought Organization  -pt given a category and a set of letters; asked to name an item from the category that begins with the letters  -e.g. sports that begin with G, H, S, and T  -pt completed task with 88% acc given min cues, improved to 94% acc given mod cues      Goal 4: Pt will complete verbal and visual reasoning tasks with 80% acc given min cues. Goal not directly targeted. Goal not directly targeted. Goal 5: Pt will repeat words/phrases/sentences using speech intelligibility strategies with 80% acc given min cues.    SLP edu pt re: SLOB speech strategies - slow, loud, over-exaggerate, breath support. Pt unable to recall edu from previous date. Pt completed diadochokinesis:  -/pa pa pa/ x5  -/ta ta ta / x5  -/ka ka ka/ x5  -pa ta ka x10. Pt with most difficulty with /ka/ due to lingual anatomy s/p hemiglossectomy    Goal not directly targeted. Other areas targeted: N/a n/a   Education:   SLP edu pt re: sitting upright, SLOB speech strategies, rationale of tx tasks  SLP edu pt re: rationale of swallow strategies, sitting upright, time strategies    Safety Devices: [x] Call light within reach  [] Chair alarm activated  [x] Bed alarm activated  [] Other: [x] Call light within reach  [] Chair alarm activated  [x] Bed alarm activated  [] Other:    Assessment: Tx session 1: Pt alert and cooperative, agreeable to tx. Pt agreeable to limited ice chip trials this date. Pt completed pharyngeal / laryngeal strengthening exercises. Pt completed fewer effortful swallows due to sore throat. Pt also requested to stop shaker maneuver due to neck pain. Pt completed additional exercises, and denied pain. Reviewed SLOB speech strategies and completed diadochokinesis. Continued difficulty with /k/ production due to lingual anatomy. Continue goals above. Tx session 2: Pt alert and cooperative, agreeable to tx. Pt completed more pharyngeal / laryngeal strengthening exercises in this session, but asked to not complete shaker. Pt also completed only 5 [place]. Continue to recommend ice chips PRN with SLP and RN due to pt not wanting to sit fully upright. Pt benefited from min-mod cues for a thought organization task. Pt with more difficulty completing time word problems - mod - max cues. Recommend 24 hr supervision at d/c. Continue goals above. Plan: Continue as per plan of care.       Additional Information:     Barriers toward progress: Cognitive deficit, Impulsivity, Limited safety awareness, Limited insight into deficits, Unrealistic expectations and Medication managment  Discharge recommendations:  [] Home independently  [] Home with assistance [x]  24 hour supervision  [] ECF [] Other:  Continued Tx Upon Discharge: ? [x] Yes [] No [] TBD based on progress while on ARU [] Vital Stim indicated [] Other:   Estimated discharge date: 06/25/2022    Interventions used this date:  [] Speech/Language Treatment  [] Instruction in HEP [] Group [x] Dysphagia Treatment [x] Cognitive Treatment   [] Other:       Total Time Breakdown / Charges    Time In Time out Total Time / units   Cognitive Tx 1015 1030 15 min / 1 unit   Speech Tx 0915 0930 15 min / 1 unit    Dysphagia Tx 0900  1000 0915  1015 15 min / 1 unit  15 min / 1 unit       Electronically Signed by     Kalee Bay MA CCC-SLP #[id number]  Speech Language Pathologist

## 2022-06-22 NOTE — CARE COORDINATION
CM left voice message with Moni Musa (Brother/Sister)   [phone] r/t DCP and to return writers call.  [facility], RN

## 2022-06-22 NOTE — CARE COORDINATION
DANIS BOLAÑOS with Chalino Kan (Brother/Sister) [phone] r/t acceptance to [address] and to return writers call when able.  Chuyita Vega RN

## 2022-06-22 NOTE — FLOWSHEET NOTE
Follow up visit. Offered prayer and bible to pt as requested.  Spiritual care available to follow up as needed

## 2022-06-22 NOTE — PROGRESS NOTES
Physical Therapy  Facility/Department: [facility]  Rehabilitation Physical Therapy Treatment Note    NAME: Feliberto Grace  : 1990 (28 y.o.)  MRN: [id number]  CODE STATUS: Full Code    Date of Service: 22       Restrictions:  Restrictions/Precautions: NPO;Isolation; Fall Risk;General Precautions  Position Activity Restriction  Other position/activity restrictions: antonio, PEG, NPO, MRSA contact precautions, ice chips with therapy/RN only     SUBJECTIVE  Subjective  Subjective: pt found in bed, dizziness noted with positional changes. chart review revealed low H/H.  RN cleared pt to participate as tolerated  Pain: pain in buttocks. did not rate this am       OBJECTIVE  Cognition  Arousal/Alertness: Appropriate responses to stimuli  Following Commands: Follows multistep commands with repitition; Follows multistep commands with increased time  Orientation  Overall Orientation Status: Within Functional Limits    Functional Mobility     Vitals taken supine initially    Supine to sit with mod ind  Bp= 99/61 with complaints of dizziness that eases with time    Sit to stand EOB to RW with CGA-SBA   BP standing- 87/57 with c/o dizziness. Pt returned to supine position independently with cease in symptoms. Pt completed 10 reps of BLE supine ankle pumps, heel slides, hip abduction, SAQ  Pt in bed with call light/needs within reach and bed alarm donned. RN notified of vitals. Second Session:   therapist conferred with MD prior to going into pt room regarding pt's low H/H. MD stating pt is cleared to participate in therapy in sitting / supine activities as tolerated. Upon entry to pt room, pt c/o increased pain in buttocks due to recent wound vac change and pt c/o general fatigue/ dizziness. BP supine= 108/60 pulse lo 90s and spo2- 94% on room air     Pt completed 2x10 BLE supine ankle pumps, heel slides, hip abduction, x20 RLE sAQ and x10 LLE quad sets due to c/o increased pain with L SAQ.    Pt in bed at end of session with alarm donned, call light/needs within reach. ASSESSMENT/PROGRESS TOWARDS GOALS  Vital Signs  Heart Rate: 91  Heart Rate Source: Monitor  BP: 102/62  BP Location: Right upper arm  MAP (Calculated): 75.33  SpO2: 96 %  O2 Device: None (Room air)    Assessment  Assessment: pt found in bed, dizziness noted with positional changes. see note for vitals taken. RN cleared pt to particiapte in therapy as tolerated. pt does not tolerate standing, tolerated supine ther ex without further complaints. continue to rec home with 24/7 vs SNF pending family decision. continue to progress mobility as pt tolerates. Activity Tolerance: Patient limited by endurance; Patient limited by pain; Patient limited by fatigue  Discharge Recommendations: 24 hour supervision or assist;Home with Home health PT  PT Equipment Recommendations  Equipment Needed: Yes  Wheelchair: Standard    Goals  Patient Goals   Patient goals : \"to walk and to eat\"  Short Term Goals  Time Frame for Short term goals: 10 days 6/17  Short term goal 1: pt will complete bed mobility with mod ind - 6/17 not met, progressing (continue goal)  Short term goal 2: pt will complete functional transfer with CGA and LRAD. - 6/17 not met, progressing (continue goal)  Short term goal 3: pt will ambulate 50 ft with LRAD and mod A. - 6/17 not met today (previously partially met for distance)  Short term goal 4: pt will tolerate stair assessment when appropriate and goal will be made. - GOAL MET 6/14, completes 4 steps with CGA-min A and BHR  Long Term Goals  Time Frame for Long term goals : 21 days 6/28  Long term goal 1: pt will complete functional transfer with SBA and LRAD  Long term goal 2: pt will ambulate 100 ft wiht LRAD and CGA. Long term goal 3: pt will complete car transfer with SBA and LRAD. Long term goal 4: pt will tolerate standing for 2 min with 1-2 UE support and CGA -SBA for balance.   Long term goal 5: pt will ascend/descend 12 steps with min A and BHR    PLAN OF CARE/SAFETY  Plan  Plan: 5-7 times per week  Current Treatment Recommendations: Strengthening; Wheelchair mobility training;Cognitive reorientation;Equipment evaluation, education, & procurement; Modalities; Positioning; Therapeutic activities; Wound care; Patient/Caregiver education & training; Safety education & training;Home exercise program;Return to work related activity;Pain management;Gait training;Balance training;Functional mobility training;Stair training;Neuromuscular re-education;Transfer training;ADL/Self-care training; Endurance training;Manual Therapy - Soft Tissue Mobilization  Safety Devices  Type of Devices: Gait belt;Patient at risk for falls;Nurse notified; All fall risk precautions in place; Bed alarm in place;Call light within reach; Left in bed; All evelyn prominences offloaded    EDUCATION  Education  Education Given To: Patient  Education Provided: Role of Therapy;Plan of Care;Precautions; Safety; Energy Conservation;Transfer Training;DME/Home Modifications; Fall Prevention Strategies; Equipment; Mobility Training;Cognition;ADL Function  Education Provided Comments: pt educated on benefits of progressive ambulation, progressing endurance with dynamic standing activiimelyssa - demos understanding  Education Method: Demonstration;Verbal  Barriers to Learning: None  Education Outcome: Verbalized understanding;Demonstrated understanding        Therapy Time   Individual Concurrent Group Co-treatment   Time In 0930         Time Out 1000         Minutes 30           Timed Code Treatment Minutes: 30 Minutes     Second Session Therapy Time:   Individual Concurrent Group Co-treatment   Time In 1330         Time Out 1400         Minutes 30           Timed Code Treatment Minutes:  30    Total Treatment Minutes:  60    Keshawn Pepe PT, 06/22/22 at 9:50 AM

## 2022-06-22 NOTE — PROGRESS NOTES
[facility] [employer] [facility]  Nephrology follow-up note           Reason for Consult: Hyponatremia  Requesting Physician:  Dr. Richard Hines history  Sodium remained stable at 134. Pancytopenia noted  Packed red blood cell ordered on 6/22 for hemoglobin of 6.6  Complains of pain in the area of the sacral decubital ulcer. Failed ba swallow 6/20    ROS:   +weak. No pain or shortness of breath. Scheduled Meds:   saliva substitute   Oral Q6H WA    venlafaxine  75 mg Oral Daily with breakfast    QUEtiapine  50 mg Oral Nightly    magnesium oxide  400 mg Per G Tube BID    zinc oxide   Topical BID    lidocaine 1 % injection  5 mL IntraDERmal Once    sodium chloride flush  5-40 mL IntraVENous 2 times per day    nystatin  5 mL Oral 4x Daily    apixaban  5 mg Per G Tube BID    ceftaroline fosamil (TEFLARO) 600 MG IVPB  600 mg IntraVENous D7I    folic acid  1 mg Per G Tube Daily    gabapentin  100 mg Per G Tube TID    melatonin  5 mg Per G Tube Nightly    sennosides-docusate sodium  2 tablet Per G Tube Nightly    thiamine  100 mg Per G Tube Daily     Continuous Infusions:   sodium chloride      sodium chloride      sodium chloride 25 mL (06/20/22 1146)     PRN Meds:.sodium chloride, oxyCODONE, oxyCODONE, Lip Balm, sodium chloride flush, sodium chloride, acetaminophen, bisacodyl, sodium chloride, clonazePAM, albuterol    History of Present Illness on 6/9/2022:    28 y.o. yo male with PMH of squamous cell carcinoma of the tongue (s/p right hemiglossectomy, b/l ND, and RFFF reconstruction on 1/3/22, radiation, and weekly cisplatin completed 4/25/22) who is admitted to ARU from  for rehabilitation. Nephrology has been consulted for hyponatremia and hyperkalemia  Patient is being fed through the feeding tube. His sodium on 6/8 at [facility] was 133 and has been around 130-135 in June.   Earlier in admission, he was hypernatremic in the 149 range as well    He was admitted at [facility] from 5/2/22 and had MRSA bacteremia from infected port s/p removal. Per Dr Bijan Fowler, while at [facility],  \"pt had right hydropneumothorax with worsening hypoxia requiring multiple intubations and chest tube placements. He underwent right internal jugular and right atrial thrombectomy due to concern that this was causing ongoing bacteremia. ID was consulted and he is to remain on IV ceftaroline with plan for long term treatment until 7/2/22. His course was complicated by severe penile edema and antonio was placed. Urology followed during acute stay. Patient failed several voiding trials    Physical exam:   Constitutional:  VITALS:  /62   Pulse 91   Temp 97.5 °F (36.4 °C) (Oral)   Resp 20   Ht 5' 6\" (1.676 m)   Wt 101 lb 8 oz (46 kg)   SpO2 96%   BMI 16.38 kg/m²   Gen: alert, awake  Neck: No JVD  Skin: Unremarkable  Cardiovascular:  S1, S2 without m/r/g   Respiratory: CTA B without w/r/r; respiratory effort normal  Abdomen:  soft, nt, nd,   Extremities: no lower extremity edema  Neuro/Psy: AAoriented times 3 ; moves all 4 ext    Data/  Recent Labs     06/20/22  0630 06/22/22  0650 06/22/22  0931   WBC 2.4* 0.3* 0.3*   HGB 7.1* 6.6* 6.6*   HCT 21.2* 19.6* 19.7*   MCV 86.9 86.9 86.7   PLT 79* 96* 100*     Recent Labs     06/20/22  0630 06/22/22  0650   * 134*   K 4.6 4.6   CL 96* 97*   CO2 32 33*   GLUCOSE 115* 97   BUN 32* 28*   CREATININE <0.5* <0.5*   LABGLOM >60 >60   GFRAA >60 >60     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 High BUN so getting adequate solute. 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

## 2022-06-22 NOTE — PROGRESS NOTES
06/22/22 @ 8279,   [facility] call center of [facility]Onc.  notified of consult. States she will send a secure message.

## 2022-06-22 NOTE — PROGRESS NOTES
[facility] Nurse  Follow-up Progress Note       NAME:  Kitty Samayoa  MEDICAL RECORD NUMBER:  [id number]  AGE:  28 y.o. GENDER:  male  :  1990  TODAY'S DATE:  2022    Subjective:pressure, non-healing surgical and traumatic    Contributing Factors: chronic pressure, decreased mobility and shear forceRadiation treatment            Wound Identification:  Wound Type:pressure, non-healing surgical and traumatic    Contributing Factors: chronic pressure, decreased mobility and shear forceRadiation treatment. Only completed VAC change at this time. Patient Goal of Care  [x] Wound Healing  [] Odor Control  [] Palliative Care  [x] Pain Control   [] Other:     Objective:left PICC line, antonio in place,     /62   Pulse 91   Temp 97.5 °F (36.4 °C) (Oral)   Resp 20   Ht 5' 6\" (1.676 m)   Wt 101 lb 8 oz (46 kg)   SpO2 96%   BMI 16.38 kg/m²   Hiram Risk Score: Hiram Scale Score: 16  Assessment:sacrum wound granulating in bed of wound. Red, minor slough. Measurements:  Negative Pressure Wound Therapy Sacrum Mid (Active)   $ Standard NPWT >50 sq cm PER TX $ Yes 22 1308   Wound Type Pressure ulcer: Stage IV 22 1308   Unit Type hospital 22 1308   Dressing Type Other (Comment) 22 1308   Number of pieces used 3 22 1308   Number of pieces removed 5 22 1308   Cycle Continuous 22 1308   Target Pressure (mmHg) 125 22 1308   Intensity 1 22 1308   Irrigation Solution Sodium chloride 0.9% 22 1308   Instillation Volume  24 mL 22 1308   Soak Time  10 minutes 22 1308   Vac Frequency 3 hours 22 1308   Canister changed?  No 22 1308   Dressing Status Clean, dry & intact 22 1308   Dressing Changed Changed/New 22 1308   Drainage Amount Moderate 22 1308   Drainage Description Serosanguinous 22 1308   Dressing Change Due 22 1308 Output (ml) 450 ml 06/21/22 2245   Wound Assessment Slough;Granulation tissue 06/22/22 1308   Shape oval 06/22/22 1308   Odor None 06/22/22 1308   Number of days: 11       Wound 06/08/22 Throat Right (Active)   Wound Image   06/15/22 1308   Wound Etiology Other 06/22/22 0541   Dressing Status Clean;Dry; Intact 06/22/22 0541   Wound Cleansed Cleansed with saline 06/20/22 1851   Dressing/Treatment Alginate with Ag; Foam 06/22/22 0541   Dressing Change Due 06/21/22 06/20/22 1851   Wound Length (cm) 4.5 cm 06/15/22 1308   Wound Width (cm) 4 cm 06/15/22 1308   Wound Depth (cm) 0.1 cm 06/15/22 1308   Wound Surface Area (cm^2) 18 cm^2 06/15/22 1308   Change in Wound Size % (l*w) -2.86 06/15/22 1308   Wound Volume (cm^3) 1.8 cm^3 06/15/22 1308   Wound Healing % -3 06/15/22 1308   Wound Assessment Slough 06/20/22 1851   Drainage Amount None 06/22/22 0541   Drainage Description Serosanguinous 06/20/22 1851   Odor None 06/22/22 0541   Kristel-wound Assessment Blanchable erythema 06/20/22 1851   Margins Attached edges 06/20/22 1851   Wound Thickness Description not for Pressure Injury Partial thickness 06/19/22 0800   Number of days: 13       Wound 06/08/22 Chest Right;Upper (Active)   Wound Image   06/10/22 1758   Wound Etiology Other 06/20/22 1851   Dressing Status Clean;Dry; Intact 06/22/22 0541   Wound Cleansed Cleansed with saline 06/20/22 1851   Dressing/Treatment Alginate with Ag; Foam 06/22/22 0541   Dressing Change Due 06/21/22 06/20/22 1851   Wound Length (cm) 1 cm 06/15/22 1308   Wound Width (cm) 1 cm 06/15/22 1308   Wound Depth (cm) 0.1 cm 06/15/22 1308   Wound Surface Area (cm^2) 1 cm^2 06/15/22 1308   Change in Wound Size % (l*w) 33.33 06/15/22 1308   Wound Volume (cm^3) 0.1 cm^3 06/15/22 1308   Wound Healing % 33 06/15/22 1308   Wound Assessment Pink/red 06/20/22 1851   Drainage Amount Scant 06/20/22 1851   Drainage Description Serous 06/20/22 1851   Odor None 06/22/22 0541   Kristel-wound Assessment Blanchable erythema 06/20/22 1851   Margins Attached edges 06/20/22 1851   Wound Thickness Description not for Pressure Injury Partial thickness 06/20/22 1851   Number of days: 13       Wound 06/08/22 Radial Distal;Left (Active)   Wound Image   06/10/22 1758   Wound Etiology Traumatic 06/22/22 0541   Dressing Status Clean;Dry; Intact 06/22/22 0541   Wound Cleansed Cleansed with saline 06/20/22 1851   Dressing/Treatment Alginate with Ag; Foam 06/22/22 0541   Dressing Change Due 06/21/22 06/20/22 1851   Wound Length (cm) 3 cm 06/15/22 1308   Wound Width (cm) 1 cm 06/15/22 1308   Wound Depth (cm) 0.1 cm 06/15/22 1308   Wound Surface Area (cm^2) 3 cm^2 06/15/22 1308   Change in Wound Size % (l*w) 50 06/15/22 1308   Wound Volume (cm^3) 0.3 cm^3 06/15/22 1308   Wound Healing % 50 06/15/22 1308   Wound Assessment Pink/red;Slough 06/20/22 1851   Drainage Amount None 06/20/22 1851   Drainage Description Serosanguinous;Green 06/20/22 1851   Odor None 06/20/22 1422   Kristel-wound Assessment Blanchable erythema 06/20/22 1851   Margins Attached edges 06/20/22 1851   Wound Thickness Description not for Pressure Injury Partial thickness 06/20/22 1851   Number of days: 13       Wound 06/08/22 Knee Left;Posterior (Active)   Wound Image   06/15/22 1308   Wound Etiology Pressure Stage 3 06/22/22 0541   Dressing Status Clean;Dry; Intact 06/22/22 0541   Wound Cleansed Cleansed with saline; Wound cleanser 06/20/22 1422   Dressing/Treatment Foam;Honey gel/honey paste 06/22/22 0541   Offloading for Diabetic Foot Ulcers Offloading ordered 06/15/22 1308   Dressing Change Due 06/20/22 06/19/22 2109   Wound Length (cm) 3.5 cm 06/15/22 1308   Wound Width (cm) 1.5 cm 06/15/22 1308   Wound Depth (cm) 0.1 cm 06/15/22 1308   Wound Surface Area (cm^2) 5.25 cm^2 06/15/22 1308   Change in Wound Size % (l*w) 40 06/15/22 1308   Wound Volume (cm^3) 0.525 cm^3 06/15/22 1308   Wound Healing % 70 06/15/22 1308   Wound Assessment Pink/red 06/20/22 1422   Drainage Amount Moderate 06/20/22 1422   Drainage Description Serosanguinous 06/20/22 1422   Odor None 06/20/22 1422   Kristel-wound Assessment Blanchable erythema 06/19/22 0800   Margins Attached edges 06/20/22 1422   Wound Thickness Description not for Pressure Injury Partial thickness 06/15/22 1308   Number of days: 13       Wound 06/08/22 Sacrum (Active)   Wound Image   06/22/22 1308   Wound Etiology Pressure Stage 4 06/22/22 1308   Dressing Status New dressing applied 06/22/22 1308   Wound Cleansed Cleansed with saline 06/22/22 1308   Dressing/Treatment Negative pressure wound therapy 06/22/22 1308   Offloading for Diabetic Foot Ulcers Offloading ordered 06/22/22 1308   Dressing Change Due 06/24/22 06/22/22 1308   Wound Length (cm) 10 cm 06/22/22 1308   Wound Width (cm) 5 cm 06/22/22 1308   Wound Depth (cm) 1 cm 06/22/22 1308   Wound Surface Area (cm^2) 50 cm^2 06/22/22 1308   Change in Wound Size % (l*w) 41.93 06/22/22 1308   Wound Volume (cm^3) 50 cm^3 06/22/22 1308   Wound Healing % 66 06/22/22 1308   Undermining Starts ___ O'Clock 200 06/22/22 1308   Undermining Ends___ O'Clock 300 06/22/22 1308   Undermining Maxium Distance (cm) 1 06/22/22 1308   Wound Assessment [place]/red;Slough 06/22/22 1308   Drainage Amount Small 06/22/22 1308   Drainage Description Serosanguinous 06/22/22 1308   Odor Mild 06/22/22 1308   Kristel-wound Assessment Blanchable erythema 06/22/22 1308   Margins Attached edges 06/22/22 1308   Wound Thickness Description not for Pressure Injury Full thickness 06/22/22 1308   Number of days: 13   Sacrum             Response to treatment:  With complaints of pain.      Pain Assessment:  Severity:  7 / 10  Quality of pain: aching, squeezing, throbbing  Wound Pain Timing/Severity: intermittent  Premedicated: Yes  Plan:   Plan of Care: Wound 06/08/22 Throat Right-Dressing/Treatment: Alginate with Ag,Foam  Wound 06/08/22 Chest Right;Upper-Dressing/Treatment: Alginate with Ag,Foam  Wound 06/08/22 Radial Distal;Left-Dressing/Treatment: Alginate with Ag,Foam  Wound 06/08/22 Knee Left;Posterior-Dressing/Treatment: Koko Lucia gel/honey paste  Wound 06/08/22 Sacrum-Dressing/Treatment: Negative pressure wound therapy      Recommend;  Areas to right Neck/right Chest/L wrist and left leg wounds: Cleanse with Cleanse with normal salinealine, pat dry.   Apply Alginate AG  Cover with guaze and bordered foam dressing. Change daily. Changed Mom, Wed, Fri.      Cleanse sacral wound with normal saline, pat dry, apply barrier wipe then hydrocolloid to wound edges.  Hydrocolloid strips to edges of posterior wound near rectum. Apply VAC drape to wound edges.  Insert gray foam with into tunnel area @ 2 o'clock . Gray foam tracking over right hip to attach vacuum suction to. Cover with VAC drape to seal.     3 pieces of  Vera flow  foam used for sacrum wound dressing and tracking. Cleanse choice  machine;set at 24 flush normal saline, soak for 10minutes every 3 hours.  Treatment is 125 mmHg continuous suction.     Change cannister once a week or when full  Not changed today.   If suction fails or patient is discharged:  -remove vac dressing  -cleanse wound with normal saline  -pack wound with saline moistened guaze and change every 12 hours.    Call wound care for deterioration [phone] or call [phone] and leave message.      Canister not changed today.     Specialty Bed Required : Yes power pro elite  [x] Low Air Loss   [x] Pressure Redistribution  [] Fluid Immersion  [] Bariatric  [] Total Pressure Relief  [] Other:     Current Diet: Diet NPO  ADULT TUBE FEEDING; PEG; Standard with Fiber; Bolus; 6 Times Daily; 237; 60; Before and after each bolus; Protein; 1 bottle of Proteinex daily, 30 mL free water flush before and after adminstration  Dietician consult:  Yes    Discharge Plan:  Placement for patient upon discharge: intermediate care facility   Patient appropriate for Outpatient [address]: Yes    Referrals:  [x]   following  [] [facility]  [] Supplies  [] Other    Patient/Caregiver Teaching:  Level of patient/caregiver understanding able to:   [] Indicates understanding       [] Needs reinforcement  [] Unsuccessful      [] Verbal Understanding  [] Demonstrated understanding       [] No evidence of learning  [] Refused teaching         [x] N/A       Electronically signed by Melany Garcia RN, on 6/22/2022 at 1:13 PM

## 2022-06-22 NOTE — FLOWSHEET NOTE
Attempted to respond to consult for visit and Olga, pt busy with care. Will attempt another visit at a later time.

## 2022-06-22 NOTE — PROGRESS NOTES
Nutrition Note    Pt continues on bolus feeds of Jevity 1.5 via PEG, increased to 6 cans daily on 6/21. Possible plans for SNF at discharge. Pt likely to tolerate Isosource, [employer] equivalent to [employer]. RD ordered Proteinex P2go d/t stage 4 wound and severe malnutrition, recommend continuing with equivalent if feasible at SNF. Attempted to call dietitian at SNF to discuss, out of office today but RD call back information left with .      Electronically signed by Jeaneth Hunter MS, RD, LD on 6/22/22 at 2:09 PM EDT    Contact: [phone]

## 2022-06-22 NOTE — PROGRESS NOTES
Jose Martinez  6/22/2022  [id number]    Chief Complaint: Critical illness myopathy    Subjective:   No acute events overnight. Today Rickey Thomas is seen in his room with therapy present. He reports severe buttock pain. He also reports feeling dizzy. ROS: No N/V, chest pain, SOB, chills or fever    Objective:  Patient Vitals for the past 24 hrs:   BP Temp Temp src Pulse Resp SpO2   06/22/22 0948 102/62 -- -- 91 -- 96 %   06/22/22 0900 102/62 97.5 °F (36.4 °C) Oral 91 20 96 %   06/21/22 2326 -- -- -- -- 16 --   06/21/22 2245 (!) 106/59 98 °F (36.7 °C) Oral 85 16 94 %     Gen: No distress, pleasant. Cachectic, supine in bed  HEENT: Normocephalic, atraumatic. Bilateral radical neck dissection present  CV: extremities well perfused   Resp: No respiratory distress. Abd: Soft, nontender, G-tube in place  Ext: No edema  Neuro: Alert, oriented, appropriately interactive. Dysarthria    Wt Readings from Last 3 Encounters:   06/21/22 101 lb 8 oz (46 kg)   02/22/22 140 lb 12.8 oz (63.9 kg)   02/13/22 149 lb 12.8 oz (67.9 kg)       Laboratory data:   Lab Results   Component Value Date    WBC 0.3 (LL) 06/22/2022    HGB 6.6 (LL) 06/22/2022    HCT 19.7 (LL) 06/22/2022    MCV 86.7 06/22/2022     (L) 06/22/2022       Lab Results   Component Value Date     06/22/2022    K 4.6 06/22/2022    CL 97 06/22/2022    CO2 33 06/22/2022    BUN 28 06/22/2022    CREATININE <0.5 06/22/2022    GLUCOSE 97 06/22/2022    CALCIUM 9.2 06/22/2022        Therapy progress:  PT  Position Activity Restriction  Other position/activity restrictions: antonio, PEG, NPO, MRSA contact precautions, ice chips with therapy/RN only  Objective     Sit to Stand: Minimal Assistance (at IV pole)  Stand to sit: Minimal Assistance (at IV pole)  Bed to Chair: Moderate assistance  Device: No Device,Hand-Held Assist  Other Apparatus:  (IV pole with L UE support)  Assistance:  Moderate assistance,Maximum assistance  Distance: 5 ft x 2  OT  PT Equipment Recommendations  Equipment Needed: Yes  Mobility Devices: Wheelchair  Wheelchair: Standard  Toilet - Technique: Stand pivot  Equipment Used: Standard toilet  Assessment        SLP    Recommendations:  Diet recommendation: NPO; NPO and Ice chips with SLP/RN only; Meds via alt means of nutrition  Instrumentation: not clinically warranted at this time   Risk management: oral care 2-3x/day to reduce adverse affects in the event of aspiration            Body mass index is 16.38 kg/m².       Rehabilitation Diagnosis:  Neurologic, 3.8, Neuromuscular Disorders, e.g. 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  He was admitted to [facility] on 6/8/22 due to functional deficits below his baseline.      Critical Illness Myopathy  - due to below  - PT, OT, ST     Squamous Cell Carcinoma of the tongue  - s/p right hemiglossectomy, bilateral neck dissection, and RFFF reconstruction on 1/3/22 followed by chemoradiation, and weekly cisplatin completed 4/25/22 and s/p G-tube  - NPO except ice chips  - Tube feeds per orders  - dietary following  - GI following for G tube concerns  - nystatin swish and spit for comfort, schedule biotene   - PT, OT, ST    Oropharyngeal Dysphagia  - on tube feeds as above  - [facility] 6/20 showing continued penetration and aspiration  - ST    Hypotension, improved  - s/p 1 L NS  - increased fluids through G-tube     MRSA bacteremia  - cefaroline for 24 days from admit per  ID  - ID following  - PICC replaced on admission      Pancytopenia  - unclear etiology  - re-consulted psych about low WBC  - transfusing 1 unit pRBCs for anemia  - giving fluids  - blood cultures drawn    Acute hypoxic respiratory failure, resolved  - with right lower hydropneumothorax s/p chest tube, MRSA pneumonia, pleural effusions s/p chest tube and requiring several reintubations during acute stay  - adequately oxygenating on room air  - wean oxygen for SpO2>90%  - IS      Urinary Retention  - has severe penile edema during acute stay. Was followed by Urology and failed multiple voiding trials  - discontinued doxazosin due to hypotension  - continue antonio catheter, most recently placed 6/6  - follow up with Urology outpatient      PE  Right Atrial Thrombus  - s/p thrombectomy 5/20 with IR, thrombus positive for MRSA  - Elquis 5 mg BID      Anasarca  - requiring diuretics during acute stay, since weaned off  - monitor     Hyponatremia  - Nephrology following, appreciate assistance     Severe Protein-calorie malnutrition  - BMI 17.9  - bolus feeds per dietary  - Dietician consulted     Pain  - gabapentin 100 mg TID  - PRN tylenol, PRN oxycodone 7.5 or 10 mg q4 hours. - will need follow up with his pain physician on discharge      Anxiety, Depression  - Psychiatry consulted, appreciated assistance  - meds per Psych     Multiple Wounds POA  - including stage four pressure ulcer on coccyx  - wound care per orders  - wound care following     Bowels: Schedule stool softener. Follow bowel movements. Enema or suppository if needed.      Bladder: continue antonio     Sleep: patient allergic to trazodone     Follow up appointments: PCP, ID, Cardiology, Oncology    Services: [facility]  EDOD: 6/26/22    Interdisciplinary team conference was held today with entire rehab treatment team including PT, OT, SLP, Dietician, RN, and SW. Discussion focused on progress toward rehab goals and discharge planning. Barriers: IV abx, wound vac, tube feeds, pain.  Total treatment time >35 min with greater than 50% spent in care coordination. [facility]  Cheri Jewell MD 6/22/2022, 4:42 PM

## 2022-06-22 NOTE — PROGRESS NOTES
Infectious Disease Follow up Notes    CC :  MRSA bacteremia      Antibiotics:   ceftaroline 600 q8  Nystatin S&S, started 6/9/22    Admit Date:   6/8/2022  Hospital Day: 15    Subjective:   He remains afebrile  We were asked to see re cytopenias    Objective:     Patient Vitals for the past 8 hrs:   BP Temp Temp src Pulse Resp SpO2   06/22/22 0948 102/62 -- -- 91 -- 96 %   06/22/22 0900 102/62 97.5 °F (36.4 °C) Oral 91 20 96 %         Scheduled Meds:   saliva substitute   Oral Q6H WA    venlafaxine  75 mg Oral Daily with breakfast    QUEtiapine  50 mg Oral Nightly    magnesium oxide  400 mg Per G Tube BID    zinc oxide   Topical BID    lidocaine 1 % injection  5 mL IntraDERmal Once    sodium chloride flush  5-40 mL IntraVENous 2 times per day    nystatin  5 mL Oral 4x Daily    apixaban  5 mg Per G Tube BID    ceftaroline fosamil (TEFLARO) 600 MG IVPB  600 mg IntraVENous C9X    folic acid  1 mg Per G Tube Daily    gabapentin  100 mg Per G Tube TID    melatonin  5 mg Per G Tube Nightly    sennosides-docusate sodium  2 tablet Per G Tube Nightly    thiamine  100 mg Per G Tube Daily       Continuous Infusions:   sodium chloride      sodium chloride 125 mL/hr at 06/22/22 1040    sodium chloride 25 mL (06/20/22 1146)          Data Review:    Lab Results   Component Value Date    WBC 0.3 (LL) 06/22/2022    HGB 6.6 (LL) 06/22/2022    HCT 19.7 (LL) 06/22/2022    MCV 86.7 06/22/2022     (L) 06/22/2022     Lab Results   Component Value Date    CREATININE <0.5 (L) 06/22/2022    BUN 28 (H) 06/22/2022     (L) 06/22/2022    K 4.6 06/22/2022    CL 97 (L) 06/22/2022    CO2 33 (H) 06/22/2022       Hepatic Function Panel:   Lab Results   Component Value Date    ALKPHOS 108 02/06/2022    ALT 17 02/06/2022    AST 17 02/06/2022    PROT 7.1 02/06/2022    BILITOT 0.4 02/06/2022    LABALBU 2.4 06/11/2022         MICRO:  6/22 UC ordered   BC x2 NGTD       CXR 6/22/22  Impression   Near complete resolution of right-sided airspace infiltrates. Assessment:     Patient Active Problem List    Diagnosis Date Noted    Severe malnutrition ([facility] 06/09/2022     Priority: Medium    Critical illness myopathy 06/08/2022     Priority: Medium    Recurrent major depressive disorder ([facility] 01/25/2022    Prediabetes 01/25/2022    Primary squamous cell carcinoma of tongue ([facility] 11/30/2021    Poor dentition 11/04/2021    Class 1 obesity due to excess calories with serious comorbidity and body mass index (BMI) of 34.0 to 34.9 in adult 11/04/2021    Chronic bilateral low back pain without sciatica 11/03/2021    Anxiety 11/03/2021    Insomnia due to other mental disorder 11/03/2021       History [facility] tongue s/p resection 1/2022 followed by chemo and XRT  G-tube in place    Admission [facility] 5/2/22 with sepsis, MOF  Hydropneumothorax s/p CT placement     Complicated, prolonged MRSA bacteremia (+BC from 5/2/22 to 5/29/22), associated with infected atrial thrombus, septic pulmonary emboli and empyema  Presumed pathogenesis was CLABSI from port   S/p port removal  S/p R atrial thrombectomy with positive culture for MRSA   Plan was IV abx through 7/2/22 via PICC that was placed 6/9/22    Transferred to [address] [place] [address] 6/8/22    Evolving, severe leukopenia as well as lesser anemia/TCP     No abx allergies     Plan:     Ceftaroline is associated with agranulocytosis/neutropenia, krista with higher doses and prolonged course Jennifer Cerna OFID 2019)    DC ceftaroline and treat with dapto. Note, dapto is not active in lung parenchyma though at this point, I don't think we are still treating pneumonia per se. I will revisit need for 2nd drug depending on course. I am not sure if he failed vanc (as one option).   I would think we should avoid linezolid for same concerns of myelosuppression    Please evaluate med list for other culprits    I will consult Hematology as well     Could probably stop the nystatin S&S ?     Will follow      Jono Stanton MD  Phone: [phone]   Fax : [phone]

## 2022-06-23 ENCOUNTER — HOSPITAL ENCOUNTER (INPATIENT)
Age: 32
LOS: 7 days | Discharge: NURSING, REHAB OR LONG-TERM CARE | DRG: 466 | End: 2022-06-30
Attending: INTERNAL MEDICINE
Payer: COMMERCIAL

## 2022-06-23 VITALS
HEIGHT: 66 IN | HEART RATE: 107 BPM | RESPIRATION RATE: 16 BRPM | BODY MASS INDEX: 16.31 KG/M2 | OXYGEN SATURATION: 94 % | DIASTOLIC BLOOD PRESSURE: 54 MMHG | SYSTOLIC BLOOD PRESSURE: 105 MMHG | WEIGHT: 101.5 LBS | TEMPERATURE: 98.2 F

## 2022-06-23 DIAGNOSIS — M54.50 CHRONIC BILATERAL LOW BACK PAIN WITHOUT SCIATICA: Primary | ICD-10-CM

## 2022-06-23 DIAGNOSIS — F41.0 PANIC DISORDER: ICD-10-CM

## 2022-06-23 DIAGNOSIS — G89.29 CHRONIC BILATERAL LOW BACK PAIN WITHOUT SCIATICA: Primary | ICD-10-CM

## 2022-06-23 DIAGNOSIS — C02.9 PRIMARY SQUAMOUS CELL CARCINOMA OF TONGUE (HCC): ICD-10-CM

## 2022-06-23 DIAGNOSIS — G72.81 CRITICAL ILLNESS MYOPATHY: ICD-10-CM

## 2022-06-23 DIAGNOSIS — F41.9 ANXIETY: ICD-10-CM

## 2022-06-23 DIAGNOSIS — G62.9 PERIPHERAL POLYNEUROPATHY: ICD-10-CM

## 2022-06-23 PROBLEM — D61.818 PANCYTOPENIA (HCC): Status: ACTIVE | Noted: 2022-06-23

## 2022-06-23 PROBLEM — A41.9 SEPSIS (HCC): Status: ACTIVE | Noted: 2022-06-23

## 2022-06-23 LAB
ANTIBODY IDENTIFICATION: NORMAL
LACTIC ACID: 1.4 MMOL/L (ref 0.4–2)
PROCALCITONIN: 0.13 NG/ML (ref 0–0.15)

## 2022-06-23 PROCEDURE — 83605 ASSAY OF LACTIC ACID: CPT

## 2022-06-23 PROCEDURE — 93308 TTE F-UP OR LMTD: CPT

## 2022-06-23 PROCEDURE — 82728 ASSAY OF FERRITIN: CPT

## 2022-06-23 PROCEDURE — 2580000003 HC RX 258: Performed by: INTERNAL MEDICINE

## 2022-06-23 PROCEDURE — 6370000000 HC RX 637 (ALT 250 FOR IP): Performed by: PSYCHIATRY & NEUROLOGY

## 2022-06-23 PROCEDURE — 36430 TRANSFUSION BLD/BLD COMPNT: CPT

## 2022-06-23 PROCEDURE — 83010 ASSAY OF HAPTOGLOBIN QUANT: CPT

## 2022-06-23 PROCEDURE — 2500000003 HC RX 250 WO HCPCS: Performed by: INTERNAL MEDICINE

## 2022-06-23 PROCEDURE — 86022 PLATELET ANTIBODIES: CPT

## 2022-06-23 PROCEDURE — 6370000000 HC RX 637 (ALT 250 FOR IP): Performed by: INTERNAL MEDICINE

## 2022-06-23 PROCEDURE — 83550 IRON BINDING TEST: CPT

## 2022-06-23 PROCEDURE — 84145 PROCALCITONIN (PCT): CPT

## 2022-06-23 PROCEDURE — 82668 ASSAY OF ERYTHROPOIETIN: CPT

## 2022-06-23 PROCEDURE — 6370000000 HC RX 637 (ALT 250 FOR IP): Performed by: PHYSICAL MEDICINE & REHABILITATION

## 2022-06-23 PROCEDURE — 1200000000 HC SEMI PRIVATE

## 2022-06-23 PROCEDURE — 2580000003 HC RX 258: Performed by: STUDENT IN AN ORGANIZED HEALTH CARE EDUCATION/TRAINING PROGRAM

## 2022-06-23 PROCEDURE — 87040 BLOOD CULTURE FOR BACTERIA: CPT

## 2022-06-23 PROCEDURE — 92526 ORAL FUNCTION THERAPY: CPT

## 2022-06-23 PROCEDURE — 82607 VITAMIN B-12: CPT

## 2022-06-23 PROCEDURE — 6360000002 HC RX W HCPCS: Performed by: INTERNAL MEDICINE

## 2022-06-23 PROCEDURE — 6370000000 HC RX 637 (ALT 250 FOR IP): Performed by: STUDENT IN AN ORGANIZED HEALTH CARE EDUCATION/TRAINING PROGRAM

## 2022-06-23 PROCEDURE — 36415 COLL VENOUS BLD VENIPUNCTURE: CPT

## 2022-06-23 PROCEDURE — 99233 SBSQ HOSP IP/OBS HIGH 50: CPT | Performed by: INTERNAL MEDICINE

## 2022-06-23 PROCEDURE — 82746 ASSAY OF FOLIC ACID SERUM: CPT

## 2022-06-23 PROCEDURE — 83540 ASSAY OF IRON: CPT

## 2022-06-23 RX ORDER — ACETAMINOPHEN 325 MG/1
650 TABLET ORAL EVERY 6 HOURS PRN
Status: CANCELLED | OUTPATIENT
Start: 2022-06-23

## 2022-06-23 RX ORDER — OXYCODONE HYDROCHLORIDE 15 MG/1
7.5 TABLET ORAL EVERY 4 HOURS PRN
Status: CANCELLED | OUTPATIENT
Start: 2022-06-23

## 2022-06-23 RX ORDER — CLONAZEPAM 0.5 MG/1
0.5 TABLET ORAL DAILY PRN
Status: DISCONTINUED | OUTPATIENT
Start: 2022-06-23 | End: 2022-06-30 | Stop reason: HOSPADM

## 2022-06-23 RX ORDER — LANOLIN ALCOHOL/MO/W.PET/CERES
400 CREAM (GRAM) TOPICAL 2 TIMES DAILY
Status: CANCELLED | OUTPATIENT
Start: 2022-06-23

## 2022-06-23 RX ORDER — QUETIAPINE FUMARATE 25 MG/1
50 TABLET, FILM COATED ORAL NIGHTLY
Status: DISCONTINUED | OUTPATIENT
Start: 2022-06-23 | End: 2022-06-30 | Stop reason: HOSPADM

## 2022-06-23 RX ORDER — SENNA AND DOCUSATE SODIUM 50; 8.6 MG/1; MG/1
2 TABLET, FILM COATED ORAL NIGHTLY
Status: DISCONTINUED | OUTPATIENT
Start: 2022-06-23 | End: 2022-06-30 | Stop reason: HOSPADM

## 2022-06-23 RX ORDER — FOLIC ACID 1 MG/1
1 TABLET ORAL DAILY
Status: DISCONTINUED | OUTPATIENT
Start: 2022-06-23 | End: 2022-06-30 | Stop reason: HOSPADM

## 2022-06-23 RX ORDER — VENLAFAXINE HYDROCHLORIDE 37.5 MG/1
75 CAPSULE, EXTENDED RELEASE ORAL
Status: DISCONTINUED | OUTPATIENT
Start: 2022-06-24 | End: 2022-06-30 | Stop reason: HOSPADM

## 2022-06-23 RX ORDER — ACETAMINOPHEN 325 MG/1
650 TABLET ORAL EVERY 6 HOURS PRN
Status: DISCONTINUED | OUTPATIENT
Start: 2022-06-23 | End: 2022-06-30 | Stop reason: HOSPADM

## 2022-06-23 RX ORDER — GABAPENTIN 100 MG/1
100 CAPSULE ORAL 3 TIMES DAILY
Status: DISCONTINUED | OUTPATIENT
Start: 2022-06-23 | End: 2022-06-30 | Stop reason: HOSPADM

## 2022-06-23 RX ORDER — 0.9 % SODIUM CHLORIDE 0.9 %
1000 INTRAVENOUS SOLUTION INTRAVENOUS ONCE
Status: DISCONTINUED | OUTPATIENT
Start: 2022-06-23 | End: 2022-06-23 | Stop reason: HOSPADM

## 2022-06-23 RX ORDER — SODIUM CHLORIDE 0.9 % (FLUSH) 0.9 %
5-40 SYRINGE (ML) INJECTION PRN
Status: CANCELLED | OUTPATIENT
Start: 2022-06-23

## 2022-06-23 RX ORDER — FOLIC ACID 1 MG/1
1 TABLET ORAL DAILY
Status: CANCELLED | OUTPATIENT
Start: 2022-06-23

## 2022-06-23 RX ORDER — SODIUM CHLORIDE 9 MG/ML
25 INJECTION, SOLUTION INTRAVENOUS PRN
Status: DISCONTINUED | OUTPATIENT
Start: 2022-06-23 | End: 2022-06-30 | Stop reason: HOSPADM

## 2022-06-23 RX ORDER — SODIUM CHLORIDE 0.9 % (FLUSH) 0.9 %
5-40 SYRINGE (ML) INJECTION PRN
Status: DISCONTINUED | OUTPATIENT
Start: 2022-06-23 | End: 2022-06-30 | Stop reason: HOSPADM

## 2022-06-23 RX ORDER — VENLAFAXINE HYDROCHLORIDE 37.5 MG/1
75 CAPSULE, EXTENDED RELEASE ORAL
Status: CANCELLED | OUTPATIENT
Start: 2022-06-24

## 2022-06-23 RX ORDER — SODIUM CHLORIDE 9 MG/ML
INJECTION, SOLUTION INTRAVENOUS PRN
Status: CANCELLED | OUTPATIENT
Start: 2022-06-23

## 2022-06-23 RX ORDER — GABAPENTIN 100 MG/1
100 CAPSULE ORAL 3 TIMES DAILY
Status: CANCELLED | OUTPATIENT
Start: 2022-06-23

## 2022-06-23 RX ORDER — SODIUM CHLORIDE 9 MG/ML
INJECTION, SOLUTION INTRAVENOUS PRN
Status: DISCONTINUED | OUTPATIENT
Start: 2022-06-23 | End: 2022-06-30 | Stop reason: HOSPADM

## 2022-06-23 RX ORDER — SALIVA STIMULANT COMB. NO.3
SPRAY, NON-AEROSOL (ML) MUCOUS MEMBRANE
Status: DISCONTINUED | OUTPATIENT
Start: 2022-06-23 | End: 2022-06-30 | Stop reason: HOSPADM

## 2022-06-23 RX ORDER — SODIUM CHLORIDE 0.9 % (FLUSH) 0.9 %
5-40 SYRINGE (ML) INJECTION EVERY 12 HOURS SCHEDULED
Status: DISCONTINUED | OUTPATIENT
Start: 2022-06-23 | End: 2022-06-24 | Stop reason: SDUPTHER

## 2022-06-23 RX ORDER — ACETAMINOPHEN 650 MG/1
650 SUPPOSITORY RECTAL EVERY 6 HOURS PRN
Status: DISCONTINUED | OUTPATIENT
Start: 2022-06-23 | End: 2022-06-30 | Stop reason: HOSPADM

## 2022-06-23 RX ORDER — OXYCODONE HYDROCHLORIDE 15 MG/1
7.5 TABLET ORAL EVERY 4 HOURS PRN
Status: DISCONTINUED | OUTPATIENT
Start: 2022-06-23 | End: 2022-06-30 | Stop reason: HOSPADM

## 2022-06-23 RX ORDER — LANOLIN ALCOHOL/MO/W.PET/CERES
100 CREAM (GRAM) TOPICAL DAILY
Status: DISCONTINUED | OUTPATIENT
Start: 2022-06-23 | End: 2022-06-30 | Stop reason: HOSPADM

## 2022-06-23 RX ORDER — QUETIAPINE FUMARATE 25 MG/1
50 TABLET, FILM COATED ORAL NIGHTLY
Status: CANCELLED | OUTPATIENT
Start: 2022-06-23

## 2022-06-23 RX ORDER — MECOBALAMIN 5000 MCG
5 TABLET,DISINTEGRATING ORAL NIGHTLY
Status: CANCELLED | OUTPATIENT
Start: 2022-06-23

## 2022-06-23 RX ORDER — SODIUM CHLORIDE 0.9 % (FLUSH) 0.9 %
5-40 SYRINGE (ML) INJECTION EVERY 12 HOURS SCHEDULED
Status: DISCONTINUED | OUTPATIENT
Start: 2022-06-23 | End: 2022-06-30 | Stop reason: HOSPADM

## 2022-06-23 RX ORDER — BISACODYL 10 MG
10 SUPPOSITORY, RECTAL RECTAL DAILY PRN
Status: DISCONTINUED | OUTPATIENT
Start: 2022-06-23 | End: 2022-06-30 | Stop reason: HOSPADM

## 2022-06-23 RX ORDER — SENNA AND DOCUSATE SODIUM 50; 8.6 MG/1; MG/1
2 TABLET, FILM COATED ORAL NIGHTLY
Status: CANCELLED | OUTPATIENT
Start: 2022-06-23

## 2022-06-23 RX ORDER — LANOLIN ALCOHOL/MO/W.PET/CERES
400 CREAM (GRAM) TOPICAL 2 TIMES DAILY
Status: DISCONTINUED | OUTPATIENT
Start: 2022-06-23 | End: 2022-06-30 | Stop reason: HOSPADM

## 2022-06-23 RX ORDER — HYDROPHILIC CREAM
PASTE (GRAM) TOPICAL 2 TIMES DAILY
Status: CANCELLED | OUTPATIENT
Start: 2022-06-23

## 2022-06-23 RX ORDER — BISACODYL 10 MG
10 SUPPOSITORY, RECTAL RECTAL DAILY PRN
Status: CANCELLED | OUTPATIENT
Start: 2022-06-23

## 2022-06-23 RX ORDER — SODIUM CHLORIDE 9 MG/ML
25 INJECTION, SOLUTION INTRAVENOUS PRN
Status: CANCELLED | OUTPATIENT
Start: 2022-06-23

## 2022-06-23 RX ORDER — SODIUM CHLORIDE 0.9 % (FLUSH) 0.9 %
5-40 SYRINGE (ML) INJECTION EVERY 12 HOURS SCHEDULED
Status: CANCELLED | OUTPATIENT
Start: 2022-06-23

## 2022-06-23 RX ORDER — LANOLIN ALCOHOL/MO/W.PET/CERES
100 CREAM (GRAM) TOPICAL DAILY
Status: CANCELLED | OUTPATIENT
Start: 2022-06-23

## 2022-06-23 RX ORDER — ALBUTEROL SULFATE 2.5 MG/3ML
2.5 SOLUTION RESPIRATORY (INHALATION) EVERY 6 HOURS PRN
Status: DISCONTINUED | OUTPATIENT
Start: 2022-06-23 | End: 2022-06-30 | Stop reason: HOSPADM

## 2022-06-23 RX ORDER — OXYCODONE HYDROCHLORIDE 5 MG/1
10 TABLET ORAL EVERY 4 HOURS PRN
Status: CANCELLED | OUTPATIENT
Start: 2022-06-23

## 2022-06-23 RX ORDER — SODIUM CHLORIDE 9 MG/ML
INJECTION, SOLUTION INTRAVENOUS CONTINUOUS
Status: ACTIVE | OUTPATIENT
Start: 2022-06-23 | End: 2022-06-24

## 2022-06-23 RX ORDER — OXYCODONE HYDROCHLORIDE 5 MG/1
10 TABLET ORAL EVERY 4 HOURS PRN
Status: DISCONTINUED | OUTPATIENT
Start: 2022-06-23 | End: 2022-06-30 | Stop reason: HOSPADM

## 2022-06-23 RX ORDER — ALBUTEROL SULFATE 2.5 MG/3ML
2.5 SOLUTION RESPIRATORY (INHALATION) EVERY 6 HOURS PRN
Status: CANCELLED | OUTPATIENT
Start: 2022-06-23

## 2022-06-23 RX ORDER — CLONAZEPAM 0.5 MG/1
0.5 TABLET ORAL DAILY PRN
Status: CANCELLED | OUTPATIENT
Start: 2022-06-23

## 2022-06-23 RX ORDER — HYDROPHILIC CREAM
PASTE (GRAM) TOPICAL 2 TIMES DAILY
Status: DISCONTINUED | OUTPATIENT
Start: 2022-06-23 | End: 2022-06-30 | Stop reason: HOSPADM

## 2022-06-23 RX ORDER — SALIVA STIMULANT COMB. NO.3
SPRAY, NON-AEROSOL (ML) MUCOUS MEMBRANE
Status: CANCELLED | OUTPATIENT
Start: 2022-06-23

## 2022-06-23 RX ORDER — MECOBALAMIN 5000 MCG
5 TABLET,DISINTEGRATING ORAL NIGHTLY
Status: DISCONTINUED | OUTPATIENT
Start: 2022-06-23 | End: 2022-06-30 | Stop reason: HOSPADM

## 2022-06-23 RX ADMIN — MEROPENEM 1000 MG: 1 INJECTION, POWDER, FOR SOLUTION INTRAVENOUS at 11:36

## 2022-06-23 RX ADMIN — Medication 400 MG: at 21:25

## 2022-06-23 RX ADMIN — Medication: at 08:00

## 2022-06-23 RX ADMIN — SODIUM CHLORIDE 250 ML: 9 INJECTION, SOLUTION INTRAVENOUS at 11:39

## 2022-06-23 RX ADMIN — Medication: at 13:52

## 2022-06-23 RX ADMIN — ACETAMINOPHEN 650 MG: 325 TABLET ORAL at 11:48

## 2022-06-23 RX ADMIN — SODIUM CHLORIDE: 9 INJECTION, SOLUTION INTRAVENOUS at 03:33

## 2022-06-23 RX ADMIN — GABAPENTIN 100 MG: 100 CAPSULE ORAL at 15:43

## 2022-06-23 RX ADMIN — Medication 100 MG: at 11:48

## 2022-06-23 RX ADMIN — VENLAFAXINE HYDROCHLORIDE 75 MG: 37.5 CAPSULE, EXTENDED RELEASE ORAL at 11:50

## 2022-06-23 RX ADMIN — GABAPENTIN 100 MG: 100 CAPSULE ORAL at 11:49

## 2022-06-23 RX ADMIN — DAPTOMYCIN 350 MG: 500 INJECTION, POWDER, LYOPHILIZED, FOR SOLUTION INTRAVENOUS at 21:14

## 2022-06-23 RX ADMIN — QUETIAPINE FUMARATE 50 MG: 25 TABLET ORAL at 21:24

## 2022-06-23 RX ADMIN — APIXABAN 5 MG: 5 TABLET, FILM COATED ORAL at 11:50

## 2022-06-23 RX ADMIN — GABAPENTIN 100 MG: 100 CAPSULE ORAL at 21:24

## 2022-06-23 RX ADMIN — MEROPENEM 1000 MG: 1 INJECTION, POWDER, FOR SOLUTION INTRAVENOUS at 22:12

## 2022-06-23 RX ADMIN — SODIUM CHLORIDE, PRESERVATIVE FREE 10 ML: 5 INJECTION INTRAVENOUS at 21:37

## 2022-06-23 RX ADMIN — Medication: at 21:39

## 2022-06-23 RX ADMIN — FOLIC ACID 1 MG: 1 TABLET ORAL at 21:25

## 2022-06-23 RX ADMIN — SODIUM CHLORIDE: 9 INJECTION, SOLUTION INTRAVENOUS at 18:38

## 2022-06-23 RX ADMIN — FOLIC ACID 1 MG: 1 TABLET ORAL at 11:49

## 2022-06-23 RX ADMIN — DOCUSATE SODIUM 50 MG AND SENNOSIDES 8.6 MG 2 TABLET: 8.6; 5 TABLET, FILM COATED ORAL at 21:24

## 2022-06-23 RX ADMIN — SODIUM CHLORIDE, PRESERVATIVE FREE 10 ML: 5 INJECTION INTRAVENOUS at 09:00

## 2022-06-23 RX ADMIN — Medication 100 MG: at 21:25

## 2022-06-23 RX ADMIN — Medication: at 21:42

## 2022-06-23 RX ADMIN — Medication: at 13:55

## 2022-06-23 RX ADMIN — APIXABAN 5 MG: 5 TABLET, FILM COATED ORAL at 21:25

## 2022-06-23 RX ADMIN — OXYCODONE 10 MG: 5 TABLET ORAL at 15:43

## 2022-06-23 RX ADMIN — Medication 400 MG: at 11:50

## 2022-06-23 RX ADMIN — Medication 5 MG: at 21:25

## 2022-06-23 ASSESSMENT — PAIN SCALES - GENERAL
PAINLEVEL_OUTOF10: 8
PAINLEVEL_OUTOF10: 8

## 2022-06-23 ASSESSMENT — PAIN DESCRIPTION - LOCATION: LOCATION: BUTTOCKS

## 2022-06-23 NOTE — PROGRESS NOTES
Infectious disease consult sent through [employer] at 1830 on 6/23/2022   RN safia Plascencia

## 2022-06-23 NOTE — CARE COORDINATION
CM received a call from [place] with [address] ([phone]) that the precert is good for seven days from 06/24 and that they will send clinicals again to insurance on 06/30.  Ramon Eric RN

## 2022-06-23 NOTE — PROGRESS NOTES
Samantha ARAYA came to collect patient for transport to  for inpatient admit. Belongings collected and sent with the patient. Blood transfusion in progress.

## 2022-06-23 NOTE — PROGRESS NOTES
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 Pt reported his girlfriend manages cooking, cleaning, laundry, grocery shopping. Pt not actively driving but was able to prior to 2022.    ST Dx: [] Aphasia  [x] Dysarthria  [] Apraxia   [x] Oropharyngeal dysphagia [x] Cognitive Impairment  [] Other:   Date of Admit: 2022  Room #: 0156/0156-01    Current functional status (updated daily):         Pt being seen for : [x] Speech/Language Treatment  [x] Dysphagia Treatment [x] Cognitive Treatment  [] Other:  Communication: []WFL  [] Aphasia  [x] Dysarthria  [] Apraxia  [] Pragmatic Impairment [] Non-verbal  [] Hearing Loss  [] Other:   Cognition: [] WFL  [] Mild  [x] Moderate  [] Severe [] Unable to Assess  [] Other:  Memory: [] WFL  [] Mild  [x] Moderate  [] Severe [] Unable to Assess  [] Other:  Behavior: [x] Alert  [x] Cooperative  [x]  Pleasant  [] Confused  [] Agitated  [] Uncooperative  [] Distractible [] Motivated  [] Self-Limiting [] Anxious  [] Other:  Endurance:  [x] Adequate for participation in SLP sessions  [] Reduced overall  [] Lethargic  [] Other:  Safety: [] No concerns at this time  [x] Reduced insight into deficits  [x]  Reduced safety awareness [] Not following call light procedures  [] Unable to Assess  [] Other:    Current Diet Order:Diet NPO  ADULT TUBE FEEDING; PEG; Standard with Fiber; Bolus; 6 Times Daily; 237; 60; Before and after each bolus; Protein; 1 bottle of Proteinex daily, 30 mL free water flush before and after adminstration  Swallowing Precautions: oral care 2-3x/day to reduce adverse affects in the event of aspiration        Date: 6/23/2022      Tx session 1  0900 - 0930 Tx session 2  Med hold per MD for remaining minutes   Total Timed Code Min 0    Total Treatment Minutes 30    Individual Treatment Minutes 30    Group Treatment Minutes 0 0   Co-Treat Minutes 0 0   Variance/Reason:  Med hold for remaining 30 minutes of tx n/a   Pain \"my head hurts\"    Pain Intervention [x] RN notified  [] Repositioned  [] Intervention offered and patient declined  [] N/A  [] Other:  [] RN notified  [] Repositioned  [] Intervention offered and patient declined  [] N/A  [] Other:   Subjective     Pt seen partially reclined in bed (40-45 degrees), fatigued with difficulty maintaining adequate THEA for discussion with SLP. Objective:  Goals  Timeframe for Short-term Goals: 18 days (06/26/22)     Dysphagia Goals:  Goal 1: Pt will complete pharyngeal/laryngeal strengthening exercises 10/10 given min cues for overall improved swallowing function    SLP attempted pharyngeal / laryngeal strengthening exercises with pt:   -effortful swallow: x2  -isometric tongue hold/protrusion: attempted x1        Goal met 06/20/22. MBSS completed. See separate speech therapy report for details. Goal met 06/20/22. MBSS completed. See separate speech therapy report for details. Goal 3: The patient/caregiver will demonstrate understanding of compensatory strategies for improved swallowing safety   SLP edu pt re: sitting fully upright for ice chip trials. Pt with difficulty sitting fully upright 2/2 buttocks pain, able to tolerate ~45 degrees upright in bed. Goal 4: The patient will tolerate recommended diet without observed clinical signs of aspiration   Ice chips: initially held during tx session d/t pt fatigue. Pt voicing significant xerostomia stating \"my mouth is so dry! \"    Noted dried blood tinged labial surfaces -- oral moisturizer applied.      Pt able to maintain adequate THEA for ice chip trials x3.    -pt tolerated with no overt s/s of aspiration  -of note, pt with silent aspiration on MBSS    Further ice chip trials held d/t waxing/waning THEA. Tx session ended prematurely -- RN notified. MD aware. Cognitive-linguistic Goals  Goal 1: Pt will complete recall tasks with 80% acc given min cues for use of compensatory strategies   Goal not directly targeted. Goal 2: Pt will complete executive function tasks (meds, math, money, time, etc) with 80% acc given min cues. Goal not directly targeted. Goal 3: Pt will complete problem solving and thought organization tasks with 80% acc given min cues. Goal not directly targeted. Goal 4: Pt will complete verbal and visual reasoning tasks with 80% acc given min cues. Goal not directly targeted. Goal 5: Pt will repeat words/phrases/sentences using speech intelligibility strategies with 80% acc given min cues. Goal not directly targeted. Other areas targeted: N/a    Education:   SLP re: aspiration precautions, rationale for low volume ice chip trials this date d/t waxing/waning THEA. Safety Devices: [x] Call light within reach  [] Chair alarm activated  [x] Bed alarm activated  [x] Other: RN and MD notified of lethargy, pt's vital signs [] Call light within reach  [] Chair alarm activated  [] Bed alarm activated  [] Other:    Assessment: Pt quite fatigued and had difficulty maintaining adequate THEA to participate in discussion with SLP. Vital signs collected during session by SLP -- RN aware. Pt was administered minimal ice chip trials (after adequate THEA to participate) with no overt s/s of aspiration observed (*however, silent aspiration noted on recent MBSS)-- SLP cued throat clear and double swallow after each trial.  Further PO trials discontinued d/t pt's waxing/waning THEA-- RN aware. Pt now a medical hold for the day per MD.   Plan: Continue as per plan of care.       Additional Information:     Barriers toward progress: Cognitive deficit, Impulsivity, Limited safety awareness, Limited insight into deficits, Unrealistic expectations and Medication managment  Discharge recommendations:  [] Home independently  [] Home with assistance [x]  24 hour supervision  [] ECF [] Other:  Continued Tx Upon Discharge: ? [x] Yes [] No [] TBD based on progress while on ARU [] Vital Stim indicated [] Other:   Estimated discharge date: 06/25/2022    Interventions used this date:  [] Speech/Language Treatment  [] Instruction in HEP [] Group [x] Dysphagia Treatment [x] Cognitive Treatment   [] Other:       Total Time Breakdown / Charges    Time In Time out Total Time / units   Cognitive Tx      Speech Tx      Dysphagia Tx 0900 0930 30 min / 1 unit       Electronically Signed by     Clarisa Sinha M.S. [id number] [facility]  Speech-language pathologist  [id number]

## 2022-06-23 NOTE — PROGRESS NOTES
PROGRESS NOTE  S:31 yrs Patient  admitted on 6/8/2022 with Critical illness myopathy [G72.81] . Today he complains of leakage around G tube    Exam:   Vitals:    06/22/22 2225   BP: 103/60   Pulse: (!) 112   Resp: 18   Temp: 99 °F (37.2 °C)   SpO2: 91%      General appearance: alert, appears older than stated age and cachectic  HEENT: Sclera clear, anicteric  Neck: supple  Lungs: clear to auscultation bilaterally  Heart: regular rate and rhythm  Abdomen: soft, G tube site with moderate discharge  Extremities: edema none     Medications: Reviewed    Labs:  CBC:   Recent Labs     06/22/22  0650 06/22/22  0931   WBC 0.3* 0.3*   HGB 6.6* 6.6*   HCT 19.6* 19.7*   MCV 86.9 86.7   PLT 96* 100*     BMP:   Recent Labs     06/22/22  0650   *   K 4.6   CL 97*   CO2 33*   BUN 28*   CREATININE <0.5*     LIVER PROFILE: No results for input(s): AST, ALT, LIPASE, PROT, BILIDIR, BILITOT, ALKPHOS in the last 72 hours. Invalid input(s): AMYLASE,  ALB  PT/INR:   No results for input(s): INR in the last 72 hours. 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 His G tube appears to be functioning well but with leakage around it, resolved post replacement. He has Pancytopenia and denies hematochezia/melena    Recommendation:  1. Continue supportive care  2. Hematology is following  3. Continue TFs per dietitian recs  4. Keep HOB elevated during PEG use  5. The PEG tube was replaced on 6/14 w a shorter 2 cm ADITI-Key low profile G tube at bedside w improvement in leaking  6.  May later have to exchange it w a 2.5 cm tube if he gains weight        Trevon Anand MD, MD  8:49 AM 6/23/2022

## 2022-06-23 NOTE — PROGRESS NOTES
Chem/Onc and oncology consult sent through [employer] [facility] at [address] on 6/23/2022   RN safia Bautista

## 2022-06-23 NOTE — PROGRESS NOTES
Infectious Disease Follow up Notes    CC :  MRSA bacteremia      Antibiotics:   dapto 8mg/kg/24 (500mg), s 6/22/22    Admit Date:   6/8/2022  Hospital Day: 16    Subjective:   Minimal history obtained from the patient. He is lethargic, this is a change in status per ARU MD.  Low grade temp currently with tachycardia. Objective:     No data found. Pale, chronically ill appearing male. Appears older than stated age  [de-identified]. Rouses to voice, answers some simple questions though dysarthric. Wound anterior neck without local signs of infection   Tachycardic, regular   Decreased breath sounds at bases suman  Abd scaphoid, soft.   Scant dermatitis around PEG site without drainage   No focal rash  Sacral wound VAC in place, photos reviewed     ACCESS  LUE PICC  Villatoro       Scheduled Meds:   daptomycin (CUBICIN) IVPB  8 mg/kg (Ideal) IntraVENous Q24H    saliva substitute   Oral Q6H WA    venlafaxine  75 mg Oral Daily with breakfast    QUEtiapine  50 mg Oral Nightly    magnesium oxide  400 mg Per G Tube BID    zinc oxide   Topical BID    lidocaine 1 % injection  5 mL IntraDERmal Once    sodium chloride flush  5-40 mL IntraVENous 2 times per day    apixaban  5 mg Per G Tube BID    folic acid  1 mg Per G Tube Daily    gabapentin  100 mg Per G Tube TID    melatonin  5 mg Per G Tube Nightly    sennosides-docusate sodium  2 tablet Per G Tube Nightly    thiamine  100 mg Per G Tube Daily       Continuous Infusions:   sodium chloride      sodium chloride Stopped (06/20/22 1948)          Data Review:    Lab Results   Component Value Date    WBC 0.3 (LL) 06/22/2022    HGB 6.6 (LL) 06/22/2022    HCT 19.7 (LL) 06/22/2022    MCV 86.7 06/22/2022     (L) 06/22/2022     Lab Results   Component Value Date    CREATININE <0.5 (L) 06/22/2022    BUN 28 (H) 06/22/2022     (L) 06/22/2022    K 4.6 06/22/2022    CL 97 (L) 06/22/2022    CO2 33 (H) 06/22/2022       Hepatic Function Panel:   Lab Results   Component Value Date    ALKPHOS 108 02/06/2022    ALT 17 02/06/2022    AST 17 02/06/2022    PROT 7.1 02/06/2022    BILITOT 0.4 02/06/2022    LABALBU 2.4 06/11/2022         MICRO:  6/22 UA 6-9wbc, 50-100rbc, no culture    BC x2 NGTD     Sputum and BAL cultures on 5/2/22 E cloacae, K pneumoniae, pneumococcus       IMAGING:  CXR 6/22/22  Impression   Near complete resolution of right-sided airspace infiltrates.        Assessment:     Patient Active Problem List    Diagnosis Date Noted    Severe malnutrition ([facility] [address] 06/09/2022     Priority: Medium    Critical illness myopathy 06/08/2022     Priority: Medium    Recurrent major depressive disorder ([facility] 01/25/2022    Prediabetes 01/25/2022    Primary squamous cell carcinoma of tongue ([facility] 11/30/2021    Poor dentition 11/04/2021    Class 1 obesity due to excess calories with serious comorbidity and body mass index (BMI) of 34.0 to 34.9 in adult 11/04/2021    Chronic bilateral low back pain without sciatica 11/03/2021    Anxiety 11/03/2021    Insomnia due to other mental disorder 11/03/2021       History [facility] tongue s/p resection/radical neck dissection 1/2022 followed by chemo and XRT and reconstruction  G-tube in place    Prolonged admission [facility] 5/2-6/8/22 with sepsis, hypoxemic respiratory failure req MV    Complicated, prolonged MRSA bacteremia (+BC from 5/2/22 to 5/19/22), associated with infected atrial thrombus, septic pulmonary emboli and empyema  Presumed pathogenesis was CLABSI from port   S/p port removal 5/7/22  BC collected 5/21, 5/24 were negative  Repeat BC collected 6/22/22 are negative to date   He is now on IV dapto alone   He was transferred to ARU 6/8/22  At the time of DC from [facility], the plan was IV abx through 7/2/22 via PICC that was placed 6/9/22 (?I think)    Infected R atrial/IJ/SVC thrombus s/p thrombectomy with culture positive for MRSA     R hydropneumothorax / empyema  S/p chest tube placement w tpa instillations  Per Oncology note at  - \"Reviewed pleural fluid and there were some \"atypical cells\". Once feasible, would be ideal to biopsy lung lesion although agree with ongoing bacteremia, likely infectious rather than malignancy. \"  Deemed poor candidate for decort     Cytopenias, acute on chronic   This was present at [facility] as well and was attributed to residua from chemo last given 4/19/22 along with acute illness   Possibility of ceftaroline-induced myelosuppression was considered (this is well described krista w higher/prolonged courses as with this patient); drug was stopped 6/22/22    FREEMAN PE     No abx allergies     Plan:     Continue dapto. Baseline CK was ordered  Note, dapto is not active in lung parenchyma though at this point, I don't think we are still treating pneumonia per se. The last CXR was clear. At this point, do not think we need a second MRSA drug, but will continue to revisit that decision depending on course. I am not sure if he failed vanc (as one option). I would think we should avoid linezolid for same concerns of myelosuppression    Concern for evolving neutropenic sepsis   UA and BC were collected <24 hours ago   -repeat BC now w fever   -check procal, lactic   -add empiric GN coverage w meropenem   -discuss w Hematology role of G-CSF   -transfer to acute care   -limited echo   -consider need of CT CAP       Will follow  D/w Shay Estrada MD  Phone: [phone]   Fax : [phone]

## 2022-06-23 NOTE — PROGRESS NOTES
Provider has been notified of change in vitals and risk for deterioration. Orders noted for change in LOC.

## 2022-06-23 NOTE — PROGRESS NOTES
The patient was found in bed with free flowing liquid coming from the stoma site for the gtube. There was clear liquid, clumps of tan, large volume, patient laying in a puddle of this liquid in the bed. Dr. Margarita Cottrell was notified. He stated to keep the pt NPO until it gets fixed. Dr. Margarita Cottrell notified that no meds, fluids or tube feedings can be administered through current site. He verbalized understanding.

## 2022-06-23 NOTE — CONSULTS
Oncology Hematology Care    Consult Note      Requesting Physician: Dr. Azul Caal:  pancytopenia        HISTORY OF PRESENT ILLNESS:      Mr. Nael Mccall  is a 28-year-old male status post hemiglossectomy for head neck carcinoma. He is now in a rehabilitation facility due to MRSA. He was recently changed from ceftriaxone to daptomycin. I was asked to see him for pancytopenia. He is very fatigued. Past Medical History:    Past Medical History:   Diagnosis Date    Cancer [facility])      Past Surgical History:    No past surgical history on file.     Current Medications:    Current Facility-Administered Medications   Medication Dose Route Frequency Provider Last Rate Last Admin    0.9 % sodium chloride infusion   IntraVENous PRN Soheila Flores MD        0.9 % sodium chloride infusion   IntraVENous Continuous Soheila Flores  mL/hr at 06/23/22 0333 New Bag at 06/23/22 0333    DAPTOmycin (CUBICIN) 500 mg in sodium chloride 0.9 % 50 mL IVPB  8 mg/kg (Ideal) IntraVENous Q24H Jeri Chandra MD   Stopped at 06/22/22 1828    oxyCODONE (ROXICODONE) immediate release tablet 10 mg  10 mg Oral Q4H PRN Soheila Flores MD   10 mg at 06/22/22 2233    oxyCODONE (OXY-IR) immediate release tablet 7.5 mg  7.5 mg Per G Tube Q4H PRN Soheila Flores MD        saliva substitute (Huong Pedlar) liquid   Oral Q6H Kena Lim MD   Given at 06/22/22 2234    venlafaxine (EFFEXOR XR) extended release capsule 75 mg  75 mg Oral Daily with breakfast Melany Renteria MD   75 mg at 06/22/22 1011    QUEtiapine (SEROQUEL) tablet 50 mg  50 mg Oral Nightly Melany Renteria MD   50 mg at 06/22/22 2254    Lip Balm ointment   Topical PRN Soheila Flores MD        magnesium oxide (MAG-OX) tablet 400 mg  400 mg Per G Tube BID Floridalma Wallace MD   400 mg at 06/22/22 Allergies: Allergies   Allergen Reactions    Trazodone And Nefazodone Nausea And Vomiting, Hives and Other (See Comments)     Does not remember  Does not remember    Tramadol Swelling and Other (See Comments)    Trazodone Other (See Comments) and Hives     Does not remember         Social History:   Social History     Socioeconomic History    Marital status: Single     Spouse name: Not on file    Number of children: 0    Years of education: 12    Highest education level: High school graduate   Occupational History    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 The patient's recent surgery has put a strain to the relationship between him and his fiancée. No guns in the home, no  service, no legal issues at this time. Social Determinants of Health     Financial Resource Strain:     Difficulty of Paying Living Expenses: Not on file   Food Insecurity:     Worried About Running Out of Food in the Last Year: Not on file    Patsy of Food in the Last Year: Not on file   Transportation Needs:     Lack of Transportation (Medical): Not on file    Lack of Transportation (Non-Medical):  Not on file   Physical Activity:     Days of Exercise per Week: Not on file    Minutes of Exercise per Session: Not on file   Stress:     Feeling of Stress : Not on file   Social Connections:     Frequency of Communication with Friends and Family: Not on file    Frequency of Social Gatherings with Friends and Family: Not on file    Attends Muslim Services: Not on file    Active Member of Clubs or Organizations: Not on file    Attends Club or Organization Meetings: Not on file    Marital Status: Not on file   Intimate Partner Violence:     Fear of Current or Ex-Partner: Not on file    Emotionally Abused: Not on file    Physically Abused: Not on file    Sexually Abused: Not on file   Housing Stability:     Unable to Pay for Housing in the Last Year: Not on file    Number of [place] in the Last Year: Not on file    Unstable Housing in the Last Year: Not on file          Family History:     Family History   Problem Relation Age of Onset    Diabetes Mother     Cancer Maternal Grandmother         Pancreatic CA     REVIEW OF SYSTEMS:      I am not sure that his review of systems is accurate. His biggest complaint is fatigue. PHYSICAL EXAM:      Vitals:  /60   Pulse (!) 112   Temp 99 °F (37.2 °C) (Axillary)   Resp 18   Ht 5' 6\" (1.676 m)   Wt 101 lb 8 oz (46 kg)   SpO2 91%   BMI 16.38 kg/m²   CONSTITUTIONAL: awake, alert, cooperative, no apparent distress. He looks chronically ill. EYES: pupils equal, round and reactive to light, sclera clear and conjunctiva normal  ENT: Normocephalic, without obvious abnormality, atraumatic  NECK: supple, symmetrical, no jugular venous distension and no carotid bruits   HEMATOLOGIC/LYMPHATIC: no cervical, supraclavicular or axillary lymphadenopathy   LUNGS: no increased work of breathing and clear to auscultation   CARDIOVASCULAR: regular rate and rhythm, normal S1 and S2, no murmur noted  ABDOMEN: normal bowel sounds x 4, soft, non-distended, non-tender, no masses palpated, no hepatosplenomgaly   MUSCULOSKELETAL: full range of motion noted, tone is normal  NEUROLOGIC: awake, alert, oriented to name, place and time. Motor skills grossly intact.    SKIN: There is a bandage under his chin and a drain on his right side. A Villatoro catheter is also in place. EXTREMITIES: no LE edema       DATA:    PT/INR:  No results for input(s): PROT, INR in the last 72 hours. PTT:  No results for input(s): APTT in the last 72 hours. CMP:    Lab Results   Component Value Date     06/22/2022    K 4.6 06/22/2022    CL 97 06/22/2022    CO2 33 06/22/2022    BUN 28 06/22/2022    PROT 7.1 02/06/2022     :    Lab Results   Component Value Date    MG 2.00 06/13/2022     Phosphorus:  No components found for: PO4  Calcium:  No results found for: CA  CBC:    Lab Results   Component Value Date    WBC 0.3 06/22/2022    RBC 2.27 06/22/2022    HGB 6.6 06/22/2022    HCT 19.7 06/22/2022    MCV 86.7 06/22/2022    RDW 15.5 06/22/2022     06/22/2022     DIFF:  Lab Results   Component Value Date    MCV 86.7 06/22/2022    RDW 15.5 06/22/2022      [email or web address]  Uric Acid:  @labcrnt(URIC)@    Radiology Review:          Problem List  Patient Active Problem List   Diagnosis    Chronic bilateral low back pain without sciatica    Anxiety    Insomnia due to other mental disorder    Poor dentition    Class 1 obesity due to excess calories with serious comorbidity and body mass index (BMI) of 34.0 to 34.9 in adult    Primary squamous cell carcinoma of tongue (HCC)    Recurrent major depressive disorder ([facility]    Prediabetes    Critical illness myopathy    Severe malnutrition (HCC)       IMPRESSION/RECOMMENDATIONS:    Pancytopenia:  -I agree with stopping ceftriaxone and starting daptomycin  -We will need to watch platelets with daptomycin since this is a common issue with daptomycin  -I also wonder whether some of this is nutritional.  Micronutrients are sent.   -Heparin-induced antiplatelet antibody is sent  -I will hold off on a bone marrow biopsy at this time, because I do not think it will add much since his CBC was relatively normal in April.  -Work-up for hemolysis will also be ordered.  -I do not see any other medications causing concern  -I doubt this is chemotherapy-induced since he had cisplatin  -Also, radiation should not have caused this since it was not delivered to the pelvis around the bone marrow or sternum. Thank you for asking me to see the patient.        Lynnette Barraza MD  Please Contact Through [employer]

## 2022-06-23 NOTE — CONSULTS
2233    zinc oxide (TRIAD HYDROPHILIC) paste   Topical BID Emmanuelle Sandoval MD   Given at 06/22/22 1017    lidocaine 1 % injection 5 mL  5 mL IntraDERmal Once Emmanuelle Sandoval MD        sodium chloride flush 0.9 % injection 5-40 mL  5-40 mL IntraVENous 2 times per day Emmanuelle Sandoval MD   10 mL at 06/22/22 0900    sodium chloride flush 0.9 % injection 5-40 mL  5-40 mL IntraVENous PRN Emmanuelle Sandoval MD        0.9 % sodium chloride infusion  25 mL IntraVENous PRN Emmanuelle Sandoval MD   Stopped at 06/20/22 1948    nystatin (MYCOSTATIN) 136447 UNIT/ML suspension 500,000 Units  5 mL Oral 4x Daily Emmanuelle Sandoval MD   500,000 Units at 06/22/22 2234    acetaminophen (TYLENOL) tablet 650 mg  650 mg Per G Tube Q6H PRN Emmanuelle Sandoval MD   650 mg at 06/22/22 2233    apixaban (ELIQUIS) tablet 5 mg  5 mg Per G Tube BID Emmanuelle Sandoval MD   5 mg at 06/22/22 2234    bisacodyl (DULCOLAX) suppository 10 mg  10 mg Rectal Daily PRN Emmanuelle Sandoval MD        folic acid (FOLVITE) tablet 1 mg  1 mg Per G Tube Daily Emmanuelle Sandoval MD   1 mg at 06/22/22 1013    gabapentin (NEURONTIN) capsule 100 mg  100 mg Per G Tube TID Emmanuelle Sandoval MD   100 mg at 06/22/22 2234    melatonin disintegrating tablet 5 mg  5 mg Per G Tube Nightly Emmanuelle Sandoval MD   5 mg at 06/22/22 2233    sennosides-docusate sodium (SENOKOT-S) 8.6-50 MG tablet 2 tablet  2 tablet Per G Tube Nightly Emmanuelle Sandoval MD   2 tablet at 06/22/22 2233    sodium chloride (OCEAN, BABY AYR) 0.65 % nasal spray 1 spray  1 spray Each Nostril Q2H PRN Emmanuelle Sandoval MD        thiamine tablet 100 mg  100 mg Per G Tube Daily Emmanuelle Sandoval MD   100 mg at 06/22/22 1013    clonazePAM (KLONOPIN) tablet 0.5 mg  0.5 mg Per G Tube Daily PRN Emmanuelle Sandoval MD   0.5 mg at 06/22/22 0438    albuterol (PROVENTIL) nebulizer solution 2.5 mg  2.5 mg Nebulization Q6H PRN Emmanuelle Sandoval MD Allergies: Allergies   Allergen Reactions    Trazodone And Nefazodone Nausea And Vomiting, Hives and Other (See Comments)     Does not remember  Does not remember    Tramadol Swelling and Other (See Comments)    Trazodone Other (See Comments) and Hives     Does not remember         Social History:   Social History     Socioeconomic History    Marital status: Single     Spouse name: Not on file    Number of children: 0    Years of education: 12    Highest education level: High school graduate   Occupational History    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 The patient's recent surgery has put a strain to the relationship between him and his fiancée. No guns in the home, no  service, no legal issues at this time. Social Determinants of Health     Financial Resource Strain:     Difficulty of Paying Living Expenses: Not on file   Food Insecurity:     Worried About Running Out of Food in the Last Year: Not on file    Patsy of Food in the Last Year: Not on file   Transportation Needs:     Lack of Transportation (Medical): Not on file    Lack of Transportation (Non-Medical):  Not on file   Physical Activity:     Days of Exercise per Week: Not on file    Minutes of Exercise per Session: Not on file   Stress:     Feeling of Stress : Not on file   Social Connections:     Frequency of Communication with Friends and Family: Not on file    Frequency of Social Gatherings with Friends and Family: Not on file    Attends Baptism Services: Not on file    Active Member of Clubs or Organizations: Not on file    Attends Club or Organization Meetings: Not on file    Marital Status: Not on file   Intimate Partner Violence:     Fear of Current or Ex-Partner: Not on file    Emotionally Abused: Not on file    Physically Abused: Not on file    Sexually Abused: Not on file   Housing Stability:     Unable to Pay for Housing in the Last Year: Not on file    Number of [place] in the Last Year: Not on file    Unstable Housing in the Last Year: Not on file          Family History:     Family History   Problem Relation Age of Onset    Diabetes Mother     Cancer Maternal Grandmother         Pancreatic CA     REVIEW OF SYSTEMS:      I am not sure that his review of systems is accurate. His biggest complaint is fatigue. PHYSICAL EXAM:      Vitals:  /60   Pulse (!) 112   Temp 99 °F (37.2 °C) (Axillary)   Resp 18   Ht 5' 6\" (1.676 m)   Wt 101 lb 8 oz (46 kg)   SpO2 91%   BMI 16.38 kg/m²   CONSTITUTIONAL: awake, alert, cooperative, no apparent distress. He looks chronically ill. EYES: pupils equal, round and reactive to light, sclera clear and conjunctiva normal  ENT: Normocephalic, without obvious abnormality, atraumatic  NECK: supple, symmetrical, no jugular venous distension and no carotid bruits   HEMATOLOGIC/LYMPHATIC: no cervical, supraclavicular or axillary lymphadenopathy   LUNGS: no increased work of breathing and clear to auscultation   CARDIOVASCULAR: regular rate and rhythm, normal S1 and S2, no murmur noted  ABDOMEN: normal bowel sounds x 4, soft, non-distended, non-tender, no masses palpated, no hepatosplenomgaly   MUSCULOSKELETAL: full range of motion noted, tone is normal  NEUROLOGIC: awake, alert, oriented to name, place and time. Motor skills grossly intact.    SKIN: There is a bandage under his chin and a drain on his right side. A Villatoro catheter is also in place. EXTREMITIES: no LE edema       DATA:    PT/INR:  No results for input(s): PROT, INR in the last 72 hours. PTT:  No results for input(s): APTT in the last 72 hours. CMP:    Lab Results   Component Value Date     06/22/2022    K 4.6 06/22/2022    CL 97 06/22/2022    CO2 33 06/22/2022    BUN 28 06/22/2022    PROT 7.1 02/06/2022     :    Lab Results   Component Value Date    MG 2.00 06/13/2022     Phosphorus:  No components found for: PO4  Calcium:  No results found for: CA  CBC:    Lab Results   Component Value Date    WBC 0.3 06/22/2022    RBC 2.27 06/22/2022    HGB 6.6 06/22/2022    HCT 19.7 06/22/2022    MCV 86.7 06/22/2022    RDW 15.5 06/22/2022     06/22/2022     DIFF:  Lab Results   Component Value Date    MCV 86.7 06/22/2022    RDW 15.5 06/22/2022      [email or web address]  Uric Acid:  @labcrnt(URIC)@    Radiology Review:          Problem List  Patient Active Problem List   Diagnosis    Chronic bilateral low back pain without sciatica    Anxiety    Insomnia due to other mental disorder    Poor dentition    Class 1 obesity due to excess calories with serious comorbidity and body mass index (BMI) of 34.0 to 34.9 in adult    Primary squamous cell carcinoma of tongue (HCC)    Recurrent major depressive disorder ([facility] [address]    Prediabetes    Critical illness myopathy    Severe malnutrition (HCC)       IMPRESSION/RECOMMENDATIONS:    Pancytopenia:  -I agree with stopping ceftriaxone and starting daptomycin  -We will need to watch platelets with daptomycin since this is a common issue with daptomycin  -I also wonder whether some of this is nutritional.  Micronutrients are sent.   -Heparin-induced antiplatelet antibody is sent  -I will hold off on a bone marrow biopsy at this time, because I do not think it will add much since his CBC was relatively normal in April.  -Work-up for hemolysis will also be ordered.  -I do not see any other medications causing concern        Thank you for asking me to see the patient.        Ellyn Jones MD  Please Contact Through [employer]

## 2022-06-23 NOTE — H&P
Take 2.5 tablets by mouth every 8 hours as needed for Anxiety  [DISCONTINUED] amoxicillin (AMOXIL) 500 MG capsule,   albuterol (PROVENTIL) (5 MG/ML) 0.5% nebulizer solution, Take 1 mL by nebulization 4 times daily as needed for Wheezing  Current Facility-Administered Medications   Medication Dose Route Frequency Provider Last Rate Last Admin    sodium chloride flush 0.9 % injection 5-40 mL  5-40 mL IntraVENous 2 times per day Lee Goodpasture, MD        sodium chloride flush 0.9 % injection 5-40 mL  5-40 mL IntraVENous PRN Lee Goodpasture, MD        0.9 % sodium chloride infusion   IntraVENous PRN Lee Goodpasture, MD        acetaminophen (TYLENOL) tablet 650 mg  650 mg Oral Q6H PRN Lee Goodpasture, MD        Or   [place]arl acetaminophen (TYLENOL) suppository 650 mg  650 mg Rectal Q6H PRN Lee Goodpasture, MD        0.9 % sodium chloride infusion   IntraVENous Continuous Lee Goodpasture, MD        meropenem [facility]) 1,000 mg in sodium chloride 0.9 % 100 mL IVPB (mini-bag)  1,000 mg IntraVENous Q8H Lee Goodpasture, MD         No current outpatient medications on file.      Facility-Administered Medications Ordered in Other Encounters   Medication Dose Route Frequency Provider Last Rate Last Admin    meropenem (MERREM) 1,000 mg in sodium chloride 0.9 % 100 mL IVPB (mini-bag)  1,000 mg IntraVENous Q8H Vicenta Zaragoza MD        perflutren lipid microspheres (DEFINITY) injection 1.65 mg  1.5 mL IntraVENous ONCE PRN Vicenta Zaragoza MD        0.9 % sodium chloride bolus  1,000 mL IntraVENous Once Lee Goodpasture, MD        0.9 % sodium chloride infusion   IntraVENous PRN Harjeet Arce MD        DAPTOmycin (CUBICIN) 500 mg in sodium chloride 0.9 % 50 mL IVPB  8 mg/kg (Ideal) IntraVENous Q24H Vicenta Zaragoza MD   Stopped at 06/22/22 1828    oxyCODONE (ROXICODONE) immediate release tablet 10 mg  10 mg Oral Q4H PRN Harjeet Arce MD   10 mg at 06/22/22 2233    oxyCODONE Mickey Malone MD   2 tablet at 06/22/22 2233    sodium chloride (OCEAN, BABY AYR) 0.65 % nasal spray 1 spray  1 spray Each Nostril Q2H PRN Bennett Art MD        thiamine tablet 100 mg  100 mg Per G Tube Daily Bennett Art MD   100 mg at 06/23/22 1148    clonazePAM (KLONOPIN) tablet 0.5 mg  0.5 mg Per G Tube Daily PRN Bennett Art MD   0.5 mg at 06/22/22 2233    albuterol (PROVENTIL) nebulizer solution 2.5 mg  2.5 mg Nebulization Q6H PRN Bennett Art MD           Prior to Admission medications    Medication Sig Start Date End Date Taking? Authorizing Provider   sodium chloride 0.9 % SOLN 50 mL with ceftaroline fosamil 600 MG SOLR 600 mg Infuse 600 mg intravenously every 8 hours 6/8/22 7/2/22  Historical Provider, MD   apixaban (ELIQUIS) 5 MG TABS tablet Take 5 mg by mouth 2 times daily 5/26/22   Historical Provider, MD   bisacodyl (DULCOLAX) 10 MG suppository Place 10 mg rectally daily as needed 6/8/22   Historical Provider, MD   folic acid (FOLVITE) 1 MG tablet 1 mg by Enteral route daily 6/9/22   Historical Provider, MD   gabapentin (NEURONTIN) 100 MG capsule Take 100 mg by mouth 3 times daily. 6/8/22   Historical Provider, MD   lidocaine viscous hcl (XYLOCAINE) 2 % SOLN solution  4/26/22   Historical Provider, MD   nystatin (MYCOSTATIN) 508621 UNIT/ML suspension Take 500,000 Units by mouth every 4 hours as needed 6/8/22   Historical Provider, MD   oxyCODONE (ROXICODONE) 5 MG immediate release tablet 5 mg by Enteral route every 6 hours as needed.  6/8/22 7/8/22  Historical Provider, MD   Oxymetazoline HCl (NASAL SPRAY) 0.05 % SOLN 2 sprays by Nasal route 2 times daily as needed 6/8/22   Historical Provider, MD   senna-docusate (Dean Calk) 8.6-50 MG per tablet 2 tablets by Enteral route nightly 6/8/22   Historical Provider, MD   sodium chloride (OCEAN) 0.65 % nasal spray 1 spray by Nasal route as needed 6/8/22   Historical Provider, MD   terazosin (HYTRIN) 1 MG capsule Take 1 mg by mouth nightly 6/8/22   Historical Provider, MD   thiamine 100 mg tablet 100 mg by Enteral route daily 6/9/22 7/9/22  Historical Provider, MD   acetaminophen (TYLENOL) 325 mg tablet Take 650 mg by mouth every 6 hours as needed for Pain    Historical Provider, MD   melatonin 3 mg TABS tablet Take 6 mg by mouth nightly    Historical Provider, MD   clonazePAM (KLONOPIN) 0.5 MG tablet Take 1 tablet by mouth 2 times daily as needed for Anxiety for up to 30 days. 3/25/22 4/24/22  Lisa Solis MD   DULoxetine (CYMBALTA) 30 MG extended release capsule Take 1 capsule by mouth daily 3/24/22   Lisa Solis MD   albuterol (PROVENTIL) (2.5 MG/3ML) 0.083% nebulizer solution Take 3 mLs by nebulization every 6 hours as needed for Wheezing 2/22/22   Marito Bo DO   hydrOXYzine (ATARAX) 10 MG tablet Take 2.5 tablets by mouth every 8 hours as needed for Anxiety 2/9/22   Marito Bo DO   albuterol (PROVENTIL) (5 MG/ML) 0.5% nebulizer solution Take 1 mL by nebulization 4 times daily as needed for Wheezing 1/19/22   Yara Zamorano DO         Allergies:    Trazodone and nefazodone, Tramadol, and Trazodone    Social History:      reports that he has quit smoking. He has a 17.00 pack-year smoking history. He has never used smokeless tobacco. He reports previous alcohol use. He reports previous drug use. Drug: Marijuana [place]). Family History:   Family History   Problem Relation Age of Onset    Diabetes Mother     Cancer Maternal Grandmother         Pancreatic CA          REVIEW OF SYSTEMS:  As above in the HPI. All other review of systems were asked and were negative except for tearful and sad. PHYSICAL EXAM:    Vitals: There were no vitals taken for this visit. General:  cachectic  HEENT:  Normocephalic, atraumatic. Pupils equal, round, reactive to light. No scleral icterus. No conjunctival injection. Normal lips, teeth, and gums. No nasal discharge.   Neck:  Supple  Heart:  Normal s1/s2, RRR, no murmurs, gallops, or rubs. no leg edema  Lungs:  diminished bilaterally, no wheeze, no rales, no rhonchi, no use of accessory muscles  Abd: bowel sounds present, soft, nontender, nondistended, no masses  Extrem:  No clubbing, cyanosis,  no edema, 2+ pedal pulses, brisk capillary refill  Skin:  Warm and dry, no open lesions or rash,  pale color/perfusion  Psych:  A&O x 3, appropriate mood and affect. Depressed and tearful  Neuro: grossly intact, moves all four extremities. Very weak  Breast: deferred  Rectal: deferred  Genitalia:  deferred    LABS:  Lab Results   Component Value Date    WBC 0.3 06/22/2022    RBC 2.27 06/22/2022    HGB 6.6 06/22/2022    HCT 19.7 06/22/2022    MCV 86.7 06/22/2022    MCH 29.2 06/22/2022    MCHC 33.7 06/22/2022    RDW 15.5 06/22/2022     06/22/2022    MPV 7.4 06/22/2022     Lab Results   Component Value Date     06/22/2022    K 4.6 06/22/2022    CL 97 06/22/2022    CO2 33 06/22/2022    BUN 28 06/22/2022    CREATININE <0.5 06/22/2022    GFRAA >60 06/22/2022    AGRATIO 1.8 02/06/2022    LABGLOM >60 06/22/2022    GLUCOSE 97 06/22/2022    PROT 7.1 02/06/2022    LABALBU 2.4 06/11/2022    CALCIUM 9.2 06/22/2022    BILITOT 0.4 02/06/2022    ALKPHOS 108 02/06/2022    AST 17 02/06/2022    ALT 17 02/06/2022     Lab Results   Component Value Date    PROTIME 18.9 06/09/2022    INR 1.60 06/09/2022     No results for input(s): CKTOTAL, CKMB, CKMBINDEX, TROPONINI in the last 72 hours.   Lab Results   Component Value Date    NITRU Negative 06/22/2022    COLORU Yellow 06/22/2022    PHUR 8.0 06/22/2022    WBCUA 6-9 06/22/2022    RBCUA  06/22/2022    BACTERIA 3+ 06/22/2022    CLARITYU SL CLOUDY 06/22/2022    SPECGRAV 1.010 06/22/2022    LEUKOCYTESUR TRACE 06/22/2022    UROBILINOGEN 0.2 06/22/2022    BILIRUBINUR Negative 06/22/2022    BLOODU MODERATE 06/22/2022    GLUCOSEU Negative 06/22/2022    KETUA Negative 06/22/2022     Lab Results   Component Value Date    MG 2.00 06/13/2022 PHOS 4.8 06/11/2022     Lab Results   Component Value Date    LABA1C 5.8 11/03/2021     No results found for: TSH, CYBVQOGK43, FOLATE, FERRITIN, IRON, TIBC, PTRFSAT, RETICCOUNT, TSH, FREET4  Lab Results   Component Value Date    TRIG 159 11/03/2021    HDL 33 11/03/2021    LDLCALC 119 11/03/2021    LABVLDL 32 11/03/2021     No results found for: AMYLASE, LIPASE  No results found for: BNP  Lab Results   Component Value Date    LACTA 1.0 06/22/2022     No results found for: PHART, PH, VMY6MMH, PCO2, PO2ART, PO2, PZU3FAK, HCO3, BEART, BE, THGBART, THB, BFK0WOJ, A0KHAUIB, O2SAT  No results found for: LABAMPH, BARBSCNU, LABBENZ, CANNAB, COCAINESCRN, LABMETH, OPIATESCREENURINE, PHENCYCLIDINESCREENURINE, PPXUR, ETOH  Lab Results   Component Value Date    DDIMER 370 02/06/2022     No results found for: VITD25        RADIOLOGY:  XR CHEST PORTABLE    Result Date: 6/22/2022  EXAMINATION: ONE XRAY VIEW OF THE CHEST 6/22/2022 10:14 am COMPARISON: Chest x-ray dated 15 Rebecca 2022 HISTORY: ORDERING SYSTEM PROVIDED HISTORY: please rule out infection TECHNOLOGIST PROVIDED HISTORY: Reason for exam:->please rule out infection Reason for Exam: rule out infection FINDINGS: Stable cardiomediastinal silhouette. Left-sided PICC line with the tip at the SVC/atrial junction. Near complete resolution of right sided airspace infiltrates. No pneumothorax or pleural effusion. Near complete resolution of right-sided airspace infiltrates. XR CHEST PORTABLE    Result Date: 6/15/2022  EXAMINATION: ONE XRAY VIEW OF THE CHEST 6/15/2022 6:56 pm COMPARISON: 06/09/2022 HISTORY: ORDERING SYSTEM PROVIDED HISTORY: please eval for infectious process TECHNOLOGIST PROVIDED HISTORY: Reason for exam:->please eval for infectious process Reason for Exam: f/u FINDINGS: The heart is mildly enlarged but unchanged. The pulmonary vessels are normal.  The lungs are mildly hyperinflated.   There are some hazy ground-glass opacities along the lung bases and right upper lobe which is slightly less prominent. There is a PICC line in place on the left with the tip appearing the area the right atrium. There are surgical clips are seen scattered in the soft tissues along the lower neck. There is no effusion or pneumothorax. Stable mild left ventricular enlargement. Left PICC line in place with the tip probably in the right atrium. Recommend readjusting the catheter 2-3 cm proximally for more physiologic positioning Slowly resolving bibasilar and right upper lobe infiltrates or atelectasis vs residual scarring. XR CHEST PORTABLE    Result Date: 6/9/2022  EXAMINATION: ONE SUPINE XRAY VIEW(S) OF THE ABDOMEN; ONE XRAY VIEW OF THE CHEST 6/9/2022 7:59 am; 6/9/2022 8:13 am COMPARISON: None. HISTORY: ORDERING SYSTEM PROVIDED HISTORY: Confirmation of course of NG/OG/NE tube and location of tip of tube TECHNOLOGIST PROVIDED HISTORY: Reason for exam:->Confirmation of course of NG/OG/NE tube and location of tip of tube Portable? ->Yes; ORDERING SYSTEM PROVIDED HISTORY: verify midline placement TECHNOLOGIST PROVIDED HISTORY: Reason for exam:->verify midline placement Reason for Exam: check midline FINDINGS: Chest: Heart size is normal.  Mediastinal contours are normal Scattered parenchymal opacities are seen throughout the lungs, right greater than left Clips are seen in the right supraclavicular region. G-tube projects in the region of the stomach A midline is not clearly included on the field of view Abdomen: G-tube projects in region of the stomach No significant small bowel distention noted. Mild-to-moderate stool load seen in the colon. Nonspecific bowel gas pattern is seen Villatoro catheter is seen     Chest: Bilateral airspace disease, right greater than left, either atelectasis or pneumonia. A midline is not included on the field of view. Abdomen: Nonobstructive bowel gas pattern. G-tube projects in the region of the stomach.      IR PICC WO SQ PORT/PUMP > 5 YEARS    Result Date: 6/9/2022  Radiology exam is complete. No Radiologist dictation. Please follow up with ordering provider. XR ABDOMEN FOR NG/OG/NE TUBE PLACEMENT    Result Date: 6/9/2022  EXAMINATION: ONE SUPINE XRAY VIEW(S) OF THE ABDOMEN; ONE XRAY VIEW OF THE CHEST 6/9/2022 7:59 am; 6/9/2022 8:13 am COMPARISON: None. HISTORY: ORDERING SYSTEM PROVIDED HISTORY: Confirmation of course of NG/OG/NE tube and location of tip of tube TECHNOLOGIST PROVIDED HISTORY: Reason for exam:->Confirmation of course of NG/OG/NE tube and location of tip of tube Portable? ->Yes; ORDERING SYSTEM PROVIDED HISTORY: verify midline placement TECHNOLOGIST PROVIDED HISTORY: Reason for exam:->verify midline placement Reason for Exam: check midline FINDINGS: Chest: Heart size is normal.  Mediastinal contours are normal Scattered parenchymal opacities are seen throughout the lungs, right greater than left Clips are seen in the right supraclavicular region. G-tube projects in the region of the stomach A midline is not clearly included on the field of view Abdomen: G-tube projects in region of the stomach No significant small bowel distention noted. Mild-to-moderate stool load seen in the colon. Nonspecific bowel gas pattern is seen Villatoro catheter is seen     Chest: Bilateral airspace disease, right greater than left, either atelectasis or pneumonia. A midline is not included on the field of view. Abdomen: Nonobstructive bowel gas pattern. G-tube projects in the region of the stomach. Fluoroscopy modified barium swallow with video    Result Date: 6/20/2022  EXAMINATION: MODIFIED BARIUM SWALLOW WAS PERFORMED IN CONJUNCTION WITH SPEECH PATHOLOGY SERVICES TECHNIQUE: Fluoroscopic evaluation of the swallowing mechanism was performed using cineradiography with multiple consistency of barium product in conjunction with speech pathology services.  FLUOROSCOPY DOSE AND TYPE OR TIME AND EXPOSURES: 1.5 minutes fluoroscopy Total dose 8.03 mGy 29 fluoroscopic loops COMPARISON: None HISTORY: ORDERING SYSTEM PROVIDED HISTORY: swallowing difficulty eval TECHNOLOGIST PROVIDED HISTORY: Reason for exam:->swallowing difficulty eval FINDINGS: Penetration and aspiration was seen Postsurgical changes seen with multiple clips. There is mild thickening of the epiglottis suggested, likely post treatment related     Penetration and aspiration Please see separate speech pathology report for full discussion of findings and recommendations. RECOMMENDATIONS: Unavailable           PHYSICIAN CERTIFICATION    I certify that Alaina Briones is expected to be hospitalized for >2 midnights based on the following assessment and plan:      ASSESSMENT:      Patient Active Problem List   Diagnosis    Chronic bilateral low back pain without sciatica    Anxiety    Insomnia due to other mental disorder    Poor dentition    Class 1 obesity due to excess calories with serious comorbidity and body mass index (BMI) of 34.0 to 34.9 in adult    Primary squamous cell carcinoma of tongue (HCC)    Recurrent major depressive disorder (HCC)    Prediabetes    Critical illness myopathy    Severe malnutrition (HCC)    Sepsis ([address]    Pancytopenia ([address]       Principal Problem:    Sepsis ([address]  Resolved Problems:    * No resolved hospital problems. *      PLAN:    1. Pancytopenia - possibly related to abx - ceftaroline was stopped - now on dapto so will need to watch plt count  2. Fever of unknown origin - meropenem and daptomycin  3. Possible UTI - meropenem - await cultures  4. Sepsis POA - based on fever to 101.6, tachycardia, leukopenia, and possible early UTI - was dx yesterday on 6/22  5. This patient meets with criteria for  severe  malnutrution based on a BMI of  There is no height or weight on file to calculate BMI. .  This malnutrition is likely due to cancer . 6.  dispo - pt will go to SNF upon discharge  7.   Anxiety - prn clonazepam          DVT prophylaxis Yes:  eliquis  GI prophylaxis pepcid  Antibiotic prophylaxis:  No     Code status Full code        Please note that over 50 minutes was spent in evaluating the patient, review of records and results, discussion with staff/family, etc.    Electronically signed by Norberto Severs, MD on 6/23/2022 at 2:03 PM

## 2022-06-23 NOTE — H&P
Internal Medicine History and Physical    Pt evaluated on:  6/23/2022    CHIEF COMPLAINT:  Fever of unknown origin in immunocompromised patietn    History of Present Illness: This is a 28 y.o. 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 Pt has G-tube. He was readmitted to [facility] 5/2-6/8 with sepsis, acute resp faiure and found to have MRSA bacteremia with blood cx that remained positive from 5/2 until 5/19 with infected atrial thrombus, septic pulm emboli and empyema. Had port removed on 5/7, repeat blood cx on 6/22 remain negative. On daptomycin, but now with pancytopenia which is acute on chronic. He may also have ceftaroline-induced myelosuppression per infectious disease and the drug was stopped on 6/22. He was initially described as lethargic, but is awake and alert and tearful but looks poorly overall. He is extremely cachectic. Past Medical History:   Diagnosis Date    Cancer [facility])      Active Ambulatory Problems     Diagnosis Date Noted    Chronic bilateral low back pain without sciatica 11/03/2021    Anxiety 11/03/2021    Insomnia due to other mental disorder 11/03/2021    Poor dentition 11/04/2021    Class 1 obesity due to excess calories with serious comorbidity and body mass index (BMI) of 34.0 to 34.9 in adult 11/04/2021    Primary squamous cell carcinoma of tongue ([facility] [address] 11/30/2021    Recurrent major depressive disorder ([facility] [address] 01/25/2022    Prediabetes 01/25/2022     Resolved Ambulatory Problems     Diagnosis Date Noted    Tobacco use disorder 11/03/2021    HTN (hypertension) 11/03/2021    Dental abscess 11/03/2021    Agitation 02/10/2022     Past Medical History:   Diagnosis Date    [facility])          Past Medical History:   Diagnosis Date    Cancer ([facility] [address]      No past surgical history on file.       Medications Prior to Admission:    Medications Prior to Admission: sodium chloride 0.9 % SOLN 50 mL with ceftaroline fosamil 600 MG SOLR 600 mg, Infuse 600 mg intravenously every 8 hours  apixaban (ELIQUIS) 5 MG TABS tablet, Take 5 mg by mouth 2 times daily  bisacodyl (DULCOLAX) 10 MG suppository, Place 10 mg rectally daily as needed  folic acid (FOLVITE) 1 MG tablet, 1 mg by Enteral route daily  gabapentin (NEURONTIN) 100 MG capsule, Take 100 mg by mouth 3 times daily. nystatin (MYCOSTATIN) 933545 UNIT/ML suspension, Take 500,000 Units by mouth every 4 hours as needed  oxyCODONE (ROXICODONE) 5 MG immediate release tablet, 5 mg by Enteral route every 6 hours as needed. Oxymetazoline HCl (NASAL SPRAY) 0.05 % SOLN, 2 sprays by Nasal route 2 times daily as needed  senna-docusate (PERICOLACE) 8.6-50 MG per tablet, 2 tablets by Enteral route nightly  sodium chloride (OCEAN) 0.65 % nasal spray, 1 spray by Nasal route as needed  terazosin (HYTRIN) 1 MG capsule, Take 1 mg by mouth nightly  thiamine 100 mg tablet, 100 mg by Enteral route daily  acetaminophen (TYLENOL) 325 mg tablet, Take 650 mg by mouth every 6 hours as needed for Pain  melatonin 3 mg TABS tablet, Take 6 mg by mouth nightly  lidocaine viscous hcl (XYLOCAINE) 2 % SOLN solution,   clonazePAM (KLONOPIN) 0.5 MG tablet, Take 1 tablet by mouth 2 times daily as needed for Anxiety for up to 30 days.   DULoxetine (CYMBALTA) 30 MG extended release capsule, Take 1 capsule by mouth daily  ACETAMINOPHEN EXTRA STRENGTH 500 MG tablet,   albuterol (PROVENTIL) (2.5 MG/3ML) 0.083% nebulizer solution, Take 3 mLs by nebulization every 6 hours as needed for Wheezing  phenol 1.4 % LIQD mouth spray,   ibuprofen (ADVIL;MOTRIN) 800 MG tablet,   ciprofloxacin-dexamethasone (CIPRODEX) 0.3-0.1 % otic suspension,   hydrOXYzine (ATARAX) 10 MG tablet, Take 2.5 tablets by mouth every 8 hours as needed for Anxiety  amoxicillin (AMOXIL) 500 MG capsule,   albuterol (PROVENTIL) (5 MG/ML) 0.5% nebulizer solution, Take 1 mL by nebulization 4 times daily as needed for Wheezing  Current Facility-Administered Medications   Medication Dose Route Frequency Provider Last Rate Last Admin    meropenem (MERREM) 1,000 mg in sodium chloride 0.9 % 100 mL IVPB (mini-bag)  1,000 mg IntraVENous Q8H Leonides Hernandez MD        perflutren lipid microspheres (DEFINITY) injection 1.65 mg  1.5 mL IntraVENous ONCE PRN Leonides Hernandez MD        0.9 % sodium chloride bolus  1,000 mL IntraVENous Once January Babcock MD        0.9 % sodium chloride infusion   IntraVENous PRN Jonathon Johnson MD        DAPTOmycin (CUBICIN) 500 mg in sodium chloride 0.9 % 50 mL IVPB  8 mg/kg (Ideal) IntraVENous Q24H Leonides Hernandez MD   Stopped at 06/22/22 1828    oxyCODONE (ROXICODONE) immediate release tablet 10 mg  10 mg Oral Q4H PRN Jonathon Johnson MD   10 mg at 06/22/22 2233    oxyCODONE (OXY-IR) immediate release tablet 7.5 mg  7.5 mg Per G Tube Q4H PRN Jonathon Johnson MD        saliva substitute (Littie Rower) liquid   Oral Q6H Oscar Flores MD   Given at 06/22/22 2234    venlafaxine (EFFEXOR XR) extended release capsule 75 mg  75 mg Oral Daily with breakfast Chantal Bryan MD   75 mg at 06/22/22 1011    QUEtiapine (SEROQUEL) tablet 50 mg  50 mg Oral Nightly Chantal Bryan MD   50 mg at 06/22/22 2254    Lip Balm ointment   Topical PRN Jonathon Johnson MD        magnesium oxide (MAG-OX) tablet 400 mg  400 mg Per G Tube BID Briana Wallace MD   400 mg at 06/22/22 2233    zinc oxide (TRIAD HYDROPHILIC) paste   Topical BID Jonathon Johnson MD   Given at 06/22/22 1017    lidocaine 1 % injection 5 mL  5 mL IntraDERmal Once Jonathon Johnson MD        sodium chloride flush 0.9 % injection 5-40 mL  5-40 mL IntraVENous 2 times per day Jonathon Johnson MD   10 mL at 06/22/22 0900    sodium chloride flush 0.9 % injection 5-40 mL  5-40 mL IntraVENous PRN Jonathon Johnson MD        0.9 % sodium chloride infusion  25 mL IntraVENous PRN Jesusia Ellis Lockwood MD 25 mL/hr at 06/23/22 1139 250 mL at 06/23/22 1139    acetaminophen (TYLENOL) tablet 650 mg  650 mg Per G Tube Q6H PRN Bridgette Mi MD   650 mg at 06/22/22 2233    apixaban (ELIQUIS) tablet 5 mg  5 mg Per G Tube BID Bridgette Mi MD   5 mg at 06/22/22 2234    bisacodyl (DULCOLAX) suppository 10 mg  10 mg Rectal Daily PRN Bridgette Mi MD        folic acid (FOLVITE) tablet 1 mg  1 mg Per G Tube Daily Bridgette Mi MD   1 mg at 06/22/22 1013    gabapentin (NEURONTIN) capsule 100 mg  100 mg Per G Tube TID Bridgette Mi MD   100 mg at 06/22/22 2234    melatonin disintegrating tablet 5 mg  5 mg Per G Tube Nightly Bridgette Mi MD   5 mg at 06/22/22 2233    sennosides-docusate sodium (SENOKOT-S) 8.6-50 MG tablet 2 tablet  2 tablet Per G Tube Nightly Bridgette Mi MD   2 tablet at 06/22/22 2233    sodium chloride (OCEAN, BABY AYR) 0.65 % nasal spray 1 spray  1 spray Each Nostril Q2H PRN Bridgette Mi MD        thiamine tablet 100 mg  100 mg Per G Tube Daily Bridgette Mi MD   100 mg at 06/22/22 1013    clonazePAM (KLONOPIN) tablet 0.5 mg  0.5 mg Per G Tube Daily PRN Bridgette Mi MD   0.5 mg at 06/22/22 2233    albuterol (PROVENTIL) nebulizer solution 2.5 mg  2.5 mg Nebulization Q6H PRN Bridgette Mi MD           Prior to Admission medications    Medication Sig Start Date End Date Taking?  Authorizing Provider   sodium chloride 0.9 % SOLN 50 mL with ceftaroline fosamil 600 MG SOLR 600 mg Infuse 600 mg intravenously every 8 hours 6/8/22 7/2/22 Yes Historical Provider, MD   apixaban (ELIQUIS) 5 MG TABS tablet Take 5 mg by mouth 2 times daily 5/26/22  Yes Historical Provider, MD   bisacodyl (DULCOLAX) 10 MG suppository Place 10 mg rectally daily as needed 6/8/22  Yes Historical Provider, MD   folic acid (FOLVITE) 1 MG tablet 1 mg by Enteral route daily 6/9/22  Yes Historical Provider, MD   gabapentin (NEURONTIN) 100 MG Historical Provider, MD   hydrOXYzine (ATARAX) 10 MG tablet Take 2.5 tablets by mouth every 8 hours as needed for Anxiety 2/9/22   Harry Bo DO   amoxicillin (AMOXIL) 500 MG capsule  11/29/21   Historical Provider, MD   albuterol (PROVENTIL) (5 MG/ML) 0.5% nebulizer solution Take 1 mL by nebulization 4 times daily as needed for Wheezing 1/19/22   Davie Cosme,          Allergies:    Trazodone and nefazodone, Tramadol, and Trazodone    Social History:      reports that he has quit smoking. He has a 17.00 pack-year smoking history. He has never used smokeless tobacco. He reports previous alcohol use. He reports previous drug use. Drug: Marijuana Zollie Axe). Family History:   Family History   Problem Relation Age of Onset    Diabetes Mother     Cancer Maternal Grandmother         Pancreatic CA          REVIEW OF SYSTEMS:  As above in the HPI. All other review of systems were asked and were negative except for tearful and sad. PHYSICAL EXAM:    Vitals:  BP (!) 105/56   Pulse (!) 125   Temp 99.4 °F (37.4 °C) (Axillary)   Resp 16   Ht 5' 6\" (1.676 m)   Wt 101 lb 8 oz (46 kg)   SpO2 93%   BMI 16.38 kg/m²     General:  cachectic  HEENT:  Normocephalic, atraumatic. Pupils equal, round, reactive to light. No scleral icterus. No conjunctival injection. Normal lips, teeth, and gums. No nasal discharge. Neck:  Supple  Heart:  Normal s1/s2, RRR, no murmurs, gallops, or rubs. no leg edema  Lungs:  diminished bilaterally, no wheeze, no rales, no rhonchi, no use of accessory muscles  Abd: bowel sounds present, soft, nontender, nondistended, no masses  Extrem:  No clubbing, cyanosis,  no edema, 2+ pedal pulses, brisk capillary refill  Skin:  Warm and dry, no open lesions or rash,  pale color/perfusion  Psych:  A&O x 3, appropriate mood and affect. Depressed and tearful  Neuro: grossly intact, moves all four extremities.    Very weak  Breast: deferred  Rectal: deferred  Genitalia: deferred    LABS:  Lab Results   Component Value Date    WBC 0.3 06/22/2022    RBC 2.27 06/22/2022    HGB 6.6 06/22/2022    HCT 19.7 06/22/2022    MCV 86.7 06/22/2022    MCH 29.2 06/22/2022    MCHC 33.7 06/22/2022    RDW 15.5 06/22/2022     06/22/2022    MPV 7.4 06/22/2022     Lab Results   Component Value Date     06/22/2022    K 4.6 06/22/2022    CL 97 06/22/2022    CO2 33 06/22/2022    BUN 28 06/22/2022    CREATININE <0.5 06/22/2022    GFRAA >60 06/22/2022    AGRATIO 1.8 02/06/2022    LABGLOM >60 06/22/2022    GLUCOSE 97 06/22/2022    PROT 7.1 02/06/2022    LABALBU 2.4 06/11/2022    CALCIUM 9.2 06/22/2022    BILITOT 0.4 02/06/2022    ALKPHOS 108 02/06/2022    AST 17 02/06/2022    ALT 17 02/06/2022     Lab Results   Component Value Date    PROTIME 18.9 06/09/2022    INR 1.60 06/09/2022     No results for input(s): CKTOTAL, CKMB, CKMBINDEX, TROPONINI in the last 72 hours.   Lab Results   Component Value Date    NITRU Negative 06/22/2022    COLORU Yellow 06/22/2022    PHUR 8.0 06/22/2022    WBCUA 6-9 06/22/2022    RBCUA  06/22/2022    BACTERIA 3+ 06/22/2022    CLARITYU SL CLOUDY 06/22/2022    SPECGRAV 1.010 06/22/2022    LEUKOCYTESUR TRACE 06/22/2022    UROBILINOGEN 0.2 06/22/2022    BILIRUBINUR Negative 06/22/2022    BLOODU MODERATE 06/22/2022    GLUCOSEU Negative 06/22/2022    KETUA Negative 06/22/2022     Lab Results   Component Value Date    MG 2.00 06/13/2022    PHOS 4.8 06/11/2022     Lab Results   Component Value Date    LABA1C 5.8 11/03/2021     No results found for: TSH, XUTSQPZR88, FOLATE, FERRITIN, IRON, TIBC, PTRFSAT, RETICCOUNT, TSH, FREET4  Lab Results   Component Value Date    TRIG 159 11/03/2021    HDL 33 11/03/2021    LDLCALC 119 11/03/2021    LABVLDL 32 11/03/2021     No results found for: AMYLASE, LIPASE  No results found for: BNP  Lab Results   Component Value Date    LACTA 1.0 06/22/2022     No results found for: PHART, PH, CIV8DKW, PCO2, PO2ART, PO2, DVP6LUM, HCO3, BEART, BE, THGBART, THB, HYT2JSJ, N8EKTJEI, O2SAT  No results found for: LABAMPH, BARBSCNU, LABBENZ, CANNAB, COCAINESCRN, LABMETH, OPIATESCREENURINE, PHENCYCLIDINESCREENURINE, PPXUR, ETOH  Lab Results   Component Value Date    DDIMER 370 02/06/2022     No results found for: VITD25        RADIOLOGY:  XR CHEST PORTABLE    Result Date: 6/22/2022  EXAMINATION: ONE XRAY VIEW OF THE CHEST 6/22/2022 10:14 am COMPARISON: Chest x-ray dated 15 Rebecca 2022 HISTORY: ORDERING SYSTEM PROVIDED HISTORY: please rule out infection TECHNOLOGIST PROVIDED HISTORY: Reason for exam:->please rule out infection Reason for Exam: rule out infection FINDINGS: Stable cardiomediastinal silhouette. Left-sided PICC line with the tip at the SVC/atrial junction. Near complete resolution of right sided airspace infiltrates. No pneumothorax or pleural effusion. Near complete resolution of right-sided airspace infiltrates. XR CHEST PORTABLE    Result Date: 6/15/2022  EXAMINATION: ONE XRAY VIEW OF THE CHEST 6/15/2022 6:56 pm COMPARISON: 06/09/2022 HISTORY: ORDERING SYSTEM PROVIDED HISTORY: please eval for infectious process TECHNOLOGIST PROVIDED HISTORY: Reason for exam:->please eval for infectious process Reason for Exam: f/u FINDINGS: The heart is mildly enlarged but unchanged. The pulmonary vessels are normal.  The lungs are mildly hyperinflated. There are some hazy ground-glass opacities along the lung bases and right upper lobe which is slightly less prominent. There is a PICC line in place on the left with the tip appearing the area the right atrium. There are surgical clips are seen scattered in the soft tissues along the lower neck. There is no effusion or pneumothorax. Stable mild left ventricular enlargement. Left PICC line in place with the tip probably in the right atrium.   Recommend readjusting the catheter 2-3 cm proximally for more physiologic positioning Slowly resolving bibasilar and right upper lobe infiltrates or atelectasis vs residual scarring. XR CHEST PORTABLE    Result Date: 6/9/2022  EXAMINATION: ONE SUPINE XRAY VIEW(S) OF THE ABDOMEN; ONE XRAY VIEW OF THE CHEST 6/9/2022 7:59 am; 6/9/2022 8:13 am COMPARISON: None. HISTORY: ORDERING SYSTEM PROVIDED HISTORY: Confirmation of course of NG/OG/NE tube and location of tip of tube TECHNOLOGIST PROVIDED HISTORY: Reason for exam:->Confirmation of course of NG/OG/NE tube and location of tip of tube Portable? ->Yes; ORDERING SYSTEM PROVIDED HISTORY: verify midline placement TECHNOLOGIST PROVIDED HISTORY: Reason for exam:->verify midline placement Reason for Exam: check midline FINDINGS: Chest: Heart size is normal.  Mediastinal contours are normal Scattered parenchymal opacities are seen throughout the lungs, right greater than left Clips are seen in the right supraclavicular region. G-tube projects in the region of the stomach A midline is not clearly included on the field of view Abdomen: G-tube projects in region of the stomach No significant small bowel distention noted. Mild-to-moderate stool load seen in the colon. Nonspecific bowel gas pattern is seen Villatoro catheter is seen     Chest: Bilateral airspace disease, right greater than left, either atelectasis or pneumonia. A midline is not included on the field of view. Abdomen: Nonobstructive bowel gas pattern. G-tube projects in the region of the stomach. IR PICC WO SQ PORT/PUMP > 5 YEARS    Result Date: 6/9/2022  Radiology exam is complete. No Radiologist dictation. Please follow up with ordering provider. XR ABDOMEN FOR NG/OG/NE TUBE PLACEMENT    Result Date: 6/9/2022  EXAMINATION: ONE SUPINE XRAY VIEW(S) OF THE ABDOMEN; ONE XRAY VIEW OF THE CHEST 6/9/2022 7:59 am; 6/9/2022 8:13 am COMPARISON: None.  HISTORY: ORDERING SYSTEM PROVIDED HISTORY: Confirmation of course of NG/OG/NE tube and location of tip of tube TECHNOLOGIST PROVIDED HISTORY: Reason for exam:->Confirmation of course of NG/OG/NE tube and location of tip of tube Portable? ->Yes; ORDERING SYSTEM PROVIDED HISTORY: verify midline placement TECHNOLOGIST PROVIDED HISTORY: Reason for exam:->verify midline placement Reason for Exam: check midline FINDINGS: Chest: Heart size is normal.  Mediastinal contours are normal Scattered parenchymal opacities are seen throughout the lungs, right greater than left Clips are seen in the right supraclavicular region. G-tube projects in the region of the stomach A midline is not clearly included on the field of view Abdomen: G-tube projects in region of the stomach No significant small bowel distention noted. Mild-to-moderate stool load seen in the colon. Nonspecific bowel gas pattern is seen Villatoro catheter is seen     Chest: Bilateral airspace disease, right greater than left, either atelectasis or pneumonia. A midline is not included on the field of view. Abdomen: Nonobstructive bowel gas pattern. G-tube projects in the region of the stomach. Fluoroscopy modified barium swallow with video    Result Date: 6/20/2022  EXAMINATION: MODIFIED BARIUM SWALLOW WAS PERFORMED IN CONJUNCTION WITH SPEECH PATHOLOGY SERVICES TECHNIQUE: Fluoroscopic evaluation of the swallowing mechanism was performed using cineradiography with multiple consistency of barium product in conjunction with speech pathology services. FLUOROSCOPY DOSE AND TYPE OR TIME AND EXPOSURES: 1.5 minutes fluoroscopy Total dose 8.03 mGy 29 fluoroscopic loops COMPARISON: None HISTORY: ORDERING SYSTEM PROVIDED HISTORY: swallowing difficulty eval TECHNOLOGIST PROVIDED HISTORY: Reason for exam:->swallowing difficulty eval FINDINGS: Penetration and aspiration was seen Postsurgical changes seen with multiple clips. There is mild thickening of the epiglottis suggested, likely post treatment related     Penetration and aspiration Please see separate speech pathology report for full discussion of findings and recommendations.  RECOMMENDATIONS: Unavailable           PHYSICIAN CERTIFICATION    I certify that Mary Felix is expected to be hospitalized for >2 midnights based on the following assessment and plan:      ASSESSMENT:      Patient Active Problem List   Diagnosis    Chronic bilateral low back pain without sciatica    Anxiety    Insomnia due to other mental disorder    Poor dentition    Class 1 obesity due to excess calories with serious comorbidity and body mass index (BMI) of 34.0 to 34.9 in adult    Primary squamous cell carcinoma of tongue (HCC)    Recurrent major depressive disorder (HCC)    Prediabetes    Critical illness myopathy    Severe malnutrition (HCC)    Sepsis ([address]    Pancytopenia (HCC)       Principal Problem:    Sepsis ([address]  Active Problems:    Critical illness myopathy    Severe malnutrition (HCC)    Pancytopenia (HCC)    Primary squamous cell carcinoma of tongue (HCC)    Recurrent major depressive disorder ([facility] [address]  Resolved Problems:    * No resolved hospital problems. *      PLAN:    1. Pancytopenia - possibly related to abx - ceftaroline was stopped - now on dapto so will need to watch plt count  2. Fever of unknown origin - meropenem and daptomycin  3. Possible UTI - meropenem - await cultures  4. Sepsis POA - based on fever to 101.6, tachycardia, leukopenia, and possible early UTI - was dx yesterday on 6/22  5. This patient meets with criteria for  severe  malnutrution based on a BMI of  Body mass index is 16.38 kg/m². .  This malnutrition is likely due to cancer . 6.  dispo - pt will go to SNF upon discharge  7.   Anxiety - prn clonazepam          DVT prophylaxis Yes:  eliquis  GI prophylaxis pepcid  Antibiotic prophylaxis:  No     Code status Full code        Please note that over 50 minutes was spent in evaluating the patient, review of records and results, discussion with staff/family, etc.    Electronically signed by Shell Brown MD on 6/23/2022 at 12:42 PM

## 2022-06-23 NOTE — PROGRESS NOTES
Call placed to Lab for status update of patients unit of blood. Lab stated sample was sent to [facility] at 1230pm on 6/22/23, Aminah Stone has to identify antibodies, and will notify when complete. Continuing to wait for unit.

## 2022-06-23 NOTE — CARE COORDINATION
MD notified CM that patient will be transfer to acute care. Man Beckman for DC plans will updated [facility] ([phone]). CM faxed wound vac order to [address] DANIS LVM with Nicho De La Garza from [address] [place] [address] for tube feed and IV ABX with updated DCP to a SNF .  Chuyita Vega RN

## 2022-06-24 ENCOUNTER — APPOINTMENT (OUTPATIENT)
Dept: GENERAL RADIOLOGY | Age: 32
DRG: 466 | End: 2022-06-24
Attending: INTERNAL MEDICINE
Payer: COMMERCIAL

## 2022-06-24 ENCOUNTER — APPOINTMENT (OUTPATIENT)
Dept: CT IMAGING | Age: 32
DRG: 466 | End: 2022-06-24
Attending: INTERNAL MEDICINE
Payer: COMMERCIAL

## 2022-06-24 LAB
ANION GAP SERPL CALCULATED.3IONS-SCNC: 7 MMOL/L (ref 3–16)
BLOOD BANK DISPENSE STATUS: NORMAL
BLOOD BANK DISPENSE STATUS: NORMAL
BLOOD BANK PRODUCT CODE: NORMAL
BLOOD BANK PRODUCT CODE: NORMAL
BPU ID: NORMAL
BPU ID: NORMAL
BUN BLDV-MCNC: 32 MG/DL (ref 7–20)
CALCIUM SERPL-MCNC: 8.6 MG/DL (ref 8.3–10.6)
CHLORIDE BLD-SCNC: 99 MMOL/L (ref 99–110)
CO2: 28 MMOL/L (ref 21–32)
CREAT SERPL-MCNC: 0.6 MG/DL (ref 0.9–1.3)
DESCRIPTION BLOOD BANK: NORMAL
DESCRIPTION BLOOD BANK: NORMAL
ERYTHROPOIETIN: 20 MU/ML (ref 4–27)
FERRITIN: 1371 NG/ML (ref 30–400)
FOLATE: 13.37 NG/ML (ref 4.78–24.2)
GFR AFRICAN AMERICAN: >60
GFR NON-AFRICAN AMERICAN: >60
GLUCOSE BLD-MCNC: 146 MG/DL (ref 70–99)
HAPTOGLOBIN: 229 MG/DL (ref 30–200)
HCT VFR BLD CALC: 18.7 % (ref 40.5–52.5)
HCT VFR BLD CALC: 22.1 % (ref 40.5–52.5)
HEMOGLOBIN: 6.4 G/DL (ref 13.5–17.5)
HEMOGLOBIN: 7.5 G/DL (ref 13.5–17.5)
HEPARIN INDUCED PLATELET ANTIBODY: NEGATIVE
INR BLD: 1.88 (ref 0.87–1.14)
IRON SATURATION: 13 % (ref 20–50)
IRON: 17 UG/DL (ref 59–158)
MCH RBC QN AUTO: 29.4 PG (ref 26–34)
MCHC RBC AUTO-ENTMCNC: 34.1 G/DL (ref 31–36)
MCV RBC AUTO: 86.2 FL (ref 80–100)
ORGANISM: ABNORMAL
PDW BLD-RTO: 15.2 % (ref 12.4–15.4)
PLATELET # BLD: 110 K/UL (ref 135–450)
PMV BLD AUTO: 7.4 FL (ref 5–10.5)
POTASSIUM REFLEX MAGNESIUM: 4.5 MMOL/L (ref 3.5–5.1)
PROTHROMBIN TIME: 21.5 SEC (ref 11.7–14.5)
RBC # BLD: 2.17 M/UL (ref 4.2–5.9)
SODIUM BLD-SCNC: 134 MMOL/L (ref 136–145)
TOTAL CK: 40 U/L (ref 39–308)
TOTAL IRON BINDING CAPACITY: 135 UG/DL (ref 260–445)
URINE CULTURE, ROUTINE: ABNORMAL
VITAMIN B-12: 638 PG/ML (ref 211–911)
WBC # BLD: 0.2 K/UL (ref 4–11)

## 2022-06-24 PROCEDURE — 88341 IMHCHEM/IMCYTCHM EA ADD ANTB: CPT

## 2022-06-24 PROCEDURE — 88311 DECALCIFY TISSUE: CPT

## 2022-06-24 PROCEDURE — 87186 SC STD MICRODIL/AGAR DIL: CPT

## 2022-06-24 PROCEDURE — 87116 MYCOBACTERIA CULTURE: CPT

## 2022-06-24 PROCEDURE — 6360000002 HC RX W HCPCS: Performed by: INTERNAL MEDICINE

## 2022-06-24 PROCEDURE — 6370000000 HC RX 637 (ALT 250 FOR IP): Performed by: INTERNAL MEDICINE

## 2022-06-24 PROCEDURE — 88342 IMHCHEM/IMCYTCHM 1ST ANTB: CPT

## 2022-06-24 PROCEDURE — 36592 COLLECT BLOOD FROM PICC: CPT

## 2022-06-24 PROCEDURE — 80048 BASIC METABOLIC PNL TOTAL CA: CPT

## 2022-06-24 PROCEDURE — 77012 CT SCAN FOR NEEDLE BIOPSY: CPT

## 2022-06-24 PROCEDURE — 88305 TISSUE EXAM BY PATHOLOGIST: CPT

## 2022-06-24 PROCEDURE — 87150 DNA/RNA AMPLIFIED PROBE: CPT

## 2022-06-24 PROCEDURE — 6360000002 HC RX W HCPCS: Performed by: RADIOLOGY

## 2022-06-24 PROCEDURE — 85610 PROTHROMBIN TIME: CPT

## 2022-06-24 PROCEDURE — C1830 POWER BONE MARROW BX NEEDLE: HCPCS

## 2022-06-24 PROCEDURE — 71045 X-RAY EXAM CHEST 1 VIEW: CPT

## 2022-06-24 PROCEDURE — 99232 SBSQ HOSP IP/OBS MODERATE 35: CPT | Performed by: INTERNAL MEDICINE

## 2022-06-24 PROCEDURE — 07DR3ZX EXTRACTION OF ILIAC BONE MARROW, PERCUTANEOUS APPROACH, DIAGNOSTIC: ICD-10-PCS | Performed by: RADIOLOGY

## 2022-06-24 PROCEDURE — 87040 BLOOD CULTURE FOR BACTERIA: CPT

## 2022-06-24 PROCEDURE — 87103 BLOOD FUNGUS CULTURE: CPT

## 2022-06-24 PROCEDURE — 2580000003 HC RX 258: Performed by: INTERNAL MEDICINE

## 2022-06-24 PROCEDURE — 85018 HEMOGLOBIN: CPT

## 2022-06-24 PROCEDURE — P9016 RBC LEUKOCYTES REDUCED: HCPCS

## 2022-06-24 PROCEDURE — 87077 CULTURE AEROBIC IDENTIFY: CPT

## 2022-06-24 PROCEDURE — 1200000000 HC SEMI PRIVATE

## 2022-06-24 PROCEDURE — 88313 SPECIAL STAINS GROUP 2: CPT

## 2022-06-24 PROCEDURE — 88360 TUMOR IMMUNOHISTOCHEM/MANUAL: CPT

## 2022-06-24 PROCEDURE — 079T3ZX DRAINAGE OF BONE MARROW, PERCUTANEOUS APPROACH, DIAGNOSTIC: ICD-10-PCS | Performed by: RADIOLOGY

## 2022-06-24 PROCEDURE — 36430 TRANSFUSION BLD/BLD COMPNT: CPT

## 2022-06-24 PROCEDURE — 85014 HEMATOCRIT: CPT

## 2022-06-24 PROCEDURE — 82550 ASSAY OF CK (CPK): CPT

## 2022-06-24 PROCEDURE — 85025 COMPLETE CBC W/AUTO DIFF WBC: CPT

## 2022-06-24 PROCEDURE — 88185 FLOWCYTOMETRY/TC ADD-ON: CPT

## 2022-06-24 PROCEDURE — 88184 FLOWCYTOMETRY/ TC 1 MARKER: CPT

## 2022-06-24 RX ORDER — SODIUM CHLORIDE 9 MG/ML
INJECTION, SOLUTION INTRAVENOUS PRN
Status: DISCONTINUED | OUTPATIENT
Start: 2022-06-24 | End: 2022-06-30 | Stop reason: HOSPADM

## 2022-06-24 RX ORDER — FENTANYL CITRATE 50 UG/ML
INJECTION, SOLUTION INTRAMUSCULAR; INTRAVENOUS
Status: COMPLETED | OUTPATIENT
Start: 2022-06-24 | End: 2022-06-24

## 2022-06-24 RX ORDER — MIDAZOLAM HYDROCHLORIDE 1 MG/ML
INJECTION INTRAMUSCULAR; INTRAVENOUS
Status: COMPLETED | OUTPATIENT
Start: 2022-06-24 | End: 2022-06-24

## 2022-06-24 RX ADMIN — MIDAZOLAM HYDROCHLORIDE 0.5 MG: 2 INJECTION, SOLUTION INTRAMUSCULAR; INTRAVENOUS at 12:01

## 2022-06-24 RX ADMIN — Medication: at 20:11

## 2022-06-24 RX ADMIN — QUETIAPINE FUMARATE 50 MG: 25 TABLET ORAL at 20:11

## 2022-06-24 RX ADMIN — SODIUM CHLORIDE, PRESERVATIVE FREE 10 ML: 5 INJECTION INTRAVENOUS at 20:11

## 2022-06-24 RX ADMIN — GABAPENTIN 100 MG: 100 CAPSULE ORAL at 14:26

## 2022-06-24 RX ADMIN — Medication 100 MG: at 08:20

## 2022-06-24 RX ADMIN — MIDAZOLAM HYDROCHLORIDE 0.5 MG: 2 INJECTION, SOLUTION INTRAMUSCULAR; INTRAVENOUS at 12:06

## 2022-06-24 RX ADMIN — ALTEPLASE 1 MG: 2.2 INJECTION, POWDER, LYOPHILIZED, FOR SOLUTION INTRAVENOUS at 12:46

## 2022-06-24 RX ADMIN — FENTANYL CITRATE 25 MCG: 50 INJECTION INTRAMUSCULAR; INTRAVENOUS at 12:02

## 2022-06-24 RX ADMIN — Medication: at 14:26

## 2022-06-24 RX ADMIN — FILGRASTIM-AAFI 300 MCG: 300 INJECTION, SOLUTION SUBCUTANEOUS at 17:30

## 2022-06-24 RX ADMIN — FENTANYL CITRATE 25 MCG: 50 INJECTION INTRAMUSCULAR; INTRAVENOUS at 12:01

## 2022-06-24 RX ADMIN — Medication: at 20:23

## 2022-06-24 RX ADMIN — MEROPENEM 1000 MG: 1 INJECTION, POWDER, FOR SOLUTION INTRAVENOUS at 21:45

## 2022-06-24 RX ADMIN — DOCUSATE SODIUM 50 MG AND SENNOSIDES 8.6 MG 2 TABLET: 8.6; 5 TABLET, FILM COATED ORAL at 20:11

## 2022-06-24 RX ADMIN — MIDAZOLAM HYDROCHLORIDE 0.5 MG: 2 INJECTION, SOLUTION INTRAMUSCULAR; INTRAVENOUS at 12:13

## 2022-06-24 RX ADMIN — FOLIC ACID 1 MG: 1 TABLET ORAL at 08:20

## 2022-06-24 RX ADMIN — ACETAMINOPHEN 650 MG: 325 TABLET ORAL at 08:20

## 2022-06-24 RX ADMIN — MEROPENEM 1000 MG: 1 INJECTION, POWDER, FOR SOLUTION INTRAVENOUS at 05:04

## 2022-06-24 RX ADMIN — GABAPENTIN 100 MG: 100 CAPSULE ORAL at 20:11

## 2022-06-24 RX ADMIN — Medication 5 MG: at 20:11

## 2022-06-24 RX ADMIN — ACETAMINOPHEN 650 MG: 325 TABLET ORAL at 18:40

## 2022-06-24 RX ADMIN — FENTANYL CITRATE 25 MCG: 50 INJECTION INTRAMUSCULAR; INTRAVENOUS at 12:13

## 2022-06-24 RX ADMIN — DAPTOMYCIN 350 MG: 500 INJECTION, POWDER, LYOPHILIZED, FOR SOLUTION INTRAVENOUS at 20:10

## 2022-06-24 RX ADMIN — Medication: at 10:21

## 2022-06-24 RX ADMIN — GABAPENTIN 100 MG: 100 CAPSULE ORAL at 08:20

## 2022-06-24 RX ADMIN — Medication 400 MG: at 20:11

## 2022-06-24 RX ADMIN — SODIUM CHLORIDE, PRESERVATIVE FREE 10 ML: 5 INJECTION INTRAVENOUS at 10:21

## 2022-06-24 RX ADMIN — FENTANYL CITRATE 25 MCG: 50 INJECTION INTRAMUSCULAR; INTRAVENOUS at 12:06

## 2022-06-24 RX ADMIN — MEROPENEM 1000 MG: 1 INJECTION, POWDER, FOR SOLUTION INTRAVENOUS at 13:25

## 2022-06-24 RX ADMIN — OXYCODONE HYDROCHLORIDE 7.5 MG: 15 TABLET ORAL at 14:30

## 2022-06-24 RX ADMIN — Medication 400 MG: at 08:20

## 2022-06-24 RX ADMIN — MIDAZOLAM HYDROCHLORIDE 0.5 MG: 2 INJECTION, SOLUTION INTRAMUSCULAR; INTRAVENOUS at 12:02

## 2022-06-24 RX ADMIN — VENLAFAXINE HYDROCHLORIDE 75 MG: 37.5 CAPSULE, EXTENDED RELEASE ORAL at 08:20

## 2022-06-24 ASSESSMENT — PAIN SCALES - GENERAL: PAINLEVEL_OUTOF10: 7

## 2022-06-24 ASSESSMENT — PAIN DESCRIPTION - LOCATION: LOCATION: HEAD

## 2022-06-24 NOTE — DISCHARGE SUMMARY
Physical Medicine & Rehabilitation  Discharge Summary     Patient Identification:  Samira Cloud  : 1990  Admit date: 2022  Discharge date: 2022   Attending provider: Alyx Aguirre. Irvin Goins MD       Primary care provider: Arnaud Chew DO     Discharge Diagnoses:   Patient Active Problem List   Diagnosis    Chronic bilateral low back pain without sciatica    Anxiety    Insomnia due to other mental disorder    Poor dentition    Class 1 obesity due to excess calories with serious comorbidity and body mass index (BMI) of 34.0 to 34.9 in adult    Primary squamous cell carcinoma of tongue (HCC)    Recurrent major depressive disorder ([facility] [address]    Prediabetes    Critical illness myopathy    Severe malnutrition (HCC)    Sepsis ([facility] [address]    Pancytopenia ([facility] [address]       History of Present Illness/Acute Hospital Course:  Samira Cloud is a 28year old male with a past medical history significant for HTN and 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 He was found to have persistent MRSA bacteremia with concern for infected port, which was removed. His hospital course was complicated by right hydropneumothorax with worsening hypoxia requiring multiple intubations and chest tube placements. He underwent right internal jugular and right atrial thrombectomy due to concern that this was causing ongoing bacteremia. ID was consulted and he is to remain on IV ceftaroline with plan for long term treatment until 22. His course was complicated by severe penile edema and antonio was placed. Urology followed during acute stay. Patient failed several voiding trials. Plan to keep antonio and follow up with outpatient.  He was admitted to [facility] on 22 due to functional deficits below his baseline. Inpatient Rehabilitation Course:   Carie Crain is a 28 y.o. male admitted to inpatient rehabilitation on 6/8/2022 with Sepsis ([facility] [address] The patient participated in an aggressive multidisciplinary inpatient rehabilitation program involving 3 hours of therapy per day, at least 5 days per week. He was making good progress, but was continuing to require assistance that his girlfriend was unable to provide at home. He was tentatively set to discharge to a skilled nursing facility on 6/26/22, but was discharged back to acute care on 6/23 due to pancytopenia and fever. Impairments: impaired mobility and ADLs, impaired gait and balance    Medical Management:    Critical Illness Myopathy  - due to below  - PT, OT, ST     Squamous Cell Carcinoma of the tongue  - s/p right hemiglossectomy, bilateral neck dissection, and RFFF reconstruction on 1/3/22 followed by chemoradiation, and weekly cisplatin completed 4/25/22 and s/p G-tube  - NPO except ice chips  - Tube feeds per orders  - dietary following  - GI following for G tube concerns  - PT, OT, ST     Oropharyngeal Dysphagia  - on tube feeds as above  - MBS 6/20 showing continued penetration and aspiration  - ST     Hypotension  - given fluids  - increased fluids through G-tube     MRSA bacteremia  - cefaroline for 24 days from admit per UC ID.  Ceftaroline discontinued per ID due to concerns about myelosuppression  - ID following, appreciated assistance  - PICC replaced on admission      Pancytopenia  - unclear etiology  - ID and heme evaluating  - was unable to transfuse unit because of inability to find match  - blood cultures drawn     Acute hypoxic respiratory failure, resolved  - with right lower hydropneumothorax s/p chest tube, MRSA pneumonia, pleural effusions s/p chest tube and requiring several reintubations during acute stay  - adequately oxygenating on room air  - wean oxygen for SpO2>90%  - IS      Urinary Retention  - has severe penile edema during acute stay. Was followed by Urology and failed multiple voiding trials  - discontinued doxazosin due to hypotension  - continue antonio catheter, most recently placed 6/6  - follow up with Urology outpatient      PE  Right Atrial Thrombus  - s/p thrombectomy 5/20 with IR, thrombus positive for MRSA  - Elquis 5 mg BID      Anasarca  - requiring diuretics during acute stay, since weaned off  - monitor     Hyponatremia  - Nephrology following, appreciate assistance     Severe Protein-calorie malnutrition  - BMI 17.9  - bolus feeds per dietary  - Dietician consulted     Pain  - gabapentin 100 mg TID  - PRN tylenol, PRN oxycodone 7.5 or 10 mg q4 hours. - will need follow up with his pain physician on discharge      Anxiety, Depression  - Psychiatry consulted, appreciated assistance  - meds per Psych      Multiple Wounds POA  - including stage four pressure ulcer on coccyx  - wound care per orders  - wound care following    Discharge Exam:  Constitutional: Alert, chronically ill appearing, less alert than prior exams  Head: deformity of jaw due to prior surgery  Eyes: Conjunctiva noninjected, no icterus, no drainage  Pulm: CTA bilat. Respirations non-labored. CV: tachycardic, regular rhythm  Abd: Soft, nontender.  NABS+, mild erythema around PEG site  Ext: No edema, no varicosities  Neuro: Alert, fully oriented, appropriate, dysarthria  MSK: No joint abnormalities noted     Discharge Functional Status:    Physical therapy:  Bed Mobility:     Sit>supine:  Assistance Level: Stand by assist  Supine>sit:  Assistance Level: Stand by assist  Transfers:     Sit>stand:  Assistance Level: Contact guard assist  Stand>sit:  Assistance Level: Contact guard assist  Bed<>chair     Stand Pivot:     Lateral transfer:     Car transfer:     Ambulation:  Surface: Level surface  Distance: 495 ft  Activity Comments: Pt ambulates through doorways and makes multiple turns on path through hallways, gym, and community room. Pt demos narrow JOE with minor unsteadiness for majority of walk with instances where pt demos moderate unsteadiness but is able to correct with min A. Pt demos slower anthony on turns for safety. Assistance Level: Minimal assistance,Contact guard assist,Dependent (CGA-min A (dependent d/t w/c follow))  Gait Deviations: Slow anthony,Decreased step length bilateral,Decreased heel strike right,Decreased heel strike left  Stairs:     Curb: Wheelchair:     Assessment:  Assessment: pt found in bed, dizziness noted with positional changes. see note for vitals taken. RN cleared pt to particiapte in therapy as tolerated. pt does not tolerate standing, tolerated supine ther ex without further complaints. continue to rec home with 24/7 vs SNF pending family decision. continue to progress mobility as pt tolerates. Activity Tolerance: Patient limited by endurance,Patient limited by pain,Patient limited by fatigue  Discharge Recommendations: 24 hour supervision or assist,Home with Home health PT      Occupational therapy:   Feeding     Grooming/Oral Hygiene  Assistance Level: Modified independent  Skilled Clinical Factors: to brush teeth while seated at sink  UE Bathing  Assistance Level: Supervision  Skilled Clinical Factors: cues for thoroughness  LE Bathing  Assistance Level: Moderate assistance  Skilled Clinical Factors: to bathe buttocks in stance and assist to bathe lower legs and feet  UE Dressing  Assistance Level: Set-up  Skilled Clinical Factors: to doff/don shirt  LE Dressing  Assistance Level: Minimal assistance  Skilled Clinical Factors: fair use of reacher to thread BLEs into brief but difficulty with pants, requiring min A to thread LLE fully into pants, SBA to stand and pull over hips, assist to thread antonio into R pants/brief leg  Putting On/Taking Off Footwear  Equipment Provided: Sock aid,Reachers  Assistance Level:  Moderate assistance  Skilled Clinical Factors: setup for shoes, assist for B ROZ hose  Toileting     Transfers: Toilet Transfers     Tub/Shower Transfers     IADLs:  Meal Prep     Money Management     Light Housekeeping     Dependent Care     Community Re-Entry     Pet Care     Health Management     Assessment:  Assessment: First Session: Pt agreeable to OT session this date with increased time due to pain in buttocks. Pt performed mobility to bathroom with SBA while managing own antonio but unable to manage wound vac. Pt given wound vac while seated to hold and pt unable to hold weight of wound vac in hand. Pt completed grooming while seated with mod I. Pt performed donning shoes with setup but continues to require assistance for donning B ROZ hose. Pt reports mild dizziness with standing with BP reading between 108/58 and 100/61 this date. Pt completed sit<>stands x10 with SBA. Pt returned to bed at end of session due to pain in buttocks and fatigue. Second Session: Wound care RN finishing with change of wound vac bandage on arrival. Pt reports pain too severe to get out of bed at this time. Pt completed BUE ther ex while supine with 3# weight for 2x20 reps of elbow flexion, chest press, wrist flexion, wrist extension, and supination/pronation. Pt completed AROM for full shoulder flexion and shoulder horizontal abduction/adduction 2x20 reps. Continue POC.   Activity Tolerance: Patient limited by endurance,Patient limited by pain,Patient limited by fatigue  Discharge Recommendations: 24 hour supervision or assist,Home with Home health OT,S Level 4    Speech therapy:    Speech Therapy Diagnosis  Cognitive Diagnosis: Mild-mod cognitive linguistic deficit  Speech Diagnosis: Moderate dysarthria         Significant Diagnostics:   Lab Results   Component Value Date    CREATININE 0.6 (L) 06/24/2022    BUN 32 (H) 06/24/2022     (L) 06/24/2022    K 4.5 06/24/2022    CL 99 06/24/2022    CO2 28 06/24/2022       Lab Results   Component Value Date    WBC 0.2 (LL) 06/24/2022    HGB 7.5 (L) 06/24/2022    HCT 22.1 (L) 06/24/2022    MCV 86.2 06/24/2022     (L) 06/24/2022       Disposition:  Admit as inpatient    Discharge Condition: Stable    Follow-up:  See after visit summary from hospitalization    Discharge Medications:     Medication List      STOP taking these medications    amoxicillin 500 MG capsule  Commonly known as: AMOXIL     ciprofloxacin-dexamethasone 0.3-0.1 % otic suspension  Commonly known as: CIPRODEX     ibuprofen 800 MG tablet  Commonly known as: ADVIL;MOTRIN     phenol 1.4 % Liqd mouth spray        ASK your doctor about these medications    acetaminophen 325 MG tablet  Commonly known as: TYLENOL  Ask about: Which instructions should I use? * albuterol (5 MG/ML) 0.5% nebulizer solution  Commonly known as: PROVENTIL  Take 1 mL by nebulization 4 times daily as needed for Wheezing     * albuterol (2.5 MG/3ML) 0.083% nebulizer solution  Commonly known as: PROVENTIL  Take 3 mLs by nebulization every 6 hours as needed for Wheezing     apixaban 5 MG Tabs tablet  Commonly known as: ELIQUIS     bisacodyl 10 MG suppository  Commonly known as: DULCOLAX     clonazePAM 0.5 MG tablet  Commonly known as: KLONOPIN  Take 1 tablet by mouth 2 times daily as needed for Anxiety for up to 30 days.      DULoxetine 30 MG extended release capsule  Commonly known as: CYMBALTA  Take 1 capsule by mouth daily     folic acid 1 MG tablet  Commonly known as: FOLVITE     gabapentin 100 MG capsule  Commonly known as: NEURONTIN     hydrOXYzine HCl 10 MG tablet  Commonly known as: ATARAX  Take 2.5 tablets by mouth every 8 hours as needed for Anxiety     lidocaine viscous hcl 2 % Soln solution  Commonly known as: XYLOCAINE     melatonin 3 MG Tabs tablet     Nasal Spray 0.05 % Soln     nystatin 623549 UNIT/ML suspension  Commonly known as: MYCOSTATIN     oxyCODONE 5 MG immediate release tablet  Commonly known as: ROXICODONE     senna-docusate 8.6-50 MG per tablet  Commonly known as: PERICOLACE     sodium chloride 0.65 % nasal spray  Commonly known as: OCEAN     sodium chloride 0.9 % SOLN 50 mL with ceftaroline fosamil 600 MG SOLR 600 mg     terazosin 1 MG capsule  Commonly known as: HYTRIN     thiamine 100 MG tablet         * This list has 2 medication(s) that are the same as other medications prescribed for you. Read the directions carefully, and ask your doctor or other care provider to review them with you. I spent over 35 minutes on this discharge encounter between counseling, coordination of care, and medication reconciliation. To comply with [facility] bylaw R.II.4.1:   Discharge order placed in advance to facilitate patients discharge needs.       Arnel Herrera MD

## 2022-06-24 NOTE — PROGRESS NOTES
ONCOLOGY HEMATOLOGY CARE PROGRESS NOTE      SUBJECTIVE:     The patient remains fatigued. ROS:     Constitutional:  No weight loss, No fever, No chills, No night sweats. Energy level good. Eyes:  No impairment or change in vision  ENT / Mouth:  No pain, abnormal ulceration, bleeding, nasal drip or change in voice or hearing  Cardiovascular:  No chest pain, palpitations, new edema, or calf discomfort  Respiratory:  No pain, hemoptysis, change to breathing  Breast:  No pain, discharge, change in appearance or texture  Gastrointestinal:  No pain, cramping, jaundice, change to eating and bowel habits  Urinary:  No pain, bleeding or change in continence  Genitalia: No pain, bleeding or discharge  Musculoskeletal:  No redness, pain, edema or weakness  Skin:  No pruritus, rash, change to nodules or lesions  Neurologic:  No discomfort, change in mental status, speech, sensory or motor activity  Psychiatric:  No change in concentration or change to affect or mood  Endocrine:  No hot flashes, increased thirst, or change to urine production  Hematologic: No petechiae, ecchymosis or bleeding  Lymphatic:  No lymphadenopathy or lymphedema  Allergy / Immunologic:  No eczema, hives, frequent or recurrent infections    OBJECTIVE        Physical    VITALS:  BP (!) 101/59   Pulse (!) 115   Temp 100.4 °F (38 °C) (Oral)   Resp 18   Wt 102 lb 6.4 oz (46.4 kg)   SpO2 93%   BMI 16.53 kg/m²   TEMPERATURE:  Current - Temp: 100.4 °F (38 °C);  Max - Temp  Av.1 °F (37.3 °C)  Min: 98.1 °F (36.7 °C)  Max: 101.6 °F (38.7 °C)  PULSE OXIMETRY RANGE: SpO2  Av.4 %  Min: 92 %  Max: 96 %  24HR INTAKE/OUTPUT:    Intake/Output Summary (Last 24 hours) at 2022 0810  Last data filed at 2022 0504  Gross per 24 hour   Intake 966 ml   Output 775 ml   Net 191 ml       CONSTITUTIONAL: awake, alert, cooperative, no apparent distress   EYES: pupils equal, round and reactive to light, sclera clear and conjunctiva normal  ENT: Normocephalic, without obvious abnormality, atraumatic  NECK: supple, symmetrical, no jugular venous distension and no carotid bruits   HEMATOLOGIC/LYMPHATIC: no cervical, supraclavicular or axillary lymphadenopathy   LUNGS: no increased work of breathing and clear to auscultation   CARDIOVASCULAR: regular rate and rhythm, normal S1 and S2, no murmur noted  ABDOMEN: normal bowel sounds x 4, soft, non-distended, non-tender, no masses palpated, no hepatosplenomgaly   MUSCULOSKELETAL: full range of motion noted, tone is normal  NEUROLOGIC: awake, alert, oriented to name, place and time. Motor skills grossly intact. SKIN: Normal skin color, texture, turgor and no jaundice. appears intact   EXTREMITIES: no LE edema       Data  Recent Labs     06/24/22  0600 06/22/22  0931 06/22/22  0650 06/20/22  0630 06/20/22  0630 06/17/22  0600 06/17/22  0600 06/15/22  0650 06/15/22  0650   WBC 0.2* 0.3* 0.3*   < > 2.4*   < > 4.0   < > 3.2*   NEUTROABS  --   --   --   --  1.5*  --  3.1  --  2.5   LYMPHOPCT  --   --   --   --  11.9  --  7.2  --  5.8   RBC 2.17* 2.27* 2.26*   < > 2.43*   < > 2.46*   < > 2.54*   HGB 6.4* 6.6* 6.6*   < > 7.1*   < > 7.1*   < > 7.3*   HCT 18.7* 19.7* 19.6*   < > 21.2*   < > 21.1*   < > 21.6*   MCV 86.2 86.7 86.9   < > 86.9   < > 85.8   < > 85.1   MCH 29.4 29.2 29.0   < > 29.2   < > 28.8   < > 28.9   MCHC 34.1 33.7 33.4   < > 33.6   < > 33.5   < > 34.0   RDW 15.2 15.5* 15.6*   < > 15.6*   < > 15.5*   < > 15.1   * 100* 96*   < > 79*   < > 112*   < > 117*    < > = values in this interval not displayed. Recent Labs     06/22/22  0650 06/24/22  0600   * 134*   K 4.6 4.5   CL 97* 99   CO2 33* 28   BUN 28* 32*   CREATININE <0.5* 0.6*     No results for input(s): AST, ALT, ALB, BILIDIR, BILITOT, ALKPHOS in the last 72 hours.     Magnesium:    Lab Results   Component Value Date    MG 2.00 06/13/2022    MG 1.70 06/10/2022       Imaging  Echo Limited  Transthoracic Echocardiography Report (TTE)     Demographics      Patient Name      Joseph Amaya      Date of Study     06/23/2022         Gender              Male      Patient Number    [id number]         Date of Birth       1990      Visit Number      [id number]          Age                 28 year(s)      Accession Number  [id number]         Room Number         6787      Corporate ID      [id number]           Yfn Martinez MD       Physician           Lis Zapata MD     Procedure    Type of Study      TTE procedure:ECHOCARDIOGRAM LIMITED. Procedure Date  Date: 06/23/2022 Start: 02:49 PM    Study Location: [address] [place] [address] - Echo Lab  Technical Quality: Adequate visualization    Additional Indications:complicated MRSA bacteremia w infected R atrial  thrombus; sepsis. Patient Status: Routine    Height: 66 inches Weight: 101 pounds BSA: 1.5 m2 BMI: 16.3 kg/m2    HR: 103 bpm BP: 105/54 mmHg     Conclusions      Summary   Limited echo for LV function/MRSA with limited doppler/color. -- Normal left ventricle systolic function with an estimated ejection   fraction of 55-60%. Left ventricular systolic function is normal with a 3D   calculated EF of 55%. EF by Khan's method estimated at 58%. No regional   wall motion abnormalities are seen. Diastolic dysfunction grade and filling pressure are indeterminate. -- The right ventricle is normal in size and function. -- Systolic pulmonary artery pressure (sPAP) is normal and estimated at 22   mmHg (right atrial pressure 3 mmHg)   -- No obvious atrial masses   - Elevated heart rate throughout study.       Signature      ------------------------------------------------------------------   Electronically signed by Lis Zapata MD (Interpreting   physician) on 06/23/2022 at 04:11 PM ------------------------------------------------------------------      Findings      Left Ventricle   Normal left ventricle size, wall thickness, and systolic function with an   estimated ejection fraction of 55-60%. Left ventricular systolic function is normal with a 3D calculated EF of 55%. EF by Khan's method estimated at 58%. No regional wall motion abnormalities are seen. Diastolic dysfunction grade and filling pressure are indeterminate. Elevated heart rate throughout study. Mitral Valve   The mitral valve is normal in structure. No evidence of mitral regurgitation. Left Atrium   The left atrium is normal in size. Aortic Valve   The aortic valve is normal in structure and function. There is no evidence of aortic valve regurgitation. Aorta   The aortic root is normal in size. Right Ventricle   The right ventricle is normal in size and function. TAPSE is measured at 29 mm. S\" Prime Velocity is measured at 14.4 cm/s. Tricuspid Valve   The tricuspid valve is normal in structure   Mild tricuspid valve regurgitation. Systolic pulmonary artery pressure (sPAP) is normal and estimated at 22 mmHg   (right atrial pressure 3 mmHg)      Right Atrium   The right atrial size is normal.   Right atrial area is cm2. Pulmonic Valve   The pulmonic valve is normal in structure      Pericardial Effusion   No pericardial effusion noted. Pleural Effusion   No pleural effusion. Miscellaneous   No obvious masses, thrombi or vegetations are noted. IVC is normal in size (< 2.1 cm) and collapses > 50% with respiration   consistent with normal right atrial pressure (3 mmHg).      M-Mode/2D Measurements (cm)      LV Diastolic Dimension: 3.70 cm LV Systolic Dimension: 7.34 cm   LV Septum Diastolic: 3.89 cm   LV PW Diastolic: 2.18 cm        AO Root Dimension: 2.7 cm                                   LA Dimension: 3.2 cm                                   LA Area: 16.8 cm2 LVOT: 1.9 cm                    LA volume/Index: 44.37 ml /30 ml/m2     Doppler Measurements                              MV Peak E-Wave: 108 cm/s      TR Velocity:220 cm/s   TR Gradient:19.36 mmHg   Estimated RAP:3 mmHg   Estimated RVSP: 22 mmHg     Aorta      Aortic Root: 2.7 cm   Ascending Aorta: 2.7 cm   LVOT Diameter: 1.9 cm         Problem List  Patient Active Problem List   Diagnosis    Chronic bilateral low back pain without sciatica    Anxiety    Insomnia due to other mental disorder    Poor dentition    Class 1 obesity due to excess calories with serious comorbidity and body mass index (BMI) of 34.0 to 34.9 in adult    Primary squamous cell carcinoma of tongue (HCC)    Recurrent major depressive disorder (HCC)    Prediabetes    Critical illness myopathy    Severe malnutrition (HCC)    Sepsis (HCC)    Pancytopenia (HCC)       ASSESSMENT AND PLAN:    Pancytopenia:  -It is a little unusual for the platelets to recover prior to the white blood count; however this is not unheard of  -Work-up is pending  -I discussed a bone marrow biopsy with the patient based on his persistent neutropenia despite stopping his antibiotics and he agreed. The procedure was explained  -The procedure was ordered.   -Micronutrients are pending  -Heparin-induced thrombocytopenia panel is pending  -Haptoglobin and direct Abida are pending  -Bone marrow will also be cultured  -Neupogen can be started after the bone marrow        ONCOLOGIC DISPOSITION:  -Once he is not neutropenic    Lynn Moore MD  Please contact through [facility]

## 2022-06-24 NOTE — PROGRESS NOTES
Hospitalist Progress Note    CC: Sepsis [facility])    Hospital course:  28 y.o. 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 Pt has G-tube. He was readmitted to [facility] 5/2-6/8 with sepsis, acute resp faiure and found to have MRSA bacteremia with blood cx that remained positive from 5/2 until 5/19 with infected atrial thrombus, septic pulm emboli and empyema. Had port removed on 5/7, repeat blood cx on 6/22 remain negative. On daptomycin, but now with pancytopenia which is acute on chronic. He may also have ceftaroline-induced myelosuppression per infectious disease and the drug was stopped on 6/22. He was initially described as lethargic, but is awake and alert and tearful but looks poorly overall. He is extremely cachectic. Pancytopenia continues - possibly due to ceftaroline - heme onc and ID are involved. Pt will discharge to SNF once inpatient treatment is completed    Admit date: 6/23/2022  Days in hospital:  1  Status:  Inpatient       24 Hour Events: WBC even lower    Subjective: have held eliquis due to hgb still low, WBC even lower now, may have bone marrow biopsy today - has some leakage around G tube - it was replaced today. ROS:   A comprehensive review of systems was negative except for: anxiety but better than yesterday, and his head hurts . Objective:    BP (!) 101/59   Pulse (!) 115   Temp 100.4 °F (38 °C) (Oral)   Resp 18   Wt 102 lb 6.4 oz (46.4 kg)   SpO2 93%   BMI 16.53 kg/m²     General:  cachectic  HEENT:  Normocephalic, atraumatic. Pupils equal, round, reactive to light. No scleral icterus. No conjunctival injection. Normal lips, teeth, and gums. No nasal discharge. Has bandage on chin with wound vac attached  Neck:  Supple  Heart:  Normal s1/s2, RRR, no murmurs, gallops, or rubs.  no leg edema  Lungs:  diminished bilaterally, no wheeze, no rales, no rhonchi, no use of accessory muscles  Abd: bowel sounds present, soft, nontender, nondistended, no masses  Extrem:  No clubbing, cyanosis,  no edema, 2+ pedal pulses, brisk capillary refill  Skin:  Warm and dry, no open lesions or rash,  pale color/perfusion  Psych:  A&O x 3, appropriate mood and affect. Depressed and tearful  Neuro: grossly intact, moves all four extremities. Very weak    Assessment:    Principal Problem:    Sepsis ([address]  Active Problems:    Severe malnutrition ([address]    Pancytopenia (HCC)    Anxiety    Primary squamous cell carcinoma of tongue (HCC)  Resolved Problems:    * No resolved hospital problems. *      Plan:  1. Pancytopenia - possibly related to abx - ceftaroline was stopped - now on dapto so will need to watch plt count - scheduled for bone marrow biopsy today per oncology  2. Fever of unknown origin - meropenem and daptomycin - culture negative so fare  3. Possible UTI - meropenem - await cultures  4. Sepsis POA - based on fever to 101.6, tachycardia, leukopenia, and possible early UTI - was dx yesterday on 6/22  5. This patient meets with criteria for  severe  malnutrution based on a BMI of 16.53. Algis Broaden This malnutrition is likely due to cancer . 6.  dispo - pt will go to SNF upon discharge  7. Anxiety - prn clonazepam  8.   Still has antonio catheter due to urinary retention    Prognosis:  Fair    Code status:  Full code    DVT prophylaxis: SCDs  GI prophylaxis: Proton Pump Inhibitor  Antibiotic prophylaxis indicated:   no  Diet:  Diet NPO  ADULT TUBE FEEDING; PEG; Standard with Fiber; Bolus; 6 Times Daily; 237; 60; Before and after each bolus; Protein; 1 bottle of Proteinex daily, 30 mL free water flush before and after adminstration    Disposition:  SNF    Medications:  Scheduled Meds:   alteplase  1 mg IntraCATHeter Once    sodium chloride flush  5-40 mL IntraVENous 2 times per day    meropenem  1,000 mg IntraVENous Q8H    [Held by provider] apixaban  5 mg Per G Tube BID    daptomycin (CUBICIN) IVPB  8 mg/kg IntraVENous X11C    folic acid  1 mg Per G Tube Daily    gabapentin  100 mg Per G Tube TID    magnesium oxide  400 mg Per G Tube BID    melatonin  5 mg Per G Tube Nightly    QUEtiapine  50 mg Oral Nightly    saliva substitute   Oral Q6H WA    sennosides-docusate sodium  2 tablet Per G Tube Nightly    sodium chloride flush  5-40 mL IntraVENous 2 times per day    thiamine  100 mg Per G Tube Daily    venlafaxine  75 mg Oral Daily with breakfast    zinc oxide   Topical BID       PRN Meds:  sodium chloride, sodium chloride flush, sodium chloride, acetaminophen **OR** acetaminophen, sodium chloride, bisacodyl, sodium chloride, acetaminophen, albuterol, clonazePAM, Lip Balm, oxyCODONE, oxyCODONE, perflutren lipid microspheres, sodium chloride, sodium chloride flush    IV:   sodium chloride      sodium chloride      sodium chloride 75 mL/hr at 06/23/22 1838    sodium chloride      sodium chloride           Intake/Output Summary (Last 24 hours) at 6/24/2022 0910  Last data filed at 6/24/2022 0504  Gross per 24 hour   Intake 966 ml   Output 775 ml   Net 191 ml       Results:  CBC:   Recent Labs     06/22/22  0650 06/22/22  0931 06/24/22  0600   WBC 0.3* 0.3* 0.2*   HGB 6.6* 6.6* 6.4*   HCT 19.6* 19.7* 18.7*   MCV 86.9 86.7 86.2   PLT 96* 100* 110*     BMP:   Recent Labs     06/22/22  0650 06/24/22  0600   * 134*   K 4.6 4.5   CL 97* 99   CO2 33* 28   BUN 28* 32*   CREATININE <0.5* 0.6*     Mag: No results for input(s): MAG in the last 72 hours. Phos:   Lab Results   Component Value Date    PHOS 4.8 06/11/2022     No results found for: GLU    LIVER PROFILE: No results for input(s): AST, ALT, LIPASE, BILIDIR, BILITOT, ALKPHOS in the last 72 hours. Invalid input(s): AMYLASE,  ALB  PT/INR: No results for input(s): PROTIME, INR in the last 72 hours. APTT: No results for input(s): APTT in the last 72 hours.   UA:  Recent Labs 06/22/22  1050   COLORU Yellow   PHUR 8.0   WBCUA 6-9*   RBCUA *   BACTERIA 3+*   CLARITYU SL CLOUDY*   SPECGRAV 1.010   LEUKOCYTESUR TRACE*   UROBILINOGEN 0.2   BILIRUBINUR Negative   BLOODU MODERATE*   GLUCOSEU Negative       Invalid input(s): ABG  Lab Results   Component Value Date    CALCIUM 8.6 06/24/2022    PHOS 4.8 06/11/2022               Electronically signed by Lane Youngblood MD on 6/24/2022 at 9:10 AM

## 2022-06-24 NOTE — PROGRESS NOTES
Physical Therapy  Discharge Summary    Name:Maverick Joel  LQY:[id number]  :1990  Treatment Diagnosis: impaired endurance/ mobilty  Diagnosis: CIM    Restrictions/Precautions  Restrictions/Precautions: NPO,Isolation,Fall Risk,General Precautions           Position Activity Restriction  Other position/activity restrictions: antonio, PEG, NPO, MRSA contact precautions, ice chips with therapy/RN only     Goals:                  Short Term Goals  Time Frame for Short term goals: 10 days   Short term goal 1: pt will complete bed mobility with mod ind -  not met, progressing (continue goal)  Short term goal 2: pt will complete functional transfer with CGA and LRAD. -  not met, progressing (continue goal)  Short term goal 3: pt will ambulate 50 ft with LRAD and mod A. -  not met today (previously partially met for distance)  Short term goal 4: pt will tolerate stair assessment when appropriate and goal will be made. - GOAL MET , completes 4 steps with CGA-min A and BHR            Long Term Goals  Time Frame for Long term goals : 21 days   Long term goal 1: pt will complete functional transfer with SBA and LRAD  Long term goal 2: pt will ambulate 100 ft wiht LRAD and CGA. Long term goal 3: pt will complete car transfer with SBA and LRAD. Long term goal 4: pt will tolerate standing for 2 min with 1-2 UE support and CGA -SBA for balance. Long term goal 5: pt will ascend/descend 12 steps with min A and BHR    Pt. Met 2/4 short term goals not addressed due to unexpected d/c from unit due to medical decline   Pt d/c to acute care services due to decreased H/H levels and acute medical decline. Grossly CGA for mobility with and without AD at time of d/c. Will resume therapy services when medically appropriate.    Electronically signed by Edmond Paz PT on 2022 at 7:10 AM

## 2022-06-24 NOTE — OR NURSING
Pt arrived for image guided bone marrow biopsy w/aspiration . Procedure explained including the risk and benefits of the procedure. All questions answered. Pt verbalizes understanding of the of procedure and states no more questions. Consent signed. Vital signs stable, labs, allergies, medications, and code status reviewed. No contraindications noted. Time out completed prior to procedure. Pt was place prone on the CT table. Pt was placed on all cardiac monitoring. Pt placed on 2 L NC for sedation.            Vitals:    06/24/22 1159   BP: 118/74   Pulse: 99   Resp: 20   Temp:    SpO2: 100%    (vital signs in table format)    Ailyn Score  2 - Able to move 4 extremities voluntarily on command  2 - BP+/- 20mmHg of normal  2 - Fully awake  2 - Able to maintain oxygen saturation >92% on room air  2 - Able to breathe deeply and cough freely    Allergies  Trazodone and nefazodone, Tramadol, and Trazodone    Labs  Lab Results   Component Value Date    INR 1.88 (H) 06/24/2022    PROTIME 21.5 (H) 06/24/2022       Lab Results   Component Value Date    CREATININE 0.6 (L) 06/24/2022    BUN 32 (H) 06/24/2022     (L) 06/24/2022    K 4.5 06/24/2022    CL 99 06/24/2022    CO2 28 06/24/2022       Lab Results   Component Value Date    WBC 0.2 (LL) 06/24/2022    HGB 6.4 (LL) 06/24/2022    HCT 18.7 (LL) 06/24/2022    MCV 86.2 06/24/2022     (L) 06/24/2022

## 2022-06-24 NOTE — PROGRESS NOTES
Occupational Therapy  Discharge Summary     Name:Maverick Mccall  IZN:[id number]  :1990  Treatment Diagnosis: impaired endurance/ mobilty  Diagnosis: CIM    Restrictions/Precautions  Restrictions/Precautions: NPO,Isolation,Fall Risk,General Precautions           Position Activity Restriction  Other position/activity restrictions: antonio, PEG, NPO, MRSA contact precautions, ice chips with therapy/RN only     Goals:     Short Term Goals  Time Frame for Short term goals: 1 week- 6/15/22  Short Term Goal 1: Pt will complete functional transfers with min A. GOAL MET 6/15/22 Pt performed functional transfers with min A. Short Term Goal 2: Pt will perform toileting with max A. progressing Pt requires total assist for toileting. Short Term Goal 3: Pt will complete LB dressing with mod A. GOAL MET 22 Pt completed LB dressing with min A. Short Term Goal 4: Pt will perform UB dressing with min A. GOAL MET 22 Pt completed UB dressing with SPV. Long Term Goals  Time Frame for Long term goals : 3 weeks- 22  Long Term Goal 1: Pt will perform functional transfers with CGA. Long Term Goal 2: Pt will complete toileting with CGA. Long Term Goal 3: Pt will perform LB dressing with CGA. Long Term Goal 4: Pt will perform UB dressing with setup. GOAL MET 22 Pt performed UB dressing with setup. Long Term Goal 5: Pt will complete full body bathing with CGA. Pt. Met 3/4 short term goals and 1/5 long term goals. Pt discharged to acute care hospital due to medical instability. ADL:     ADL  Grooming/Oral Hygiene  Assistance Level: Modified independent  Skilled Clinical Factors: to brush teeth while seated at sink  Putting On/Taking Off Footwear  Assistance Level:  Moderate assistance  Skilled Clinical Factors: setup for shoes, assist for EMILY varnere        Functional Mobility  Activity: To/From bathroom  Assistance Level: Supervision  Bed Mobility  Overall Assistance Level: Modified Independent  Sit to Supine  Assistance Level: Modified independent  Supine to Sit  Assistance Level: Modified independent  Transfers  Surface: From bed; Wheelchair; To bed  Additional Factors: Verbal cues; Hand placement cues  Sit to Stand  Assistance Level: Stand by assist  Stand to Sit  Assistance Level: Stand by assist  Bed To/From Chair  Technique: Stand step  Assistance Level: Stand by assist  Skilled Clinical Factors: no AD  Stand Pivot  Assistance Level: Stand by assist                     Assessment:     Assessment: First Session: Pt agreeable to OT session this date with increased time due to pain in buttocks. Pt performed mobility to bathroom with SBA while managing own antonio but unable to manage wound vac. Pt given wound vac while seated to hold and pt unable to hold weight of wound vac in hand. Pt completed grooming while seated with mod I. Pt performed donning shoes with setup but continues to require assistance for donning B ROZ hose. Pt reports mild dizziness with standing with BP reading between 108/58 and 100/61 this date. Pt completed sit<>stands x10 with SBA. Pt returned to bed at end of session due to pain in buttocks and fatigue. Second Session: Wound care RN finishing with change of wound vac bandage on arrival. Pt reports pain too severe to get out of bed at this time. Pt completed BUE ther ex while supine with 3# weight for 2x20 reps of elbow flexion, chest press, and x20 reps for wrist flexion, wrist extension, and supination/pronation. Pt completed AROM for full shoulder flexion for 2x20 reps and shoulder horizontal abduction/adduction x20 reps. Continue POC. Activity Tolerance: Patient limited by endurance; Patient limited by pain; Patient limited by fatigue  Discharge Recommendations: 24 hour supervision or assist;Home with Home health OT;S Level 4           Electronically signed by Neal Parker OT on 6/24/2022 at 7:10 AM

## 2022-06-24 NOTE — PROGRESS NOTES
Wound Care paused in order for patient to go to sx. For bony biopsy. Sacrum covered with large sponge, tape across to hold in place until returns. Charted on patient's care until returned.

## 2022-06-24 NOTE — CONSULTS
[facility] Nurse  Consult Note       NAME:  Hever Herrera  MEDICAL RECORD NUMBER:  [id number]  AGE: 28 y.o. GENDER: male  : 1990  TODAY'S DATE:  2022    Subjective; Hi, ok   Reason for WOCN Evaluation and Assessment:Evaluate neck, rt shoulder, left anterior arm, left lateral leg wounds. Sacrum stage 4 with wound vac. Healed areas to right chest. Leave open to air  MId dressing change patient taken down for bone biopsy. Covered sacrum with dressing until returned to finish VAC dressing change. Hever Herrera is a 28 y.o. male referred by:   [] Physician  [x] Nursing  [] Other:     Wound Identification:  Wound Type: :pressure, non-healing surgical and traumatic    Contributing Factors: chronic pressure, decreased mobility and shear forceRadiation treatment             Wound History:  28 y.o. 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 Pt has G-tube. Current Wound Care Treatment:  Dry dressings  Patient Goal of Care:  [x] Wound Healing  [] Odor Control  [] Palliative Care  [] Pain Control   [] Other:         PAST MEDICAL HISTORY        Diagnosis Date    Cancer [facility])        PAST SURGICAL HISTORY    No past surgical history on file.     FAMILY HISTORY    Family History   Problem Relation Age of Onset    Diabetes Mother     Cancer Maternal Grandmother         Pancreatic CA       SOCIAL HISTORY    Social History     Tobacco Use    Smoking status: Former Smoker     Packs/day: 1.00     Years: 17.00     Pack years: 17.00    Smokeless tobacco: Never Used   Vaping Use    Vaping Use: Never used   Substance Use Topics    Alcohol use: Not Currently     Comment: Previously could drink up to 20-12 ounce cans of Budweiser daily    Drug use: Not Currently     Types: Marijuana [facility])     Comment: Previous marijuana use       ALLERGIES    Allergies   Allergen Reactions 0.083% nebulizer solution Take 3 mLs by nebulization every 6 hours as needed for Wheezing 120 each 3    hydrOXYzine (ATARAX) 10 MG tablet Take 2.5 tablets by mouth every 8 hours as needed for Anxiety 30 tablet 2    albuterol (PROVENTIL) (5 MG/ML) 0.5% nebulizer solution Take 1 mL by nebulization 4 times daily as needed for Wheezing 120 each 3       Objective; antonio urinary,   VAC Veraflow to sacrum. /65   Pulse (!) 112   Temp 97.9 °F (36.6 °C) (Oral)   Resp 16   Wt 102 lb 6.4 oz (46.4 kg)   SpO2 94%   BMI 16.53 kg/m²     LABS:  WBC:    Lab Results   Component Value Date    WBC 0.2 06/24/2022     H/H:    Lab Results   Component Value Date    HGB 6.4 06/24/2022    HCT 18.7 06/24/2022     PTT:  No results found for: APTT, PTT[APTT}  PT/INR:    Lab Results   Component Value Date    PROTIME 21.5 06/24/2022    INR 1.88 06/24/2022     HgBA1c:    Lab Results   Component Value Date    LABA1C 5.8 11/03/2021       Assessment; Sacrum red granulation, minor slough. Neck slough present. Rt chest dry healed,  Left arm scant seepage serous, red, left leg scant seepage serous, red.    Hiram Risk Score: Hiram Scale Score: 12    Patient Active Problem List   Diagnosis    Chronic bilateral low back pain without sciatica    Anxiety    Insomnia due to other mental disorder    Poor dentition    Class 1 obesity due to excess calories with serious comorbidity and body mass index (BMI) of 34.0 to 34.9 in adult    Primary squamous cell carcinoma of tongue (HCC)    Recurrent major depressive disorder (HCC)    Prediabetes    Critical illness myopathy    Severe malnutrition (HCC)    Sepsis (HCC)    Pancytopenia (HCC)       Measurements:  Negative Pressure Wound Therapy Sacrum Mid (Active)   $ Standard NPWT >50 sq cm PER TX $ Yes 06/24/22 1140   Wound Type Pressure ulcer: Stage IV 06/24/22 1140   Unit Type hospital 06/24/22 1140   Dressing Type Other (Comment) 06/24/22 1140   Number of pieces used 4 06/24/22 1140 Number of pieces removed 3 06/24/22 1140   Cycle Continuous 06/24/22 1140   Target Pressure (mmHg) 125 06/24/22 1140   Intensity 1 06/24/22 1140   Irrigation Solution Sodium chloride 0.9% 06/24/22 1140   Instillation Volume  24 mL 06/24/22 1140   Soak Time  10 minutes 06/24/22 1140   Vac Frequency 3 hours 06/24/22 1140   Canister changed? No 06/24/22 1140   Dressing Status Clean, dry & intact 06/24/22 1140   Dressing Changed Changed/New 06/24/22 1140   Drainage Amount Moderate 06/24/22 1140   Drainage Description Serosanguinous 06/24/22 1140   Dressing Change Due 06/27/22 06/24/22 1140   Output (ml) 450 ml 06/21/22 2245   Wound Assessment Slough;Granulation tissue 06/24/22 1140   Kristel-wound Assessment Blanchable erythema 06/24/22 1140   Shape oval 06/24/22 1140   Odor None 06/24/22 1140   Number of days: 13       Wound 06/08/22 Throat Right (Active)   Wound Image   06/24/22 1140   Wound Etiology Other 06/24/22 1140   Dressing Status New dressing applied 06/24/22 1140   Wound Cleansed Cleansed with saline 06/24/22 1140   Dressing/Treatment Alginate with Ag; Foam 06/24/22 1140   Dressing Change Due 06/25/22 06/24/22 1140   Wound Length (cm) 2.5 cm 06/24/22 1140   Wound Width (cm) 3.7 cm 06/24/22 1140   Wound Depth (cm) 0.1 cm 06/24/22 1140   Wound Surface Area (cm^2) 9.25 cm^2 06/24/22 1140   Change in Wound Size % (l*w) 47.14 06/24/22 1140   Wound Volume (cm^3) 0.925 cm^3 06/24/22 1140   Wound Healing % 47 06/24/22 1140   Wound Assessment Slough 06/24/22 1140   Drainage Amount Scant 06/24/22 1140   Drainage Description Serosanguinous 06/24/22 1140   Odor None 06/24/22 1140   Kristel-wound Assessment Intact 06/24/22 1140   Margins Attached edges 06/24/22 1140   Wound Thickness Description not for Pressure Injury Partial thickness 06/24/22 1140   Number of days: 15    neck          Wound 06/08/22 Chest Right;Upper (Active)   Wound Image    06/24/22 1140   Wound Etiology Other 06/24/22 1140   Dressing Status Dry; Other (Comment) 06/24/22 1140   Wound Cleansed Cleansed with saline 06/24/22 1140   Dressing/Treatment Open to air 06/24/22 1140   Dressing Change Due 06/25/22 06/24/22 1140   Wound Length (cm) 0.6 cm 06/24/22 1140   Wound Width (cm) 0.7 cm 06/24/22 1140   Wound Depth (cm) 0.1 cm 06/24/22 1140   Wound Surface Area (cm^2) 0.42 cm^2 06/24/22 1140   Change in Wound Size % (l*w) 72 06/24/22 1140   Wound Volume (cm^3) 0.042 cm^3 06/24/22 1140   Wound Healing % 72 06/24/22 1140   Wound Assessment Pink/red;Dry 06/24/22 1140   Drainage Amount None 06/24/22 1140   Drainage Description Serous 06/20/22 1851   Odor None 06/24/22 1140   Kristel-wound Assessment Blanchable erythema 06/24/22 1140   Margins Attached edges 06/24/22 1140   Wound Thickness Description not for Pressure Injury Partial thickness 06/24/22 1140   Number of days: 15    rt chest             Wound 06/08/22 Radial Distal;Left (Active)   Wound Image   06/24/22 1140   Wound Etiology Traumatic 06/24/22 1140   Dressing Status New dressing applied 06/24/22 1140   Wound Cleansed Cleansed with saline 06/24/22 1140   Dressing/Treatment Alginate with Ag; Foam 06/24/22 1140   Dressing Change Due 06/25/22 06/24/22 1140   Wound Length (cm) 6 cm 06/24/22 1140   Wound Width (cm) 1 cm 06/24/22 1140   Wound Depth (cm) 0.1 cm 06/24/22 1140   Wound Surface Area (cm^2) 6 cm^2 06/24/22 1140   Change in Wound Size % (l*w) 0 06/24/22 1140   Wound Volume (cm^3) 0.6 cm^3 06/24/22 1140   Wound Healing % 0 06/24/22 1140   Wound Assessment [place]/red;Slough 06/24/22 1140   Drainage Amount None 06/24/22 1140   Drainage Description Serosanguinous;Green 06/20/22 1851   Odor None 06/24/22 1140   Kristel-wound Assessment Blanchable erythema 06/24/22 1140   Margins Attached edges 06/24/22 1140   Wound Thickness Description not for Pressure Injury Partial thickness 06/24/22 1140   Number of days: 15    left radial         Wound 06/08/22 Knee Left;Posterior (Active)   Wound Image   06/24/22 1140   Wound Etiology Pressure Stage 3 06/24/22 1140   Dressing Status New dressing applied 06/24/22 1140   Wound Cleansed Cleansed with saline 06/24/22 1140   Dressing/Treatment Alginate with Ag;Dry dressing 06/24/22 1140   Offloading for Diabetic Foot Ulcers Offloading ordered 06/15/22 1308   Dressing Change Due 06/25/22 06/24/22 1140   Wound Length (cm) 2.7 cm 06/24/22 1140   Wound Width (cm) 1 cm 06/24/22 1140   Wound Depth (cm) 0.1 cm 06/24/22 1140   Wound Surface Area (cm^2) 2.7 cm^2 06/24/22 1140   Change in Wound Size % (l*w) 69.14 06/24/22 1140   Wound Volume (cm^3) 0.27 cm^3 06/24/22 1140   Wound Healing % 85 06/24/22 1140   Wound Assessment Pink/red 06/24/22 1140   Drainage Amount None 06/24/22 1140   Drainage Description Serosanguinous 06/20/22 1422   Odor None 06/24/22 1140   Kristel-wound Assessment Blanchable erythema 06/24/22 1140   Margins Attached edges 06/24/22 1140   Wound Thickness Description not for Pressure Injury Partial thickness 06/24/22 1140   Number of days: 15    knee left         Wound 06/08/22 Sacrum (Active)   Wound Image   06/24/22 1140   Wound Etiology Pressure Stage 4 06/24/22 1140   Dressing Status New dressing applied 06/24/22 1140   Wound Cleansed Cleansed with saline 06/24/22 1140   Dressing/Treatment Negative pressure wound therapy 06/24/22 1140   Offloading for Diabetic Foot Ulcers Offloading ordered 06/24/22 1140   Dressing Change Due 06/27/22 06/24/22 1140   Wound Length (cm) 9 cm 06/24/22 1140   Wound Width (cm) 7 cm 06/24/22 1140   Wound Depth (cm) 1 cm 06/24/22 1140   Wound Surface Area (cm^2) 63 cm^2 06/24/22 1140   Change in Wound Size % (l*w) 26.83 06/24/22 1140   Wound Volume (cm^3) 63 cm^3 06/24/22 1140   Wound Healing % 57 06/24/22 1140   Undermining Starts ___ O'Clock 1100 06/24/22 1140   Undermining Ends___ O'Clock 200 06/24/22 1140   Undermining Maxium Distance (cm) 1.2 06/24/22 1140   Wound Assessment Wounded Knee/red;Slough 06/24/22 1140   Drainage Amount Small 06/24/22 1140 Drainage Description Serosanguinous 06/24/22 1140   Odor Mild 06/24/22 1140   Kristel-wound Assessment Blanchable erythema 06/24/22 1140   Margins Attached edges 06/24/22 1140   Wound Thickness Description not for Pressure Injury Full thickness 06/24/22 1140   Number of days: 15      sacrum      Response to treatment:  With complaints of pain. Pain Assessment:  Severity:  4 / 10  Quality of pain: aching  Wound Pain Timing/Severity: intermittent  Premedicated: No    Plan   Plan of Care: Wound 06/08/22 Throat Right-Dressing/Treatment: Alginate with Ag,Foam  Wound 06/08/22 Chest Right;Upper-Dressing/Treatment: Open to air  Wound 06/08/22 Radial Distal;Left-Dressing/Treatment: Alginate with Ag,Foam  Wound 06/08/22 Knee Left;Posterior-Dressing/Treatment: Alginate with Ag,Dry dressing  Wound 06/08/22 Sacrum-Dressing/Treatment: Negative pressure wound therapy    Recommend;  Areas to right Neck/L wrist and left leg wounds: Cleanse with Cleanse with normal salinealine, pat dry.   Apply Alginate AG  Cover with guaze and bordered foam dressing. Change daily.     Changed Mom, Wed, Fri.      Cleanse sacral wound with normal saline, pat dry, apply barrier wipe then hydrocolloid to wound edges.  Hydrocolloid strips to edges of posterior wound near rectum. Apply VAC drape to wound edges.  Insert gray foam with into tunnel area @ 2 o'clock . Gray foam tracking over right hip to attach vacuum suction to. Cover with VAC drape to seal.     5 pieces of  Vera flow  foam used for sacrum wound dressing and tracking.   Cleanse choice  machine;set at 24 flush normal saline, soak for 10minutes every 3 hours.  Treatment is 125 mmHg continuous suction.     Change cannister once a week or when full  Not changed today.   If suction fails or patient is discharged:  -remove vac dressing  -cleanse wound with normal saline  -pack wound with saline moistened guaze and change every 12 hours.    Call wound care for deterioration [phone] or call [phone] and leave message.      Canister not changed today.       Specialty Bed Required : Yes power pro elite  [x] Low Air Loss   [x] Pressure Redistribution  [] Fluid Immersion  [] Bariatric  [] Total Pressure Relief  [] Other:     Current Diet: Diet NPO  ADULT TUBE FEEDING; PEG; Standard with Fiber; Bolus; 6 Times Daily; 237; 60; Before and after each bolus; Protein; 1 bottle of Proteinex daily, 30 mL free water flush before and after adminstration  Dietician consult:  Yes    Discharge Plan:  Placement for patient upon discharge: intermediate care facility    Patient appropriate for Outpatient [address] [facility] [address]: Yes    Referrals:  [x]  / discharge planner  Following  [] [employer] [facility]  [] Supplies  [] Other    Patient/Caregiver Teaching:  Level of patient/caregiver understanding able to:   [] Indicates understanding       [] Needs reinforcement  [] Unsuccessful      [] Verbal Understanding  [] Demonstrated understanding       [] No evidence of learning  [] Refused teaching         [x] N/A       Electronically signed by Susy Vizcarra RN, BSN on 6/24/2022 at 11:49 AM

## 2022-06-24 NOTE — PROGRESS NOTES
Infectious Disease Follow up Notes    CC :  MRSA bacteremia      Antibiotics:   dapto 8mg/kg/24 (350mg), s 6/22/22  Meropenem 1g q8    Admit Date:   6/23/2022  Hospital Day: 2    Subjective:   Moved to medical floor  Fever to 100.4 earlier today; currently normothermic  On RA   Had BM bx this morning   No complaints voiced     Objective:     Patient Vitals for the past 8 hrs:   BP Temp Temp src Pulse Resp SpO2 Weight   06/24/22 1039 106/65 97.9 °F (36.6 °C) Oral (!) 112 16 94 % --   06/24/22 1010 (!) 103/56 97.5 °F (36.4 °C) -- (!) 110 18 93 % --   06/24/22 0819 (!) 101/59 100.4 °F (38 °C) Oral (!) 115 18 93 % --   06/24/22 0452 -- -- -- -- -- -- 102 lb 6.4 oz (46.4 kg)   06/24/22 0300 (!) 94/54 98.1 °F (36.7 °C) Oral (!) 106 18 92 % --       Pale, chronically ill appearing male. Appears older than stated age  More alert today, conversant w dysarthric speech  Cachectic   Wound anterior neck without local signs of infection   Tachycardic, regular   Decreased breath sounds at bases suman  Abd scaphoid, soft.   Scant dermatitis around PEG site without drainage   No focal rash  Sacral wound w granular base    ACCESS  LUE PICC  [place]       Scheduled Meds:   alteplase  1 mg IntraCATHeter Once    sodium chloride flush  5-40 mL IntraVENous 2 times per day    meropenem  1,000 mg IntraVENous Q8H    [Held by provider] apixaban  5 mg Per G Tube BID    daptomycin (CUBICIN) IVPB  8 mg/kg IntraVENous M68O    folic acid  1 mg Per G Tube Daily    gabapentin  100 mg Per G Tube TID    magnesium oxide  400 mg Per G Tube BID    melatonin  5 mg Per G Tube Nightly    QUEtiapine  50 mg Oral Nightly    saliva substitute   Oral Q6H WA    sennosides-docusate sodium  2 tablet Per G Tube Nightly    sodium chloride flush  5-40 mL IntraVENous 2 times per day    thiamine  100 mg Per G Tube Daily    venlafaxine  75 mg Oral Daily with breakfast    zinc oxide   Topical BID       Continuous Infusions:   sodium chloride      sodium chloride      sodium chloride 75 mL/hr at 06/23/22 1838    sodium chloride      sodium chloride            Data Review:    Lab Results   Component Value Date    WBC 0.2 (LL) 06/24/2022    HGB 6.4 (LL) 06/24/2022    HCT 18.7 (LL) 06/24/2022    MCV 86.2 06/24/2022     (L) 06/24/2022     Lab Results   Component Value Date    CREATININE 0.6 (L) 06/24/2022    BUN 32 (H) 06/24/2022     (L) 06/24/2022    K 4.5 06/24/2022    CL 99 06/24/2022    CO2 28 06/24/2022       Hepatic Function Panel:   Lab Results   Component Value Date    ALKPHOS 108 02/06/2022    ALT 17 02/06/2022    AST 17 02/06/2022    PROT 7.1 02/06/2022    BILITOT 0.4 02/06/2022    LABALBU 2.4 06/11/2022         MICRO:  6/22 UA 6-9wbc, 50-100rbc, >100k Ps aeruginosa, ADITI pending    BC x2 NGTD   6/23 BC x2 NGTD      Sputum and BAL cultures on 5/2/22 E cloacae, K pneumoniae, pneumococcus       IMAGING:  CXR 6/22/22  Impression   Near complete resolution of right-sided airspace infiltrates. CXR 6/24/22  Impression   Curvilinear opacities throughout both lungs, which could represent either   mild interstitial pulmonary edema and/or atypical pneumonia. Echo 6/23/22   Summary   Limited echo for LV function/MRSA with limited doppler/color. -- Normal left ventricle systolic function with an estimated ejection   fraction of 55-60%. Left ventricular systolic function is normal with a 3D   calculated EF of 55%. EF by Khan's method estimated at 58%. No regional   wall motion abnormalities are seen. Diastolic dysfunction grade and filling pressure are indeterminate. -- The right ventricle is normal in size and function. -- Systolic pulmonary artery pressure (sPAP) is normal and estimated at 22   mmHg (right atrial pressure 3 mmHg)   -- No obvious atrial masses   - Elevated heart rate throughout study.       Assessment:     Patient Active Problem List Diagnosis Date Noted    Sepsis ([facility] 06/23/2022     Priority: Medium    P[facility]topenia ([facility] 06/23/2022     Priority: Medium    Severe malnutrition ([facility] 06/09/2022     Priority: Medium    Critical illness myopathy 06/08/2022     Priority: Medium    Recurrent major depressive disorder ([facility] 01/25/2022    Prediabetes 01/25/2022    Primary squamous cell carcinoma of tongue ([facility] 11/30/2021    Poor dentition 11/04/2021    Class 1 obesity due to excess calories with serious comorbidity and body mass index (BMI) of 34.0 to 34.9 in adult 11/04/2021    Chronic bilateral low back pain without sciatica 11/03/2021    Anxiety 11/03/2021    Insomnia due to other mental disorder 11/03/2021       History S[facility] tongue s/p resection/radical neck dissection 1/2022 followed by chemo and XRT and reconstruction  G-tube in place    Prolonged admission [facility] 5/2-6/8/22 with sepsis, hypoxemic respiratory failure req MV    Complicated, prolonged MRSA bacteremia (+BC from 5/2/22 to 5/19/22), associated with infected atrial thrombus, septic pulmonary emboli and empyema  Presumed pathogenesis was CLABSI from port   S/p port removal 5/7/22  BC collected 5/21, 5/24 were negative  Repeat BC collected 6/22/22 are negative to date   He is now on IV dapto alone   He was transferred to ARU 6/8/22  At the time of DC from [facility], the plan was IV abx through 7/2/22 via PICC that was placed 6/9/22 (?I think)  Repeat BC are negative to date. Repeat limited echo was reassuring. Infected R atrial/IJ/SVC thrombus s/p thrombectomy with culture positive for MRSA     R hydropneumothorax / empyema  S/p chest tube placement w tpa instillations  Per Oncology note at  - \"Reviewed pleural fluid and there were some \"atypical cells\". Once feasible, would be ideal to biopsy lung lesion although agree with ongoing bacteremia, likely infectious rather than malignancy. \"  Deemed poor candidate for decort   -resolved     Cytopenias, acute on chronic   This was present at [facility] as well and was attributed to residua from chemo last given 4/19/22 along with acute illness   Possibility of ceftaroline-induced myelosuppression was considered (this is well described krista w higher/prolonged courses as with this patient); drug was stopped 6/22/22  S/p BM bx; G-CSF ordered     Pseudomonas CAUTI vs bacteriuria   May or may not be source of fever  Should be covered with meropenem added 6/23/22  -change antonio if not recently done     FREEMAN PE     No abx allergies     Plan:     Continue dapto. Baseline CK 6/24/22 is wnl   Note, dapto is not active in lung parenchyma though at this point, I don't think we are still treating pneumonia per se. At this point, do not think we need a second MRSA drug, but will continue to revisit that decision depending on course. I am not sure if he failed vanc (as one option).   I would think we should avoid linezolid for same concerns of myelosuppression    Continue meropenem for Psa UTI   Change antonio if not yet done - d/w RN     G-CSF after BM Bx per Heme      D/w MARSHAL Corbett MD  Phone: [phone]   Fax : [phone]

## 2022-06-24 NOTE — PROGRESS NOTES
[facility]  Nephrology follow-up note           Reason for Consult: Hyponatremia  Requesting Physician:  Dr. Germain Duong history  Sodium stable at 134. T/f to [facility] d/t fever and pancytopenia  BM bx on 6/24    ROS:   Temp 100.4 F on 6/24    Scheduled Meds:   sodium chloride flush  5-40 mL IntraVENous 2 times per day    meropenem  1,000 mg IntraVENous Q8H    [Held by provider] apixaban  5 mg Per G Tube BID    daptomycin (CUBICIN) IVPB  8 mg/kg IntraVENous K78Q    folic acid  1 mg Per G Tube Daily    gabapentin  100 mg Per G Tube TID    magnesium oxide  400 mg Per G Tube BID    melatonin  5 mg Per G Tube Nightly    QUEtiapine  50 mg Oral Nightly    saliva substitute   Oral Q6H WA    sennosides-docusate sodium  2 tablet Per G Tube Nightly    thiamine  100 mg Per G Tube Daily    venlafaxine  75 mg Oral Daily with breakfast    zinc oxide   Topical BID     Continuous Infusions:   sodium chloride      sodium chloride      sodium chloride 75 mL/hr at 06/23/22 1838    sodium chloride      sodium chloride       PRN Meds:.sodium chloride, sodium chloride flush, sodium chloride, acetaminophen **OR** acetaminophen, sodium chloride, bisacodyl, sodium chloride, acetaminophen, albuterol, clonazePAM, Lip Balm, oxyCODONE, oxyCODONE, perflutren lipid microspheres, sodium chloride, sodium chloride flush    History of Present Illness on 6/9/2022:    28 y.o. yo male with PMH of squamous cell carcinoma of the tongue (s/p right hemiglossectomy, b/l ND, and RFFF reconstruction on 1/3/22, radiation, and weekly cisplatin completed 4/25/22) who is admitted to ARU from  for rehabilitation. Nephrology has been consulted for hyponatremia and hyperkalemia  Patient is being fed through the feeding tube. His sodium on 6/8 at [facility] was 133 and has been around 130-135 in June.   Earlier in admission, he was hypernatremic in the 149 range as well    He was admitted at [facility] from 5/2/22 and had MRSA bacteremia from infected port s/p removal. Per Dr Mickey Malone, while at [facility],  \"pt had right hydropneumothorax with worsening hypoxia requiring multiple intubations and chest tube placements. He underwent right internal jugular and right atrial thrombectomy due to concern that this was causing ongoing bacteremia. ID was consulted and he is to remain on IV ceftaroline with plan for long term treatment until 7/2/22. His course was complicated by severe penile edema and antonio was placed. Urology followed during acute stay. Patient failed several voiding trials    Physical exam:   Constitutional:  VITALS:  BP (!) 103/51   Pulse 91   Temp 98.1 °F (36.7 °C) (Oral)   Resp 16   Wt 102 lb 6.4 oz (46.4 kg)   SpO2 96%   BMI 16.53 kg/m²   Gen: alert, awake  Neck: No JVD  Skin: Unremarkable  Cardiovascular:  S1, S2 without m/r/g   Respiratory: CTA B without w/r/r; respiratory effort normal  Abdomen:  soft, nt, nd,   Extremities: no lower extremity edema  Neuro/Psy: AAoriented times 3 ; moves all 4 ext    Data/  Recent Labs     06/22/22  0650 06/22/22  0931 06/24/22  0600   WBC 0.3* 0.3* 0.2*   HGB 6.6* 6.6* 6.4*   HCT 19.6* 19.7* 18.7*   MCV 86.9 86.7 86.2   PLT 96* 100* 110*     Recent Labs     06/22/22  0650 06/24/22  0600   * 134*   K 4.6 4.5   CL 97* 99   CO2 33* 28   GLUCOSE 97 146*   BUN 28* 32*   CREATININE <0.5* 0.6*   LABGLOM >60 >60   GFRAA >60 >60     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 High BUN so getting adequate solute. 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  ID following     -Chronic antonio d/t penile edema and failed voiding trials    -Elevated TSH, mild    -Pancytopenia   1 PRBC on 6/22    Neupogen after BM Bx on 6/24 per Dr Kajal Pacheco    -Hyperkalemia - better with prn lokelma    -Hypomagnesemia - on oral Mg replacement      Plan  -saline has been ordered in the setting of fever and relative hypotension  -pt is getting 30 ml free water before and after bolus TF 6x per day  -serial BMPs

## 2022-06-24 NOTE — DISCHARGE INSTR - COC
Continuity of Care Form    Patient Name: Joon Welch   :  1990  MRN:  [id number]    Admit date:  2022  Discharge date:  22      Code Status Order: Full Code   Advance Directives:      Admitting Physician:  Vika Joe MD  PCP: Bianka Gusman DO    Discharging Nurse: [facility]/Room#: 3728/6390-09  Discharging Unit Phone Number: ***    Emergency Contact:   Extended Emergency Contact Information  Primary Emergency Contact: Idalia Aparicio  Mobile Phone: [phone]  Relation: Brother/Sister  Preferred language: English   needed? No  Secondary Emergency Contact: Alisson Crystal  Mobile Phone: [phone]  Relation: Girlfriend  Preferred language: English   needed? No    Past Surgical History:  No past surgical history on file.     Immunization History:   Immunization History   Administered Date(s) Administered    COVID-19, Pfizer Purple top, DILUTE for use, 12+ yrs, 30mcg/0.3mL dose 2021, 09/15/2021    Pneumococcal Polysaccharide ([id number]) 2021    Tdap (Boostrix, Adacel) 2021, 2021, 2021       Active Problems:  Patient Active Problem List   Diagnosis Code    Chronic bilateral low back pain without sciatica M54.50, G89.29    Anxiety F41.9    Insomnia due to other mental disorder F51.05, F99    Poor dentition K08.9    Class 1 obesity due to excess calories with serious comorbidity and body mass index (BMI) of 34.0 to 34.9 in adult E66.09, Z68.34    Primary squamous cell carcinoma of tongue (HCC) C02.9    Recurrent major depressive disorder (HCC) F33.9    Prediabetes R73.03    Critical illness myopathy G72.81    Severe malnutrition (HCC) E43    Sepsis (HCC) A41.9    Pancytopenia ([address] D61.818       Isolation/Infection:   Isolation            Neutropenic          Patient Infection Status       None to display            Nurse Assessment:  Last Vital Signs: BP (!) 101/59   Pulse (!) 115   Temp 100.4 °F (38 °C) (Oral)   Resp 18   Wt 102 lb 6.4 oz (46.4 kg)   SpO2 93%   BMI 16.53 kg/m²     Last documented pain score (0-10 scale):    Last Weight:   Wt Readings from Last 1 Encounters:   06/24/22 102 lb 6.4 oz (46.4 kg)     Mental Status:  {IP PT MENTAL STATUS:27838}    IV Access:  { MAX IV ACCESS:388086823}    Nursing Mobility/ADLs:  Walking   {CHP DME FMJJ:546190549}  Transfer  {CHP DME PRYU:536009798}  Bathing  {CHP DME CRNN:656277679}  Dressing  {CHP DME DTPI:450031742}  Toileting  {CHP DME ZKNO:579197036}  Feeding  {P DME YYDM:759917089}  Med Admin  {P DME DZOV:612321097}  Med Delivery   { MAX MED Delivery:090277663}    Wound Care Documentation and Therapy:    Negative Pressure Wound Therapy Sacrum Mid (Active)   $ Standard NPWT >50 sq cm PER TX $ Yes 06/24/22 1140   Wound Type Pressure ulcer: Stage IV 06/29/22 1123   Unit Type hospital 06/29/22 1123   Dressing Type Other (Comment) 06/29/22 1123   Number of pieces used 4 06/29/22 1123   Number of pieces removed 4 06/29/22 1123   Cycle Continuous 06/29/22 1123   Target Pressure (mmHg) 125 06/29/22 1123   Intensity 1 06/29/22 1123   Irrigation Solution Sodium chloride 0.9% 06/29/22 1123   Instillation Volume  24 mL 06/29/22 1123   Soak Time  10 minutes 06/29/22 1123   Vac Frequency 3 hours 06/29/22 1123   Canister changed? No 06/29/22 1123   Dressing Status New dressing applied 06/29/22 1123   Dressing Changed Changed/New 06/29/22 1123   Drainage Amount Moderate 06/29/22 1123   Drainage Description Serosanguinous 06/29/22 1123   Dressing Change Due 07/01/22 06/29/22 1123   Output (ml) 175 ml 06/26/22 0355   Wound Assessment Slough;Granulation tissue 06/29/22 1123   Kristel-wound Assessment Blanchable erythema 06/29/22 1123   Shape oval 06/29/22 1123   Odor None 06/29/22 1123   Number of days: 18       Wound 06/08/22 Throat Right (Active)   Wound Image   06/24/22 1140   Wound Etiology Other 06/28/22 2250   Dressing Status Clean;Dry; Intact 06/28/22 2250   Wound Cleansed Cleansed with saline 06/28/22 2250   Dressing/Treatment Alginate with Ag; Foam 06/28/22 0939   Dressing Change Due 06/30/22 06/28/22 2250   Wound Length (cm) 2.5 cm 06/24/22 1140   Wound Width (cm) 3.7 cm 06/24/22 1140   Wound Depth (cm) 0.1 cm 06/24/22 1140   Wound Surface Area (cm^2) 9.25 cm^2 06/24/22 1140   Change in Wound Size % (l*w) 47.14 06/24/22 1140   Wound Volume (cm^3) 0.925 cm^3 06/24/22 1140   Wound Healing % 47 06/24/22 1140   Wound Assessment Pink/red 06/28/22 2250   Drainage Amount None 06/28/22 2250   Drainage Description Serosanguinous 06/28/22 2250   Odor None 06/28/22 2250   Kristel-wound Assessment Intact 06/28/22 2250   Margins Attached edges 06/28/22 2250   Wound Thickness Description not for Pressure Injury Partial thickness 06/28/22 2250   Number of days: 20       Wound 06/08/22 Radial Distal;Left (Active)   Wound Image   06/24/22 1140   Wound Etiology Traumatic 06/28/22 2250   Dressing Status Clean;Dry; Intact 06/28/22 2250   Wound Cleansed Cleansed with saline 06/28/22 2250   Dressing/Treatment Alginate with Ag; Foam 06/28/22 2250   Dressing Change Due 06/30/22 06/28/22 2250   Wound Length (cm) 6 cm 06/24/22 1140   Wound Width (cm) 1 cm 06/24/22 1140   Wound Depth (cm) 0.1 cm 06/24/22 1140   Wound Surface Area (cm^2) 6 cm^2 06/24/22 1140   Change in Wound Size % (l*w) 0 06/24/22 1140   Wound Volume (cm^3) 0.6 cm^3 06/24/22 1140   Wound Healing % 0 06/24/22 1140   Wound Assessment [place]/red;Slough 06/28/22 2250   Drainage Amount None 06/28/22 2250   Drainage Description Serosanguinous;Green 06/28/22 2250   Odor None 06/28/22 2250   Kristel-wound Assessment Blanchable erythema 06/28/22 2250   Margins Attached edges 06/28/22 2250   Wound Thickness Description not for Pressure Injury Partial thickness 06/28/22 2250   Number of days: 20       Wound 06/08/22 Knee Left;Posterior (Active)   Wound Image   06/24/22 1140   Wound Etiology Pressure Stage 3 06/28/22 2250   Dressing Status Clean;Dry; Intact 06/28/22 2250   Wound Cleansed Cleansed with saline 06/28/22 2250   Dressing/Treatment Alginate with Ag;Dry dressing 06/28/22 2250   Offloading for Diabetic Foot Ulcers Offloading ordered 06/28/22 2250   Dressing Change Due 06/30/22 06/28/22 2250   Wound Length (cm) 2.7 cm 06/24/22 1140   Wound Width (cm) 1 cm 06/24/22 1140   Wound Depth (cm) 0.1 cm 06/24/22 1140   Wound Surface Area (cm^2) 2.7 cm^2 06/24/22 1140   Change in Wound Size % (l*w) 69.14 06/24/22 1140   Wound Volume (cm^3) 0.27 cm^3 06/24/22 1140   Wound Healing % 85 06/24/22 1140   Wound Assessment Pink/red 06/28/22 2250   Drainage Amount None 06/28/22 2250   Drainage Description Serosanguinous 06/28/22 2250   Odor None 06/28/22 2250   Kristel-wound Assessment Blanchable erythema 06/28/22 2250   Margins Attached edges 06/28/22 2250   Wound Thickness Description not for Pressure Injury Partial thickness 06/28/22 2250   Number of days: 20       Wound 06/08/22 Sacrum (Active)   Wound Image   06/27/22 1346   Wound Etiology Pressure Stage 4 06/29/22 1123   Dressing Status New dressing applied 06/29/22 1123   Wound Cleansed Cleansed with saline 06/29/22 1123   Dressing/Treatment Negative pressure wound therapy 06/29/22 1123   Offloading for Diabetic Foot Ulcers Offloading ordered 06/29/22 1123   Dressing Change Due 07/01/22 06/29/22 1123   Wound Length (cm) 9.5 cm 06/27/22 1346   Wound Width (cm) 6.5 cm 06/27/22 1346   Wound Depth (cm) 1 cm 06/27/22 1346   Wound Surface Area (cm^2) 61.75 cm^2 06/27/22 1346   Change in Wound Size % (l*w) 28.28 06/27/22 1346   Wound Volume (cm^3) 61.75 cm^3 06/27/22 1346   Wound Healing % 58 06/27/22 1346   Undermining Starts ___ O'Clock 1100 06/28/22 2250   Undermining Ends___ O'Clock 200 06/28/22 2250   Undermining [place] Distance (cm) 1 06/28/22 2250   Wound Assessment Pink/red;Slough 06/29/22 1123   Drainage Amount Small 06/29/22 1123   Drainage Description Serosanguinous 06/29/22 1123   Odor Mild 06/29/22 1123   Kristel-wound Assessment Blanchable erythema 06/29/22 1123   Margins Attached edges 06/29/22 1123   Wound Thickness Description not for Pressure Injury Full thickness 06/29/22 1123   Number of days: 20            Recommend. Areas to right Neck/L wrist and left leg wounds: Cleanse with Cleanse with normal salinealine, pat dry. Apply Alginate AG  Cover with guaze and bordered foam dressing. Change daily.     (NPWT )   Negative pressure wound therapy @ continuous suction 125 mmHg. Dressing to be change 3 times a week. Undermining anterior wound 12:00 to 2:00. Canister to be changed as needed. NPWT should not be off more than 2 hours in transport. Stay in place during therapy. Patient needs pain medication when dressing changed. Change cannister once a week or when full  Not changed today. If suction fails or patient is discharged:  -remove vac dressing  -cleanse wound with normal saline  -pack wound with saline moistened guaze and change every 12 hours. Sacrum debriement done by Dr. Stacia Zazueta 05/28/22    Elimination:  Continence: Bowel: {YES / WB:94795}  Bladder: {YES / BW:99478}  Urinary Catheter: {Urinary Catheter:656421435}   Colostomy/Ileostomy/Ileal Conduit: {YES / NR:89034}       Date of Last BM: ***    Intake/Output Summary (Last 24 hours) at 6/24/2022 0854  Last data filed at 6/24/2022 0504  Gross per 24 hour   Intake 966 ml   Output 775 ml   Net 191 ml     I/O last 3 completed shifts:   In: 966 [NG/GT:966]  Out: 775 [Urine:775]    Safety Concerns:     [facility] [employer] Safety Concerns:833107404}    Impairments/Disabilities:      [facility] [employer] Impairments/Disabilities:275128738}    Nutrition Therapy:  Current Nutrition Therapy:   [facility] [employer] Diet List:317785290}    Routes of Feeding: {CHP DME Other Feedings:353634535}  Liquids: {Slp liquid thickness:33503}  Daily Fluid Restriction: {CHP DME Yes amt example:475165710}  Last Modified Barium Swallow with Video (Video Swallowing Test): {Done Not Done QUNT:089342678}    Treatments at the Time of Hospital Discharge:   Respiratory Treatments: ***  Oxygen Therapy:  {Therapy; copd oxygen:88961}  Ventilator:    {MH CC Vent RFTJ:869460984}    Rehab Therapies: {THERAPEUTIC INTERVENTION:5915548434}  Weight Bearing Status/Restrictions: [address] CC Weight Bearin}  Other Medical Equipment (for information only, NOT a DME order):  {EQUIPMENT:095728162}  Other Treatments: ***    Patient's personal belongings (please select all that are sent with patient):  {CHP DME Belongings:690759452}    RN SIGNATURE:  {Esignature:238640235}    CASE MANAGEMENT/SOCIAL WORK SECTION    Inpatient Status Date: 22    Readmission Risk Assessment Score:  Readmission Risk              Risk of Unplanned Readmission:  26           Discharging to Facility/ Agency   Name: [employer]  Address:   [facility]:[phone]  [facility]:[phone]    / signature: Electronically signed by Monique Yoon RN on 22 at 10:38 AM EDT    PHYSICIAN SECTION    Prognosis: Good    Condition at Discharge: Stable    Rehab Potential (if transferring to Rehab): Good    Recommended Labs or Other Treatments After Discharge:     Recommended Follow-up, Labs or Other Treatments After Discharge:      ID INFUSION ORDERS   Daptomycin 350mg (8mg/kg/24) q24 hours through 7/3/22  Meropenem 1g q8 through 7/3/22  Associated diagnoses   -complicated MRSA bacteremia; blood cultures collected at [facility] on 22 and 22 were negative  -MDR Pseudomonas UTI, +urine culture on 22  -ceftaroline associated agranulocytosis      Weekly while on IV abx - CBC diff, BUN, creatinine, LFTs, CK faxed to [phone]   Routine CVC care   DC PICC at completion of IV abx unless another provider requests it be maintained and assumes responsibility      Follow-up with Infectious Disease at [facility], call for appointment [phone]  Donna Dawson MD           Physician Certification: I certify the above information and transfer of Paul Kelly  is necessary for the continuing treatment of the diagnosis listed and that he requires [place] for less 30 days.      Update Admission H&P: No change in H&P    PHYSICIAN SIGNATURE:  Electronically signed by Xavier Diane MD on 6/30/22 at 10:46 AM EDT

## 2022-06-24 NOTE — PROGRESS NOTES
Pt a/o. Pt tolerated 1 unit PRBC this AM with no adverse reactions. Post transfusion hemoglobin 7.5. No bone marrow biopsy post complications noted. Wound care changed wound vac and writer changed all other dressings per order. Villatoro changed using sterile technique per order with no complications. Draining yellow, clear urine. Temp of 100.4 this morning, tylenol given and pt has been afebrile since. Dr. Lance Mullins was notified. Pt tolerating medications crushed  through tube and tolerating bolus feedings through tube. Pt stated no nausea; no emesis. Pt medicated x1 for pain per pt request and stated that it helped with the pain. Pt prefers to lay on left side using the wedge. Bed locked in lowest position; side rails 2/4; call light and bedside table within reach of pt.

## 2022-06-24 NOTE — OR NURSING
Image guided bone marrow biopsy w/aspiration biopsy completed by Dr. London Goodell. Pt tolerated procedure without any signs or symptoms of distress. Vital signs stable. Report given  to  RN. Pt transported back to [facility] in stable condition via stretcher. Received: Versed: 2 mg       Fentanyl: 100 mcg  Bandage to left lower back that is clean dry and intact. Patient to lay on left side for 1 hour.      Vital Signs  Vitals:    06/24/22 1217   BP: 113/70   Pulse: (!) 105   Resp: 17   Temp:    SpO2: 95%    (vital signs in table format)    Post Ailyn  2 - Able to move 4 extremities voluntarily on command  2 - BP+/- 20mmHg of normal  2 - Fully awake  2 - Able to maintain oxygen saturation >92% on room air  2 - Able to breathe deeply and cough freely

## 2022-06-24 NOTE — PROGRESS NOTES
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 Pt reported his girlfriend manages cooking, cleaning, laundry, grocery shopping. Pt not actively driving but was able to prior to 2022.    ST Dx: [] Aphasia  [x] Dysarthria  [] Apraxia   [x] Oropharyngeal dysphagia [x] Cognitive Impairment  [] Other:   Date of Admit: 2022  Room #: 0156/0156-01    Current functional status (updated daily):         Pt being seen for : [x] Speech/Language Treatment  [x] Dysphagia Treatment [x] Cognitive Treatment  [] Other:  Communication: []WFL  [] Aphasia  [x] Dysarthria  [] Apraxia  [] Pragmatic Impairment [] Non-verbal  [] Hearing Loss  [] Other:   Cognition: [] WFL  [] Mild  [x] Moderate  [] Severe [] Unable to Assess  [] Other:  Memory: [] WFL  [] Mild  [x] Moderate  [] Severe [] Unable to Assess  [] Other:  Behavior: [x] Alert  [x] Cooperative  [x]  Pleasant  [] Confused  [] Agitated  [] Uncooperative  [] Distractible [] Motivated  [] Self-Limiting [] Anxious  [] Other:  Endurance:  [x] Adequate for participation in SLP sessions  [] Reduced overall  [] Lethargic  [] Other:  Safety: [] No concerns at this time  [x] Reduced insight into deficits  [x]  Reduced safety awareness [] Not following call light procedures  [] Unable to Assess  [] Other:    Current Diet Order:No diet orders on file  Swallowing Precautions: oral care 2-3x/day to reduce adverse affects in the event of aspiration        Date: 2022      DISCHARGE SUMMARY      Total Timed Code Min 0    Total Treatment Minutes 0    Individual Treatment Minutes 0    Group Treatment Minutes 0 0   Co-Treat Minutes 0 0   Variance/Reason:   n/a   Pain \"my head hurts\"    Pain Intervention [x] RN notified  [] Repositioned  [] Intervention offered and patient declined  [] N/A  [] Other:  [] RN notified  [] Repositioned  [] Intervention offered and patient declined  [] N/A  [] Other:   Subjective         Objective:  Goals  Timeframe for Short-term Goals: 18 days (06/26/22)     Dysphagia Goals:  Goal 1: Pt will complete pharyngeal/laryngeal strengthening exercises 10/10 given min cues for overall improved swallowing function    Goal grossly met - Pt able to complete most pharyngeal / laryngeal strengthening exercises; occ needing to complete fewer reps due to fatigue. Pt still presented with severe oropharyngeal dysphagia despite being able to complete exercises. Goal met 06/20/22. MBSS completed. See separate speech therapy report for details. Goal 3: The patient/caregiver will demonstrate understanding of compensatory strategies for improved swallowing safety   Goal not met - Limited carryover of education; limited ability to sit fully upright for ice chip trials due to buttocks pain. Goal 4: The patient will tolerate recommended diet without observed clinical signs of aspiration   Goal not met - Pt continues to present with severe oropharyngeal dysphagia. Continue to recommend NPO with ice chips PRN with RN only (if pt is fully alert and upright)       Cognitive-linguistic Goals  Goal 1: Pt will complete recall tasks with 80% acc given min cues for use of compensatory strategies   Goal grossly met - Pt demonstrated improved recall abilities with use of compensatory strategies given min cues. Goal 2: Pt will complete executive function tasks (meds, math, money, time, etc) with 80% acc given min cues. Goal not met - Pt often needing mod-max cues to complete executive function tasks. Recommend assist with finances and meds at d/c.          Goal 3: Pt will complete problem solving and thought organization tasks with 80% acc given min cues. Goal not met - Pt did not demonstrate consistent progress with problem solving and thought organization. Overall reduced insight into deficits and reduced safety awareness. Goal 4: Pt will complete verbal and visual reasoning tasks with 80% acc given min cues. Goal not met - Pt did not demonstrate consistent progress with reasoning abilities, often needing increased cueing. Goal 5: Pt will repeat words/phrases/sentences using speech intelligibility strategies with 80% acc given min cues. Goal not met - Edu re: SLOB speech strategies. Pt with reduced ability to produce velar sounds (e.g. /k/ and /g/) due to lingual anatomy (s/p hemiglossectomy), thus limited progress made with overall intelligibility. Other areas targeted: N/a    Education:       Safety Devices: [] Call light within reach  [] Chair alarm activated  [] Bed alarm activated  [] Other: [] Call light within reach  [] Chair alarm activated  [] Bed alarm activated  [] Other:    Assessment: Pt had been pleasant to work with during therapy sessions, but overall with limited progress made. SLP edu pt re: SLOB speech strategies, but overall improvement with speech intelligibility limited due to lingual anatomy (s/p hemiglossectomy). Pt made some progress with cognitive goals, but overall did not demonstrate consistent progress. Recommend assist with meds and finances at d/c. Pt also continues to present with severe oropharyngeal dysphagia. Repeat MBSS completed 06/20/22 recommending continue NPO due to silent aspiration across consistencies. Recommend NPO with nutrition via PEG. Ice chips PRN with RN only (pt acuted off and is currently admitted to inpatient). Recommend 24 hr supervision and ongoing ST at d/c.    Plan: Continue as per plan of care.       Additional Information:     Barriers toward progress: Cognitive deficit, Impulsivity, Limited safety awareness, Limited insight into deficits, Unrealistic expectations and Medication managment  Discharge recommendations:  [] Home independently  [] Home with assistance [x]  24 hour supervision  [] ECF [] Other:  Continued Tx Upon Discharge: ? [x] Yes [] No [] TBD based on progress while on ARU [] Vital Stim indicated [] Other:   Estimated discharge date: 06/25/2022    Interventions used this date:  [] Speech/Language Treatment  [] Instruction in HEP [] Group [x] Dysphagia Treatment [x] Cognitive Treatment   [] Other:       Total Time Breakdown / Charges    Time In Time out Total Time / units   Cognitive Tx      Speech Tx      Dysphagia Tx          Electronically Signed by     Shabbir Riggs MA CCC-SLP #[id number]  Speech Language Pathologist

## 2022-06-24 NOTE — PROGRESS NOTES
Physician Progress Note      Susana Sauceda  CSN #:                  [id number]  :                       1990  ADMIT DATE:       2022 4:36 PM  [address] [place] DATE:  RESPONDING  PROVIDER #:        Gricelda Ferrer MD          QUERY TEXT:    Pt admitted with sepsis d/t possible UTI and has severe malnutrition   documented in H&P. Please review RD consult findings from 22 to support   severe malnutrition for your agreement:    The medical record reflects the following:  Risk Factors: Stage 4 squamous cell carcinoma tongue, PEG tube leaking and   replaced with G Tube    Clinical Indicators: Per RD progress note 22- \"Malnutrition Status:    Severe malnutrition (22 1408)  Context:  Chronic Illness   Findings of   the 6 clinical characteristics of malnutrition: Energy Intake:  Unable to   assess Weight Loss:  Greater than 20% over 1 year   Body Fat Loss:  Severe   body fat loss Orbital, Buccal region  Muscle Mass Loss:  Severe muscle mass   loss Temples (temporalis),Clavicles (pectoralis & deltoids),Hand   (interosseous) Fluid Accumulation:  Unable to assess   Strength:  Not   Performed\"  22- Bedscale wt 102 lb/BMI 16.5. Pt w/G tube and TF    Treatment: Goal TF & Flush Orders Provides: Bolus feeds of Jevity 1.5, 5 cans   daily to provide 1185 mL TV, 1775 kcal, 76 g protein, and 900 ml free water. +1 bottle of proteinex daily for additional 26 g protein and 104 kcal. +30 mL   free water before and after adminstration. ASPEN Criteria:    https://aspenjournals. onlinelibrary. gómez. com/doi/full/10.1177/428774660375981  5    Thank you,  [email or web address]  Options provided:  -- Severe Protein calorie malnutrition  -- Other - I will add my own diagnosis  -- Disagree - Not applicable / Not valid  -- Disagree - Clinically unable to determine / Unknown  -- Refer to Clinical Documentation Reviewer    PROVIDER RESPONSE TEXT:    This patient has severe protein calorie malnutrition. Query created by: Corinne Wade on 6/24/2022 11:21 AM      QUERY TEXT:    Pt admitted with sepsis d/t possible UTI. Pt noted to have chronic indwelling   urinary catheter \"mostly recently placed 6/6\" per ARU physician PN 6/22/22. If possible, please document in the progress notes and discharge summary if   you are evaluating and/or treating any of the following: The medical record reflects the following:  Risk Factors: pancytopenia, MRSA bacteremia, cancer tongue, SPCM    Clinical Indicators: H&P 6/23-\"Sepsis POA - based on fever to 101.6,   tachycardia, leukopenia, and possible early UTI - was dx yesterday on 6/22\"  Per ARU physician PN 6/22-\"Urinary Retention. ..continue antonio catheter, most   recently placed 6/6\"    Treatment: labs, abx, IVF, ongoing supportive care    Thank you,  [email or web address]  Options provided:  -- UTI due to chronic indwelling urinary catheter  -- UTI not due to indwelling urinary catheter  -- Other - I will add my own diagnosis  -- Disagree - Not applicable / Not valid  -- Disagree - Clinically unable to determine / Unknown  -- Refer to Clinical Documentation Reviewer    PROVIDER RESPONSE TEXT:    UTI is due to the chronic indwelling urinary catheter.     Query created by: Corinne Wade on 6/24/2022 11:35 AM      Electronically signed by:  Luke Larose MD 6/24/2022 2:44 PM

## 2022-06-24 NOTE — RT PROTOCOL NOTE
RT Inhaler-Nebulizer Bronchodilator Protocol Note    There is a bronchodilator order in the chart from a provider indicating to follow the RT Bronchodilator Protocol and there is an Initiate RT Inhaler-Nebulizer Bronchodilator Protocol order as well (see protocol at bottom of note). CXR Findings:  XR CHEST PORTABLE    Result Date: 6/22/2022  Near complete resolution of right-sided airspace infiltrates. The findings from the last RT Protocol Assessment were as follows:   History Pulmonary Disease: Smoker 15 pack years or more  Respiratory Pattern: Regular pattern and RR 12-20 bpm  Breath Sounds: Slightly diminished and/or crackles  Cough: Strong, spontaneous, non-productive  Indication for Bronchodilator Therapy:    Bronchodilator Assessment Score: 3    Aerosolized bronchodilator medication orders have been revised according to the RT Inhaler-Nebulizer Bronchodilator Protocol below. Respiratory Therapist to perform RT Therapy Protocol Assessment initially then follow the protocol. Repeat RT Therapy Protocol Assessment PRN for score 0-3 or on second treatment, BID, and PRN for scores above 3. No Indications - adjust the frequency to every 6 hours PRN wheezing or bronchospasm, if no treatments needed after 48 hours then discontinue using Per Protocol order mode. If indication present, adjust the RT bronchodilator orders based on the Bronchodilator Assessment Score as indicated below. Use Inhaler orders unless patient has one or more of the following: on home nebulizer, not able to hold breath for 10 seconds, is not alert and oriented, cannot activate and use MDI correctly, or respiratory rate 25 breaths per minute or more, then use the equivalent nebulizer order(s) with same Frequency and PRN reasons based on the score. If a patient is on this medication at home then do not decrease Frequency below that used at home.     0-3 - enter or revise RT bronchodilator order(s) to equivalent RT Bronchodilator order with Frequency of every 4 hours PRN for wheezing or increased work of breathing using Per Protocol order mode. 4-6 - enter or revise RT Bronchodilator order(s) to two equivalent RT bronchodilator orders with one order with BID Frequency and one order with Frequency of every 4 hours PRN wheezing or increased work of breathing using Per Protocol order mode. 7-10 - enter or revise RT Bronchodilator order(s) to two equivalent RT bronchodilator orders with one order with TID Frequency and one order with Frequency of every 4 hours PRN wheezing or increased work of breathing using Per Protocol order mode. 11-13 - enter or revise RT Bronchodilator order(s) to one equivalent RT bronchodilator order with QID Frequency and an Albuterol order with Frequency of every 4 hours PRN wheezing or increased work of breathing using Per Protocol order mode. Greater than 13 - enter or revise RT Bronchodilator order(s) to one equivalent RT bronchodilator order with every 4 hours Frequency and an Albuterol order with Frequency of every 2 hours PRN wheezing or increased work of breathing using Per Protocol order mode. RT to enter RT Home Evaluation for COPD & MDI Assessment order using Per Protocol order mode.     Electronically signed by Maldonado Washburn RCP on 6/23/2022 at 8:32 PM

## 2022-06-24 NOTE — PROGRESS NOTES
Comprehensive Nutrition Assessment    Type and Reason for Visit:  Initial,Positive Nutrition Screen    Nutrition Recommendations/Plan:   1. Continue bolus feeds of Jevity 1.5 x6 cans daily   2. +60 mL free water flush before and after administration - per MD  3. +1 bottle of proteinex daily, 30 mL free water flush before and after administration   4. Obtain updated weight   5. Monitor TF tolerance (abdominal pain, bloating, distention, diarrhea)  6. Monitor nutrition adequacy, pertinent labs, bowel habits, wt changes, and clinical progress     Malnutrition Assessment:  Malnutrition Status:  Severe malnutrition (06/24/22 1500)    Context:  Chronic Illness     Findings of the 6 clinical characteristics of malnutrition:  Weight Loss:  Greater than 20% over 1 year     Body Fat Loss:  Severe body fat loss Orbital,Buccal region   Muscle Mass Loss:  Severe muscle mass loss Temples (temporalis),Clavicles (pectoralis & deltoids),Hand (interosseous)    Nutrition Assessment:    Positive nutrition screen for home TF: 28 y.o. M with PMHx sig for squamous cell cancer of the tongue s/p resection and radical neck dissection 1/22. Pt previously in [facility], [facility]. Oncology following for Pancytopenia. S/p bone marrow biopsy today. Pt unavailable after multiple attempts today. Pt started on bolus feeds of Jevity 1.5 x 5 cans daily on 6/13 via PEG. RD increased bolus feeds to 6 cans daily on 6/21 to promote wound healing and assist in weight gain, pt tolerating well. No N/V reported. Severe malnutrition and significant wt loss noted, pt was 161 lb on 1/22 and currently 102 lb (-37% wt change 6 months). Will continue to monitor weights. Continue current recommendations, will continue to monitor. Nutrition Related Findings:    Na 134. . + BM today.  Wound Type: Pressure Injury,Stage II,Stage III,Stage IV,Wound Vac       Current Nutrition Intake & Therapies:    Average Meal Intake: NPO  Average Supplements Intake: NPO  Diet NPO  ADULT TUBE FEEDING; PEG; Standard with Fiber; Bolus; 6 Times Daily; 237; 60; Before and after each bolus; Protein; 1 bottle of Proteinex daily, 30 mL free water flush before and after adminstration  Current Tube Feeding (TF) Orders:  · Feeding Route: PEG  · Formula: Standard with Fiber  · Schedule: Bolus  · Additives/Modulars: Protein  · Goal TF & Flush Orders Provides: Bolus feeds of Jevity 1.5, 6 cans daily to provide 1185 ml TV, 1775 kcals, 76 g protein and 900 ml free water. + 1 bottles of proteinex daily for an additional 104 kcal and 26 g protein. + 30 ml free water before and after administration. Anthropometric Measures:  Height: 5' 6\" (167.6 cm)  Ideal Body Weight (IBW): 142 lbs (65 kg)    Admission Body Weight: 110 lb (49.9 kg)  Current Body Weight: 102 lb (46.3 kg), 71.8 % IBW.  Weight Source: Bed Scale  Current BMI (kg/m2): 16.5  Usual Body Weight: 250 lb (113.4 kg) (June 2021)  % Weight Change (Calculated): -59.2                 BMI Categories: Underweight (BMI less than 18.5)    Estimated Daily Nutrient Needs:  Energy Requirements Based On: Kcal/kg (35-40 kcals/kg)  Weight Used for Energy Requirements: Current (50 kg)  Energy (kcal/day): 9225-7183 kcals  Weight Used for Protein Requirements: Current (1.5-2 g/kg)  Protein (g/day):  g  Method Used for Fluid Requirements: 1 ml/kcal    Nutrition Diagnosis:   Severe malnutrition related to catabolic illness,inadequate protein-energy intake as evidenced by NPO or clear liquid status due to medical condition,BMI,weight loss,weight loss greater than or equal to 20% in 1 year,severe loss of subcutaneous fat,severe muscle loss,Criteria as identified in malnutrition assessment    Nutrition Interventions:   Food and/or Nutrient Delivery: Continue Current Tube Feeding  Nutrition Education/Counseling: No recommendation at this time  Coordination of Nutrition Care: Continue to monitor while inpatient,Interdisciplinary Rounds       Goals:  Previous Goal Met: Progressing toward Goal(s)  Goals: Tolerate nutrition support at goal rate,prior to discharge       Nutrition Monitoring and Evaluation:   Behavioral-Environmental Outcomes: None Identified  Food/Nutrient Intake Outcomes: Enteral Nutrition Intake/Tolerance  Physical Signs/Symptoms Outcomes: Biochemical Data,Nutrition Focused Physical Findings,Weight    Discharge Planning:     Too soon to determine     Padmini Esteban [address], [address] [place] [address],   Contact: Office: [phone]; [place]: [phone]

## 2022-06-24 NOTE — PROGRESS NOTES
PROGRESS NOTE  S:31 yrs Patient  admitted on 6/23/2022 with Sepsis ([facility] [address] [A41.9] . Today he complains of leakage around G tube    Exam:   Vitals:    06/24/22 0300   BP: (!) 94/54   Pulse: (!) 106   Resp: 18   Temp: 98.1 °F (36.7 °C)   SpO2: 92%      General appearance: alert, appears older than stated age and cachectic  HEENT: Sclera clear, anicteric  Neck: supple  Lungs: clear to auscultation bilaterally  Heart: regular rate and rhythm  Abdomen: soft, G tube site with moderate discharge  Extremities: edema none     Medications: Reviewed    Labs:  CBC:   Recent Labs     06/22/22  0650 06/22/22  0931 06/24/22  0600   WBC 0.3* 0.3* 0.2*   HGB 6.6* 6.6* 6.4*   HCT 19.6* 19.7* 18.7*   MCV 86.9 86.7 86.2   PLT 96* 100* 110*     BMP:   Recent Labs     06/22/22  0650 06/24/22  0600   * 134*   K 4.6 4.5   CL 97* 99   CO2 33* 28   BUN 28* 32*   CREATININE <0.5* 0.6*     LIVER PROFILE: No results for input(s): AST, ALT, LIPASE, PROT, BILIDIR, BILITOT, ALKPHOS in the last 72 hours. Invalid input(s): AMYLASE,  ALB  PT/INR:   No results for input(s): INR in the last 72 hours. 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 His G tube appears to be functioning well but with leakage around it, resolved post replacement. He has Pancytopenia and denies hematochezia/melena    Recommendation:  1. Continue supportive care  2. Hematology is following  3. Continue TFs per dietitian recs  4. Keep HOB elevated during PEG use  5. The PEG tube was replaced on 6/14 w a shorter 2 cm ADITI-Key low profile G tube at bedside w improvement in leaking  6.  May later have to exchange it w a 2.5 cm tube if he gains weight        Amber Rhodes MD, MD  7:35 AM 6/24/2022

## 2022-06-25 LAB
ANION GAP SERPL CALCULATED.3IONS-SCNC: 6 MMOL/L (ref 3–16)
BUN BLDV-MCNC: 36 MG/DL (ref 7–20)
CALCIUM SERPL-MCNC: 8.8 MG/DL (ref 8.3–10.6)
CHLORIDE BLD-SCNC: 102 MMOL/L (ref 99–110)
CO2: 29 MMOL/L (ref 21–32)
CREAT SERPL-MCNC: 0.6 MG/DL (ref 0.9–1.3)
GFR AFRICAN AMERICAN: >60
GFR NON-AFRICAN AMERICAN: >60
GLUCOSE BLD-MCNC: 141 MG/DL (ref 70–99)
HCT VFR BLD CALC: 21.4 % (ref 40.5–52.5)
HEMATOLOGY PATH CONSULT: YES
HEMOGLOBIN: 7.4 G/DL (ref 13.5–17.5)
MCH RBC QN AUTO: 28.9 PG (ref 26–34)
MCHC RBC AUTO-ENTMCNC: 34.4 G/DL (ref 31–36)
MCV RBC AUTO: 83.9 FL (ref 80–100)
PDW BLD-RTO: 15.7 % (ref 12.4–15.4)
PLATELET # BLD: 120 K/UL (ref 135–450)
PLATELET SLIDE REVIEW: ABNORMAL
PMV BLD AUTO: 7.4 FL (ref 5–10.5)
POTASSIUM REFLEX MAGNESIUM: 4.3 MMOL/L (ref 3.5–5.1)
RBC # BLD: 2.55 M/UL (ref 4.2–5.9)
REPORT: NORMAL
SLIDE REVIEW: ABNORMAL
SODIUM BLD-SCNC: 137 MMOL/L (ref 136–145)
WBC # BLD: 0.2 K/UL (ref 4–11)

## 2022-06-25 PROCEDURE — 80048 BASIC METABOLIC PNL TOTAL CA: CPT

## 2022-06-25 PROCEDURE — 99232 SBSQ HOSP IP/OBS MODERATE 35: CPT | Performed by: INTERNAL MEDICINE

## 2022-06-25 PROCEDURE — 85025 COMPLETE CBC W/AUTO DIFF WBC: CPT

## 2022-06-25 PROCEDURE — 6360000002 HC RX W HCPCS: Performed by: INTERNAL MEDICINE

## 2022-06-25 PROCEDURE — 1200000000 HC SEMI PRIVATE

## 2022-06-25 PROCEDURE — 6370000000 HC RX 637 (ALT 250 FOR IP): Performed by: INTERNAL MEDICINE

## 2022-06-25 PROCEDURE — 2580000003 HC RX 258: Performed by: INTERNAL MEDICINE

## 2022-06-25 RX ADMIN — Medication 5 MG: at 21:10

## 2022-06-25 RX ADMIN — Medication: at 08:11

## 2022-06-25 RX ADMIN — GABAPENTIN 100 MG: 100 CAPSULE ORAL at 21:11

## 2022-06-25 RX ADMIN — QUETIAPINE FUMARATE 50 MG: 25 TABLET ORAL at 21:11

## 2022-06-25 RX ADMIN — VENLAFAXINE HYDROCHLORIDE 75 MG: 37.5 CAPSULE, EXTENDED RELEASE ORAL at 08:10

## 2022-06-25 RX ADMIN — MEROPENEM 1000 MG: 1 INJECTION, POWDER, FOR SOLUTION INTRAVENOUS at 06:37

## 2022-06-25 RX ADMIN — Medication 100 MG: at 08:11

## 2022-06-25 RX ADMIN — GABAPENTIN 100 MG: 100 CAPSULE ORAL at 08:11

## 2022-06-25 RX ADMIN — OXYCODONE 10 MG: 5 TABLET ORAL at 09:14

## 2022-06-25 RX ADMIN — MEROPENEM 1000 MG: 1 INJECTION, POWDER, FOR SOLUTION INTRAVENOUS at 13:11

## 2022-06-25 RX ADMIN — Medication: at 21:11

## 2022-06-25 RX ADMIN — DAPTOMYCIN 350 MG: 500 INJECTION, POWDER, LYOPHILIZED, FOR SOLUTION INTRAVENOUS at 21:04

## 2022-06-25 RX ADMIN — Medication 400 MG: at 21:11

## 2022-06-25 RX ADMIN — MEROPENEM 1000 MG: 1 INJECTION, POWDER, FOR SOLUTION INTRAVENOUS at 22:10

## 2022-06-25 RX ADMIN — FOLIC ACID 1 MG: 1 TABLET ORAL at 08:11

## 2022-06-25 RX ADMIN — Medication 400 MG: at 08:11

## 2022-06-25 RX ADMIN — SODIUM CHLORIDE, PRESERVATIVE FREE 10 ML: 5 INJECTION INTRAVENOUS at 08:11

## 2022-06-25 RX ADMIN — SODIUM CHLORIDE: 9 INJECTION, SOLUTION INTRAVENOUS at 13:11

## 2022-06-25 RX ADMIN — ACETAMINOPHEN 650 MG: 325 TABLET ORAL at 10:52

## 2022-06-25 RX ADMIN — GABAPENTIN 100 MG: 100 CAPSULE ORAL at 13:02

## 2022-06-25 RX ADMIN — OXYCODONE HYDROCHLORIDE 7.5 MG: 15 TABLET ORAL at 04:39

## 2022-06-25 RX ADMIN — OXYCODONE HYDROCHLORIDE 7.5 MG: 15 TABLET ORAL at 17:12

## 2022-06-25 RX ADMIN — OXYCODONE HYDROCHLORIDE 7.5 MG: 15 TABLET ORAL at 21:11

## 2022-06-25 RX ADMIN — DOCUSATE SODIUM 50 MG AND SENNOSIDES 8.6 MG 2 TABLET: 8.6; 5 TABLET, FILM COATED ORAL at 21:11

## 2022-06-25 RX ADMIN — Medication: at 08:10

## 2022-06-25 RX ADMIN — OXYCODONE 10 MG: 5 TABLET ORAL at 13:11

## 2022-06-25 RX ADMIN — FILGRASTIM-AAFI 300 MCG: 300 INJECTION, SOLUTION SUBCUTANEOUS at 17:40

## 2022-06-25 RX ADMIN — Medication: at 13:09

## 2022-06-25 RX ADMIN — SODIUM CHLORIDE, PRESERVATIVE FREE 10 ML: 5 INJECTION INTRAVENOUS at 21:04

## 2022-06-25 ASSESSMENT — PAIN SCALES - GENERAL
PAINLEVEL_OUTOF10: 9
PAINLEVEL_OUTOF10: 9
PAINLEVEL_OUTOF10: 10
PAINLEVEL_OUTOF10: 8
PAINLEVEL_OUTOF10: 10

## 2022-06-25 ASSESSMENT — PAIN DESCRIPTION - LOCATION
LOCATION: HIP
LOCATION: SACRUM
LOCATION: BUTTOCKS
LOCATION: HIP;BUTTOCKS

## 2022-06-25 ASSESSMENT — PAIN DESCRIPTION - ORIENTATION
ORIENTATION: RIGHT
ORIENTATION: RIGHT

## 2022-06-25 NOTE — PROGRESS NOTES
ONCOLOGY HEMATOLOGY CARE PROGRESS NOTE      SUBJECTIVE:     The pt is s/p bone marrow asp and bx . Complains of leakage around G tube. Seen by GI yest. Also received 1 unit prbc. No new system complaints. Heme Hx:    Mr. Celena Mcneill  is a 81-year-old male status post hemiglossectomy for head neck carcinoma. He is now in a rehabilitation facility due to MRSA. He was recently changed from ceftriaxone to daptomycin. I was asked to see him for pancytopenia    ROS:     Constitutional:  No weight loss, No fever, No chills, No night sweats. Energy level poor. Eyes:  No impairment or change in vision  ENT / Mouth:  No pain, abnormal ulceration, bleeding, nasal drip.pos change in voice   Cardiovascular:  No chest pain, palpitations, new edema, or calf discomfort  Respiratory:  No pain, hemoptysis, change to breathing  Gastrointestinal:  No pain, cramping, jaundice, change to eating and bowel habits  Urinary:  No pain, bleeding or change in continence  Genitalia: No pain, bleeding or discharge  Musculoskeletal:  No redness, pain, edema or weakness  Skin:  No pruritus, rash, change to nodules or lesions Wound on sacrum  Neurologic:  No discomfort, change in mental status, speech, sensory or motor activity  Psychiatric:  No change in concentration or change to affect or mood  Endocrine:  No hot flashes, increased thirst, or change to urine production  Hematologic: No petechiae, ecchymosis or bleeding  Lymphatic:  No lymphadenopathy or lymphedema  Allergy / Immunologic:  No eczema, hives, frequent or recurrent infections    OBJECTIVE        Physical    VITALS:  /64   Pulse (!) 102   Temp 97.6 °F (36.4 °C) (Oral)   Resp 16   Ht 5' 6\" (1.676 m)   Wt 102 lb 6.4 oz (46.4 kg)   SpO2 96%   BMI 16.53 kg/m²   TEMPERATURE:  Current - Temp: 97.6 °F (36.4 °C);  Max - Temp  Av.2 °F (36.8 °C)  Min: 97.5 °F (36.4 °C)  Max: 100.1 °F (37.8 °C)  PULSE OXIMETRY RANGE: SpO2  Avg: 96.1 %  Min: 91 %  Max: 100 %  24HR INTAKE/OUTPUT:      Intake/Output Summary (Last 24 hours) at 6/25/2022 0933  Last data filed at 6/25/2022 0815  Gross per 24 hour   Intake 2392.92 ml   Output 2175 ml   Net 217.92 ml       CONSTITUTIONAL: Chronically ill-appearing, no acute distress. Cachectic  EYES: Sclera clear, conjunctiva normal  ENT: Oral pharynx unremarkable with moist mucus membranes  LUNGS: Clear to auscultation. No increased labor with breathing. CARDIOVASCULAR: Regular rate and rhythm, normal S1 and S2, no S3 or S4, and no murmur noted. ABDOMEN: Soft, non-distended, non-tender. Small amount of leakage about the G-tube  MUSCULOSKELETAL: There is no redness, warmth, or swelling of the joints. NEUROLOGIC: Awake, alert. Grossly non-focal.  SKIN: No bruising or bleeding. PSYCH: Normal affect. Data  Recent Labs     06/25/22  0640 06/24/22  1455 06/24/22  0600 06/22/22  0931 06/22/22  0931 06/22/22  0650 06/20/22  0630 06/17/22  0600 06/17/22  0600 06/15/22  0650 06/15/22  0650   WBC 0.2*  --  0.2*  --  0.3*   < > 2.4*   < > 4.0   < > 3.2*   NEUTROABS  --   --   --   --   --   --  1.5*  --  3.1  --  2.5   LYMPHOPCT  --   --   --   --   --   --  11.9  --  7.2  --  5.8   RBC 2.55*  --  2.17*  --  2.27*   < > 2.43*   < > 2.46*   < > 2.54*   HGB 7.4* 7.5* 6.4*   < > 6.6*   < > 7.1*   < > 7.1*   < > 7.3*   HCT 21.4* 22.1* 18.7*   < > 19.7*   < > 21.2*   < > 21.1*   < > 21.6*   MCV 83.9  --  86.2  --  86.7   < > 86.9   < > 85.8   < > 85.1   MCH 28.9  --  29.4  --  29.2   < > 29.2   < > 28.8   < > 28.9   MCHC 34.4  --  34.1  --  33.7   < > 33.6   < > 33.5   < > 34.0   RDW 15.7*  --  15.2  --  15.5*   < > 15.6*   < > 15.5*   < > 15.1   *  --  110*  --  100*   < > 79*   < > 112*   < > 117*    < > = values in this interval not displayed.         Recent Labs     06/24/22  0600 06/25/22  0640   * 137   K 4.5 4.3   CL 99 102   CO2 28 29   BUN 32* 36*   CREATININE 0.6* 0.6*     No results for input(s): AST, ALT, ALB, BILIDIR, BILITOT, ALKPHOS in the last 72 hours. Magnesium:    Lab Results   Component Value Date    MG 2.00 06/13/2022    MG 1.70 06/10/2022       Imaging  CT GUIDED NEEDLE PLACEMENT  Narrative: PROCEDURE:  CT GUIDED BONE MARROW ASPIRATION AND CORE NEEDLE BONE BIOPSY OF THE LEFT  ILIAC BONE. MODERATE CONSCIOUS SEDATION    6/24/2022    HISTORY:  ORDERING SYSTEM PROVIDED HISTORY: pancytopenia  TECHNOLOGIST PROVIDED HISTORY:  Culture  Flow cytometry  Cytogenetics  MDS and AML panel. Reason for exam:->pancytopenia  Reason for Exam: pancytopenia; ORDERING SYSTEM PROVIDED HISTORY: pancytopenia  TECHNOLOGIST PROVIDED HISTORY:  Culture  Flow cytometry  Cytogenetics  MDS and AML panel. Reason for exam:->pancytopenia    SEDATION:  Moderate sedation was ordered and supervised by the attending with physician  face-to-face monitoring. Medications were provided and recorded by Radiology  nurses. A total of 2 mg of Versed and 100 mcg of fentanyl was used for  conscious sedation during the procedure, with total conscious sedation time  of 17 minutes. TECHNIQUE:  Informed consent was obtained following a detailed explanation of the  procedure including risks, benefits, and alternatives. Universal protocol  was followed. Sterile gowns, masks, hats and gloves utilized for maximal  sterile barrier. Axial images were obtained through the iliac bones using CT  guidance and a suitable skin site was prepped and draped in sterile fashion. Local anesthesia was achieved with lidocaine. An 11 gauge [employer] bone  marrow biopsy needle was advanced into the left iliac bone and approximately  10 mL of bone marrow aspirate was obtained. A single core biopsy specimen  was obtained and the patient tolerated the procedure well. Hemostasis was  achieved with digital pressure. The site was cleaned, and a dry sterile  dressing was applied.     Dose modulation, iterative reconstruction, and/or weight based adjustment of  the mA/kV was utilized to reduce the radiation dose to as low as reasonably  achievable. Total exam dose linear product was 389.33 mGy per cm. Impression: Successful CT guided bone marrow aspiration and core biopsy of the left iliac  bone. CT BIOPSY BONE MARROW  Narrative: PROCEDURE:  CT GUIDED BONE MARROW ASPIRATION AND CORE NEEDLE BONE BIOPSY OF THE LEFT  ILIAC BONE. MODERATE CONSCIOUS SEDATION    6/24/2022    HISTORY:  ORDERING SYSTEM PROVIDED HISTORY: pancytopenia  TECHNOLOGIST PROVIDED HISTORY:  Culture  Flow cytometry  Cytogenetics  MDS and AML panel. Reason for exam:->pancytopenia  Reason for Exam: pancytopenia; ORDERING SYSTEM PROVIDED HISTORY: pancytopenia  TECHNOLOGIST PROVIDED HISTORY:  Culture  Flow cytometry  Cytogenetics  MDS and AML panel. Reason for exam:->pancytopenia    SEDATION:  Moderate sedation was ordered and supervised by the attending with physician  face-to-face monitoring. Medications were provided and recorded by Radiology  nurses. A total of 2 mg of Versed and 100 mcg of fentanyl was used for  conscious sedation during the procedure, with total conscious sedation time  of 17 minutes. TECHNIQUE:  Informed consent was obtained following a detailed explanation of the  procedure including risks, benefits, and alternatives. Universal protocol  was followed. Sterile gowns, masks, hats and gloves utilized for maximal  sterile barrier. Axial images were obtained through the iliac bones using CT  guidance and a suitable skin site was prepped and draped in sterile fashion. Local anesthesia was achieved with lidocaine. An 11 gauge [employer] bone  marrow biopsy needle was advanced into the left iliac bone and approximately  10 mL of bone marrow aspirate was obtained. A single core biopsy specimen  was obtained and the patient tolerated the procedure well. Hemostasis was  achieved with digital pressure. The site was cleaned, and a dry sterile  dressing was applied.     Dose modulation, iterative reconstruction, and/or weight based adjustment of  the mA/kV was utilized to reduce the radiation dose to as low as reasonably  achievable. Total exam dose linear product was 389.33 mGy per cm. Impression: Successful CT guided bone marrow aspiration and core biopsy of the left iliac  bone. XR CHEST PORTABLE  Narrative: EXAMINATION:  ONE XRAY VIEW OF THE CHEST    6/24/2022 5:55 am    COMPARISON:  06/22/2022, 06/15/2022    HISTORY:  ORDERING SYSTEM PROVIDED HISTORY: SEPSIS  TECHNOLOGIST PROVIDED HISTORY:  Reason for exam:->SEPSIS  Reason for Exam: SEPSIS    FINDINGS:  A single frontal view of the chest was performed. There is no acute skeletal  abnormality. There are multiple surgical clips overlying the neck soft  tissues. A left arm PICC is in place, with tip overlying the cavoatrial  junction. The heart size and mediastinal contours are stable, and within  normal limits. There are curvilinear opacities within both lungs, which  could represent either interstitial pulmonary edema or atypical pneumonia. No focus of acute airspace consolidation is identified. There is no evidence  of a pneumothorax. Impression: Curvilinear opacities throughout both lungs, which could represent either  mild interstitial pulmonary edema and/or atypical pneumonia.       Problem List  Patient Active Problem List   Diagnosis    Chronic bilateral low back pain without sciatica    Anxiety    Insomnia due to other mental disorder    Poor dentition    Class 1 obesity due to excess calories with serious comorbidity and body mass index (BMI) of 34.0 to 34.9 in adult    Primary squamous cell carcinoma of tongue (HCC)    Recurrent major depressive disorder (HCC)    Prediabetes    Critical illness myopathy    Severe malnutrition (HCC)    Sepsis (HCC)    Pancytopenia (HCC)       ASSESSMENT AND PLAN:    Pancytopenia:  -It is a little unusual for the platelets to recover prior to the white blood count; however this is not unheard of  -S/P bone marrow biopsy 6/24 to address his persistent neutropenia despite stopping his antibiotics  -Path pend  -B12, folate nl, iron low but more c/w anemia of chronic dz (TIBC low), ferritin increased  -Heparin-induced thrombocytopenia panel is negative  -Haptoglobin elevated c/w acute phase reactant and direct Abida is neg  -Bone marrow will also be cultured  -We will go ahead and start Neupogen today. The rationale for the use of this growth factor support agent as well as its side effects were discussed. -remains on eliquis.  Monitor for bleeding  -Cont folvite        ONCOLOGIC DISPOSITION:  -Once he is not neutropenic    Yo Lundberg MD, MPH  Please contact through [facility]

## 2022-06-25 NOTE — PROGRESS NOTES
Infectious Disease Follow up Notes    CC :  MRSA bacteremia      Antibiotics:   dapto 8mg/kg/24 (350mg), s 6/22/22  Meropenem 1g q8    Admit Date:   6/23/2022  Hospital Day: 3    Subjective:   He says he is feeling better. Some intermittent leakage around G tube site per RN   24h Tm 100.1 ~4am, currently AF     Objective:     Patient Vitals for the past 8 hrs:   BP Temp Temp src Pulse Resp SpO2   06/25/22 1150 101/63 97.4 °F (36.3 °C) Oral 88 16 93 %   06/25/22 0808 104/64 97.6 °F (36.4 °C) Oral (!) 102 16 96 %       Pale, chronically ill appearing male. More alert today, NAD  Dysarthric speech  Cachectic   Wound anterior neck without local signs of infection   Tachycardic, regular   Decreased breath sounds at bases suman  Abd scaphoid, soft.   Scant dermatitis around PEG site without drainage   No focal rash  Sacral wound VAC    ACCESS  LUE PICC  Villatoro       Scheduled Meds:   filgrastim-aafi  300 mcg SubCUTAneous QPM    sodium chloride flush  5-40 mL IntraVENous 2 times per day    meropenem  1,000 mg IntraVENous Q8H    [Held by provider] apixaban  5 mg Per G Tube BID    daptomycin (CUBICIN) IVPB  8 mg/kg IntraVENous [id number]    folic acid  1 mg Per G Tube Daily    gabapentin  100 mg Per G Tube TID    magnesium oxide  400 mg Per G Tube BID    melatonin  5 mg Per G Tube Nightly    QUEtiapine  50 mg Oral Nightly    saliva substitute   Oral Q6H WA    sennosides-docusate sodium  2 tablet Per G Tube Nightly    thiamine  100 mg Per G Tube Daily    venlafaxine  75 mg Oral Daily with breakfast    zinc oxide   Topical BID       Continuous Infusions:   sodium chloride      sodium chloride 10 mL/hr at 06/25/22 1311    sodium chloride      sodium chloride            Data Review:    Lab Results   Component Value Date    WBC 0.2 (LL) 06/25/2022    HGB 7.4 (L) 06/25/2022    HCT 21.4 (L) 06/25/2022    MCV 83.9 06/25/2022     (L) 06/25/2022     Lab Results   Component Value Date    CREATININE 0.6 (L) 06/25/2022    BUN 36 (H) 06/25/2022     06/25/2022    K 4.3 06/25/2022     06/25/2022    CO2 29 06/25/2022       Hepatic Function Panel:   Lab Results   Component Value Date    ALKPHOS 108 02/06/2022    ALT 17 02/06/2022    AST 17 02/06/2022    PROT 7.1 02/06/2022    BILITOT 0.4 02/06/2022    LABALBU 2.4 06/11/2022         MICRO:  6/22 UA 6-9wbc, 50-100rbc, >100k Ps aeruginosa, ADITI pending    BC x2 NGTD   6/23 BC x2 NGTD  6/24 BM aspirate culture with S epi and E faecalis      Sputum and BAL cultures on 5/2/22 E cloacae, K pneumoniae, pneumococcus       IMAGING:  CXR 6/22/22  Impression   Near complete resolution of right-sided airspace infiltrates. CXR 6/24/22  Impression   Curvilinear opacities throughout both lungs, which could represent either   mild interstitial pulmonary edema and/or atypical pneumonia. Echo 6/23/22   Summary   Limited echo for LV function/MRSA with limited doppler/color. -- Normal left ventricle systolic function with an estimated ejection   fraction of 55-60%. Left ventricular systolic function is normal with a 3D   calculated EF of 55%. EF by Khan's method estimated at 58%. No regional   wall motion abnormalities are seen. Diastolic dysfunction grade and filling pressure are indeterminate. -- The right ventricle is normal in size and function. -- Systolic pulmonary artery pressure (sPAP) is normal and estimated at 22   mmHg (right atrial pressure 3 mmHg)   -- No obvious atrial masses   - Elevated heart rate throughout study.       Assessment:     Patient Active Problem List    Diagnosis Date Noted    Sepsis ([facility] [address] 06/23/2022     Priority: Medium    Pancytopenia ([address] 06/23/2022     Priority: Medium    Severe malnutrition ([facility] [address] 06/09/2022     Priority: Medium    Critical illness myopathy 06/08/2022     Priority: Medium    Recurrent major depressive disorder ([facility] [address] 01/25/2022    Pseudomonas CAUTI vs bacteriuria     +Bacterial culture on the BM aspirate     FREEMAN PE     No abx allergies     Plan:     Continue dapto. Baseline CK 6/24/22 is wnl   Note, dapto is not active in lung parenchyma though at this point, I don't think we are still treating pneumonia per se. At this point, do not think we need a second MRSA drug, but will continue to revisit that decision depending on course. I am not sure if he failed vanc (as one option). I would think we should avoid linezolid for same concerns of myelosuppression    Continue meropenem for Psa UTI, the isolate was sensitive  Villatoro was changed     Not sure what to make of the gram positives present in the BM aspirate, as BC collected 1d earlier are negative to date. Both organisms should be covered by dapto.   BM path is pending       D/w MARSHAL Raman MD  Phone: [phone]   Fax : [phone]

## 2022-06-25 NOTE — PROGRESS NOTES
[email or web address]  Nephrology follow-up note           Reason for Consult: Hyponatremia  Requesting Physician:  Dr. Iman Falcon history  Resting    BM bx on 6/24    ROS:   T max 100.1 F    Scheduled Meds:   filgrastim-aafi  300 mcg SubCUTAneous QPM    sodium chloride flush  5-40 mL IntraVENous 2 times per day    meropenem  1,000 mg IntraVENous Q8H    [Held by provider] apixaban  5 mg Per G Tube BID    daptomycin (CUBICIN) IVPB  8 mg/kg IntraVENous [id number]    folic acid  1 mg Per G Tube Daily    gabapentin  100 mg Per G Tube TID    magnesium oxide  400 mg Per G Tube BID    melatonin  5 mg Per G Tube Nightly    QUEtiapine  50 mg Oral Nightly    saliva substitute   Oral Q6H WA    sennosides-docusate sodium  2 tablet Per G Tube Nightly    thiamine  100 mg Per G Tube Daily    venlafaxine  75 mg Oral Daily with breakfast    zinc oxide   Topical BID     Continuous Infusions:   sodium chloride      sodium chloride 10 mL/hr at 06/25/22 1311    sodium chloride      sodium chloride       PRN Meds:.sodium chloride, sodium chloride flush, sodium chloride, acetaminophen **OR** acetaminophen, sodium chloride, bisacodyl, sodium chloride, acetaminophen, albuterol, clonazePAM, Lip Balm, oxyCODONE, oxyCODONE, perflutren lipid microspheres, sodium chloride, sodium chloride flush    History of Present Illness on 6/9/2022:    28 y.o. yo male with PMH of squamous cell carcinoma of the tongue (s/p right hemiglossectomy, b/l ND, and RFFF reconstruction on 1/3/22, radiation, and weekly cisplatin completed 4/25/22) who is admitted to ARU from  for rehabilitation. Nephrology has been consulted for hyponatremia and hyperkalemia  Patient is being fed through the feeding tube. His sodium on 6/8 at [facility] was 133 and has been around 130-135 in June.   Earlier in admission, he was hypernatremic in the 149 range as well    He was admitted at [facility] from 5/2/22 and had MRSA bacteremia from infected port s/p removal. Per  Mickey Malone, while at [facility],  \"pt had right hydropneumothorax with worsening hypoxia requiring multiple intubations and chest tube placements. He underwent right internal jugular and right atrial thrombectomy due to concern that this was causing ongoing bacteremia. ID was consulted and he is to remain on IV ceftaroline with plan for long term treatment until 7/2/22. His course was complicated by severe penile edema and antonio was placed. Urology followed during acute stay. Patient failed several voiding trials    Physical exam:   Constitutional:  VITALS:  /63   Pulse 88   Temp 97.4 °F (36.3 °C) (Oral)   Resp 16   Ht 5' 6\" (1.676 m)   Wt 102 lb 6.4 oz (46.4 kg)   SpO2 93%   BMI 16.53 kg/m²   Gen: alert, awake  Neck: No JVD  Skin: Unremarkable  Cardiovascular:  S1, S2 without m/r/g   Respiratory: CTA B without w/r/r; respiratory effort normal  Abdomen:  soft, nt, nd,   Extremities: no lower extremity edema  Neuro/Psy: AAoriented times 3 ; moves all 4 ext    Data/  Recent Labs     06/24/22  0600 06/24/22  1455 06/25/22  0640   WBC 0.2*  --  0.2*   HGB 6.4* 7.5* 7.4*   HCT 18.7* 22.1* 21.4*   MCV 86.2  --  83.9   *  --  120*     Recent Labs     06/24/22  0600 06/25/22  0640   * 137   K 4.5 4.3   CL 99 102   CO2 28 29   GLUCOSE 146* 141*   BUN 32* 36*   CREATININE 0.6* 0.6*   LABGLOM >60 >60   GFRAA >60 >60     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 High BUN so getting adequate solute. 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  ID following --> switched to daptomycin n meropenem on 6/23  BC from 6/24 growing enterobacter fecalis and staph epi    -Chronic antonio d/t penile edema and failed voiding trials    -Elevated TSH, mild    -Pancytopenia   1 PRBC on 6/22    Neupogen after BM Bx on 6/24 per Dr Berta Ortega    -Hyperkalemia - better with prn lokelma    -Hypomagnesemia - on oral Mg replacement      Plan  -off iVF  -pt is getting 30 ml free water before and after bolus TF 6x per day  -serial BMPs

## 2022-06-25 NOTE — PROGRESS NOTES
Hospitalist Progress Note      PCP: Reny Barnett DO    Date of Admission: 6/23/2022    Chief Complaint: fever    Hospital Course: This is a 28 y.o. 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 Pt has G-tube. He was readmitted to [address] [place] [address] 5/2-6/8 with sepsis, acute resp faiure and found to have MRSA bacteremia with blood cx that remained positive from 5/2 until 5/19 with infected atrial thrombus, septic pulm emboli and empyema. Had port removed on 5/7, repeat blood cx on 6/22 remain negative. On daptomycin, but now with pancytopenia which is acute on chronic. He may also have ceftaroline-induced myelosuppression per infectious disease and the drug was stopped on 6/22. He was initially described as lethargic, but is awake and alert and tearful but looks poorly overall. He is extremely cachectic. Subjective: Continues with severe pancytopenia. Fevers improved. Awaiting bone marrow biopsy.        Medications:  Reviewed    Infusion Medications    sodium chloride      sodium chloride      sodium chloride      sodium chloride       Scheduled Medications    filgrastim-aafi  300 mcg SubCUTAneous QPM    sodium chloride flush  5-40 mL IntraVENous 2 times per day    meropenem  1,000 mg IntraVENous Q8H    [Held by provider] apixaban  5 mg Per G Tube BID    daptomycin (CUBICIN) IVPB  8 mg/kg IntraVENous H11I    folic acid  1 mg Per G Tube Daily    gabapentin  100 mg Per G Tube TID    magnesium oxide  400 mg Per G Tube BID    melatonin  5 mg Per G Tube Nightly    QUEtiapine  50 mg Oral Nightly    saliva substitute   Oral Q6H WA    sennosides-docusate sodium  2 tablet Per G Tube Nightly    thiamine  100 mg Per G Tube Daily    venlafaxine  75 mg Oral Daily with breakfast    zinc oxide   Topical BID     PRN Meds: sodium chloride, sodium chloride flush, sodium chloride, acetaminophen **OR** acetaminophen, sodium chloride, bisacodyl, sodium chloride, acetaminophen, albuterol, clonazePAM, Lip Balm, oxyCODONE, oxyCODONE, perflutren lipid microspheres, sodium chloride, sodium chloride flush      Intake/Output Summary (Last 24 hours) at 6/25/2022 0859  Last data filed at 6/25/2022 0815  Gross per 24 hour   Intake 2749.92 ml   Output 2175 ml   Net 574.92 ml       Physical Exam Performed:    /64   Pulse (!) 102   Temp 97.6 °F (36.4 °C) (Oral)   Resp 16   Ht 5' 6\" (1.676 m)   Wt 102 lb 6.4 oz (46.4 kg)   SpO2 96%   BMI 16.53 kg/m²     General appearance: Cachectic  HEENT: Pupils equal, round, and reactive to light. Conjunctivae/corneas clear. Neck: Supple, with full range of motion. No jugular venous distention. Surgical dressing in place  Respiratory:  Normal respiratory effort. Clear to auscultation, bilaterally without Rales/Wheezes/Rhonchi. Cardiovascular: Regular rate and rhythm with normal S1/S2 without murmurs, rubs or gallops. Abdomen: Soft, non-tender, non-distended with normal bowel sounds. PEG in place  Musculoskeletal: No clubbing, cyanosis or edema bilaterally. Full range of motion without deformity. Skin: Skin color, texture, turgor normal.  No rashes or lesions. Neurologic:  Neurovascularly intact without any focal sensory/motor deficits. Cranial nerves: II-XII intact, grossly non-focal.  Psychiatric: Alert and oriented, thought content appropriate, normal insight  Capillary Refill: Brisk,3 seconds, normal   Peripheral Pulses: +2 palpable, equal bilaterally       Labs:   Recent Labs     06/22/22  0931 06/22/22  0931 06/24/22  0600 06/24/22  1455 06/25/22  0640   WBC 0.3*  --  0.2*  --  0.2*   HGB 6.6*   < > 6.4* 7.5* 7.4*   HCT 19.7*   < > 18.7* 22.1* 21.4*   *  --  110*  --  120*    < > = values in this interval not displayed.      Recent Labs     06/24/22  0600 06/25/22  0640   * 137   K 4.5 4.3   CL 99 102   CO2 28 29   BUN 32* 36*   CREATININE 0.6* 0.6* CALCIUM 8.6 8.8     No results for input(s): AST, ALT, BILIDIR, BILITOT, ALKPHOS in the last 72 hours. Recent Labs     06/24/22  0948   INR 1.88*     Recent Labs     06/24/22  0600   CKTOTAL 40       Urinalysis:      Lab Results   Component Value Date    NITRU Negative 06/22/2022    WBCUA 6-9 06/22/2022    BACTERIA 3+ 06/22/2022    RBCUA  06/22/2022    BLOODU MODERATE 06/22/2022    SPECGRAV 1.010 06/22/2022    GLUCOSEU Negative 06/22/2022       Radiology:  CT BIOPSY BONE MARROW   Final Result   Successful CT guided bone marrow aspiration and core biopsy of the left iliac   bone. CT GUIDED NEEDLE PLACEMENT   Final Result   Successful CT guided bone marrow aspiration and core biopsy of the left iliac   bone. XR CHEST PORTABLE   Final Result   Curvilinear opacities throughout both lungs, which could represent either   mild interstitial pulmonary edema and/or atypical pneumonia.                  Assessment/Plan:    Active Hospital Problems    Diagnosis     Sepsis ([address] [A41.9]      Priority: Medium    Pancytopenia ([address] [D61.818]      Priority: Medium    Severe malnutrition ([address] [E43]      Priority: Medium    Primary squamous cell carcinoma of tongue (HCC) [C02.9]     Anxiety [F41.9]      Pancytopenia  - possibly ceftaroline induced, now stopped  - Hem/onc consulted  - bone marrow biopsy pending  - s/p 2u pRBCs  - consider granix following bone marrow results  - continue to monitor    Neutropenic fever, sepsis  - urine culture with pseudomonas UTI associated with antonio  - remains on treatment for MRSA bacteremia as well but not suspected to be active infection  - ID consulted  - continue merrem, daptomycin  - fever curve improving  - neutropenia precautions    Severe protein calorie malnutrition  - dietitian consulted  - continue tube feeds  - GI consulted for PEG adjustment    Head and neck cancer  - s/p surgery  - management per Hem/Onc  - pain control ordered    DVT Prophylaxis: lovenox  Diet: Diet NPO  ADULT TUBE FEEDING; PEG; Standard with Fiber; Bolus; 6 Times Daily; 237; 60; Before and after each bolus; Protein; 1 bottle of Proteinex daily, 30 mL free water flush before and after adminstration  Code Status: Full Code    PT/OT Eval Status: not ordered    Dispo - SNF.  Unclear timeframe given severe pancytopenia    Benito Palumbo MD

## 2022-06-26 LAB
ANION GAP SERPL CALCULATED.3IONS-SCNC: 6 MMOL/L (ref 3–16)
BLOOD CULTURE, ROUTINE: NORMAL
BUN BLDV-MCNC: 32 MG/DL (ref 7–20)
CALCIUM SERPL-MCNC: 9.3 MG/DL (ref 8.3–10.6)
CHLORIDE BLD-SCNC: 102 MMOL/L (ref 99–110)
CO2: 29 MMOL/L (ref 21–32)
CREAT SERPL-MCNC: <0.5 MG/DL (ref 0.9–1.3)
CULTURE, BLOOD 2: NORMAL
GFR AFRICAN AMERICAN: >60
GFR NON-AFRICAN AMERICAN: >60
GLUCOSE BLD-MCNC: 97 MG/DL (ref 70–99)
HCT VFR BLD CALC: 24.1 % (ref 40.5–52.5)
HEMATOLOGY PATH CONSULT: NO
HEMOGLOBIN: 8 G/DL (ref 13.5–17.5)
MCH RBC QN AUTO: 28.4 PG (ref 26–34)
MCHC RBC AUTO-ENTMCNC: 33.2 G/DL (ref 31–36)
MCV RBC AUTO: 85.4 FL (ref 80–100)
PDW BLD-RTO: 15.9 % (ref 12.4–15.4)
PLATELET # BLD: 146 K/UL (ref 135–450)
PLATELET SLIDE REVIEW: ADEQUATE
PMV BLD AUTO: 7.4 FL (ref 5–10.5)
POTASSIUM REFLEX MAGNESIUM: 4.5 MMOL/L (ref 3.5–5.1)
RBC # BLD: 2.82 M/UL (ref 4.2–5.9)
RBC # BLD: NORMAL 10*6/UL
SLIDE REVIEW: ABNORMAL
SODIUM BLD-SCNC: 137 MMOL/L (ref 136–145)
WBC # BLD: 0.2 K/UL (ref 4–11)

## 2022-06-26 PROCEDURE — 6360000002 HC RX W HCPCS: Performed by: INTERNAL MEDICINE

## 2022-06-26 PROCEDURE — 2580000003 HC RX 258: Performed by: INTERNAL MEDICINE

## 2022-06-26 PROCEDURE — 92526 ORAL FUNCTION THERAPY: CPT

## 2022-06-26 PROCEDURE — 1200000000 HC SEMI PRIVATE

## 2022-06-26 PROCEDURE — 36415 COLL VENOUS BLD VENIPUNCTURE: CPT

## 2022-06-26 PROCEDURE — 80048 BASIC METABOLIC PNL TOTAL CA: CPT

## 2022-06-26 PROCEDURE — 85025 COMPLETE CBC W/AUTO DIFF WBC: CPT

## 2022-06-26 PROCEDURE — 92610 EVALUATE SWALLOWING FUNCTION: CPT

## 2022-06-26 PROCEDURE — 6370000000 HC RX 637 (ALT 250 FOR IP): Performed by: INTERNAL MEDICINE

## 2022-06-26 RX ADMIN — APIXABAN 5 MG: 5 TABLET, FILM COATED ORAL at 19:54

## 2022-06-26 RX ADMIN — MEROPENEM 1000 MG: 1 INJECTION, POWDER, FOR SOLUTION INTRAVENOUS at 13:39

## 2022-06-26 RX ADMIN — Medication 400 MG: at 19:54

## 2022-06-26 RX ADMIN — SODIUM CHLORIDE 25 ML: 9 INJECTION, SOLUTION INTRAVENOUS at 13:38

## 2022-06-26 RX ADMIN — CLONAZEPAM 0.5 MG: 0.5 TABLET ORAL at 10:37

## 2022-06-26 RX ADMIN — GABAPENTIN 100 MG: 100 CAPSULE ORAL at 08:00

## 2022-06-26 RX ADMIN — VENLAFAXINE HYDROCHLORIDE 75 MG: 37.5 CAPSULE, EXTENDED RELEASE ORAL at 08:00

## 2022-06-26 RX ADMIN — Medication: at 19:53

## 2022-06-26 RX ADMIN — Medication: at 10:31

## 2022-06-26 RX ADMIN — DOCUSATE SODIUM 50 MG AND SENNOSIDES 8.6 MG 2 TABLET: 8.6; 5 TABLET, FILM COATED ORAL at 19:54

## 2022-06-26 RX ADMIN — GABAPENTIN 100 MG: 100 CAPSULE ORAL at 13:36

## 2022-06-26 RX ADMIN — Medication 100 MG: at 08:00

## 2022-06-26 RX ADMIN — FOLIC ACID 1 MG: 1 TABLET ORAL at 08:00

## 2022-06-26 RX ADMIN — DAPTOMYCIN 350 MG: 500 INJECTION, POWDER, LYOPHILIZED, FOR SOLUTION INTRAVENOUS at 20:09

## 2022-06-26 RX ADMIN — Medication 5 MG: at 19:54

## 2022-06-26 RX ADMIN — FILGRASTIM-AAFI 300 MCG: 300 INJECTION, SOLUTION SUBCUTANEOUS at 17:17

## 2022-06-26 RX ADMIN — Medication: at 10:32

## 2022-06-26 RX ADMIN — Medication: at 13:36

## 2022-06-26 RX ADMIN — OXYCODONE 10 MG: 5 TABLET ORAL at 14:46

## 2022-06-26 RX ADMIN — Medication 400 MG: at 08:00

## 2022-06-26 RX ADMIN — SODIUM CHLORIDE, PRESERVATIVE FREE 10 ML: 5 INJECTION INTRAVENOUS at 19:54

## 2022-06-26 RX ADMIN — MEROPENEM 1000 MG: 1 INJECTION, POWDER, FOR SOLUTION INTRAVENOUS at 21:46

## 2022-06-26 RX ADMIN — OXYCODONE HYDROCHLORIDE 7.5 MG: 15 TABLET ORAL at 10:36

## 2022-06-26 RX ADMIN — OXYCODONE 10 MG: 5 TABLET ORAL at 19:53

## 2022-06-26 RX ADMIN — MEROPENEM 1000 MG: 1 INJECTION, POWDER, FOR SOLUTION INTRAVENOUS at 06:28

## 2022-06-26 RX ADMIN — QUETIAPINE FUMARATE 50 MG: 25 TABLET ORAL at 19:53

## 2022-06-26 RX ADMIN — GABAPENTIN 100 MG: 100 CAPSULE ORAL at 19:54

## 2022-06-26 ASSESSMENT — PAIN DESCRIPTION - LOCATION
LOCATION: BUTTOCKS
LOCATION: BUTTOCKS

## 2022-06-26 ASSESSMENT — PAIN SCALES - GENERAL
PAINLEVEL_OUTOF10: 10
PAINLEVEL_OUTOF10: 10
PAINLEVEL_OUTOF10: 7

## 2022-06-26 NOTE — PROGRESS NOTES
Central supply tried to find the secur-valeriano connector for pt's g tube, went to ER, C2 and still cannot find piece.

## 2022-06-26 NOTE — PROGRESS NOTES
PROGRESS NOTE  S:31 yrs Patient  admitted on 6/23/2022 with Sepsis ([facility] [A41.9] . Secure-lock for low profile G tube missing. The tube was deflated and removed. It was replaced with an 18 Fr Ezequiel Kub G tube. Current Hospital Schedu[employer] Meds   filgrastim-aafi  300 mcg SubCUTAneous QPM    sodium chloride flush  5-40 mL IntraVENous 2 times per day    meropenem  1,000 mg IntraVENous Q8H    apixaban  5 mg Per G Tube BID    daptomycin (CUBICIN) IVPB  8 mg/kg IntraVENous P08K    folic acid  1 mg Per G Tube Daily    gabapentin  100 mg Per G Tube TID    magnesium oxide  400 mg Per G Tube BID    melatonin  5 mg Per G Tube Nightly    QUEtiapine  50 mg Oral Nightly    saliva substitute   Oral Q6H WA    sennosides-docusate sodium  2 tablet Per G Tube Nightly    thiamine  100 mg Per G Tube Daily    venlafaxine  75 mg Oral Daily with breakfast    zinc oxide   Topical BID     Current Hospital IV Meds   sodium chloride      sodium chloride 10 mL/hr at 06/25/22 1311    sodium chloride      sodium chloride 25 mL (06/26/22 1338)     Current Hospital PRN Meds  sodium chloride, sodium chloride flush, sodium chloride, acetaminophen **OR** acetaminophen, sodium chloride, bisacodyl, sodium chloride, acetaminophen, albuterol, clonazePAM, Lip Balm, oxyCODONE, oxyCODONE, perflutren lipid microspheres, sodium chloride, sodium chloride flush    Exam:   Vitals:    06/26/22 1145   BP: 111/71   Pulse: (!) 112   Resp: 18   Temp: 98.4 °F (36.9 °C)   SpO2: (!) 89%     I/O last 3 completed shifts: In: 3041.1 [P.O.:120;  I.V.:10; NG/GT:2006; IV Piggyback:905.1]  Out: 2350 [Urine:1825; Drains:525]   General appearance: alert, appears stated age and cooperative  HEENT: PERRLA  Neck: no adenopathy, no carotid bruit, no JVD, supple, symmetrical, trachea midline and thyroid not enlarged, symmetric, no tenderness/mass/nodules  Lungs: clear to auscultation bilaterally  Heart: regular rate and rhythm, S1, S2 normal, no murmur, click, rub or gallop  Abdomen: soft, non-tender; bowel sounds normal; no masses,  no organomegaly  Extremities: extremities normal, atraumatic, no cyanosis or edema     Labs:  CBC:   Recent Labs     06/24/22  0600 06/24/22  0600 06/24/22  1455 06/25/22  0640 06/26/22  0757   WBC 0.2*  --   --  0.2* 0.2*   HGB 6.4*   < > 7.5* 7.4* 8.0*   HCT 18.7*   < > 22.1* 21.4* 24.1*   MCV 86.2  --   --  83.9 85.4   *  --   --  120* 146    < > = values in this interval not displayed. BMP:   Recent Labs     06/24/22  0600 06/25/22  0640 06/26/22  0757   * 137 137   K 4.5 4.3 4.5   CL 99 102 102   CO2 28 29 29   BUN 32* 36* 32*   CREATININE 0.6* 0.6* <0.5*     LIVER PROFILE: No results for input(s): AST, ALT, LIPASE, PROT, BILIDIR, BILITOT, ALKPHOS in the last 72 hours. Invalid input(s): AMYLASE,  ALB  PT/INR:   Recent Labs     06/24/22  0948   INR 1.88*       IMAGING:  No results found. Hospital Problems           Last Modified POA    * (Principal) Sepsis ([address] 6/23/2022 Yes    Severe malnutrition ([address] (Chronic) 6/24/2022 Yes    Pancytopenia ([address] 6/24/2022 Yes    Anxiety 6/24/2022 Yes    Primary squamous cell carcinoma of tongue ([address] 6/24/2022 Yes         Impression:  27 yo male with squamous cell cancer of the tongue s/p resection and radical neck dissection with G tube malfunction    Recommendation:  1. PEG tube exchanged to [facility] 18 Fr tube. Please call if further questions or concerns.       Lobo Flatness, DO  1:57 PM 6/26/2022            [address] [place] [address] 120      [address] [place] [address]     Phone: [phone]     Fax: [phone]

## 2022-06-26 NOTE — PROGRESS NOTES
Hospitalist Progress Note      PCP: Diana Michele DO    Date of Admission: 6/23/2022    Chief Complaint: fever    Hospital Course: This is a 28 y.o. 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 Pt has G-tube. He was readmitted to [facility] 5/2-6/8 with sepsis, acute resp faiure and found to have MRSA bacteremia with blood cx that remained positive from 5/2 until 5/19 with infected atrial thrombus, septic pulm emboli and empyema. Had port removed on 5/7, repeat blood cx on 6/22 remain negative. On daptomycin, but now with pancytopenia which is acute on chronic. He may also have ceftaroline-induced myelosuppression per infectious disease and the drug was stopped on 6/22. He was initially described as lethargic, but is awake and alert and tearful but looks poorly overall. He is extremely cachectic. Subjective: Continues with severe pancytopenia. Fevers improved. Awaiting bone marrow biopsy.        Medications:  Reviewed    Infusion Medications    sodium chloride      sodium chloride 10 mL/hr at 06/25/22 1311    sodium chloride      sodium chloride       Scheduled Medications    filgrastim-aafi  300 mcg SubCUTAneous QPM    sodium chloride flush  5-40 mL IntraVENous 2 times per day    meropenem  1,000 mg IntraVENous Q8H    apixaban  5 mg Per G Tube BID    daptomycin (CUBICIN) IVPB  8 mg/kg IntraVENous F82T    folic acid  1 mg Per G Tube Daily    gabapentin  100 mg Per G Tube TID    magnesium oxide  400 mg Per G Tube BID    melatonin  5 mg Per G Tube Nightly    QUEtiapine  50 mg Oral Nightly    saliva substitute   Oral Q6H WA    sennosides-docusate sodium  2 tablet Per G Tube Nightly    thiamine  100 mg Per G Tube Daily    venlafaxine  75 mg Oral Daily with breakfast    zinc oxide   Topical BID     PRN Meds: sodium chloride, sodium chloride flush, sodium chloride, acetaminophen **OR** acetaminophen, sodium chloride, bisacodyl, sodium chloride, acetaminophen, albuterol, clonazePAM, Lip Balm, oxyCODONE, oxyCODONE, perflutren lipid microspheres, sodium chloride, sodium chloride flush      Intake/Output Summary (Last 24 hours) at 6/26/2022 0917  Last data filed at 6/26/2022 0757  Gross per 24 hour   Intake 1755.07 ml   Output 2025 ml   Net -269.93 ml       Physical Exam Performed:    /70   Pulse 86   Temp 97.9 °F (36.6 °C) (Axillary)   Resp 16   Ht 5' 6\" (1.676 m)   Wt 102 lb 6.4 oz (46.4 kg)   SpO2 94%   BMI 16.53 kg/m²     General appearance: Cachectic  HEENT: Pupils equal, round, and reactive to light. Conjunctivae/corneas clear. Neck: Supple, with full range of motion. No jugular venous distention. Surgical dressing in place  Respiratory:  Normal respiratory effort. Clear to auscultation, bilaterally without Rales/Wheezes/Rhonchi. Cardiovascular: Regular rate and rhythm with normal S1/S2 without murmurs, rubs or gallops. Abdomen: Soft, non-tender, non-distended with normal bowel sounds. PEG in place  Musculoskeletal: No clubbing, cyanosis or edema bilaterally. Full range of motion without deformity. Skin: Skin color, texture, turgor normal.  No rashes or lesions. Neurologic:  Neurovascularly intact without any focal sensory/motor deficits. Cranial nerves: II-XII intact, grossly non-focal.  Psychiatric: Alert and oriented, thought content appropriate, normal insight  Capillary Refill: Brisk,3 seconds, normal   Peripheral Pulses: +2 palpable, equal bilaterally       Labs:   Recent Labs     06/24/22  0600 06/24/22  0600 06/24/22  1455 06/25/22  0640 06/26/22  0757   WBC 0.2*  --   --  0.2* 0.2*   HGB 6.4*   < > 7.5* 7.4* 8.0*   HCT 18.7*   < > 22.1* 21.4* 24.1*   *  --   --  120* 146    < > = values in this interval not displayed.      Recent Labs     06/24/22  0600 06/25/22  0640 06/26/22  0757   * 137 137   K 4.5 4.3 4.5   CL 99 102 102   CO2 28 29 29   BUN 32* 36* 32*   CREATININE 0.6* 0.6* <0.5*   CALCIUM 8.6 8.8 9.3     No results for input(s): AST, ALT, BILIDIR, BILITOT, ALKPHOS in the last 72 hours. Recent Labs     06/24/22  0948   INR 1.88*     Recent Labs     06/24/22  0600   CKTOTAL 40       Urinalysis:      Lab Results   Component Value Date    NITRU Negative 06/22/2022    WBCUA 6-9 06/22/2022    BACTERIA 3+ 06/22/2022    RBCUA  06/22/2022    BLOODU MODERATE 06/22/2022    SPECGRAV 1.010 06/22/2022    GLUCOSEU Negative 06/22/2022       Radiology:  CT BIOPSY BONE MARROW   Final Result   Successful CT guided bone marrow aspiration and core biopsy of the left iliac   bone. CT GUIDED NEEDLE PLACEMENT   Final Result   Successful CT guided bone marrow aspiration and core biopsy of the left iliac   bone. XR CHEST PORTABLE   Final Result   Curvilinear opacities throughout both lungs, which could represent either   mild interstitial pulmonary edema and/or atypical pneumonia.                  Assessment/Plan:    Active Hospital Problems    Diagnosis     Sepsis ([address] [A41.9]      Priority: Medium    Pancytopenia ([address] [D61.818]      Priority: Medium    Severe malnutrition ([address] [E43]      Priority: Medium    Primary squamous cell carcinoma of tongue (HCC) [C02.9]     Anxiety [F41.9]      Pancytopenia  - possibly ceftaroline induced, now stopped  - Hem/onc consulted  - bone marrow biopsy pending  - s/p 2u pRBCs  - continue to monitor    Neutropenic fever, sepsis  - urine culture with pseudomonas UTI associated with antonio  - remains on treatment for MRSA bacteremia as well but not suspected to be active infection  - ID consulted  - continue merrem, daptomycin  - fever curve improving  - neutropenia precautions    Severe protein calorie malnutrition  - dietitian consulted  - continue tube feeds  - GI consulted for PEG adjustment    Head and neck cancer  - s/p surgery  - management per Hem/Onc  - pain control ordered    DVT Prophylaxis: lovenox  Diet: Diet NPO  ADULT TUBE FEEDING; PEG; Standard with Fiber; Bolus; 6 Times Daily; 237; 60; Before and after each bolus; Protein; 1 bottle of Proteinex daily, 30 mL free water flush before and after adminstration  Code Status: Full Code    PT/OT Eval Status: not ordered    Dispo - SNF.  Unclear timeframe given severe pancytopenia    Joel Mir MD

## 2022-06-26 NOTE — PROGRESS NOTES
ONCOLOGY HEMATOLOGY CARE PROGRESS NOTE      SUBJECTIVE:     The pt is s/p bone marrow asp and bx . No new system complaints. Heme Hx:    Mr. Genoveva Mahmood  is a 28-year-old male status post hemiglossectomy for head neck carcinoma. He is now in a rehabilitation facility due to MRSA. He was recently changed from ceftriaxone to daptomycin. I was asked to see him for pancytopenia    ROS:     Constitutional:  No weight loss, No fever, No chills, No night sweats. Energy level poor. Eyes:  No impairment or change in vision  ENT / Mouth:  No pain, abnormal ulceration, bleeding, nasal drip.pos change in voice   Cardiovascular:  No chest pain, palpitations, new edema, or calf discomfort  Respiratory:  No pain, hemoptysis, change to breathing  Gastrointestinal:  No pain, cramping, jaundice, change to eating and bowel habits  Urinary:  No pain, bleeding or change in continence  Genitalia: No pain, bleeding or discharge  Musculoskeletal:  No redness, pain, edema or weakness  Skin:  No pruritus, rash, change to nodules or lesions Wound on sacrum  Neurologic:  No discomfort, change in mental status, speech, sensory or motor activity  Psychiatric:  No change in concentration or change to affect or mood  Endocrine:  No hot flashes, increased thirst, or change to urine production  Hematologic: No petechiae, ecchymosis or bleeding  Lymphatic:  No lymphadenopathy or lymphedema  Allergy / Immunologic:  No eczema, hives, frequent or recurrent infections    OBJECTIVE        Physical    VITALS:  BP (!) 97/52   Pulse 79   Temp 97.7 °F (36.5 °C) (Oral)   Resp 16   Ht 5' 6\" (1.676 m)   Wt 102 lb 6.4 oz (46.4 kg)   SpO2 93%   BMI 16.53 kg/m²   TEMPERATURE:  Current - Temp: 97.7 °F (36.5 °C);  Max - Temp  Av.7 °F (36.5 °C)  Min: 97.4 °F (36.3 °C)  Max: 98.3 °F (36.8 °C)  PULSE OXIMETRY RANGE: SpO2  Av.2 %  Min: 90 %  Max: 96 %  24HR INTAKE/OUTPUT:      Intake/Output Summary (Last 24 hours) at 6/26/2022 0713  Last data filed at 6/26/2022 0140  Gross per 24 hour   Intake 1995.07 ml   Output 1400 ml   Net 595.07 ml       CONSTITUTIONAL: Chronically ill-appearing, no acute distress. Cachectic  EYES: Sclera clear, conjunctiva normal  ENT: Oral pharynx unremarkable with moist mucus membranes  LUNGS: Clear to auscultation. No increased labor with breathing. CARDIOVASCULAR: Regular rate and rhythm, normal S1 and S2, no S3 or S4, and no murmur noted. ABDOMEN: Soft, non-distended, non-tender. Small amount of leakage about the G-tube  MUSCULOSKELETAL: There is no redness, warmth, or swelling of the joints. NEUROLOGIC: Awake, alert. Grossly non-focal.  SKIN: No bruising or bleeding. PSYCH: Normal affect. Data  Recent Labs     06/25/22  0640 06/24/22  1455 06/24/22  0600 06/22/22  0931 06/22/22  0931 06/22/22  0650 06/20/22  0630 06/17/22  0600 06/17/22  0600 06/15/22  0650 06/15/22  0650   WBC 0.2*  --  0.2*  --  0.3*   < > 2.4*   < > 4.0   < > 3.2*   NEUTROABS  --   --   --   --   --   --  1.5*  --  3.1  --  2.5   LYMPHOPCT  --   --   --   --   --   --  11.9  --  7.2  --  5.8   RBC 2.55*  --  2.17*  --  2.27*   < > 2.43*   < > 2.46*   < > 2.54*   HGB 7.4* 7.5* 6.4*   < > 6.6*   < > 7.1*   < > 7.1*   < > 7.3*   HCT 21.4* 22.1* 18.7*   < > 19.7*   < > 21.2*   < > 21.1*   < > 21.6*   MCV 83.9  --  86.2  --  86.7   < > 86.9   < > 85.8   < > 85.1   MCH 28.9  --  29.4  --  29.2   < > 29.2   < > 28.8   < > 28.9   MCHC 34.4  --  34.1  --  33.7   < > 33.6   < > 33.5   < > 34.0   RDW 15.7*  --  15.2  --  15.5*   < > 15.6*   < > 15.5*   < > 15.1   *  --  110*  --  100*   < > 79*   < > 112*   < > 117*    < > = values in this interval not displayed. Recent Labs     06/24/22  0600 06/25/22  0640   * 137   K 4.5 4.3   CL 99 102   CO2 28 29   BUN 32* 36*   CREATININE 0.6* 0.6*     No results for input(s): AST, ALT, ALB, BILIDIR, BILITOT, ALKPHOS in the last 72 hours.     Magnesium:    Lab reasonably  achievable. Total exam dose linear product was 389.33 mGy per cm. Impression: Successful CT guided bone marrow aspiration and core biopsy of the left iliac  bone. CT BIOPSY BONE MARROW  Narrative: PROCEDURE:  CT GUIDED BONE MARROW ASPIRATION AND CORE NEEDLE BONE BIOPSY OF THE LEFT  ILIAC BONE. MODERATE CONSCIOUS SEDATION    6/24/2022    HISTORY:  ORDERING SYSTEM PROVIDED HISTORY: pancytopenia  TECHNOLOGIST PROVIDED HISTORY:  Culture  Flow cytometry  Cytogenetics  MDS and AML panel. Reason for exam:->pancytopenia  Reason for Exam: pancytopenia; ORDERING SYSTEM PROVIDED HISTORY: pancytopenia  TECHNOLOGIST PROVIDED HISTORY:  Culture  Flow cytometry  Cytogenetics  MDS and AML panel. Reason for exam:->pancytopenia    SEDATION:  Moderate sedation was ordered and supervised by the attending with physician  face-to-face monitoring. Medications were provided and recorded by Radiology  nurses. A total of 2 mg of Versed and 100 mcg of fentanyl was used for  conscious sedation during the procedure, with total conscious sedation time  of 17 minutes. TECHNIQUE:  Informed consent was obtained following a detailed explanation of the  procedure including risks, benefits, and alternatives. Universal protocol  was followed. Sterile gowns, masks, hats and gloves utilized for maximal  sterile barrier. Axial images were obtained through the iliac bones using CT  guidance and a suitable skin site was prepped and draped in sterile fashion. Local anesthesia was achieved with lidocaine. An 11 gauge AgLocal bone  marrow biopsy needle was advanced into the left iliac bone and approximately  10 mL of bone marrow aspirate was obtained. A single core biopsy specimen  was obtained and the patient tolerated the procedure well. Hemostasis was  achieved with digital pressure. The site was cleaned, and a dry sterile  dressing was applied.     Dose modulation, iterative reconstruction, and/or weight based adjustment of  the mA/kV was utilized to reduce the radiation dose to as low as reasonably  achievable. Total exam dose linear product was 389.33 mGy per cm. Impression: Successful CT guided bone marrow aspiration and core biopsy of the left iliac  bone. XR CHEST PORTABLE  Narrative: EXAMINATION:  ONE XRAY VIEW OF THE CHEST    6/24/2022 5:55 am    COMPARISON:  06/22/2022, 06/15/2022    HISTORY:  ORDERING SYSTEM PROVIDED HISTORY: SEPSIS  TECHNOLOGIST PROVIDED HISTORY:  Reason for exam:->SEPSIS  Reason for Exam: SEPSIS    FINDINGS:  A single frontal view of the chest was performed. There is no acute skeletal  abnormality. There are multiple surgical clips overlying the neck soft  tissues. A left arm PICC is in place, with tip overlying the cavoatrial  junction. The heart size and mediastinal contours are stable, and within  normal limits. There are curvilinear opacities within both lungs, which  could represent either interstitial pulmonary edema or atypical pneumonia. No focus of acute airspace consolidation is identified. There is no evidence  of a pneumothorax. Impression: Curvilinear opacities throughout both lungs, which could represent either  mild interstitial pulmonary edema and/or atypical pneumonia.       Problem List  Patient Active Problem List   Diagnosis    Chronic bilateral low back pain without sciatica    Anxiety    Insomnia due to other mental disorder    Poor dentition    Class 1 obesity due to excess calories with serious comorbidity and body mass index (BMI) of 34.0 to 34.9 in adult    Primary squamous cell carcinoma of tongue (HCC)    Recurrent major depressive disorder (HCC)    Prediabetes    Critical illness myopathy    Severe malnutrition (HCC)    Sepsis (HCC)    Pancytopenia (HCC)       ASSESSMENT AND PLAN:    Pancytopenia:  -It is a little unusual for the platelets to recover prior to the white blood count; however this is not unheard of  -S/P bone marrow biopsy 6/24 to address his persistent neutropenia despite stopping his antibiotics  -Path pend  -B12, folate nl, iron low but more c/w anemia of chronic dz (TIBC low), ferritin increased  -Heparin-induced thrombocytopenia panel is negative  -Haptoglobin elevated c/w acute phase reactant and direct Abida is neg  -Bone marrow will also be cultured  -CBC pending this AM. Cont neupogen until WBC approaches 10k (expect 50% drop on discontinuing). The rationale for the use of this growth factor support agent as well as its side effects were discussed. -remains on eliquis.  Monitor for bleeding  -Cont folvite        ONCOLOGIC DISPOSITION:  -Once he is not neutropenic    Annamarie Pacheco MD, MPH  Please contact through [address]

## 2022-06-26 NOTE — PROGRESS NOTES
Upon assessment pt ADITI-KEY gastrostomy tube missing SECUR-PRANAY connector. Able to give pt medications and 60 mL of bolus feeding. Gastrostomy tube then began leaking. Pt comfortable with plan to hold remaining 180 mL of feed to prevent further complications until additional SECUR-PRANAY can be located. Charge RN Tavares Ochoa and Clinical RN Laci Bustamante aware of need for additional [address]- are attempting to locate.     Ga Parker RN

## 2022-06-26 NOTE — PLAN OF CARE
Problem: SLP Adult - Impaired Swallowing  Goal: By Discharge: Advance to least restrictive diet without signs or symptoms of aspiration for planned discharge setting. See evaluation for individualized goals. Note: SLP completed evaluation. Please refer to notes in EMR.

## 2022-06-26 NOTE — PLAN OF CARE
Problem: Discharge Planning  Goal: Discharge to home or other facility with appropriate resources  Outcome: Not Progressing  Flowsheets (Taken 6/25/2022 2111)  Discharge to home or other facility with appropriate resources: Identify barriers to discharge with patient and caregiver     Problem: Skin/Tissue Integrity  Goal: Absence of new skin breakdown  Description: 1. Monitor for areas of redness and/or skin breakdown  2. Assess vascular access sites hourly  3. Every 4-6 hours minimum:  Change oxygen saturation probe site  4. Every 4-6 hours:  If on nasal continuous positive airway pressure, respiratory therapy assess nares and determine need for appliance change or resting period.   Outcome: Progressing     Problem: Nutrition Deficit:  Goal: Optimize nutritional status  Outcome: Progressing     Problem: Pain  Goal: Verbalizes/displays adequate comfort level or baseline comfort level  Outcome: Progressing  Flowsheets (Taken 6/25/2022 2111)  Verbalizes/displays adequate comfort level or baseline comfort level:   Encourage patient to monitor pain and request assistance   Assess pain using appropriate pain scale

## 2022-06-26 NOTE — PROGRESS NOTES
Speech Language Pathology  Facility/Department: [place] [id number] - [facility]   CLINICAL BEDSIDE SWALLOW EVALUATION    NAME: Alejandro Sauer  : 1990  MRN: [id number]    ADMISSION DATE: 2022  ADMITTING DIAGNOSIS: has Chronic bilateral low back pain without sciatica; Anxiety; Insomnia due to other mental disorder; Poor dentition; Class 1 obesity due to excess calories with serious comorbidity and body mass index (BMI) of 34.0 to 34.9 in adult; Primary squamous cell carcinoma of tongue ([address] Recurrent major depressive disorder ([address] Prediabetes; Critical illness myopathy; Severe malnutrition ([address] Sepsis ([address] and Pancytopenia ([address] on their problem list.  ONSET DATE: 2022    Recent Chest Xray/CT of Chest: (2022)  Impression   Curvilinear opacities throughout both lungs, which could represent either   mild interstitial pulmonary edema and/or atypical pneumonia.           Date of Eval: 2022  Evaluating Therapist: DARWIN Lei    Current Diet level:  Current Diet : NPO      Primary Complaint  Patient Complaint: Pt requesting to eat and drink again. Pain:  Pain Assessment  Pain Assessment: 0-10  Pain Level: 10  Patient's Stated Pain Goal: 0 - No pain  Pain Location: Buttocks  Pain Orientation: Right  Non-Pharmaceutical Pain Intervention(s): Repositioned  Pain Assessment in Advanced Dementia (PAINAD)  Non-Pharmaceutical Pain Intervention(s): Repositioned  Faces, Legs, Activity, Cry, and Consolability (FLACC)  Non-Pharmaceutical Pain Intervention(s): Repositioned    Reason for Referral  Alejandro Sauer was referred for a bedside swallow evaluation to assess the efficiency of his swallow function, identify signs and symptoms of aspiration and make recommendations regarding safe dietary consistencies, effective compensatory strategies, and safe eating environment. Per MD H&P: \"This is a 28 y.o.  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 Pt has G-tube. He was readmitted to [facility] 5/2-6/8 with sepsis, acute resp faiure and found to have MRSA bacteremia with blood cx that remained positive from 5/2 until 5/19 with infected atrial thrombus, septic pulm emboli and empyema. Had port removed on 5/7, repeat blood cx on 6/22 remain negative. On daptomycin, but now with pancytopenia which is acute on chronic. He may also have ceftaroline-induced myelosuppression per infectious disease and the drug was stopped on 6/22. He was initially described as lethargic, but is awake and alert and tearful but looks poorly overall. He is extremely cachectic. \"    Vitals:    06/26/22 1425   BP: 111/71   Pulse: 89   Resp: 20   Temp: 98.4 °F (36.9 °C)   SpO2: (!) 92%       Impression  Dysphagia Diagnosis: Moderate to severe pharyngeal stage dysphagia; Moderate to severe oral stage dysphagia  Dysphagia Impression : Pt presents with moderate to severe oropharyngeal dysphagia characterized by disorganized oral management, mild to moderate oral residue, delayed AP transit, suspectre premature spillage, delayed and decreased laryngeal elevation and excursion, and delayed cough x2  Dysphagia Outcome Severity Scale: Level 1: Severe dysphagia- NPO. Unable to tolerate any PO safely     Treatment Plan  Requires SLP Intervention: Yes     D/C Recommendations: Ongoing speech therapy is recommended during this hospitalization       Recommended Diet and Intervention   Recommended Solids: NPO  Recommended Liquids: NPO  Recommended Form of Meds: Via alternative means of nutrition  Recommendations: NPO;Modified barium swallow study; Therapeutic feeds with SLP only;Consider ice chips PRN;Dysphagia treatment  Therapeutic Interventions: Bolus control exercises; Therapeutic PO trials with SLP;Laryngeal exercises;Oral motor exercises; Pharyngeal exercises; Patient/Family education    Compensatory Swallowing Strategies  Compensatory Swallowing Strategies : Eat/Feed slowly;Upright as possible for all oral intake;Remain upright for 30-45 minutes after meals;Small bites/sips    Treatment/Goals  Short-term Goals  Timeframe for Short-term Goals: 5 days (07/01/2022)  Goal 1: Pt will complete pharyngeal/laryngeal strengthening exercises 10/10 given min cues for overall improved swallowing function. Long-term Goals  Timeframe for Long-term Goals: 7 days (07/03/2022)  Goal 1: Pt will tolerate safest and least restrictive diet without any clinical s/s of aspiration. Dysphagia Goals: The patient will tolerate instrumental swallowing procedure; The patient/caregiver will demonstrate understanding of compensatory strategies for improved swallowing safety. ;The patient will tolerate recommended diet without observed clinical signs of aspiration    General  Chart Reviewed: Yes  Comments: Pt was seen in room in bed with RN permission. Pt was alert and agreeable to session. Behavior/Cognition: Alert; Cooperative;Pleasant mood  Respiratory Status: Room air  O2 Device: None (Room air)  Communication Observation: Dysarthria  Follows Directions: Simple  Dentition: Some missing teeth  Patient Positioning: Upright in bed  Baseline Vocal Quality: Normal  Volitional Cough: Weak  Prior Dysphagia History: Pt reports no dysphagia history prior to partial tongue resection r/t cancer. Consistencies Administered: Pureed; Thin - cup; Thin - straw; Ice Chips    Vision/Hearing  Vision  Vision: Within Functional Limits  Hearing  Hearing: Within functional limits    Oral Motor Deficits  Oral/Motor  Oral Hygiene: Moist;Clean    Oral Phase Dysfunction  Oral Phase  Oral Phase: Exceptions  Oral Phase  Oral Phase - Comment: Pt demonstrates appropriate lip rounding and labial stripping with mild to moderate oral residue, prolonged AP transit, and suspected premature spillage.      Indicators of Pharyngeal Phase Dysfunction   Pharyngeal Phase   Pharyngeal Phase: Pt demonstrates delayed swallow initiation, decreased laryngeal elevation and excursion, and delayed cough x2    Prognosis  Individuals consulted  Consulted and agree with results and recommendations: Patient;RN  RN Name: Jayna Mccann  Patient Education: Pt educated on role of SLP, results of assessment, and recommendations.   Patient Education Response: Verbalizes understanding  Safety Devices in place: Yes  Type of devices: Left in bed;Bed alarm in place;Nurse notified       Therapy Time  SLP Individual Minutes  Time In: 1410  Time Out: 4157  Minutes: 31        Electronically Signed By:  Gera Oates MA, [address]  Speech Language Pathologist

## 2022-06-26 NOTE — PROGRESS NOTES
[email or web address]  Nephrology follow-up note           Reason for Consult: Hyponatremia  Requesting Physician:  Dr. Dewane Schirmer history  Resting  Sodium stable     BM bx on 6/24    ROS:   T max 100.1 F    Scheduled Meds:   filgrastim-aafi  300 mcg SubCUTAneous QPM    sodium chloride flush  5-40 mL IntraVENous 2 times per day    meropenem  1,000 mg IntraVENous Q8H    apixaban  5 mg Per G Tube BID    daptomycin (CUBICIN) IVPB  8 mg/kg IntraVENous S83U    folic acid  1 mg Per G Tube Daily    gabapentin  100 mg Per G Tube TID    magnesium oxide  400 mg Per G Tube BID    melatonin  5 mg Per G Tube Nightly    QUEtiapine  50 mg Oral Nightly    saliva substitute   Oral Q6H WA    sennosides-docusate sodium  2 tablet Per G Tube Nightly    thiamine  100 mg Per G Tube Daily    venlafaxine  75 mg Oral Daily with breakfast    zinc oxide   Topical BID     Continuous Infusions:   sodium chloride      sodium chloride 10 mL/hr at 06/25/22 1311    sodium chloride      sodium chloride 25 mL (06/26/22 1338)     PRN Meds:.sodium chloride, sodium chloride flush, sodium chloride, acetaminophen **OR** acetaminophen, sodium chloride, bisacodyl, sodium chloride, acetaminophen, albuterol, clonazePAM, Lip Balm, oxyCODONE, oxyCODONE, perflutren lipid microspheres, sodium chloride, sodium chloride flush    History of Present Illness on 6/9/2022:    28 y.o. yo male with PMH of squamous cell carcinoma of the tongue (s/p right hemiglossectomy, b/l ND, and RFFF reconstruction on 1/3/22, radiation, and weekly cisplatin completed 4/25/22) who is admitted to ARU from  for rehabilitation. Nephrology has been consulted for hyponatremia and hyperkalemia  Patient is being fed through the feeding tube. His sodium on 6/8 at [facility] was 133 and has been around 130-135 in June.   Earlier in admission, he was hypernatremic in the 149 range as well    He was admitted at [facility] from 5/2/22 and had MRSA bacteremia from infected port s/p removal. Per Dr Radha Puente, while at [facility],  \"pt had right hydropneumothorax with worsening hypoxia requiring multiple intubations and chest tube placements. He underwent right internal jugular and right atrial thrombectomy due to concern that this was causing ongoing bacteremia. ID was consulted and he is to remain on IV ceftaroline with plan for long term treatment until 7/2/22. His course was complicated by severe penile edema and antonio was placed. Urology followed during acute stay. Patient failed several voiding trials    Physical exam:   Constitutional:  VITALS:  BP (!) 107/58   Pulse (!) 110   Temp 97.4 °F (36.3 °C) (Oral)   Resp 16   Ht 5' 6\" (1.676 m)   Wt 102 lb 6.4 oz (46.4 kg)   SpO2 93%   BMI 16.53 kg/m²   Gen: alert, awake  Neck: No JVD  Skin: Unremarkable  Cardiovascular:  S1, S2 without m/r/g   Respiratory: CTA B without w/r/r; respiratory effort normal  Abdomen:  soft, nt, nd,   Extremities: no lower extremity edema  Neuro/Psy: AAoriented times 3 ; moves all 4 ext    Data/  Recent Labs     06/24/22  0600 06/24/22  0600 06/24/22  1455 06/25/22  0640 06/26/22  0757   WBC 0.2*  --   --  0.2* 0.2*   HGB 6.4*   < > 7.5* 7.4* 8.0*   HCT 18.7*   < > 22.1* 21.4* 24.1*   MCV 86.2  --   --  83.9 85.4   *  --   --  120* 146    < > = values in this interval not displayed. 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 High BUN so getting adequate solute.         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  ID following --> switched to daptomycin n meropenem on 6/23  BC from 6/24 growing enterobacter fecalis and staph epi    -Chronic antonio d/t penile edema and failed voiding trials    -Elevated TSH, mild    -Pancytopenia   1 PRBC on 6/22    Neupogen after BM Bx on 6/24 per Dr Jailyn Vega    -Hyperkalemia - better with prn lokelma    -Hypomagnesemia - on oral Mg replacement      Plan  - swallow eval in am planned   -pt is getting 30 ml free water before and after bolus TF 6x per day  -serial BMPs    Will sign off

## 2022-06-27 ENCOUNTER — APPOINTMENT (OUTPATIENT)
Dept: GENERAL RADIOLOGY | Age: 32
DRG: 466 | End: 2022-06-27
Attending: INTERNAL MEDICINE
Payer: COMMERCIAL

## 2022-06-27 LAB
ANION GAP SERPL CALCULATED.3IONS-SCNC: 3 MMOL/L (ref 3–16)
BLOOD BANK REF CASE: NORMAL
BLOOD CULTURE, ROUTINE: ABNORMAL
BLOOD CULTURE, ROUTINE: NORMAL
BUN BLDV-MCNC: 32 MG/DL (ref 7–20)
CALCIUM SERPL-MCNC: 8.9 MG/DL (ref 8.3–10.6)
CHLORIDE BLD-SCNC: 100 MMOL/L (ref 99–110)
CO2: 32 MMOL/L (ref 21–32)
CREAT SERPL-MCNC: <0.5 MG/DL (ref 0.9–1.3)
CULTURE, BLOOD 2: NORMAL
GFR AFRICAN AMERICAN: >60
GFR NON-AFRICAN AMERICAN: >60
GLUCOSE BLD-MCNC: 147 MG/DL (ref 70–99)
HCT VFR BLD CALC: 28.2 % (ref 40.5–52.5)
HEMATOLOGY PATH CONSULT: NO
HEMATOLOGY PATH CONSULT: NORMAL
HEMOGLOBIN: 9.4 G/DL (ref 13.5–17.5)
MCH RBC QN AUTO: 28.6 PG (ref 26–34)
MCHC RBC AUTO-ENTMCNC: 33.4 G/DL (ref 31–36)
MCV RBC AUTO: 85.5 FL (ref 80–100)
ORGANISM: ABNORMAL
PDW BLD-RTO: 16.4 % (ref 12.4–15.4)
PLATELET # BLD: 140 K/UL (ref 135–450)
PLATELET SLIDE REVIEW: ABNORMAL
PMV BLD AUTO: 6.9 FL (ref 5–10.5)
POTASSIUM REFLEX MAGNESIUM: 4.5 MMOL/L (ref 3.5–5.1)
RBC # BLD: 3.3 M/UL (ref 4.2–5.9)
SLIDE REVIEW: ABNORMAL
SODIUM BLD-SCNC: 135 MMOL/L (ref 136–145)
WBC # BLD: 0.2 K/UL (ref 4–11)

## 2022-06-27 PROCEDURE — 92526 ORAL FUNCTION THERAPY: CPT

## 2022-06-27 PROCEDURE — 6360000002 HC RX W HCPCS: Performed by: INTERNAL MEDICINE

## 2022-06-27 PROCEDURE — 36592 COLLECT BLOOD FROM PICC: CPT

## 2022-06-27 PROCEDURE — 6370000000 HC RX 637 (ALT 250 FOR IP): Performed by: INTERNAL MEDICINE

## 2022-06-27 PROCEDURE — 85025 COMPLETE CBC W/AUTO DIFF WBC: CPT

## 2022-06-27 PROCEDURE — 1200000000 HC SEMI PRIVATE

## 2022-06-27 PROCEDURE — 2580000003 HC RX 258: Performed by: INTERNAL MEDICINE

## 2022-06-27 PROCEDURE — 0DH63UZ INSERTION OF FEEDING DEVICE INTO STOMACH, PERCUTANEOUS APPROACH: ICD-10-PCS | Performed by: INTERNAL MEDICINE

## 2022-06-27 PROCEDURE — 80048 BASIC METABOLIC PNL TOTAL CA: CPT

## 2022-06-27 PROCEDURE — 99232 SBSQ HOSP IP/OBS MODERATE 35: CPT | Performed by: INTERNAL MEDICINE

## 2022-06-27 PROCEDURE — 0D20XUZ CHANGE FEEDING DEVICE IN UPPER INTESTINAL TRACT, EXTERNAL APPROACH: ICD-10-PCS | Performed by: INTERNAL MEDICINE

## 2022-06-27 RX ORDER — OXYCODONE HYDROCHLORIDE 5 MG/1
5 TABLET ORAL ONCE
Status: COMPLETED | OUTPATIENT
Start: 2022-06-27 | End: 2022-06-27

## 2022-06-27 RX ADMIN — GABAPENTIN 100 MG: 100 CAPSULE ORAL at 09:30

## 2022-06-27 RX ADMIN — DAPTOMYCIN 350 MG: 500 INJECTION, POWDER, LYOPHILIZED, FOR SOLUTION INTRAVENOUS at 20:49

## 2022-06-27 RX ADMIN — APIXABAN 5 MG: 5 TABLET, FILM COATED ORAL at 09:35

## 2022-06-27 RX ADMIN — SODIUM CHLORIDE, PRESERVATIVE FREE 10 ML: 5 INJECTION INTRAVENOUS at 09:31

## 2022-06-27 RX ADMIN — Medication: at 13:53

## 2022-06-27 RX ADMIN — Medication 5 MG: at 20:51

## 2022-06-27 RX ADMIN — MEROPENEM 1000 MG: 1 INJECTION, POWDER, FOR SOLUTION INTRAVENOUS at 06:54

## 2022-06-27 RX ADMIN — Medication 100 MG: at 09:30

## 2022-06-27 RX ADMIN — MEROPENEM 1000 MG: 1 INJECTION, POWDER, FOR SOLUTION INTRAVENOUS at 22:01

## 2022-06-27 RX ADMIN — GABAPENTIN 100 MG: 100 CAPSULE ORAL at 13:53

## 2022-06-27 RX ADMIN — DOCUSATE SODIUM 50 MG AND SENNOSIDES 8.6 MG 2 TABLET: 8.6; 5 TABLET, FILM COATED ORAL at 20:50

## 2022-06-27 RX ADMIN — Medication 400 MG: at 20:50

## 2022-06-27 RX ADMIN — Medication: at 20:50

## 2022-06-27 RX ADMIN — GABAPENTIN 100 MG: 100 CAPSULE ORAL at 20:50

## 2022-06-27 RX ADMIN — Medication 400 MG: at 09:30

## 2022-06-27 RX ADMIN — SODIUM CHLORIDE, PRESERVATIVE FREE 10 ML: 5 INJECTION INTRAVENOUS at 20:51

## 2022-06-27 RX ADMIN — OXYCODONE 10 MG: 5 TABLET ORAL at 09:42

## 2022-06-27 RX ADMIN — VENLAFAXINE HYDROCHLORIDE 75 MG: 37.5 CAPSULE, EXTENDED RELEASE ORAL at 09:30

## 2022-06-27 RX ADMIN — QUETIAPINE FUMARATE 50 MG: 25 TABLET ORAL at 20:50

## 2022-06-27 RX ADMIN — FILGRASTIM-AAFI 300 MCG: 300 INJECTION, SOLUTION SUBCUTANEOUS at 20:50

## 2022-06-27 RX ADMIN — Medication: at 09:30

## 2022-06-27 RX ADMIN — APIXABAN 5 MG: 5 TABLET, FILM COATED ORAL at 20:51

## 2022-06-27 RX ADMIN — OXYCODONE 10 MG: 5 TABLET ORAL at 17:26

## 2022-06-27 RX ADMIN — FOLIC ACID 1 MG: 1 TABLET ORAL at 09:30

## 2022-06-27 RX ADMIN — OXYCODONE 10 MG: 5 TABLET ORAL at 13:53

## 2022-06-27 RX ADMIN — OXYCODONE 5 MG: 5 TABLET ORAL at 12:58

## 2022-06-27 RX ADMIN — MEROPENEM 1000 MG: 1 INJECTION, POWDER, FOR SOLUTION INTRAVENOUS at 13:53

## 2022-06-27 ASSESSMENT — PAIN - FUNCTIONAL ASSESSMENT: PAIN_FUNCTIONAL_ASSESSMENT: ACTIVITIES ARE NOT PREVENTED

## 2022-06-27 ASSESSMENT — PAIN SCALES - GENERAL
PAINLEVEL_OUTOF10: 10
PAINLEVEL_OUTOF10: 0
PAINLEVEL_OUTOF10: 10
PAINLEVEL_OUTOF10: 10

## 2022-06-27 ASSESSMENT — PAIN DESCRIPTION - LOCATION
LOCATION: COCCYX
LOCATION: BUTTOCKS

## 2022-06-27 ASSESSMENT — PAIN DESCRIPTION - ORIENTATION: ORIENTATION: MID

## 2022-06-27 NOTE — PROGRESS NOTES
[facility] Nurse  Follow-up Progress Note       NAME:  Milo Mae  MEDICAL RECORD NUMBER:  [id number]  AGE:  28 y.o. GENDER:  male  :  1990  TODAY'S DATE:  2022    Subjective: Oh no. Spouse in room. Healed areas to right chest. Leave open to air  MId dressing change patient taken down for bone biopsy. Covered sacrum with dressing until returned to finish VAC dressing change. Wound Identification:  Wound Type: pressure, non-healing surgical and traumatic    Contributing Factors: chronic pressure, decreased mobility and shear forceRadiation treatment                     Patient Goal of Care:  [x] Wound Healing  [] Odor Control  [] Palliative Care  [x] Pain Control   [] Other:     Objective: lying in bed    /72   Pulse 95   Temp 98.4 °F (36.9 °C) (Axillary)   Resp 22   Ht 5' 6\" (1.676 m)   Wt 102 lb 6.4 oz (46.4 kg)   SpO2 92%   BMI 16.53 kg/m²   Hiram Risk Score: Hiram Scale Score: 14  Assessment: Granulation in wound bed, slough continues to anterior wound. Measurements:  Negative Pressure Wound Therapy Sacrum Mid (Active)   $ Standard NPWT >50 sq cm PER TX $ Yes 22 1140   Wound Type Pressure ulcer: Stage IV 22 1346   Unit Type hospital 22 1346   Dressing Type Other (Comment) 22 1140   Number of pieces used 4 22 1140   Number of pieces removed 3 22 1140   Cycle Continuous 22 1346   Target Pressure (mmHg) 125 22 1346   Intensity 1 22 1346   Irrigation Solution Sodium chloride 0.9% 22 1346   Instillation Volume  24 mL 22 1346   Soak Time  10 minutes 22 1346   Vac Frequency 3 hours 22 1346   Canister changed?  No 22 1346   Dressing Status New dressing applied 22 1346   Dressing Changed Changed/New 22 1346   Drainage Amount Moderate 22 1140   Drainage Description Serosanguinous 22 1140   Dressing Change Due 22 06/27/22 1346   Output (ml) 175 ml 06/26/22 0355   Wound Assessment Slough;Granulation tissue 06/27/22 1346   Kristel-wound Assessment Blanchable erythema 06/27/22 1346   Shape oval 06/24/22 1140   Odor None 06/24/22 1140   Number of days: 16       Wound 06/08/22 Throat Right (Active)   Wound Image   06/24/22 1140   Wound Etiology Pressure Stage 3 06/27/22 1346   Dressing Status New dressing applied 06/27/22 1346   Wound Cleansed Cleansed with saline 06/27/22 1346   Dressing/Treatment Triad hydro/zinc oxide-based hydrophilic paste 48/36/76 1490   Dressing Change Due 06/28/22 06/27/22 1346   Wound Length (cm) 2.5 cm 06/24/22 1140   Wound Width (cm) 3.7 cm 06/24/22 1140   Wound Depth (cm) 0.1 cm 06/24/22 1140   Wound Surface Area (cm^2) 9.25 cm^2 06/24/22 1140   Change in Wound Size % (l*w) 47.14 06/24/22 1140   Wound Volume (cm^3) 0.925 cm^3 06/24/22 1140   Wound Healing % 47 06/24/22 1140   Wound Assessment Pink/red 06/26/22 1000   Drainage Amount None 06/27/22 0917   Drainage Description Serosanguinous 06/24/22 1140   Odor None 06/24/22 1140   Kristel-wound Assessment Intact 06/26/22 1000   Margins Attached edges 06/24/22 1140   Wound Thickness Description not for Pressure Injury Partial thickness 06/24/22 1140   Number of days: 18       Wound 06/08/22 Radial Distal;Left (Active)   Wound Image   06/24/22 1140   Wound Etiology Traumatic 06/24/22 1140   Dressing Status Clean;Dry; Intact 06/27/22 0917   Wound Cleansed Cleansed with saline 06/24/22 1140   Dressing/Treatment Alginate with Ag; Foam 06/27/22 0917   Dressing Change Due 06/25/22 06/24/22 1140   Wound Length (cm) 6 cm 06/24/22 1140   Wound Width (cm) 1 cm 06/24/22 1140   Wound Depth (cm) 0.1 cm 06/24/22 1140   Wound Surface Area (cm^2) 6 cm^2 06/24/22 1140   Change in Wound Size % (l*w) 0 06/24/22 1140   Wound Volume (cm^3) 0.6 cm^3 06/24/22 1140   Wound Healing % 0 06/24/22 1140   Wound Assessment [place]/red;Slough 06/24/22 1140   Drainage Amount None 06/27/22 0954 Drainage Description Serosanguinous;Green 06/20/22 1851   Odor None 06/24/22 1140   Kristel-wound Assessment Blanchable erythema 06/24/22 1140   Margins Attached edges 06/24/22 1140   Wound Thickness Description not for Pressure Injury Partial thickness 06/24/22 1140   Number of days: 18       Wound 06/08/22 Knee Left;Posterior (Active)   Wound Image   06/24/22 1140   Wound Etiology Pressure Stage 3 06/24/22 1140   Dressing Status Clean;Dry; Intact 06/26/22 1000   Wound Cleansed Cleansed with saline 06/24/22 1140   Dressing/Treatment Alginate with Ag;Dry dressing 06/27/22 0917   Offloading for Diabetic Foot Ulcers Offloading ordered 06/15/22 1308   Dressing Change Due 06/25/22 06/24/22 1140   Wound Length (cm) 2.7 cm 06/24/22 1140   Wound Width (cm) 1 cm 06/24/22 1140   Wound Depth (cm) 0.1 cm 06/24/22 1140   Wound Surface Area (cm^2) 2.7 cm^2 06/24/22 1140   Change in Wound Size % (l*w) 69.14 06/24/22 1140   Wound Volume (cm^3) 0.27 cm^3 06/24/22 1140   Wound Healing % 85 06/24/22 1140   Wound Assessment Pink/red 06/24/22 1140   Drainage Amount None 06/27/22 0917   Drainage Description Serosanguinous 06/20/22 1422   Odor None 06/24/22 1140   Kristel-wound Assessment Blanchable erythema 06/24/22 1140   Margins Attached edges 06/24/22 1140   Wound Thickness Description not for Pressure Injury Partial thickness 06/24/22 1140   Number of days: 18       Wound 06/08/22 Sacrum (Active)   Wound Image   06/27/22 1346   Wound Etiology Pressure Stage 4 06/27/22 1346   Dressing Status New dressing applied 06/27/22 1346   Wound Cleansed Cleansed with saline 06/27/22 1346   Dressing/Treatment Negative pressure wound therapy 06/27/22 1346   Offloading for Diabetic Foot Ulcers Offloading ordered 06/27/22 1346   Dressing Change Due 06/29/22 06/27/22 1346   Wound Length (cm) 9.5 cm 06/27/22 1346   Wound Width (cm) 6.5 cm 06/27/22 1346   Wound Depth (cm) 1 cm 06/27/22 1346   Wound Surface Area (cm^2) 61.75 cm^2 06/27/22 1346   Change in Wound Size % (l*w) 28.28 06/27/22 1346   Wound Volume (cm^3) 61.75 cm^3 06/27/22 1346   Wound Healing % 58 06/27/22 1346   Undermining Starts ___ O'Clock 1100 06/27/22 1346   Undermining Ends___ O'Clock 200 06/27/22 1346   Undermining [place] Distance (cm) 1 06/27/22 1346   Wound Assessment Pink/red;Slough 06/27/22 1346   Drainage Amount Small 06/27/22 1346   Drainage Description Serosanguinous 06/27/22 1346   Odor Mild 06/27/22 1346   Kristel-wound Assessment Blanchable erythema 06/27/22 1346   Margins Attached edges 06/27/22 1346   Wound Thickness Description not for Pressure Injury Full thickness 06/27/22 1346   Number of days: 18          4 pieces of gray sponge placed. 5 pieces taken out. Response to treatment:  With complaints of pain. Pain Assessment:  Severity:  8 / 10  Quality of pain: aching  Wound Pain Timing/Severity: intermittent  Premedicated: No  Special order for pain medication one time. Plan:   Plan of Care: Wound 06/08/22 Throat Right-Dressing/Treatment: Triad hydro/zinc oxide-based hydrophilic paste  [REMOVED] Wound 06/08/22 Chest Right;Upper-Dressing/Treatment: Open to air  Wound 06/08/22 Radial Distal;Left-Dressing/Treatment: Alginate with Ag,Foam  Wound 06/08/22 Knee Left;Posterior-Dressing/Treatment: Alginate with Ag,Dry dressing  Wound 06/08/22 Sacrum-Dressing/Treatment: Negative pressure wound therapy    Recommend. Areas to right Neck/L wrist and left leg wounds: Cleanse with Cleanse with normal salinealine, pat dry.   Apply Alginate AG  Cover with guaze and bordered foam dressing. Change daily.     Changed Mom, Wed, Fri.      Cleanse sacral wound with normal saline, pat dry, apply barrier wipe then hydrocolloid to wound edges.  Hydrocolloid strips to edges of posterior wound near rectum. Apply VAC drape to wound edges.  Insert gray foam with into tunnel area @ 12 o'clock to 2 o'clock. Gray foam tracking over right hip to attach vacuum suction to.  Cover with VAC drape to seal.     4 pieces of  Vera flow  foam used for sacrum wound dressing and tracking.   Cleanse choice  machine;set at 24 flush normal saline, soak for 10minutes every 3 hours.  Treatment is 125 mmHg continuous suction.     Change cannister once a week or when full  Not changed today.   If suction fails or patient is discharged:  -remove vac dressing  -cleanse wound with normal saline  -pack wound with saline moistened guaze and change every 12 hours.    Call wound care for deterioration [phone] or call [phone] and leave message.       Specialty Bed Required : Yes power pro elite  [x] Low Air Loss   [x] Pressure Redistribution  [] Fluid Immersion  [] Bariatric  [] Total Pressure Relief  [] Other:     Current Diet: Diet NPO  ADULT TUBE FEEDING; PEG; Standard with Fiber; Bolus; 6 Times Daily; 237; 60; Before and after each bolus; Protein; 1 bottle of Proteinex daily, 30 mL free water flush before and after adminstration  Dietician consult:  Yes    Discharge Plan:  Placement for patient upon discharge: intermediate care facility   Patient appropriate for Outpatient [facility]: Yes    Referrals:  [x]  following  [] [facility]  [] Supplies  [] Other    Patient/Caregiver Teaching:  Level of patient/caregiver understanding able to:   [] Indicates understanding       [] Needs reinforcement  [] Unsuccessful      [] Verbal Understanding  [] Demonstrated understanding       [] No evidence of learning  [] Refused teaching         [x] N/A       Electronically signed by Armida Tapia RN, on 6/27/2022 at 1:49 PM

## 2022-06-27 NOTE — PROGRESS NOTES
Infectious Disease Follow up Notes    CC :  MRSA bacteremia      Antibiotics:   dapto 8mg/kg/24 (350mg), s 6/22/22  Meropenem 1g q8    Admit Date:   6/23/2022  Hospital Day: 5    Subjective:   He says he is feeling better. No fever in >48 hours now   On RA     Objective:     Patient Vitals for the past 8 hrs:   BP Temp Temp src Pulse Resp SpO2   06/27/22 1258 -- -- -- -- 22 --   06/27/22 0917 118/72 98.4 °F (36.9 °C) Axillary 95 16 92 %       Pale, chronically ill appearing male. Alert, NAD   Dysarthric speech  Cachectic   Wound anterior neck dressed  Decreased breath sounds at bases [place]  Abd scaphoid, soft.   Scant dermatitis around PEG site without drainage   No focal rash  Sacral wound VAC    ACCESS  LUE PICC  Villatoro       Scheduled Meds:   filgrastim-aafi  300 mcg SubCUTAneous QPM    sodium chloride flush  5-40 mL IntraVENous 2 times per day    meropenem  1,000 mg IntraVENous Q8H    apixaban  5 mg Per G Tube BID    daptomycin (CUBICIN) IVPB  8 mg/kg IntraVENous X10Y    folic acid  1 mg Per G Tube Daily    gabapentin  100 mg Per G Tube TID    magnesium oxide  400 mg Per G Tube BID    melatonin  5 mg Per G Tube Nightly    QUEtiapine  50 mg Oral Nightly    saliva substitute   Oral Q6H WA    sennosides-docusate sodium  2 tablet Per G Tube Nightly    thiamine  100 mg Per G Tube Daily    venlafaxine  75 mg Oral Daily with breakfast    zinc oxide   Topical BID       Continuous Infusions:   sodium chloride      sodium chloride 10 mL/hr at 06/25/22 1311    sodium chloride      sodium chloride 25 mL (06/26/22 1338)          Data Review:    Lab Results   Component Value Date    WBC 0.2 (LL) 06/27/2022    HGB 9.4 (L) 06/27/2022    HCT 28.2 (L) 06/27/2022    MCV 85.5 06/27/2022     06/27/2022     Lab Results   Component Value Date    CREATININE <0.5 (L) 06/27/2022    BUN 32 (H) 06/27/2022     (L) 06/27/2022    K 4.5 06/27/2022     06/27/2022    CO2 32 06/27/2022       Hepatic Function Panel:   Lab Results   Component Value Date    ALKPHOS 108 02/06/2022    ALT 17 02/06/2022    AST 17 02/06/2022    PROT 7.1 02/06/2022    BILITOT 0.4 02/06/2022    LABALBU 2.4 06/11/2022         MICRO:  6/22 UA 6-9wbc, 50-100rbc, >100k Ps aeruginosa,     BC x2 NGTD   6/23 BC x2 NGTD  6/24 BM aspirate culture with S epi, S haemolyticus, Clostridia ramosum and E faecalis      Sputum and BAL cultures on 5/2/22 E cloacae, K pneumoniae, pneumococcus       IMAGING:  CXR 6/22/22  Impression   Near complete resolution of right-sided airspace infiltrates. CXR 6/24/22  Impression   Curvilinear opacities throughout both lungs, which could represent either   mild interstitial pulmonary edema and/or atypical pneumonia. Echo 6/23/22   Summary   Limited echo for LV function/MRSA with limited doppler/color. -- Normal left ventricle systolic function with an estimated ejection   fraction of 55-60%. Left ventricular systolic function is normal with a 3D   calculated EF of 55%. EF by Khan's method estimated at 58%. No regional   wall motion abnormalities are seen. Diastolic dysfunction grade and filling pressure are indeterminate. -- The right ventricle is normal in size and function. -- Systolic pulmonary artery pressure (sPAP) is normal and estimated at 22   mmHg (right atrial pressure 3 mmHg)   -- No obvious atrial masses   - Elevated heart rate throughout study.       Assessment:     Patient Active Problem List    Diagnosis Date Noted    Sepsis ([address] 06/23/2022     Priority: Medium    Pancytopenia ([address] 06/23/2022     Priority: Medium    Critical illness myopathy 06/08/2022     Priority: Medium    Recurrent major depressive disorder ([address] 01/25/2022    Prediabetes 01/25/2022    Primary squamous cell carcinoma of tongue ([address] 11/30/2021    Poor dentition 11/04/2021    Class 1 obesity due to excess calories with serious comorbidity and body mass index (BMI) of 34.0 to 34.9 in adult 11/04/2021    Chronic bilateral low back pain without sciatica 11/03/2021    Anxiety 11/03/2021    Insomnia due to other mental disorder 11/03/2021       History [facility] tongue s/p resection/radical neck dissection 1/2022 followed by chemo and XRT and reconstruction  G-tube in place    Prolonged admission [facility] 5/2-6/8/22 with sepsis, hypoxemic respiratory failure req MV    Complicated, prolonged MRSA bacteremia (+BC from 5/2/22 to 5/19/22), associated with infected atrial thrombus, septic pulmonary emboli and empyema  Presumed pathogenesis was CLABSI from port   S/p port removal 5/7/22  BC collected 5/21, 5/24 were negative  Repeat BC collected 6/22/22 are negative to date   He is now on IV dapto alone   He was transferred to ARU 6/8/22  At the time of DC from [facility], the plan was IV abx through 7/2/22 via PICC that was placed 6/9/22 (?I think)  Repeat BC are negative to date. Repeat limited echo was reassuring.    -continue dapto with periodic check of CK, it was wnl on 6/24/22    Infected R atrial/IJ/SVC thrombus s/p thrombectomy with culture positive for MRSA     R hydropneumothorax / empyema  S/p chest tube placement w tpa instillations  Per Oncology note at  - \"Reviewed pleural fluid and there were some \"atypical cells\". Once feasible, would be ideal to biopsy lung lesion although agree with ongoing bacteremia, likely infectious rather than malignancy. \"  Deemed poor candidate for decort   -resolved     Cytopenias, acute on chronic   This was present at [facility] as well and was attributed to residua from chemo last given 4/19/22 along with acute illness   Possibility of ceftaroline-induced myelosuppression was considered (this is well described krista w higher/prolonged courses as with this patient); drug was stopped 6/22/22  S/p BM bx; G-CSF ordered  -BM path still pending      Pseudomonas CAUTI vs bacteriuria     +Bacterial culture on the BM aspirate [place]  I think this is contamination     FREEMAN PE     No abx allergies     Plan:     Continue dapto through 7/2/22 with periodic check of CK   Baseline CK 6/24/22 is wnl     Continue abx for Psa UTI, the isolate was sensitive to FQ and meropenem, I cefepime and R pip   Today is day #4/10  Villatoro was changed      Await BM path     I think the BM aspirate culture is probably contamination           Celi Park MD  Phone: [phone]   Fax : [phone]

## 2022-06-27 NOTE — PROGRESS NOTES
ONCOLOGY HEMATOLOGY CARE PROGRESS NOTE      SUBJECTIVE:     The patient remains very fatigued. Heme Hx:    Mr. Roby Norris  is a 76-year-old male status post hemiglossectomy for head neck carcinoma. He is now in a rehabilitation facility due to MRSA. He was recently changed from ceftriaxone to daptomycin. I was asked to see him for pancytopenia    ROS:     Constitutional:  No weight loss, No fever, No chills, No night sweats. Energy level poor. Eyes:  No impairment or change in vision  ENT / Mouth:  No pain, abnormal ulceration, bleeding, nasal drip.pos change in voice   Cardiovascular:  No chest pain, palpitations, new edema, or calf discomfort  Respiratory:  No pain, hemoptysis, change to breathing  Gastrointestinal:  No pain, cramping, jaundice, change to eating and bowel habits  Urinary:  No pain, bleeding or change in continence  Genitalia: No pain, bleeding or discharge  Musculoskeletal:  No redness, pain, edema or weakness  Skin:  No pruritus, rash, change to nodules or lesions Wound on sacrum  Neurologic:  No discomfort, change in mental status, speech, sensory or motor activity  Psychiatric:  No change in concentration or change to affect or mood  Endocrine:  No hot flashes, increased thirst, or change to urine production  Hematologic: No petechiae, ecchymosis or bleeding  Lymphatic:  No lymphadenopathy or lymphedema  Allergy / Immunologic:  No eczema, hives, frequent or recurrent infections    OBJECTIVE        Physical    VITALS:  /77   Pulse 95   Temp 98.5 °F (36.9 °C) (Axillary)   Resp 16   Ht 5' 6\" (1.676 m)   Wt 102 lb 6.4 oz (46.4 kg)   SpO2 93%   BMI 16.53 kg/m²   TEMPERATURE:  Current - Temp: 98.5 °F (36.9 °C);  Max - Temp  Av.1 °F (36.7 °C)  Min: 97.4 °F (36.3 °C)  Max: 98.5 °F (36.9 °C)  PULSE OXIMETRY RANGE: SpO2  Av.5 %  Min: 89 %  Max: 95 %  24HR INTAKE/OUTPUT:      Intake/Output Summary (Last 24 hours) at 2022 0910  Last data filed at 6/27/2022 0415  Gross per 24 hour   Intake 2386 ml   Output 1400 ml   Net 986 ml       CONSTITUTIONAL: Chronically ill-appearing, no acute distress. Cachectic  EYES: Sclera clear, conjunctiva normal  ENT: Oral pharynx unremarkable with moist mucus membranes  LUNGS: Clear to auscultation. No increased labor with breathing. CARDIOVASCULAR: Regular rate and rhythm, normal S1 and S2, no S3 or S4, and no murmur noted. ABDOMEN: Soft, non-distended, non-tender. Small amount of leakage about the G-tube  MUSCULOSKELETAL: There is no redness, warmth, or swelling of the joints. NEUROLOGIC: Awake, alert. Grossly non-focal.  SKIN: No bruising or bleeding. PSYCH: Normal affect. Data  Recent Labs     06/27/22  0640 06/26/22  0757 06/25/22  0640 06/22/22  0650 06/20/22  0630 06/17/22  0600 06/17/22  0600 06/15/22  0650 06/15/22  0650   WBC 0.2* 0.2* 0.2*   < > 2.4*   < > 4.0   < > 3.2*   NEUTROABS  --   --   --   --  1.5*  --  3.1  --  2.5   LYMPHOPCT  --   --   --   --  11.9  --  7.2  --  5.8   RBC 3.30* 2.82* 2.55*   < > 2.43*   < > 2.46*   < > 2.54*   HGB 9.4* 8.0* 7.4*   < > 7.1*   < > 7.1*   < > 7.3*   HCT 28.2* 24.1* 21.4*   < > 21.2*   < > 21.1*   < > 21.6*   MCV 85.5 85.4 83.9   < > 86.9   < > 85.8   < > 85.1   MCH 28.6 28.4 28.9   < > 29.2   < > 28.8   < > 28.9   MCHC 33.4 33.2 34.4   < > 33.6   < > 33.5   < > 34.0   RDW 16.4* 15.9* 15.7*   < > 15.6*   < > 15.5*   < > 15.1    146 120*   < > 79*   < > 112*   < > 117*    < > = values in this interval not displayed. Recent Labs     06/25/22  0640 06/26/22  0757 06/27/22  0640    137 135*   K 4.3 4.5 4.5    102 100   CO2 29 29 32   BUN 36* 32* 32*   CREATININE 0.6* <0.5* <0.5*     No results for input(s): AST, ALT, ALB, BILIDIR, BILITOT, ALKPHOS in the last 72 hours.     Magnesium:    Lab Results   Component Value Date    MG 2.00 06/13/2022    MG 1.70 06/10/2022       Imaging  CT GUIDED NEEDLE PLACEMENT  Narrative: PROCEDURE:  CT GUIDED BONE MARROW ASPIRATION AND CORE NEEDLE BONE BIOPSY OF THE LEFT  ILIAC BONE. MODERATE CONSCIOUS SEDATION    6/24/2022    HISTORY:  ORDERING SYSTEM PROVIDED HISTORY: pancytopenia  TECHNOLOGIST PROVIDED HISTORY:  Culture  Flow cytometry  Cytogenetics  MDS and AML panel. Reason for exam:->pancytopenia  Reason for Exam: pancytopenia; ORDERING SYSTEM PROVIDED HISTORY: pancytopenia  TECHNOLOGIST PROVIDED HISTORY:  Culture  Flow cytometry  Cytogenetics  MDS and AML panel. Reason for exam:->pancytopenia    SEDATION:  Moderate sedation was ordered and supervised by the attending with physician  face-to-face monitoring. Medications were provided and recorded by Radiology  nurses. A total of 2 mg of Versed and 100 mcg of fentanyl was used for  conscious sedation during the procedure, with total conscious sedation time  of 17 minutes. TECHNIQUE:  Informed consent was obtained following a detailed explanation of the  procedure including risks, benefits, and alternatives. Universal protocol  was followed. Sterile gowns, masks, hats and gloves utilized for maximal  sterile barrier. Axial images were obtained through the iliac bones using CT  guidance and a suitable skin site was prepped and draped in sterile fashion. Local anesthesia was achieved with lidocaine. An 11 gauge [employer] bone  marrow biopsy needle was advanced into the left iliac bone and approximately  10 mL of bone marrow aspirate was obtained. A single core biopsy specimen  was obtained and the patient tolerated the procedure well. Hemostasis was  achieved with digital pressure. The site was cleaned, and a dry sterile  dressing was applied. Dose modulation, iterative reconstruction, and/or weight based adjustment of  the mA/kV was utilized to reduce the radiation dose to as low as reasonably  achievable. Total exam dose linear product was 389.33 mGy per cm.   Impression: Successful CT guided bone marrow aspiration and core biopsy of the left iliac  bone. CT BIOPSY BONE MARROW  Narrative: PROCEDURE:  CT GUIDED BONE MARROW ASPIRATION AND CORE NEEDLE BONE BIOPSY OF THE LEFT  ILIAC BONE. MODERATE CONSCIOUS SEDATION    6/24/2022    HISTORY:  ORDERING SYSTEM PROVIDED HISTORY: pancytopenia  TECHNOLOGIST PROVIDED HISTORY:  Culture  Flow cytometry  Cytogenetics  MDS and AML panel. Reason for exam:->pancytopenia  Reason for Exam: pancytopenia; ORDERING SYSTEM PROVIDED HISTORY: pancytopenia  TECHNOLOGIST PROVIDED HISTORY:  Culture  Flow cytometry  Cytogenetics  MDS and AML panel. Reason for exam:->pancytopenia    SEDATION:  Moderate sedation was ordered and supervised by the attending with physician  face-to-face monitoring. Medications were provided and recorded by Radiology  nurses. A total of 2 mg of Versed and 100 mcg of fentanyl was used for  conscious sedation during the procedure, with total conscious sedation time  of 17 minutes. TECHNIQUE:  Informed consent was obtained following a detailed explanation of the  procedure including risks, benefits, and alternatives. Universal protocol  was followed. Sterile gowns, masks, hats and gloves utilized for maximal  sterile barrier. Axial images were obtained through the iliac bones using CT  guidance and a suitable skin site was prepped and draped in sterile fashion. Local anesthesia was achieved with lidocaine. An 11 gauge [employer] bone  marrow biopsy needle was advanced into the left iliac bone and approximately  10 mL of bone marrow aspirate was obtained. A single core biopsy specimen  was obtained and the patient tolerated the procedure well. Hemostasis was  achieved with digital pressure. The site was cleaned, and a dry sterile  dressing was applied. Dose modulation, iterative reconstruction, and/or weight based adjustment of  the mA/kV was utilized to reduce the radiation dose to as low as reasonably  achievable. Total exam dose linear product was 389.33 mGy per cm.   Impression: Successful CT guided bone marrow aspiration and core biopsy of the left iliac  bone. XR CHEST PORTABLE  Narrative: EXAMINATION:  ONE XRAY VIEW OF THE CHEST    6/24/2022 5:55 am    COMPARISON:  06/22/2022, 06/15/2022    HISTORY:  ORDERING SYSTEM PROVIDED HISTORY: SEPSIS  TECHNOLOGIST PROVIDED HISTORY:  Reason for exam:->SEPSIS  Reason for Exam: SEPSIS    FINDINGS:  A single frontal view of the chest was performed. There is no acute skeletal  abnormality. There are multiple surgical clips overlying the neck soft  tissues. A left arm PICC is in place, with tip overlying the cavoatrial  junction. The heart size and mediastinal contours are stable, and within  normal limits. There are curvilinear opacities within both lungs, which  could represent either interstitial pulmonary edema or atypical pneumonia. No focus of acute airspace consolidation is identified. There is no evidence  of a pneumothorax. Impression: Curvilinear opacities throughout both lungs, which could represent either  mild interstitial pulmonary edema and/or atypical pneumonia.       Problem List  Patient Active Problem List   Diagnosis    Chronic bilateral low back pain without sciatica    Anxiety    Insomnia due to other mental disorder    Poor dentition    Class 1 obesity due to excess calories with serious comorbidity and body mass index (BMI) of 34.0 to 34.9 in adult    Primary squamous cell carcinoma of tongue (HCC)    Recurrent major depressive disorder (HCC)    Prediabetes    Critical illness myopathy    Severe malnutrition (HCC)    Sepsis (HCC)    Pancytopenia (HCC)       ASSESSMENT AND PLAN:    Pancytopenia:  -It is a little unusual for the platelets to recover prior to the white blood count; however this is not unheard of  -S/P bone marrow biopsy 6/24 to address his persistent neutropenia despite stopping his antibiotics  -B12 and folate are normal  -Iron studies are consistent with anemia of chronic disease  -Heparin-induced thrombocytopenia panel is negative  -Bone marrow biopsy done 6/24/2022 is pending  -Bone marrow was also cultured    Neutropenia:  -He was started on Granix  -Currently there is no benefit and this is somewhat suspected  -It is very unusual for the platelet count to recover prior to the white blood count  -It is unlikely that he has squamous cell carcinoma of the head neck and his bone marrow and it is very unlikely this is damage due to chemotherapy based on the medications that he was given    Head neck carcinoma:  -Status posttreatment at the [facility]  -He received surgery, chemotherapy and radiation      ONCOLOGIC DISPOSITION:  -Once he is not neutropenic    Nick David MD, MPH  Please contact through [facility]

## 2022-06-27 NOTE — PROGRESS NOTES
Hospitalist Progress Note      PCP: Rachel Aldrich DO    Date of Admission: 6/23/2022    Chief Complaint: fever    Hospital Course: This is a 28 y.o. 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 Pt has G-tube. He was readmitted to [facility] 5/2-6/8 with sepsis, acute resp faiure and found to have MRSA bacteremia with blood cx that remained positive from 5/2 until 5/19 with infected atrial thrombus, septic pulm emboli and empyema. Had port removed on 5/7, repeat blood cx on 6/22 remain negative. On daptomycin, but now with pancytopenia which is acute on chronic. He may also have ceftaroline-induced myelosuppression per infectious disease and the drug was stopped on 6/22. Fully oriented and alert at the time of the visit. Was confused at the time of arrival.  Still neutropenic. Being followed by oncology. Bone marrow biopsy is pending  Dysfunction of the G-tube was treated by gastroenterology with replacement with a new one. Subjective: Weak, fully alert and oriented.   No chest pain, no shortness of breath, no nausea, no vomiting      Medications:  Reviewed    Infusion Medications    sodium chloride      sodium chloride 10 mL/hr at 06/25/22 1311    sodium chloride      sodium chloride 25 mL (06/26/22 1338)     Scheduled Medications    filgrastim-aafi  300 mcg SubCUTAneous QPM    sodium chloride flush  5-40 mL IntraVENous 2 times per day    meropenem  1,000 mg IntraVENous Q8H    apixaban  5 mg Per G Tube BID    daptomycin (CUBICIN) IVPB  8 mg/kg IntraVENous [id number]    folic acid  1 mg Per G Tube Daily    gabapentin  100 mg Per G Tube TID    magnesium oxide  400 mg Per G Tube BID    melatonin  5 mg Per G Tube Nightly    QUEtiapine  50 mg Oral Nightly    saliva substitute   Oral Q6H WA    sennosides-docusate sodium  2 tablet Per G Tube Nightly    thiamine  100 mg Per G Tube Daily 06/27/22  0640    137 135*   K 4.3 4.5 4.5    102 100   CO2 29 29 32   BUN 36* 32* 32*   CREATININE 0.6* <0.5* <0.5*   CALCIUM 8.8 9.3 8.9     No results for input(s): AST, ALT, BILIDIR, BILITOT, ALKPHOS in the last 72 hours. Recent Labs     06/24/22  0948   INR 1.88*     No results for input(s): Sandi Merritt in the last 72 hours. Urinalysis:      Lab Results   Component Value Date    NITRU Negative 06/22/2022    WBCUA 6-9 06/22/2022    BACTERIA 3+ 06/22/2022    RBCUA  06/22/2022    BLOODU MODERATE 06/22/2022    SPECGRAV 1.010 06/22/2022    GLUCOSEU Negative 06/22/2022       Radiology:  CT BIOPSY BONE MARROW   Final Result   Successful CT guided bone marrow aspiration and core biopsy of the left iliac   bone. CT GUIDED NEEDLE PLACEMENT   Final Result   Successful CT guided bone marrow aspiration and core biopsy of the left iliac   bone. XR CHEST PORTABLE   Final Result   Curvilinear opacities throughout both lungs, which could represent either   mild interstitial pulmonary edema and/or atypical pneumonia. FL MODIFIED BARIUM SWALLOW W VIDEO    (Results Pending)           Assessment/Plan:    Active Hospital Problems    Diagnosis     Sepsis ([address] [A41.9]      Priority: Medium    Pancytopenia ([address] [S87.983]      Priority: Medium    Primary squamous cell carcinoma of tongue (HCC) [C02.9]     Anxiety [F41.9]      PLAN:    Pancytopenia  Still neutropenic and anemic. Platelet count has improved  Being followed by heme-onc  Bone marrow biopsy is pending    Neutropenic fever with sepsis  Continue empiric antibiotics  Temperature is normal today    Head and neck cancer  Postop. Pain is controlled. Heme-onc is following. Main oncology is at [facility]. Feeding tube dysfunction  Resolved after replacement. GI intervention appreciated. Discussed with the patient. Questions answered.     DVT Prophylaxis: lovenox  Diet: Diet NPO  ADULT TUBE FEEDING; PEG; Standard with Fiber; Bolus; 6 Times Daily; 237; 60; Before and after each bolus; Protein; 1 bottle of Proteinex daily, 30 mL free water flush before and after adminstration  Code Status: Full Code    PT/OT Eval Status: not ordered    Dispo - SNF.  Unclear timeframe given acute illness and only partial resolution so far    Sharan Rocha MD

## 2022-06-27 NOTE — PROGRESS NOTES
Speech Language Pathology    Name: Marly Heck  : 1990  Medical Diagnosis: Sepsis ([facility] [address] [A41.9]     Pt seen at bedside for swallow evaluation yesterday, 22. SLP had recommended NPO with a Modified Barium Swallow Study (MBSS). Pt is known to speech therapy department from ARU admission. Pt with MBSS completed on 22 (initial MBSS completed 22 at [facility]), with the following impressions: \"Assessment: Pt continues to present with severe oropharyngeal dysphagia. · Oral phase deficits characterized by weak lingual manipulation and minimal to no tongue base retraction - negatively impacted from right hemiglossectomy. Reduced A-P transit, resulting in pocketing / residue in anterior sulcus. Reduced bolus control resulting in premature spillage across consistencies.     · Pharyngeal phase deficits characterized by no-minimal anterior hyoid movement, no epiglottic inversion, reduced PPW movement, and delayed laryngeal elevation. Due to anatomical features and reduced movement overall, difficult to determine trigger of pharyngeal swallow - but delayed across consistencies. Per the radiologist, \"there is mild thickening of the epiglottis suggested, likely post treatment related. \"  This resulted in significant pooling on the epiglottis and very limited / no valleculae space. Significantly reduced BOT retraction. Pt with trace penetration x1 before the swallow with thin tsp. Pt with deep penetration during the swallow with all consistencies - eventual silent aspiration. Pt having to swallow 4-5x per tsp. Significant post-swallow residue, appeared to increase as the consistency became thicker. Pt with deep penetration after the swallow due to pharyngeal residue across consistencies - residue in valleculae began to spill over the epiglottic rim and into the pharyngeal vestibule. Eventual aspiration across consistencies.  Aspiration is silent; pt unable to clear despite cues to cough.     Overall, pt continues to be at a high risk of aspiration across all consistencies. Recommend continue NPO with nutrition via alternative means. Recommend ice chips PRN with SLP and RN only. RN and MD made aware of recommendations. SLP also spoke with significant other and brother via phone calls. \"    Pt with hx of stage IV squamous cell carcinoma of the tongue (s/p right hemiglossectomy, bilateral neck dissection, and RFFF reconstruction on 1/3/22 followed by chemoradiation, and weekly cisplatin completed 4/25/22 and s/p G-tube). Pt has been NPO for a significant amount of time due to severe oropharyngeal dysphagia. Limited progress has been made with overall swallowing function - had been recommended NPO with both previous MBS studies (06/01/22 and 06/20/22). With MBSS just completed last week and pt silently aspirating all consistencies, limited progress made between two most recent MBS studies, and change in medical status with re-admission to acute inpatient from ARU, pt is not appropriate for a repeat MBSS at this time. SLP to have RN discontinue the order. RN made aware. SLP will see pt at bedside for dysphagia f/u.      Thank you,  Angelina Luis MA [address] [facility]  Speech Language Pathologist

## 2022-06-27 NOTE — PROGRESS NOTES
placed by RN 06/26/22. Pt had just completed MBSS on 06/20/22, recommending NPO due to severe oropharyngeal dysphagia. With MBSS just completed last week and pt silently aspirating all consistencies, limited progress made, and change in medical status with re-admission to acute inpatient from ARU, pt is not appropriate for a repeat MBSS at this time. SLP spoke with RN, asking to discontinue the order. More details in brief note from earlier this AM  · Pt agreeable to raise [facility] for ice chip trials. Of note, pt with ice chips at bedside. Pt is impulsive, and SLP had recommended ice chips with RN and SLP only   · Pt with no overt s/s of aspiration with ice chips, but previous MBSS did indicate silent aspiration     · The following pharyngeal / laryngeal strengthening exercises completed:  · Effortful swallow: 10x  · Shikha: 5x  · Isometric tongue tip press and hold 5 sec: 10x  · Jaw jut: 10x  · Shaker: maneuver - held for 1 min x2, then completed 30 reps, then held for 1 min x1  · Pt continues to present with reduced lingual ROM and coordination - likely related to radiation treatment    SLP recommends continue NPO with ice chips PRN with SLP and RN only. Ensure pt is fully upright for all ice chips. RN made aware; asked to discontinue MBSS order since pt is not appropriate at this time. SLP to continue to follow. Dysphagia Goals:  Timeframe for Long-term Goals: 7 days (07/03/2022)  Goal 1: Pt will tolerate safest and least restrictive diet without any clinical s/s of aspiration. 06/27 - addressed and ongoing; not progressing - see above      Short-term Goals  Timeframe for Short-term Goals: 5 days (07/01/2022)  Goal 1: Pt will complete pharyngeal/laryngeal strengthening exercises 10/10 given min cues for overall improved swallowing function. 06/27 - addressed and ongoing - see above     Goal 2: The patient will tolerate instrumental swallowing procedure  06/27 - Goal discontinued.  Pt with MBSS 06/20/22 and is not appropriate for a repeat at this time. Goal 3: The patient/caregiver will demonstrate understanding of compensatory strategies for improved swallowing safety  06/27 - addressed and ongoing; see above    Goal 4: The patient will tolerate recommended diet without observed clinical signs of aspiration  06/27 - addressed and ongoing; not progressing - see above     Speech/Language/Cog Goals: N/A      Recommendations:  Solid Consistency: NPO  Liquid Consistency: NPO; ice chips PRN with SLP and RN only  Medication:  Via alternative means of nutrition     Patient/Family/Caregiver Education: SLP edu pt re: role of SLP, rationale of not completing MBSS, safe swallow strategies, swallowing exercises. Pt verbalized understanding. Compensatory Strategies: Oral care 3-4x/day; fully upright for all ice chip trials     Plan:    Continued Dysphagia treatment with goals per plan of care. Discharge Recommendations: Ongoing ST at d/c    If pt discharges from hospital prior to Speech/Swallowing discharge, this note serves as tx and discharge summary.      Total Treatment Time / Charges     Time in Time out Total Time / units   Cognitive Tx         Speech Tx      Dysphagia Tx [id number] 24 min / 1 unit     Signature:  Jorge Gold [id number]  Speech Language Pathologist

## 2022-06-27 NOTE — PROGRESS NOTES
Physician Progress Note      PATIENTFriddie Lombard  CSN #:                  [id number]  :                       1990  ADMIT DATE:       2022 4:36 PM  [address] [place] DATE:  RESPONDING  PROVIDER #:        Ronel Johnson MD          QUERY TEXT:    Pt admitted with UTI due to antonio. Noted documentation of  stage 4 sacrum   pressure ulcer, stage 3 pressure ulcer of left knee, and non healing surgical   wounds of neck and chest.  If possible, please document in progress notes and   discharge summary:    The medical record reflects the following:  Risk Factors: sever malnutrition, s/p resection and radical neck dissection   , chemo and radiation  Clinical Indicators: Stage 4 sacrum pressure ulcer, stage 3 pressure ulcer of   left knee, and non healing surgical wounds of neck and chest per wound care  Treatment: wound care, dietary consult, wound vac therapy, supportive care    Thank you,  Jose De Jesus Benitez RN, CDS  [email or web address]  Options provided:  -- Stage 4 sacrum pressure ulcer, stage 3 pressure ulcer of left knee, and non   healing surgical wounds of neck and chest confirmed present on admission  -- Other - I will add my own diagnosis  -- Disagree - Not applicable / Not valid  -- Disagree - Clinically unable to determine / Unknown  -- Refer to Clinical Documentation Reviewer    PROVIDER RESPONSE TEXT:    The diagnosis of stage 4 sacrum pressure ulcer, stage 3 pressure ulcer of left   knee, and non healing surgical wounds of neck and chest was confirmed as   present on admission.     Query created by: Marko Reyes on 2022 7:21 AM      Electronically signed by:  Ronel Johnson MD 2022 7:24 AM

## 2022-06-27 NOTE — PROGRESS NOTES
PROGRESS NOTE  S:31 yrs Patient  admitted on 6/23/2022 with Sepsis ([facility] [address] [A41.9] . Secure-lock for low profile G tube missing. The tube was deflated and removed. It was replaced with an 18 Fr Desma Bologna G tube. Current Hospital Schedued Meds   filgrastim-aafi  300 mcg SubCUTAneous QPM    sodium chloride flush  5-40 mL IntraVENous 2 times per day    meropenem  1,000 mg IntraVENous Q8H    apixaban  5 mg Per G Tube BID    daptomycin (CUBICIN) IVPB  8 mg/kg IntraVENous U81Z    folic acid  1 mg Per G Tube Daily    gabapentin  100 mg Per G Tube TID    magnesium oxide  400 mg Per G Tube BID    melatonin  5 mg Per G Tube Nightly    QUEtiapine  50 mg Oral Nightly    saliva substitute   Oral Q6H WA    sennosides-docusate sodium  2 tablet Per G Tube Nightly    thiamine  100 mg Per G Tube Daily    venlafaxine  75 mg Oral Daily with breakfast    zinc oxide   Topical BID     Current Hospital IV Meds   sodium chloride      sodium chloride 10 mL/hr at 06/25/22 1311    sodium chloride      sodium chloride 25 mL (06/26/22 1338)     Current Hospital PRN Meds  sodium chloride, sodium chloride flush, sodium chloride, acetaminophen **OR** acetaminophen, sodium chloride, bisacodyl, sodium chloride, acetaminophen, albuterol, clonazePAM, Lip Balm, oxyCODONE, oxyCODONE, perflutren lipid microspheres, sodium chloride, sodium chloride flush    Exam:   Vitals:    06/27/22 0400   BP: 115/77   Pulse: 95   Resp: 16   Temp: 98.5 °F (36.9 °C)   SpO2: 93%     I/O last 3 completed shifts: In: 3421.1 [P.O.:555;  I.V.:10; NG/GT:1951; IV Piggyback:905.1]  Out: 80 [Urine:2150; Drains:175]   General appearance: alert, appears stated age and cooperative  HEENT: PERRLA  Neck: no adenopathy, no carotid bruit, no JVD, supple, symmetrical, trachea midline and thyroid not enlarged, symmetric, no tenderness/mass/nodules  Lungs: clear to auscultation bilaterally  Heart: regular rate and rhythm, S1, S2 normal, no murmur, click, rub or gallop  Abdomen: soft, non-tender; bowel sounds normal; no masses,  no organomegaly  Extremities: extremities normal, atraumatic, no cyanosis or edema     Labs:  CBC:   Recent Labs     06/24/22  1455 06/25/22  0640 06/26/22  0757   WBC  --  0.2* 0.2*   HGB 7.5* 7.4* 8.0*   HCT 22.1* 21.4* 24.1*   MCV  --  83.9 85.4   PLT  --  120* 146     BMP:   Recent Labs     06/25/22  0640 06/26/22  0757    137   K 4.3 4.5    102   CO2 29 29   BUN 36* 32*   CREATININE 0.6* <0.5*     LIVER PROFILE: No results for input(s): AST, ALT, LIPASE, PROT, BILIDIR, BILITOT, ALKPHOS in the last 72 hours. Invalid input(s): AMYLASE,  ALB  PT/INR:   Recent Labs     06/24/22  0948   INR 1.88*       IMAGING:  No results found. Hospital Problems           Last Modified POA    * (Principal) Sepsis ([address] 6/23/2022 Yes    Severe malnutrition ([address] (Chronic) 6/24/2022 Yes    Pancytopenia ([address] 6/24/2022 Yes    Anxiety 6/24/2022 Yes    Primary squamous cell carcinoma of tongue ([address] 6/24/2022 Yes         Impression:  27 yo male with squamous cell cancer of the tongue s/p resection and radical neck dissection with G tube malfunction    Recommendation:  1. PEG tube exchanged to [facility] 18 Fr tube. 2. Will follow.       Maria D Moreira MD  7:02 AM 6/27/2022            [address] [facility] [address]      [address] [place] [address]     Phone: [phone]     Fax: [phone]

## 2022-06-28 LAB
ANION GAP SERPL CALCULATED.3IONS-SCNC: 5 MMOL/L (ref 3–16)
BUN BLDV-MCNC: 24 MG/DL (ref 7–20)
CALCIUM SERPL-MCNC: 8.6 MG/DL (ref 8.3–10.6)
CHLORIDE BLD-SCNC: 99 MMOL/L (ref 99–110)
CO2: 32 MMOL/L (ref 21–32)
CREAT SERPL-MCNC: <0.5 MG/DL (ref 0.9–1.3)
GFR AFRICAN AMERICAN: >60
GFR NON-AFRICAN AMERICAN: >60
GLUCOSE BLD-MCNC: 122 MG/DL (ref 70–99)
HCT VFR BLD CALC: 24.2 % (ref 40.5–52.5)
HEMATOLOGY PATH CONSULT: NO
HEMOGLOBIN: 8.1 G/DL (ref 13.5–17.5)
MCH RBC QN AUTO: 28.5 PG (ref 26–34)
MCHC RBC AUTO-ENTMCNC: 33.4 G/DL (ref 31–36)
MCV RBC AUTO: 85.4 FL (ref 80–100)
PDW BLD-RTO: 15.9 % (ref 12.4–15.4)
PLATELET # BLD: 155 K/UL (ref 135–450)
PMV BLD AUTO: 7.1 FL (ref 5–10.5)
POTASSIUM REFLEX MAGNESIUM: 4.9 MMOL/L (ref 3.5–5.1)
RBC # BLD: 2.84 M/UL (ref 4.2–5.9)
SODIUM BLD-SCNC: 136 MMOL/L (ref 136–145)
WBC # BLD: 0.3 K/UL (ref 4–11)

## 2022-06-28 PROCEDURE — 85025 COMPLETE CBC W/AUTO DIFF WBC: CPT

## 2022-06-28 PROCEDURE — 1200000000 HC SEMI PRIVATE

## 2022-06-28 PROCEDURE — 2580000003 HC RX 258: Performed by: INTERNAL MEDICINE

## 2022-06-28 PROCEDURE — 6370000000 HC RX 637 (ALT 250 FOR IP): Performed by: INTERNAL MEDICINE

## 2022-06-28 PROCEDURE — 36592 COLLECT BLOOD FROM PICC: CPT

## 2022-06-28 PROCEDURE — 80048 BASIC METABOLIC PNL TOTAL CA: CPT

## 2022-06-28 PROCEDURE — 6360000002 HC RX W HCPCS: Performed by: INTERNAL MEDICINE

## 2022-06-28 RX ADMIN — DOCUSATE SODIUM 50 MG AND SENNOSIDES 8.6 MG 2 TABLET: 8.6; 5 TABLET, FILM COATED ORAL at 22:14

## 2022-06-28 RX ADMIN — Medication 100 MG: at 08:16

## 2022-06-28 RX ADMIN — Medication 400 MG: at 08:16

## 2022-06-28 RX ADMIN — MEROPENEM 1000 MG: 1 INJECTION, POWDER, FOR SOLUTION INTRAVENOUS at 16:30

## 2022-06-28 RX ADMIN — Medication: at 08:29

## 2022-06-28 RX ADMIN — SODIUM CHLORIDE, PRESERVATIVE FREE 10 ML: 5 INJECTION INTRAVENOUS at 21:18

## 2022-06-28 RX ADMIN — GABAPENTIN 100 MG: 100 CAPSULE ORAL at 20:56

## 2022-06-28 RX ADMIN — APIXABAN 5 MG: 5 TABLET, FILM COATED ORAL at 20:56

## 2022-06-28 RX ADMIN — CLONAZEPAM 0.5 MG: 0.5 TABLET ORAL at 20:57

## 2022-06-28 RX ADMIN — APIXABAN 5 MG: 5 TABLET, FILM COATED ORAL at 08:16

## 2022-06-28 RX ADMIN — DAPTOMYCIN 350 MG: 500 INJECTION, POWDER, LYOPHILIZED, FOR SOLUTION INTRAVENOUS at 21:16

## 2022-06-28 RX ADMIN — MEROPENEM 1000 MG: 1 INJECTION, POWDER, FOR SOLUTION INTRAVENOUS at 05:05

## 2022-06-28 RX ADMIN — OXYCODONE 10 MG: 5 TABLET ORAL at 20:56

## 2022-06-28 RX ADMIN — Medication: at 21:21

## 2022-06-28 RX ADMIN — VENLAFAXINE HYDROCHLORIDE 75 MG: 37.5 CAPSULE, EXTENDED RELEASE ORAL at 08:16

## 2022-06-28 RX ADMIN — FILGRASTIM-AAFI 300 MCG: 300 INJECTION, SOLUTION SUBCUTANEOUS at 22:20

## 2022-06-28 RX ADMIN — Medication: at 21:25

## 2022-06-28 RX ADMIN — Medication: at 08:28

## 2022-06-28 RX ADMIN — FOLIC ACID 1 MG: 1 TABLET ORAL at 08:16

## 2022-06-28 RX ADMIN — SODIUM CHLORIDE, PRESERVATIVE FREE 10 ML: 5 INJECTION INTRAVENOUS at 08:17

## 2022-06-28 RX ADMIN — ACETAMINOPHEN 650 MG: 325 TABLET ORAL at 20:57

## 2022-06-28 RX ADMIN — Medication 400 MG: at 20:56

## 2022-06-28 RX ADMIN — OXYCODONE 10 MG: 5 TABLET ORAL at 10:52

## 2022-06-28 RX ADMIN — GABAPENTIN 100 MG: 100 CAPSULE ORAL at 08:16

## 2022-06-28 RX ADMIN — OXYCODONE 10 MG: 5 TABLET ORAL at 17:33

## 2022-06-28 RX ADMIN — QUETIAPINE FUMARATE 50 MG: 25 TABLET ORAL at 22:14

## 2022-06-28 RX ADMIN — MEROPENEM 1000 MG: 1 INJECTION, POWDER, FOR SOLUTION INTRAVENOUS at 22:25

## 2022-06-28 RX ADMIN — Medication 5 MG: at 20:57

## 2022-06-28 ASSESSMENT — PAIN SCALES - GENERAL
PAINLEVEL_OUTOF10: 10

## 2022-06-28 ASSESSMENT — PAIN DESCRIPTION - DESCRIPTORS: DESCRIPTORS: ACHING;OTHER (COMMENT)

## 2022-06-28 ASSESSMENT — PAIN DESCRIPTION - LOCATION
LOCATION: SACRUM
LOCATION: SACRUM

## 2022-06-28 ASSESSMENT — PAIN DESCRIPTION - FREQUENCY: FREQUENCY: CONTINUOUS

## 2022-06-28 ASSESSMENT — PAIN DESCRIPTION - PAIN TYPE: TYPE: ACUTE PAIN

## 2022-06-28 ASSESSMENT — PAIN DESCRIPTION - ORIENTATION: ORIENTATION: RIGHT;MID

## 2022-06-28 ASSESSMENT — PAIN DESCRIPTION - ONSET: ONSET: ON-GOING

## 2022-06-28 NOTE — PROGRESS NOTES
PROGRESS NOTE  S:31 yrs Patient  admitted on 6/23/2022 with Sepsis ([facility] [address] [A41.9] . Secure-lock for low profile G tube missing. The tube was deflated and removed. It was replaced with an 18 Fr [place]ield Rands G tube. Current Hospital Schedued Meds   filgrastim-aafi  300 mcg SubCUTAneous QPM    sodium chloride flush  5-40 mL IntraVENous 2 times per day    meropenem  1,000 mg IntraVENous Q8H    apixaban  5 mg Per G Tube BID    daptomycin (CUBICIN) IVPB  8 mg/kg IntraVENous U09W    folic acid  1 mg Per G Tube Daily    gabapentin  100 mg Per G Tube TID    magnesium oxide  400 mg Per G Tube BID    melatonin  5 mg Per G Tube Nightly    QUEtiapine  50 mg Oral Nightly    saliva substitute   Oral Q6H WA    sennosides-docusate sodium  2 tablet Per G Tube Nightly    thiamine  100 mg Per G Tube Daily    venlafaxine  75 mg Oral Daily with breakfast    zinc oxide   Topical BID     Current Hospital IV Meds   sodium chloride      sodium chloride 10 mL/hr at 06/25/22 1311    sodium chloride      sodium chloride 25 mL (06/26/22 1338)     Current Hospital PRN Meds  sodium chloride, sodium chloride flush, sodium chloride, acetaminophen **OR** acetaminophen, sodium chloride, bisacodyl, sodium chloride, acetaminophen, albuterol, clonazePAM, Lip Balm, oxyCODONE, oxyCODONE, perflutren lipid microspheres, sodium chloride, sodium chloride flush    Exam:   Vitals:    06/28/22 0500   BP: 124/77   Pulse: 100   Resp: 18   Temp: 100.1 °F (37.8 °C)   SpO2: 93%     I/O last 3 completed shifts:   In: 9908 [P.O.:555; NG/GT:2722]  Out: 2900 [Urine:2225; Emesis/NG output:675]   General appearance: alert, appears stated age and cooperative  HEENT: PERRLA  Neck: no adenopathy, no carotid bruit, no JVD, supple, symmetrical, trachea midline and thyroid not enlarged, symmetric, no tenderness/mass/nodules  Lungs: clear to auscultation bilaterally  Heart: regular rate and rhythm, S1, S2 normal, no murmur, click, rub or gallop  Abdomen: soft, non-tender; bowel sounds normal; no masses,  no organomegaly  Extremities: extremities normal, atraumatic, no cyanosis or edema     Labs:  CBC:   Recent Labs     06/26/22  0757 06/27/22  0640 06/28/22  0520   WBC 0.2* 0.2* 0.3*   HGB 8.0* 9.4* 8.1*   HCT 24.1* 28.2* 24.2*   MCV 85.4 85.5 85.4    140 155     BMP:   Recent Labs     06/26/22  0757 06/27/22  0640 06/28/22  0520    135* 136   K 4.5 4.5 4.9    100 99   CO2 29 32 32   BUN 32* 32* 24*   CREATININE <0.5* <0.5* <0.5*     LIVER PROFILE: No results for input(s): AST, ALT, LIPASE, PROT, BILIDIR, BILITOT, ALKPHOS in the last 72 hours. Invalid input(s): AMYLASE,  ALB  PT/INR:   No results for input(s): INR in the last 72 hours. Invalid input(s): PT    IMAGING:  No results found. 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 PEG tube exchanged to [facility] 18 Fr tube. 2. ID following  3. Will follow.       Love Avila MD  6:52 AM 6/28/2022            [address] [facility]    [address]      [address] [place] [address]     Phone: [phone]     Fax: [phone]

## 2022-06-28 NOTE — PROGRESS NOTES
ONCOLOGY HEMATOLOGY CARE PROGRESS NOTE      SUBJECTIVE:     The patient states that he feels a little better this morning. Heme Hx:    Mr. Tanja Lu  is a 81-year-old male status post hemiglossectomy for head neck carcinoma. He is now in a rehabilitation facility due to MRSA. He was recently changed from ceftriaxone to daptomycin. I was asked to see him for pancytopenia    ROS:     Constitutional:  No weight loss, No fever, No chills, No night sweats. Energy level poor. Eyes:  No impairment or change in vision  ENT / Mouth:  No pain, abnormal ulceration, bleeding, nasal drip.pos change in voice   Cardiovascular:  No chest pain, palpitations, new edema, or calf discomfort  Respiratory:  No pain, hemoptysis, change to breathing  Gastrointestinal:  No pain, cramping, jaundice, change to eating and bowel habits  Urinary:  No pain, bleeding or change in continence  Genitalia: No pain, bleeding or discharge  Musculoskeletal:  No redness, pain, edema or weakness  Skin:  No pruritus, rash, change to nodules or lesions Wound on sacrum  Neurologic:  No discomfort, change in mental status, speech, sensory or motor activity  Psychiatric:  No change in concentration or change to affect or mood  Endocrine:  No hot flashes, increased thirst, or change to urine production  Hematologic: No petechiae, ecchymosis or bleeding  Lymphatic:  No lymphadenopathy or lymphedema  Allergy / Immunologic:  No eczema, hives, frequent or recurrent infections    OBJECTIVE        Physical    VITALS:  /85   Pulse (!) 113   Temp 97.8 °F (36.6 °C) (Axillary)   Resp 15   Ht 5' 6\" (1.676 m)   Wt 102 lb 6.4 oz (46.4 kg)   SpO2 96%   BMI 16.53 kg/m²   TEMPERATURE:  Current - Temp: 97.8 °F (36.6 °C);  Max - Temp  Av.6 °F (37 °C)  Min: 97.8 °F (36.6 °C)  Max: 100.1 °F (37.8 °C)  PULSE OXIMETRY RANGE: SpO2  Av.3 %  Min: 93 %  Max: 96 %  24HR INTAKE/OUTPUT:      Intake/Output Summary (Last 24 hours) at 6/28/2022 0942  Last data filed at 6/28/2022 0500  Gross per 24 hour   Intake 1525 ml   Output 2400 ml   Net -875 ml       CONSTITUTIONAL: Chronically ill-appearing, no acute distress. Cachectic  EYES: Sclera clear, conjunctiva normal  ENT: Oral pharynx unremarkable with moist mucus membranes  LUNGS: Clear to auscultation. No increased labor with breathing. CARDIOVASCULAR: Regular rate and rhythm, normal S1 and S2, no S3 or S4, and no murmur noted. ABDOMEN: Soft, non-distended, non-tender. Small amount of leakage about the G-tube  MUSCULOSKELETAL: There is no redness, warmth, or swelling of the joints. NEUROLOGIC: Awake, alert. Grossly non-focal.  SKIN: No bruising or bleeding. PSYCH: Normal affect. Data  Recent Labs     06/28/22  0520 06/27/22  0640 06/26/22  0757 06/22/22  0650 06/20/22  0630 06/17/22  0600 06/17/22  0600 06/15/22  0650 06/15/22  0650   WBC 0.3* 0.2* 0.2*   < > 2.4*   < > 4.0   < > 3.2*   NEUTROABS  --   --   --   --  1.5*  --  3.1  --  2.5   LYMPHOPCT  --   --   --   --  11.9  --  7.2  --  5.8   RBC 2.84* 3.30* 2.82*   < > 2.43*   < > 2.46*   < > 2.54*   HGB 8.1* 9.4* 8.0*   < > 7.1*   < > 7.1*   < > 7.3*   HCT 24.2* 28.2* 24.1*   < > 21.2*   < > 21.1*   < > 21.6*   MCV 85.4 85.5 85.4   < > 86.9   < > 85.8   < > 85.1   MCH 28.5 28.6 28.4   < > 29.2   < > 28.8   < > 28.9   MCHC 33.4 33.4 33.2   < > 33.6   < > 33.5   < > 34.0   RDW 15.9* 16.4* 15.9*   < > 15.6*   < > 15.5*   < > 15.1    140 146   < > 79*   < > 112*   < > 117*    < > = values in this interval not displayed. Recent Labs     06/26/22  0757 06/27/22  0640 06/28/22  0520    135* 136   K 4.5 4.5 4.9    100 99   CO2 29 32 32   BUN 32* 32* 24*   CREATININE <0.5* <0.5* <0.5*     No results for input(s): AST, ALT, ALB, BILIDIR, BILITOT, ALKPHOS in the last 72 hours.     Magnesium:    Lab Results   Component Value Date    MG 2.00 06/13/2022    MG 1.70 06/10/2022       Imaging  CT GUIDED NEEDLE PLACEMENT  Narrative: PROCEDURE:  CT GUIDED BONE MARROW ASPIRATION AND CORE NEEDLE BONE BIOPSY OF THE LEFT  ILIAC BONE. MODERATE CONSCIOUS SEDATION    6/24/2022    HISTORY:  ORDERING SYSTEM PROVIDED HISTORY: pancytopenia  TECHNOLOGIST PROVIDED HISTORY:  Culture  Flow cytometry  Cytogenetics  MDS and AML panel. Reason for exam:->pancytopenia  Reason for Exam: pancytopenia; ORDERING SYSTEM PROVIDED HISTORY: pancytopenia  TECHNOLOGIST PROVIDED HISTORY:  Culture  Flow cytometry  Cytogenetics  MDS and AML panel. Reason for exam:->pancytopenia    SEDATION:  Moderate sedation was ordered and supervised by the attending with physician  face-to-face monitoring. Medications were provided and recorded by Radiology  nurses. A total of 2 mg of Versed and 100 mcg of fentanyl was used for  conscious sedation during the procedure, with total conscious sedation time  of 17 minutes. TECHNIQUE:  Informed consent was obtained following a detailed explanation of the  procedure including risks, benefits, and alternatives. Universal protocol  was followed. Sterile gowns, masks, hats and gloves utilized for maximal  sterile barrier. Axial images were obtained through the iliac bones using CT  guidance and a suitable skin site was prepped and draped in sterile fashion. Local anesthesia was achieved with lidocaine. An 11 gauge [employer] bone  marrow biopsy needle was advanced into the left iliac bone and approximately  10 mL of bone marrow aspirate was obtained. A single core biopsy specimen  was obtained and the patient tolerated the procedure well. Hemostasis was  achieved with digital pressure. The site was cleaned, and a dry sterile  dressing was applied. Dose modulation, iterative reconstruction, and/or weight based adjustment of  the mA/kV was utilized to reduce the radiation dose to as low as reasonably  achievable. Total exam dose linear product was 389.33 mGy per cm.   Impression: Successful CT guided bone marrow aspiration and core biopsy of the left iliac  bone. CT BIOPSY BONE MARROW  Narrative: PROCEDURE:  CT GUIDED BONE MARROW ASPIRATION AND CORE NEEDLE BONE BIOPSY OF THE LEFT  ILIAC BONE. MODERATE CONSCIOUS SEDATION    6/24/2022    HISTORY:  ORDERING SYSTEM PROVIDED HISTORY: pancytopenia  TECHNOLOGIST PROVIDED HISTORY:  Culture  Flow cytometry  Cytogenetics  MDS and AML panel. Reason for exam:->pancytopenia  Reason for Exam: pancytopenia; ORDERING SYSTEM PROVIDED HISTORY: pancytopenia  TECHNOLOGIST PROVIDED HISTORY:  Culture  Flow cytometry  Cytogenetics  MDS and AML panel. Reason for exam:->pancytopenia    SEDATION:  Moderate sedation was ordered and supervised by the attending with physician  face-to-face monitoring. Medications were provided and recorded by Radiology  nurses. A total of 2 mg of Versed and 100 mcg of fentanyl was used for  conscious sedation during the procedure, with total conscious sedation time  of 17 minutes. TECHNIQUE:  Informed consent was obtained following a detailed explanation of the  procedure including risks, benefits, and alternatives. Universal protocol  was followed. Sterile gowns, masks, hats and gloves utilized for maximal  sterile barrier. Axial images were obtained through the iliac bones using CT  guidance and a suitable skin site was prepped and draped in sterile fashion. Local anesthesia was achieved with lidocaine. An 11 gauge [employer] bone  marrow biopsy needle was advanced into the left iliac bone and approximately  10 mL of bone marrow aspirate was obtained. A single core biopsy specimen  was obtained and the patient tolerated the procedure well. Hemostasis was  achieved with digital pressure. The site was cleaned, and a dry sterile  dressing was applied. Dose modulation, iterative reconstruction, and/or weight based adjustment of  the mA/kV was utilized to reduce the radiation dose to as low as reasonably  achievable.   Total exam dose linear product was 389.33 mGy per cm. Impression: Successful CT guided bone marrow aspiration and core biopsy of the left iliac  bone. XR CHEST PORTABLE  Narrative: EXAMINATION:  ONE XRAY VIEW OF THE CHEST    6/24/2022 5:55 am    COMPARISON:  06/22/2022, 06/15/2022    HISTORY:  ORDERING SYSTEM PROVIDED HISTORY: SEPSIS  TECHNOLOGIST PROVIDED HISTORY:  Reason for exam:->SEPSIS  Reason for Exam: SEPSIS    FINDINGS:  A single frontal view of the chest was performed. There is no acute skeletal  abnormality. There are multiple surgical clips overlying the neck soft  tissues. A left arm PICC is in place, with tip overlying the cavoatrial  junction. The heart size and mediastinal contours are stable, and within  normal limits. There are curvilinear opacities within both lungs, which  could represent either interstitial pulmonary edema or atypical pneumonia. No focus of acute airspace consolidation is identified. There is no evidence  of a pneumothorax. Impression: Curvilinear opacities throughout both lungs, which could represent either  mild interstitial pulmonary edema and/or atypical pneumonia.       Problem List  Patient Active Problem List   Diagnosis    Chronic bilateral low back pain without sciatica    Anxiety    Insomnia due to other mental disorder    Poor dentition    Class 1 obesity due to excess calories with serious comorbidity and body mass index (BMI) of 34.0 to 34.9 in adult    Primary squamous cell carcinoma of tongue (HCC)    Recurrent major depressive disorder (HCC)    Prediabetes    Critical illness myopathy    Sepsis ([address]    Pancytopenia ([address]       ASSESSMENT AND PLAN:    Pancytopenia:  -It is a little unusual for the platelets to recover prior to the white blood count; however this is not unheard of  -S/P bone marrow biopsy 6/24 to address his persistent neutropenia despite stopping his antibiotics  -B12 and folate are normal  -Iron studies are consistent with anemia of chronic disease  -Heparin-induced thrombocytopenia panel is negative  -Bone marrow biopsy done 6/24/2022 is pending  -Bone marrow was also cultured  -If his bone marrow is not back by tomorrow, I will call pathology for preliminary. Unfortunately, it does take time to process these.     Neutropenia:  -He was started on Granix  -Currently there is no benefit and this is somewhat suspected  -It is very unusual for the platelet count to recover prior to the white blood count  -It is unlikely that he has squamous cell carcinoma of the head neck and his bone marrow and it is very unlikely this is damage due to chemotherapy based on the medications that he was given    Head neck carcinoma:  -Status posttreatment at [facility]  -He received surgery, chemotherapy and radiation      ONCOLOGIC DISPOSITION:  -Once he is not neutropenic    Virgil Nuñez MD, MPH  Please contact through [facility]

## 2022-06-28 NOTE — PROGRESS NOTES
Hospitalist Progress Note      PCP: Fer Torres DO    Date of Admission: 6/23/2022    Chief Complaint: fever    Hospital Course: This is a 28 y.o. 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 Pt has G-tube. He was readmitted to [facility] 5/2-6/8 with sepsis, acute resp faiure and found to have MRSA bacteremia with blood cx that remained positive from 5/2 until 5/19 with infected atrial thrombus, septic pulm emboli and empyema. Had port removed on 5/7, repeat blood cx on 6/22 remain negative. On daptomycin, but now with pancytopenia which is acute on chronic. He may also have ceftaroline-induced myelosuppression per infectious disease and the drug was stopped on 6/22. Fully oriented and alert at the time of the visit. Was confused at the time of arrival.  Still neutropenic. Being followed by oncology. Bone marrow biopsy is pending  Dysfunction of the G-tube was treated by gastroenterology with replacement with a new one. WBC count is still low. Weight loss noted and patient was seen again by dietitian. Subjective: Weak, fully alert and oriented. No chest pain, no shortness of breath, no nausea, no vomiting.   No changes from yesterday      Medications:  Reviewed    Infusion Medications    sodium chloride      sodium chloride 10 mL/hr at 06/25/22 1311    sodium chloride      sodium chloride 25 mL (06/26/22 1338)     Scheduled Medications    filgrastim-aafi  300 mcg SubCUTAneous QPM    sodium chloride flush  5-40 mL IntraVENous 2 times per day    meropenem  1,000 mg IntraVENous Q8H    apixaban  5 mg Per G Tube BID    daptomycin (CUBICIN) IVPB  8 mg/kg IntraVENous H95K    folic acid  1 mg Per G Tube Daily    gabapentin  100 mg Per G Tube TID    magnesium oxide  400 mg Per G Tube BID    melatonin  5 mg Per G Tube Nightly    QUEtiapine  50 mg Oral Nightly    saliva substitute Oral Q6H WA    sennosides-docusate sodium  2 tablet Per G Tube Nightly    thiamine  100 mg Per G Tube Daily    venlafaxine  75 mg Oral Daily with breakfast    zinc oxide   Topical BID     PRN Meds: sodium chloride, sodium chloride flush, sodium chloride, acetaminophen **OR** acetaminophen, sodium chloride, bisacodyl, sodium chloride, acetaminophen, albuterol, clonazePAM, Lip Balm, oxyCODONE, oxyCODONE, perflutren lipid microspheres, sodium chloride, sodium chloride flush      Intake/Output Summary (Last 24 hours) at 6/28/2022 1352  Last data filed at 6/28/2022 0500  Gross per 24 hour   Intake 1228 ml   Output 1725 ml   Net -497 ml       Physical Exam Performed:    /85   Pulse (!) 113   Temp 97.8 °F (36.6 °C) (Axillary)   Resp 15   Ht 5' 6\" (1.676 m)   Wt 111 lb 3.2 oz (50.4 kg)   SpO2 96%   BMI 17.95 kg/m²     General appearance: Cachectic, alert and pleasant  HEENT: Pupils equal, round, and reactive to light. Conjunctivae/corneas clear. Neck: Supple, with full range of motion. No jugular venous distention. Surgical dressing in place  Respiratory:  Normal respiratory effort. Clear to auscultation, bilaterally without Rales/Wheezes/Rhonchi. Cardiovascular: Regular rate and rhythm with normal S1/S2 without murmurs, rubs or gallops. Abdomen: Soft, non-tender, non-distended with normal bowel sounds. Feeding tube in place  Musculoskeletal: No clubbing, cyanosis or edema bilaterally. Full range of motion without deformity. Skin: Skin color, texture, turgor normal.  No rashes or lesions. Neurologic:  Neurovascularly intact without any focal sensory/motor deficits. Cranial nerves: II-XII intact, grossly non-focal.  Psychiatric: Alert and oriented, thought content appropriate, normal insight. Appropriate communication  Capillary Refill: Brisk,3 seconds, normal   Peripheral Pulses: +2 palpable, equal bilaterally     I examined the patient today (06/28/22).   Physical exam is similar to yesterday (6/27)    Labs:   Recent Labs     06/26/22  0757 06/27/22  0640 06/28/22  0520   WBC 0.2* 0.2* 0.3*   HGB 8.0* 9.4* 8.1*   HCT 24.1* 28.2* 24.2*    140 155     Recent Labs     06/26/22  0757 06/27/22  0640 06/28/22  0520    135* 136   K 4.5 4.5 4.9    100 99   CO2 29 32 32   BUN 32* 32* 24*   CREATININE <0.5* <0.5* <0.5*   CALCIUM 9.3 8.9 8.6     No results for input(s): AST, ALT, BILIDIR, BILITOT, ALKPHOS in the last 72 hours. No results for input(s): INR in the last 72 hours. No results for input(s): Cherre Gash in the last 72 hours. Urinalysis:      Lab Results   Component Value Date    NITRU Negative 06/22/2022    WBCUA 6-9 06/22/2022    BACTERIA 3+ 06/22/2022    RBCUA  06/22/2022    BLOODU MODERATE 06/22/2022    SPECGRAV 1.010 06/22/2022    GLUCOSEU Negative 06/22/2022       Radiology:  CT BIOPSY BONE MARROW   Final Result   Successful CT guided bone marrow aspiration and core biopsy of the left iliac   bone. CT GUIDED NEEDLE PLACEMENT   Final Result   Successful CT guided bone marrow aspiration and core biopsy of the left iliac   bone. XR CHEST PORTABLE   Final Result   Curvilinear opacities throughout both lungs, which could represent either   mild interstitial pulmonary edema and/or atypical pneumonia. Assessment/Plan:    Active Hospital Problems    Diagnosis     Sepsis ([facility] [A41.9]      Priority: Medium    Pancytopenia ([facility] [X24.317]      Priority: Medium    Primary squamous cell carcinoma of tongue (HCC) [C02.9]     Anxiety [F41.9]      PLAN:    Pancytopenia  Still neutropenic and anemic. Platelet count has improved  Being followed by heme-onc  Bone marrow biopsy is pending. Still not back. Continue same management    Neutropenic fever with sepsis  Continue empiric antibiotics  Temperature is normal today as well  No new issues    Head and neck cancer  Postop. Pain is controlled. Heme-onc is following. Main oncology is at [facility]. No change    Feeding tube dysfunction  Resolved after replacement. GI intervention appreciated. Discussed with the patient. Questions answered. DVT Prophylaxis: lovenox  Diet: Diet NPO  ADULT TUBE FEEDING; PEG; Other Tube Feeding (specify); Rafael Thierry Peptide 1.5; Bolus; 6 Times Daily; 325; 30; Before and after each bolus  Code Status: Full Code    PT/OT Eval Status: not ordered    Dispo - SNF. Unclear timeframe given acute illness and only partial resolution so far.   Awaiting bone marrow biopsy results    Yaneli Loera MD

## 2022-06-28 NOTE — PROGRESS NOTES
Comprehensive Nutrition Assessment    Type and Reason for Visit:  Reassess    Nutrition Recommendations/Plan:   Recommend bolus feeds of [employer] Peptide 1.5, x 4 cans daily. Begin with 160 mL and increase by 80 mL at each following feed (240 mL at 2nd feeding and full can (325 mL) at 4th feeding), or as tolerated. 30 mL free water flush before and after each administration. Monitor Na and need for adjustments  Monitor TF tolerance (abdominal pain, bloating, distention, diarrhea)  Obtain updated weight - ordered 6/28  Monitor need for addition of proteinex daily  Monitor nutrition adequacy, pertinent labs, bowel habits, wt changes, and clinical progress    Malnutrition Assessment:  Malnutrition Status:  Severe malnutrition (06/24/22 1500)    Context:  Chronic Illness     Findings of the 6 clinical characteristics of malnutrition:  Weight Loss:  Greater than 20% over 1 year     Body Fat Loss:  Severe body fat loss Orbital,Buccal region   Muscle Mass Loss:  Severe muscle mass loss Temples (temporalis),Clavicles (pectoralis & deltoids),Hand (interosseous)     Nutrition Assessment:    Follow up: Pt nutritionally declining AEB weight loss and loose stool. Noted emesis documented with loose, watery BMs yesterday. Concerns for absorption of current TF formula. Recommend modifying tube feed to peptide based formula that contains hydrolyzed proteins, MCT oils and lower osmolality for better GI tolerance. Discussed with pt and RN. Will continue to monitor. Nutrition Related Findings:    Na 136. K 4.9. Loose water stool x2 on 6/27. N/V on 6/27. G tube replaced by GI on 6/26.  Wound Type: Pressure Injury,Stage II,Stage III,Stage IV,Wound Vac       Current Nutrition Intake & Therapies:    Average Meal Intake: NPO  Average Supplements Intake: NPO  Diet NPO  ADULT TUBE FEEDING; PEG; Standard with Fiber; Bolus; 6 Times Daily; 237; 60; Before and after each bolus; Protein; 1 bottle of Proteinex daily, 30 mL free water flush before and after adminstration  Current Tube Feeding (TF) Orders:  · Feeding Route: PEG  · Formula: Other Tube Feeding (Comment) [employer] Peptide 1.5)  · Schedule: Bolus  · Current TF & Flush Orders Provides: Bolus feeds of Jevity 1.5, 6 cans daily to provide 1185 ml TV, 1775 kcals, 76 g protein and 900 ml free water. + 1 bottles of proteinex daily for an additional 104 kcal and 26 g protein. + 30 ml free water before and after administration. · Goal TF & Flush Orders Provides: Bolus feeds of Eduardo Burkett Peptide 1.5 x4 cans daily to provide 1300 mL TV, 2000 kcal, 96 g protein, and 910 mL free water. +30 mL free water flush before and after administration      Anthropometric Measures:  Height: 5' 6\" (167.6 cm)  Ideal Body Weight (IBW): 142 lbs (65 kg)    Admission Body Weight: 110 lb (49.9 kg)  Current Body Weight: 102 lb (46.3 kg), 71.8 % IBW.  Weight Source: Bed Scale  Current BMI (kg/m2): 16.5  Usual Body Weight: 250 lb (113.4 kg) (June 2021)  % Weight Change (Calculated): -59.2  Weight Adjustment For: No Adjustment                 BMI Categories: Underweight (BMI less than 18.5)    Estimated Daily Nutrient Needs:  Energy Requirements Based On: Kcal/kg (35-40 kcals/kg)  Weight Used for Energy Requirements: Current (50 kg)  Energy (kcal/day): 4226-7128 kcals  Weight Used for Protein Requirements: Current (1.5-2 g/kg)  Protein (g/day):  g  Method Used for Fluid Requirements: 1 ml/kcal    Nutrition Diagnosis:   · Severe malnutrition related to catabolic illness,inadequate protein-energy intake as evidenced by NPO or clear liquid status due to medical condition,BMI,weight loss,weight loss greater than or equal to 20% in 1 year,severe loss of subcutaneous fat,severe muscle loss,Criteria as identified in malnutrition assessment      Nutrition Interventions:   Food and/or Nutrient Delivery: Modify Tube Feeding  Nutrition Education/Counseling: No recommendation at this time  Coordination of Nutrition Care:

## 2022-06-29 LAB
ANION GAP SERPL CALCULATED.3IONS-SCNC: 2 MMOL/L (ref 3–16)
BANDED NEUTROPHILS RELATIVE PERCENT: 7 % (ref 0–7)
BASOPHILS ABSOLUTE: 0 K/UL (ref 0–0.2)
BASOPHILS RELATIVE PERCENT: 1 %
BUN BLDV-MCNC: 25 MG/DL (ref 7–20)
CALCIUM SERPL-MCNC: 9.1 MG/DL (ref 8.3–10.6)
CHLORIDE BLD-SCNC: 97 MMOL/L (ref 99–110)
CO2: 33 MMOL/L (ref 21–32)
CREAT SERPL-MCNC: <0.5 MG/DL (ref 0.9–1.3)
EOSINOPHILS ABSOLUTE: 0.1 K/UL (ref 0–0.6)
EOSINOPHILS RELATIVE PERCENT: 13 %
GFR AFRICAN AMERICAN: >60
GFR NON-AFRICAN AMERICAN: >60
GLUCOSE BLD-MCNC: 96 MG/DL (ref 70–99)
HCT VFR BLD CALC: 24.1 % (ref 40.5–52.5)
HEMATOLOGY PATH CONSULT: NO
HEMOGLOBIN: 8.1 G/DL (ref 13.5–17.5)
LYMPHOCYTES ABSOLUTE: 0.4 K/UL (ref 1–5.1)
LYMPHOCYTES RELATIVE PERCENT: 62 %
MCH RBC QN AUTO: 27.9 PG (ref 26–34)
MCHC RBC AUTO-ENTMCNC: 33.5 G/DL (ref 31–36)
MCV RBC AUTO: 83.1 FL (ref 80–100)
MONOCYTES ABSOLUTE: 0.1 K/UL (ref 0–1.3)
MONOCYTES RELATIVE PERCENT: 10 %
NEUTROPHILS ABSOLUTE: 0.1 K/UL (ref 1.7–7.7)
NEUTROPHILS RELATIVE PERCENT: 7 %
PDW BLD-RTO: 16.1 % (ref 12.4–15.4)
PLATELET # BLD: 176 K/UL (ref 135–450)
PLATELET SLIDE REVIEW: ADEQUATE
PMV BLD AUTO: 7.4 FL (ref 5–10.5)
POTASSIUM REFLEX MAGNESIUM: 5.4 MMOL/L (ref 3.5–5.1)
RBC # BLD: 2.89 M/UL (ref 4.2–5.9)
REASON FOR REJECTION: NORMAL
REJECTED TEST: NORMAL
SLIDE REVIEW: ABNORMAL
SODIUM BLD-SCNC: 132 MMOL/L (ref 136–145)
WBC # BLD: 0.7 K/UL (ref 4–11)

## 2022-06-29 PROCEDURE — 2580000003 HC RX 258: Performed by: INTERNAL MEDICINE

## 2022-06-29 PROCEDURE — 6370000000 HC RX 637 (ALT 250 FOR IP): Performed by: INTERNAL MEDICINE

## 2022-06-29 PROCEDURE — 6360000002 HC RX W HCPCS: Performed by: NURSE PRACTITIONER

## 2022-06-29 PROCEDURE — 85025 COMPLETE CBC W/AUTO DIFF WBC: CPT

## 2022-06-29 PROCEDURE — 1200000000 HC SEMI PRIVATE

## 2022-06-29 PROCEDURE — 97116 GAIT TRAINING THERAPY: CPT

## 2022-06-29 PROCEDURE — 97530 THERAPEUTIC ACTIVITIES: CPT

## 2022-06-29 PROCEDURE — 6360000002 HC RX W HCPCS: Performed by: INTERNAL MEDICINE

## 2022-06-29 PROCEDURE — 97163 PT EVAL HIGH COMPLEX 45 MIN: CPT

## 2022-06-29 PROCEDURE — 80048 BASIC METABOLIC PNL TOTAL CA: CPT

## 2022-06-29 PROCEDURE — 97535 SELF CARE MNGMENT TRAINING: CPT

## 2022-06-29 PROCEDURE — 97166 OT EVAL MOD COMPLEX 45 MIN: CPT

## 2022-06-29 PROCEDURE — 97110 THERAPEUTIC EXERCISES: CPT

## 2022-06-29 RX ORDER — MORPHINE SULFATE 2 MG/ML
2 INJECTION, SOLUTION INTRAMUSCULAR; INTRAVENOUS EVERY 4 HOURS PRN
Status: DISCONTINUED | OUTPATIENT
Start: 2022-06-29 | End: 2022-06-30 | Stop reason: HOSPADM

## 2022-06-29 RX ADMIN — OXYCODONE 10 MG: 5 TABLET ORAL at 12:01

## 2022-06-29 RX ADMIN — DAPTOMYCIN 350 MG: 500 INJECTION, POWDER, LYOPHILIZED, FOR SOLUTION INTRAVENOUS at 20:33

## 2022-06-29 RX ADMIN — GABAPENTIN 100 MG: 100 CAPSULE ORAL at 20:39

## 2022-06-29 RX ADMIN — OXYCODONE 10 MG: 5 TABLET ORAL at 16:44

## 2022-06-29 RX ADMIN — SODIUM CHLORIDE 25 ML: 9 INJECTION, SOLUTION INTRAVENOUS at 13:55

## 2022-06-29 RX ADMIN — Medication: at 20:49

## 2022-06-29 RX ADMIN — Medication 400 MG: at 09:25

## 2022-06-29 RX ADMIN — Medication: at 09:27

## 2022-06-29 RX ADMIN — QUETIAPINE FUMARATE 50 MG: 25 TABLET ORAL at 20:39

## 2022-06-29 RX ADMIN — APIXABAN 5 MG: 5 TABLET, FILM COATED ORAL at 22:26

## 2022-06-29 RX ADMIN — SODIUM CHLORIDE, PRESERVATIVE FREE 10 ML: 5 INJECTION INTRAVENOUS at 20:39

## 2022-06-29 RX ADMIN — Medication 400 MG: at 20:38

## 2022-06-29 RX ADMIN — MORPHINE SULFATE 2 MG: 2 INJECTION, SOLUTION INTRAMUSCULAR; INTRAVENOUS at 03:10

## 2022-06-29 RX ADMIN — OXYCODONE HYDROCHLORIDE 7.5 MG: 15 TABLET ORAL at 20:38

## 2022-06-29 RX ADMIN — Medication: at 09:26

## 2022-06-29 RX ADMIN — SODIUM CHLORIDE, PRESERVATIVE FREE 10 ML: 5 INJECTION INTRAVENOUS at 09:26

## 2022-06-29 RX ADMIN — VENLAFAXINE HYDROCHLORIDE 75 MG: 37.5 CAPSULE, EXTENDED RELEASE ORAL at 09:25

## 2022-06-29 RX ADMIN — FILGRASTIM-AAFI 300 MCG: 300 INJECTION, SOLUTION SUBCUTANEOUS at 16:43

## 2022-06-29 RX ADMIN — ACETAMINOPHEN 650 MG: 325 TABLET ORAL at 20:38

## 2022-06-29 RX ADMIN — GABAPENTIN 100 MG: 100 CAPSULE ORAL at 13:47

## 2022-06-29 RX ADMIN — ACETAMINOPHEN 650 MG: 325 TABLET ORAL at 03:10

## 2022-06-29 RX ADMIN — GABAPENTIN 100 MG: 100 CAPSULE ORAL at 09:25

## 2022-06-29 RX ADMIN — DOCUSATE SODIUM 50 MG AND SENNOSIDES 8.6 MG 2 TABLET: 8.6; 5 TABLET, FILM COATED ORAL at 20:38

## 2022-06-29 RX ADMIN — MORPHINE SULFATE 2 MG: 2 INJECTION, SOLUTION INTRAMUSCULAR; INTRAVENOUS at 13:47

## 2022-06-29 RX ADMIN — FOLIC ACID 1 MG: 1 TABLET ORAL at 09:25

## 2022-06-29 RX ADMIN — MEROPENEM 1000 MG: 1 INJECTION, POWDER, FOR SOLUTION INTRAVENOUS at 20:38

## 2022-06-29 RX ADMIN — CLONAZEPAM 0.5 MG: 0.5 TABLET ORAL at 20:38

## 2022-06-29 RX ADMIN — MEROPENEM 1000 MG: 1 INJECTION, POWDER, FOR SOLUTION INTRAVENOUS at 06:10

## 2022-06-29 RX ADMIN — Medication 100 MG: at 09:24

## 2022-06-29 RX ADMIN — APIXABAN 5 MG: 5 TABLET, FILM COATED ORAL at 09:25

## 2022-06-29 RX ADMIN — MEROPENEM 1000 MG: 1 INJECTION, POWDER, FOR SOLUTION INTRAVENOUS at 13:57

## 2022-06-29 RX ADMIN — Medication: at 15:18

## 2022-06-29 RX ADMIN — MORPHINE SULFATE 2 MG: 2 INJECTION, SOLUTION INTRAMUSCULAR; INTRAVENOUS at 09:24

## 2022-06-29 RX ADMIN — OXYCODONE 10 MG: 5 TABLET ORAL at 01:08

## 2022-06-29 RX ADMIN — Medication 5 MG: at 20:38

## 2022-06-29 ASSESSMENT — PAIN DESCRIPTION - ORIENTATION
ORIENTATION: MID
ORIENTATION: LEFT

## 2022-06-29 ASSESSMENT — PAIN SCALES - GENERAL
PAINLEVEL_OUTOF10: 10
PAINLEVEL_OUTOF10: 8
PAINLEVEL_OUTOF10: 10
PAINLEVEL_OUTOF10: 8
PAINLEVEL_OUTOF10: 10
PAINLEVEL_OUTOF10: 8
PAINLEVEL_OUTOF10: 10
PAINLEVEL_OUTOF10: 10

## 2022-06-29 ASSESSMENT — PAIN DESCRIPTION - LOCATION
LOCATION: COCCYX;BUTTOCKS
LOCATION: COCCYX;BUTTOCKS
LOCATION: COCCYX
LOCATION: COCCYX;BUTTOCKS

## 2022-06-29 NOTE — CARE COORDINATION
Chart reviewed by CM d/t LOS - day 6. Pt being followed by internal medicine, oncology, GI, wound care and infectious disease. Plans for return to [address] spoke with Forrest Wooten at [facility], who states pt precert good thru end of day on 6/30/22. Per internal medicine provider note, disposition to SNF when neutropenia is resolved. CM team following.

## 2022-06-29 NOTE — PROGRESS NOTES
PROGRESS NOTE  S:31 yrs Patient  admitted on 6/23/2022 with Sepsis ([facility] [address] [A41.9] . Secure-lock for low profile G tube missing. The tube was deflated and removed. It was replaced with an 18 Fr Trevor Aidan G tube. Current Hospital Schedued Meds   filgrastim-aafi  300 mcg SubCUTAneous QPM    sodium chloride flush  5-40 mL IntraVENous 2 times per day    meropenem  1,000 mg IntraVENous Q8H    apixaban  5 mg Per G Tube BID    daptomycin (CUBICIN) IVPB  8 mg/kg IntraVENous E85L    folic acid  1 mg Per G Tube Daily    gabapentin  100 mg Per G Tube TID    magnesium oxide  400 mg Per G Tube BID    melatonin  5 mg Per G Tube Nightly    QUEtiapine  50 mg Oral Nightly    saliva substitute   Oral Q6H WA    sennosides-docusate sodium  2 tablet Per G Tube Nightly    thiamine  100 mg Per G Tube Daily    venlafaxine  75 mg Oral Daily with breakfast    zinc oxide   Topical BID     Current Hospital IV Meds   sodium chloride      sodium chloride 10 mL/hr at 06/25/22 1311    sodium chloride      sodium chloride 25 mL (06/26/22 1338)     Current Hospital PRN Meds  morphine, sodium chloride, sodium chloride flush, sodium chloride, acetaminophen **OR** acetaminophen, sodium chloride, bisacodyl, sodium chloride, acetaminophen, albuterol, clonazePAM, Lip Balm, oxyCODONE, oxyCODONE, perflutren lipid microspheres, sodium chloride, sodium chloride flush    Exam:   Vitals:    06/29/22 0340   BP: 124/68   Pulse: 92   Resp: 16   Temp: 98.2 °F (36.8 °C)   SpO2: 94%     I/O last 3 completed shifts:   In: 3944 [NG/GT:1231]  Out: 3750 [Urine:3750]   General appearance: alert, appears stated age and cooperative  HEENT: PERRLA  Neck: no adenopathy, no carotid bruit, no JVD, supple, symmetrical, trachea midline and thyroid not enlarged, symmetric, no tenderness/mass/nodules  Lungs: clear to auscultation bilaterally  Heart: regular rate and rhythm, S1, S2 normal, no murmur, click, rub or gallop  Abdomen: soft, non-tender; bowel sounds normal; no masses,  no organomegaly  Extremities: extremities normal, atraumatic, no cyanosis or edema     Labs:  CBC:   Recent Labs     06/27/22  0640 06/28/22  0520 06/29/22  0456   WBC 0.2* 0.3* 0.7*   HGB 9.4* 8.1* 8.1*   HCT 28.2* 24.2* 24.1*   MCV 85.5 85.4 83.1    155 176     BMP:   Recent Labs     06/27/22  0640 06/28/22  0520 06/29/22  0456   * 136 132*   K 4.5 4.9 5.4*    99 97*   CO2 32 32 33*   BUN 32* 24* 25*   CREATININE <0.5* <0.5* <0.5*     LIVER PROFILE: No results for input(s): AST, ALT, LIPASE, PROT, BILIDIR, BILITOT, ALKPHOS in the last 72 hours. Invalid input(s): AMYLASE,  ALB  PT/INR:   No results for input(s): INR in the last 72 hours. Invalid input(s): PT    IMAGING:  No results found. Hospital Problems           Last Modified POA    * (Principal) Sepsis ([address] 6/23/2022 Yes    Pancytopenia ([address] 6/24/2022 Yes    Anxiety 6/24/2022 Yes    Primary squamous cell carcinoma of tongue ([address] 6/24/2022 Yes               Impression:  29 yo male with squamous cell cancer of the tongue s/p resection and radical neck dissection with G tube malfunction Has febrile pancytopenia    Recommendation:  1. [facility]. 2. ID following  3. Will follow.       Mariano Bay MD  7:45 AM 6/29/2022            [facility]    [address]      [address] [place] [address]     Phone: [phone]     Fax: [phone]

## 2022-06-29 NOTE — PROGRESS NOTES
Pt states pain 10/10 despite q4 oxycodone and q6 tylenol. Pt refuses turns due to pain, pt has large pressue wound with wound vac on coccyx. Vitals stable can additional pain management be ordered so pt can tolerate turns?  Thanks Need Callback: NO CALLBACK REQ B3 TELEMETRY

## 2022-06-29 NOTE — PROGRESS NOTES
Occupational Therapy  Facility/Department: [place] [address] - MED SURG  Occupational Therapy Initial Assessment/Treatment    Name: Kayy Sanchez  : 1990  MRN: [id number]  Date of Service: 2022    Discharge Recommendations:  [address]          Patient Diagnosis(es): There were no encounter diagnoses. Past Medical History:  has a past medical history of Cancer ([facility] [address] Past Surgical History:  has a past surgical history that includes CT BIOPSY BONE MARROW (2022). Assessment   Performance deficits / Impairments: Decreased functional mobility ; Decreased safe awareness;Decreased balance;Decreased endurance;Decreased ROM; Decreased ADL status; Decreased strength;Decreased fine motor control;Decreased high-level IADLs;Decreased posture;Decreased vision/visual deficit; Decreased coordination;Decreased cognition    Assessment: Pt presents with the above deficits requiring skilled OT services to return home safely. 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 Pt presents now with Sepsis and a wound on his buttocks. Pt reports (I) with ADLs and functional mobility at baseline with a cane PRN, but was recently on the ARU where he was not using AD for shourt distance but was using a W/c for longer distances. Today, pt required total A for LB ADLS and functional t/fs. Pt mod Ax2 with no AD for stand pivot t/f to chair. Pt with fair tolerance of ther ex during session. Pt educated on pressure relief strategies and limiting time seated up in chair d/t wound.  Pt would continue to benefit from skilled OT services to increase safety, balance, strength, endurance and independence in ADLS and functional mobility. Prognosis: Fair  Decision Making: Medium Complexity  REQUIRES OT FOLLOW-UP: Yes  Activity Tolerance  Activity Tolerance: Patient limited by fatigue;Patient limited by pain  Activity Tolerance Comments: Pt with pain in buttocks with some movements requiring increased time and education on pressure relief strategies. Plan   Plan  Times per Week: 3-5x/week  Current Treatment Recommendations: Strengthening,ROM,Balance training,Functional mobility training,Endurance training,Safety education & training,Equipment evaluation, education, & procurement,Return to work related activity,Neuromuscular re-education,Patient/Caregiver education & training,Self-Care / ADL,Home management training,Coordination training     Restrictions  Restrictions/Precautions  Restrictions/Precautions: NPO,Isolation,Fall Risk,General Precautions  Position Activity Restriction  Other position/activity restrictions: antonio, PEG, NPO, MRSA contact precautions, antonio, wound vac, IV    Subjective   General  Chart Reviewed: Yes,Orders,Progress Notes,History and Physical,Imaging,Labs  Patient assessed for rehabilitation services?: Yes  Additional Pertinent Hx: tongue Cancer, neck dissection (01/2022), hospital admission in early May with multiple intubations and chest tube placements, sepsis  Response to previous treatment: Patient with no complaints from previous session  Family / Caregiver Present: No  Referring Practitioner: Enid Puente MD  Diagnosis: Pancytopenia  Subjective  Subjective: Pt in bed and agreeable to therapy. General Comment  Comments: RN approved therapy.      Social/Functional History  Social/Functional History  Lives With: Significant other (roommate; older adult)  Type of Home: Apartment  Home Layout: Two level  Entrance Stairs - Number of Steps: 12  Entrance Stairs - Rails: Both  Bathroom Shower/Tub: Tub/Shower unit,Shower chair with back  [place] Toilet: Standard  Bathroom Equipment: Grab bars around toilet  Home Equipment: [address], quad  Has the patient had two or more falls in the past year or any fall with injury in the past year?: Yes (Pt reports falling 1-2 times in the past year. Pt reports no serious injuries.)  ADL Assistance: Independent  Homemaking Assistance: Independent  Homemaking Responsibilities: Yes  Meal Prep Responsibility: Secondary  Laundry Responsibility: Secondary  Cleaning Responsibility: Secondary  Bill Paying/Finance Responsibility: Primary  Shopping Responsibility: Secondary  Health Care Management: Primary  Ambulation Assistance: Independent (Pt reports using cane PRN when he was feeling weak.)  Transfer Assistance: Independent  Active : No  Patient's  Info: pt reported prior to January he was driving  Occupation: Full time employment  Type of Occupation:  at [employer]  Additional Comments: Pt states that girlfriend is able to provide 24HR physical assistance if needed. pt states him and GF live with elderly roommate whom he also assist with household tasks and physically (does not state how). Pt with a recent ARU stay. In ARU, pt reports walking without AD for short distances and using the W/C for longer distances. Objective   Heart Rate: (!) 102  Heart Rate Source: Monitor  BP: 111/68  BP Location: Right upper arm  BP Method: Automatic  Patient Position: Left side  MAP (Calculated): 82.33  Resp: 17  SpO2: 95 %  O2 Device: None (Room air)          Observation/Palpation  Posture: Fair  Safety Devices  Type of Devices: Gait belt;Nurse notified; All fall risk precautions in place;Call light within reach; Left in chair;Chair alarm in place (RN aware pf pt in chair and confirms she will help him back to bed around 3)     Bed Mobility Training  Bed Mobility Training: Yes  Overall Level of Assistance: Stand-by assistance; Additional time; Adaptive equipment  Interventions: Verbal cues  Supine to Sit: Stand-by extension, elbow flexion, chest press, shoulder flexion/extension. Education Given To: Patient  Education Provided: Role of Therapy; ADL Adaptive Strategies;Transfer Training;Plan of Care;Precautions  Education Provided Comments: disease specific: pressure relief strategies, general safety, use of call light, ther ex, POC, safe t/fs. Education Method: Verbal  Barriers to Learning: None  Education Outcome: Verbalized understanding       AM-PAC Score        AM-PAC Inpatient Daily Activity Raw Score: 13 (06/29/22 1458)  AM-PAC Inpatient ADL T-Scale Score : 32.03 (06/29/22 1458)  ADL Inpatient CMS 0-100% Score: 63.03 (06/29/22 1458)  ADL Inpatient CMS G-Code Modifier : CL (06/29/22 1458)    Goals  Short Term Goals  Time Frame for Short term goals: 1 week- 7/6 unless stated otherwise. Short Term Goal 1: Pt will complete functional transfers with min A and AD PRN. Short Term Goal 2: Pt will perform BUE ther ex x20 to increase strength for functional activities and pressure relief strategies (7/4). Short Term Goal 3: Pt will complete LB dressing with mod A. Patient Goals   Patient goals : \"to sit up in the chair. \"       Therapy Time   Individual Concurrent Group Co-treatment   Time In 6900         Time Out 1443         Minutes 49         Timed Code Treatment Minutes: 39 Minutes (10minute evaluation.)       Jacquelyn Phillips OTR/L  If pt discharges prior to next session, this note will serve as discharge summary. See case management note for discharge disposition.

## 2022-06-29 NOTE — PROGRESS NOTES
G tube leaking tube feed around entry site. Patient tolerating tube feeds, no distention or c/o nausea, residuals <50. No c.o pain at site. Site cleansed and drain sponges placed around site. GI paged via on call service to notify. Per Dr. Ron Huynh tighten peg tube to 2 cm and monitor for leaking. If tube continues to leak, stop using and call GI.

## 2022-06-29 NOTE — PROGRESS NOTES
ONCOLOGY HEMATOLOGY CARE PROGRESS NOTE      SUBJECTIVE:     The patient is still very debilitated. He has no new complaints today. Heme Hx:    Mr. Genaro Andrews  is a 41-year-old male status post hemiglossectomy for head neck carcinoma. He is now in a rehabilitation facility due to MRSA. He was recently changed from ceftriaxone to daptomycin. I was asked to see him for pancytopenia    ROS:     Constitutional:  No weight loss, No fever, No chills, No night sweats. Energy level poor. Eyes:  No impairment or change in vision  ENT / Mouth:  No pain, abnormal ulceration, bleeding, nasal drip.pos change in voice   Cardiovascular:  No chest pain, palpitations, new edema, or calf discomfort  Respiratory:  No pain, hemoptysis, change to breathing  Gastrointestinal:  No pain, cramping, jaundice, change to eating and bowel habits  Urinary:  No pain, bleeding or change in continence  Genitalia: No pain, bleeding or discharge  Musculoskeletal:  No redness, pain, edema or weakness  Skin:  No pruritus, rash, change to nodules or lesions Wound on sacrum  Neurologic:  No discomfort, change in mental status, speech, sensory or motor activity  Psychiatric:  No change in concentration or change to affect or mood  Endocrine:  No hot flashes, increased thirst, or change to urine production  Hematologic: No petechiae, ecchymosis or bleeding  Lymphatic:  No lymphadenopathy or lymphedema  Allergy / Immunologic:  No eczema, hives, frequent or recurrent infections    OBJECTIVE        Physical    VITALS:  /63   Pulse 93   Temp 97.9 °F (36.6 °C) (Oral)   Resp 18   Ht 5' 6\" (1.676 m)   Wt 110 lb 3.2 oz (50 kg)   SpO2 94%   BMI 17.79 kg/m²   TEMPERATURE:  Current - Temp: 97.9 °F (36.6 °C);  Max - Temp  Av.9 °F (36.6 °C)  Min: 97.7 °F (36.5 °C)  Max: 98.2 °F (36.8 °C)  PULSE OXIMETRY RANGE: SpO2  Av.7 %  Min: 94 %  Max: 96 %  24HR INTAKE/OUTPUT:      Intake/Output Summary (Last 24 hours) at 6/29/2022 1033  Last data filed at 6/29/2022 0844  Gross per 24 hour   Intake 300 ml   Output 3000 ml   Net -2700 ml       CONSTITUTIONAL: Chronically ill-appearing, no acute distress. Cachectic  EYES: Sclera clear, conjunctiva normal  ENT: Oral pharynx unremarkable with moist mucus membranes  LUNGS: Clear to auscultation. No increased labor with breathing. CARDIOVASCULAR: Regular rate and rhythm, normal S1 and S2, no S3 or S4, and no murmur noted. ABDOMEN: Soft, non-distended, non-tender. Small amount of leakage about the G-tube  MUSCULOSKELETAL: There is no redness, warmth, or swelling of the joints. NEUROLOGIC: Awake, alert. Grossly non-focal.  SKIN: No bruising or bleeding. PSYCH: Normal affect. Data  Recent Labs     06/29/22  0456 06/28/22  0520 06/27/22  0640 06/22/22  0650 06/20/22  0630 06/17/22  0600 06/17/22  0600   WBC 0.7* 0.3* 0.2*   < > 2.4*   < > 4.0   NEUTROABS 0.1*  --   --   --  1.5*  --  3.1   LYMPHOPCT 62.0  --   --   --  11.9  --  7.2   RBC 2.89* 2.84* 3.30*   < > 2.43*   < > 2.46*   HGB 8.1* 8.1* 9.4*   < > 7.1*   < > 7.1*   HCT 24.1* 24.2* 28.2*   < > 21.2*   < > 21.1*   MCV 83.1 85.4 85.5   < > 86.9   < > 85.8   MCH 27.9 28.5 28.6   < > 29.2   < > 28.8   MCHC 33.5 33.4 33.4   < > 33.6   < > 33.5   RDW 16.1* 15.9* 16.4*   < > 15.6*   < > 15.5*    155 140   < > 79*   < > 112*    < > = values in this interval not displayed. Recent Labs     06/27/22  0640 06/28/22  0520 06/29/22  0456   * 136 132*   K 4.5 4.9 5.4*    99 97*   CO2 32 32 33*   BUN 32* 24* 25*   CREATININE <0.5* <0.5* <0.5*     No results for input(s): AST, ALT, ALB, BILIDIR, BILITOT, ALKPHOS in the last 72 hours. Magnesium:    Lab Results   Component Value Date    MG 2.00 06/13/2022    MG 1.70 06/10/2022       Imaging  CT GUIDED NEEDLE PLACEMENT  Narrative: PROCEDURE:  CT GUIDED BONE MARROW ASPIRATION AND CORE NEEDLE BONE BIOPSY OF THE LEFT  ILIAC BONE.     MODERATE CONSCIOUS SEDATION    6/24/2022    HISTORY:  ORDERING SYSTEM PROVIDED HISTORY: pancytopenia  TECHNOLOGIST PROVIDED HISTORY:  Culture  Flow cytometry  Cytogenetics  MDS and AML panel. Reason for exam:->pancytopenia  Reason for Exam: pancytopenia; ORDERING SYSTEM PROVIDED HISTORY: pancytopenia  TECHNOLOGIST PROVIDED HISTORY:  Culture  Flow cytometry  Cytogenetics  MDS and AML panel. Reason for exam:->pancytopenia    SEDATION:  Moderate sedation was ordered and supervised by the attending with physician  face-to-face monitoring. Medications were provided and recorded by Radiology  nurses. A total of 2 mg of Versed and 100 mcg of fentanyl was used for  conscious sedation during the procedure, with total conscious sedation time  of 17 minutes. TECHNIQUE:  Informed consent was obtained following a detailed explanation of the  procedure including risks, benefits, and alternatives. Universal protocol  was followed. Sterile gowns, masks, hats and gloves utilized for maximal  sterile barrier. Axial images were obtained through the iliac bones using CT  guidance and a suitable skin site was prepped and draped in sterile fashion. Local anesthesia was achieved with lidocaine. An 11 gauge [employer] bone  marrow biopsy needle was advanced into the left iliac bone and approximately  10 mL of bone marrow aspirate was obtained. A single core biopsy specimen  was obtained and the patient tolerated the procedure well. Hemostasis was  achieved with digital pressure. The site was cleaned, and a dry sterile  dressing was applied. Dose modulation, iterative reconstruction, and/or weight based adjustment of  the mA/kV was utilized to reduce the radiation dose to as low as reasonably  achievable. Total exam dose linear product was 389.33 mGy per cm. Impression: Successful CT guided bone marrow aspiration and core biopsy of the left iliac  bone.   CT BIOPSY BONE MARROW  Narrative: PROCEDURE:  CT GUIDED BONE MARROW ASPIRATION AND CORE NEEDLE BONE BIOPSY OF THE LEFT  ILIAC BONE. MODERATE CONSCIOUS SEDATION    6/24/2022    HISTORY:  ORDERING SYSTEM PROVIDED HISTORY: pancytopenia  TECHNOLOGIST PROVIDED HISTORY:  Culture  Flow cytometry  Cytogenetics  MDS and AML panel. Reason for exam:->pancytopenia  Reason for Exam: pancytopenia; ORDERING SYSTEM PROVIDED HISTORY: pancytopenia  TECHNOLOGIST PROVIDED HISTORY:  Culture  Flow cytometry  Cytogenetics  MDS and AML panel. Reason for exam:->pancytopenia    SEDATION:  Moderate sedation was ordered and supervised by the attending with physician  face-to-face monitoring. Medications were provided and recorded by Radiology  nurses. A total of 2 mg of Versed and 100 mcg of fentanyl was used for  conscious sedation during the procedure, with total conscious sedation time  of 17 minutes. TECHNIQUE:  Informed consent was obtained following a detailed explanation of the  procedure including risks, benefits, and alternatives. Universal protocol  was followed. Sterile gowns, masks, hats and gloves utilized for maximal  sterile barrier. Axial images were obtained through the iliac bones using CT  guidance and a suitable skin site was prepped and draped in sterile fashion. Local anesthesia was achieved with lidocaine. An 11 gauge [employer] bone  marrow biopsy needle was advanced into the left iliac bone and approximately  10 mL of bone marrow aspirate was obtained. A single core biopsy specimen  was obtained and the patient tolerated the procedure well. Hemostasis was  achieved with digital pressure. The site was cleaned, and a dry sterile  dressing was applied. Dose modulation, iterative reconstruction, and/or weight based adjustment of  the mA/kV was utilized to reduce the radiation dose to as low as reasonably  achievable. Total exam dose linear product was 389.33 mGy per cm. Impression: Successful CT guided bone marrow aspiration and core biopsy of the left iliac  bone.   XR CHEST PORTABLE  Narrative: EXAMINATION:  ONE XRAY VIEW OF THE CHEST    6/24/2022 5:55 am    COMPARISON:  06/22/2022, 06/15/2022    HISTORY:  ORDERING SYSTEM PROVIDED HISTORY: SEPSIS  TECHNOLOGIST PROVIDED HISTORY:  Reason for exam:->SEPSIS  Reason for Exam: SEPSIS    FINDINGS:  A single frontal view of the chest was performed. There is no acute skeletal  abnormality. There are multiple surgical clips overlying the neck soft  tissues. A left arm PICC is in place, with tip overlying the cavoatrial  junction. The heart size and mediastinal contours are stable, and within  normal limits. There are curvilinear opacities within both lungs, which  could represent either interstitial pulmonary edema or atypical pneumonia. No focus of acute airspace consolidation is identified. There is no evidence  of a pneumothorax. Impression: Curvilinear opacities throughout both lungs, which could represent either  mild interstitial pulmonary edema and/or atypical pneumonia. Problem List  Patient Active Problem List   Diagnosis    Chronic bilateral low back pain without sciatica    Anxiety    Insomnia due to other mental disorder    Poor dentition    Class 1 obesity due to excess calories with serious comorbidity and body mass index (BMI) of 34.0 to 34.9 in adult    Primary squamous cell carcinoma of tongue (HCC)    Recurrent major depressive disorder (HCC)    Prediabetes    Critical illness myopathy    Sepsis ([address]    Pancytopenia ([address]       ASSESSMENT AND PLAN:    Pancytopenia:  -It is a little unusual for the platelets to recover prior to the white blood count; however this is not unheard of  -S/P bone marrow biopsy 6/24 to address his persistent neutropenia despite stopping his antibiotics  -B12 and folate are normal  -Iron studies are consistent with anemia of chronic disease  -Heparin-induced thrombocytopenia panel is negative  -Primary bone marrow biopsy demonstrates agranulocytosis.   No evidence of increased blasts.       Neutropenia:  -He was started on Granix  -His white blood count is recovering  -He will continue with Granix  -Based on the bone marrow, this could be from his high-dose antibiotics    Head neck carcinoma:  -Status posttreatment at the [facility]  -He received surgery, chemotherapy and radiation      ONCOLOGIC DISPOSITION:  -Once he is not neutropenic    Arianna Marinelli MD, MPH  Please contact through [address]

## 2022-06-29 NOTE — PROGRESS NOTES
Physical Therapy  Facility/Department: [place] [address] - MED SURG  Physical Therapy Initial Assessment    Name: Nolvia Mcdonnell  : 1990  MRN: [id number]  Date of Service: 2022    Discharge Recommendations:  Subacute/Skilled Nursing Facility   PT Equipment Recommendations  Equipment Needed: No      Patient Diagnosis(es): There were no encounter diagnoses. Past Medical History:  has a past medical history of Cancer ([facility] [address] Past Surgical History:  has a past surgical history that includes CT BIOPSY BONE MARROW (2022). Assessment   Body Structures, Functions, Activity Limitations Requiring Skilled Therapeutic Intervention: Decreased functional mobility ; Decreased coordination;Decreased endurance;Decreased sensation; Increased pain;Decreased balance;Decreased body mechanics; Decreased tolerance to work activity; Decreased strength;Decreased safe awareness;Decreased ADL status  Assessment: Pt is a 29 y/o male who presents from rehab with drop in H/H. Pt has extensive history with PEG tube, cancer, large coccyx wound with vac. Pt currently requires mod A x2 for transfers and SBA for bed mobility. Treatment Diagnosis: impaired functional mobility  Therapy Prognosis: Fair  Decision Making: High Complexity  Requires PT Follow-Up: Yes  Activity Tolerance  Activity Tolerance: Patient limited by endurance; Patient limited by pain; Patient limited by fatigue     Plan   Plan  Plan: 3-5 times per week  Current Treatment Recommendations: Strengthening,Cognitive reorientation,Equipment evaluation, education, & procurement,Positioning,Therapeutic activities,Patient/Caregiver education & training,Safety education & training,Home exercise program,Pain management,Gait training,Balance training,Functional mobility training,Stair training,Neuromuscular re-education,Transfer training,ADL/Self-care training,Endurance training  Safety Devices  Type of Devices: Gait belt,Nurse notified,All fall risk precautions in place,Call light within reach,Left in chair,Chair alarm in place (OT present at end of session)     Restrictions  Restrictions/Precautions  Restrictions/Precautions: NPO,Isolation,Fall Risk,General Precautions  Position Activity Restriction  Other position/activity restrictions: antonio, PEG, NPO, MRSA contact precautions, antonio, wound vac, IV     Subjective   General  Chart Reviewed: Yes  Patient assessed for rehabilitation services?: Yes  Family / Caregiver Present: No  Referring Practitioner: Cory Portillo MD  Referral Date : 06/29/22  Diagnosis: sepsis  Follows Commands: Within Functional Limits  General Comment  Comments: Pt supine in bed upon arrival.  Subjective  Subjective: Pt agreeable to therapy. Reports 10/10 pain in buttocks         Social/Functional History  Social/Functional History  Lives With: Significant other (roommate; older adult)  Type of Home: Apartment  Home Layout: Two level  Entrance Stairs - Number of Steps: 12  Entrance Stairs - Rails: Both  Bathroom Shower/Tub: Tub/Shower unit,Shower chair with back  Bathroom Toilet: Standard  Bathroom Equipment: Grab bars around toilet  Home Equipment: [facility]  Has the patient had two or more falls in the past year or any fall with injury in the past year?: Yes (Pt reports falling 1-2 times in the past year.  Pt reports no serious injuries.)  ADL Assistance: Independent  Homemaking Assistance: Independent  Homemaking Responsibilities: Yes  Meal Prep Responsibility: Secondary  Laundry Responsibility: Secondary  Cleaning Responsibility: Secondary  Bill Paying/Finance Responsibility: Primary  Shopping Responsibility: Secondary  Health Care Management: Primary  Ambulation Assistance: Independent (Pt reports using cane PRN when he was feeling weak.)  Transfer Assistance: Independent  Active : No  Patient's  Info: pt reported prior to January he was driving  Occupation: Full time employment  Type of Occupation:  at [employer]  Additional Comments: Pt states that girlfriend is able to provide 24HR physical assistance if needed. pt states him and GF live with elderly roommate whom he also assist with household tasks and physically (does not state how). Pt with a recent ARU stay. In ARU, pt reports walking without AD for short distances and using the W/C for longer distances. Vision/Hearing  Vision  Vision: Within Functional Limits  Hearing  Hearing: Within functional limits      Objective   Heart Rate: (!) 110 (immediate post return to bed)  Heart Rate Source: Monitor  BP: (!) 111/57  BP Location: Right upper arm  BP Method: Automatic  Patient Position: Left side  MAP (Calculated): 75  Resp: 18  SpO2: 95 %  O2 Device: None (Room air)     Observation/Palpation  Posture: Fair  Gross Assessment  AROM: Generally decreased, functional  Strength: Generally decreased, functional  Sensation: Intact     Bed Mobility Training  Bed Mobility Training: Yes  Overall Level of Assistance: Stand-by assistance; Additional time; Adaptive equipment  Interventions: Verbal cues  Supine to Sit: Stand-by assistance; Additional time; Adaptive equipment (HOB elevated, use of BR.)  Balance  Sitting: Intact  Standing: Impaired (Activity: standing EOB, stand pivot t/f from EOB to chair. Posterior lean. mod Ax2 throughout)  Standing - Static: Constant support  Standing - Dynamic: Constant support  Transfer Training  Transfer Training: Yes  Overall Level of Assistance: Assist X2;Moderate assistance  Interventions: Safety awareness training  Sit to Stand: Assist X2;Moderate assistance; Additional time (no AD, posterior lean, t/f performed x3)  Stand to Sit: Assist X2;Moderate assistance; Additional time  Stand Pivot Transfers: Assist X2;Moderate assistance; Additional time (posterior lean. HHA)  Bed to Chair: Assist X2;Moderate assistance; Additional time [facility]  Gait Training  Gait Training: Yes  Gait  Overall Level of Assistance:  Moderate assistance  Gait Abnormalities: Antalgic (limited weight bearing on LLE due to pain)  Distance (ft): 3 Feet (bed to chair)    AM-PAC Score  AM-PAC Inpatient Mobility Raw Score : 13 (06/29/22 1519)  AM-PAC Inpatient T-Scale Score : 36.74 (06/29/22 1519)  Mobility Inpatient CMS 0-100% Score: 64.91 (06/29/22 1519)  Mobility Inpatient CMS G-Code Modifier : CL (06/29/22 1519)        Goals  Short Term Goals  Time Frame for Short term goals: 7 days (7/6/22)  Short term goal 1: Pt will perform bed mobility with supervision  Short term goal 2: Pt will perform transfers with min A  Short term goal 3: Pt will ambulate 48' with RW and min A  Short term goal 4: By 7/2/22, pt will tolerate 15 reps of LE ther ex for improved strength and activity tolerance  Patient Goals   Patient goals : \" to go home\"     Education  Patient Education  Education Given To: Patient  Education Provided: Role of Therapy;Plan of Care;Transfer Training  Education Method: Demonstration;Verbal  Education Outcome: Verbalized understanding;Continued education needed    Therapy Time   Individual Concurrent Group Co-treatment   Time In 1355         Time Out 1430         Minutes 35         Timed Code Treatment Minutes: [place],   [facility]

## 2022-06-29 NOTE — PROGRESS NOTES
ID    Chart reviewed   He remains AF     BM bx - agranulocytosis   WBC is up a bit     Continue dapto and meropenem through 6/2/20 for complicated MRSA bacteremia and Psa UTI, respectively     Recommended Follow-up, Labs or Other Treatments After Discharge:      ID INFUSION ORDERS   Daptomycin 350mg (8mg/kg/24) q24 hours through 7/3/22  Meropenem 1g q8 through 7/3/22  Associated diagnoses   -complicated MRSA bacteremia; blood cultures collected at [facility] on 6/22/22 and 6/23/22 were negative  -MDR Pseudomonas UTI, +urine culture on 6/22/22  -ceftaroline associated agranulocytosis     Weekly while on IV abx - CBC diff, BUN, creatinine, LFTs, CK faxed to [phone]   Routine CVC care   DC PICC at completion of IV abx unless another provider requests it be maintained and assumes responsibility     Follow-up with Infectious Disease at [facility], call for appointment [phone]  Denny Sykes MD         Please call with further questions     Denny Sykes MD

## 2022-06-29 NOTE — PROGRESS NOTES
Pt refusing turns, education provided pt states \"this hurts least of the hurts\". RN will continue to educate and offer turns.

## 2022-06-29 NOTE — PROGRESS NOTES
[facility] Nurse  Follow-up Progress Note       NAME:  Bertha Marquez  MEDICAL RECORD NUMBER:  [id number]  AGE:  28 y.o. GENDER:  male  :  1990  TODAY'S DATE:  2022    Subjective: oh no it hurts   Wound Identification:  Wound Type:  pressure, non-healing surgical and traumatic    Contributing Factors: chronic pressure, decreased mobility and shear force Radiation treatment          Patient Goal of Care:  [x] Wound Healing  [] Odor Control  [] Palliative Care        [] Pain Control   [] Other:     Objective:lying on right side in bed    /63   Pulse 93   Temp 97.9 °F (36.6 °C) (Oral)   Resp 18   Ht 5' 6\" (1.676 m)   Wt 110 lb 3.2 oz (50 kg)   SpO2 94%   BMI 17.79 kg/m²   Hiram Risk Score: Hiram Scale Score: 13  Assessment: Sacrum slough continues anterior  Wound bed less, granulation over rest wound bed. Measurements:  Negative Pressure Wound Therapy Sacrum Mid (Active)   $ Standard NPWT >50 sq cm PER TX $ Yes 22 1140   Wound Type Pressure ulcer: Stage IV 22 1123   Unit Type hospital 22 1123   Dressing Type Other (Comment) 22 1123   Number of pieces used 4 22 1123   Number of pieces removed 4 22 1123   Cycle Continuous 22 1123   Target Pressure (mmHg) 125 22 1123   Intensity 1 22 1123   Irrigation Solution Sodium chloride 0.9% 22 1123   Instillation Volume  24 mL 22 1123   Soak Time  10 minutes 22 1123   Vac Frequency 3 hours 22 1123   Canister changed?  No 22 1123   Dressing Status New dressing applied 22 1123   Dressing Changed Changed/New 22 1123   Drainage Amount Moderate 22 1123   Drainage Description Serosanguinous 22 1123   Dressing Change Due 22 1123   Output (ml) 175 ml 22 0355   Wound Assessment Slough;Granulation tissue 22 1123   Kristel-wound Assessment Blanchable erythema 22 1123   Shape oval 06/29/22 1123   Odor None 06/29/22 1123   Number of days: 18       Wound 06/08/22 Throat Right (Active)   Wound Image   06/24/22 1140   Wound Etiology Other 06/28/22 2250   Dressing Status Clean;Dry; Intact 06/28/22 2250   Wound Cleansed Cleansed with saline 06/28/22 2250   Dressing/Treatment Alginate with Ag; Foam 06/28/22 0939   Dressing Change Due 06/30/22 06/28/22 2250   Wound Length (cm) 2.5 cm 06/24/22 1140   Wound Width (cm) 3.7 cm 06/24/22 1140   Wound Depth (cm) 0.1 cm 06/24/22 1140   Wound Surface Area (cm^2) 9.25 cm^2 06/24/22 1140   Change in Wound Size % (l*w) 47.14 06/24/22 1140   Wound Volume (cm^3) 0.925 cm^3 06/24/22 1140   Wound Healing % 47 06/24/22 1140   Wound Assessment Pink/red 06/28/22 2250   Drainage Amount None 06/28/22 2250   Drainage Description Serosanguinous 06/28/22 2250   Odor None 06/28/22 2250   Kristel-wound Assessment Intact 06/28/22 2250   Margins Attached edges 06/28/22 2250   Wound Thickness Description not for Pressure Injury Partial thickness 06/28/22 2250   Number of days: 20       Wound 06/08/22 Radial Distal;Left (Active)   Wound Image   06/24/22 1140   Wound Etiology Traumatic 06/28/22 2250   Dressing Status Clean;Dry; Intact 06/28/22 2250   Wound Cleansed Cleansed with saline 06/28/22 2250   Dressing/Treatment Alginate with Ag; Foam 06/28/22 2250   Dressing Change Due 06/30/22 06/28/22 2250   Wound Length (cm) 6 cm 06/24/22 1140   Wound Width (cm) 1 cm 06/24/22 1140   Wound Depth (cm) 0.1 cm 06/24/22 1140   Wound Surface Area (cm^2) 6 cm^2 06/24/22 1140   Change in Wound Size % (l*w) 0 06/24/22 1140   Wound Volume (cm^3) 0.6 cm^3 06/24/22 1140   Wound Healing % 0 06/24/22 1140   Wound Assessment [place]/red;Slough 06/28/22 2250   Drainage Amount None 06/28/22 2250   Drainage Description Serosanguinous;Green 06/28/22 2250   Odor None 06/28/22 2250   Kristel-wound Assessment Blanchable erythema 06/28/22 2250   Margins Attached edges 06/28/22 2250   Wound Thickness Description not for Pressure Injury Partial thickness 06/28/22 2250   Number of days: 20       Wound 06/08/22 Knee Left;Posterior (Active)   Wound Image   06/24/22 1140   Wound Etiology Pressure Stage 3 06/28/22 2250   Dressing Status Clean;Dry; Intact 06/28/22 2250   Wound Cleansed Cleansed with saline 06/28/22 2250   Dressing/Treatment Alginate with Ag;Dry dressing 06/28/22 2250   Offloading for Diabetic Foot Ulcers Offloading ordered 06/28/22 2250   Dressing Change Due 06/30/22 06/28/22 2250   Wound Length (cm) 2.7 cm 06/24/22 1140   Wound Width (cm) 1 cm 06/24/22 1140   Wound Depth (cm) 0.1 cm 06/24/22 1140   Wound Surface Area (cm^2) 2.7 cm^2 06/24/22 1140   Change in Wound Size % (l*w) 69.14 06/24/22 1140   Wound Volume (cm^3) 0.27 cm^3 06/24/22 1140   Wound Healing % 85 06/24/22 1140   Wound Assessment Pink/red 06/28/22 2250   Drainage Amount None 06/28/22 2250   Drainage Description Serosanguinous 06/28/22 2250   Odor None 06/28/22 2250   Kristel-wound Assessment Blanchable erythema 06/28/22 2250   Margins Attached edges 06/28/22 2250   Wound Thickness Description not for Pressure Injury Partial thickness 06/28/22 2250   Number of days: 20       Wound 06/08/22 Sacrum (Active)   Wound Image   06/27/22 1346   Wound Etiology Pressure Stage 4 06/29/22 1123   Dressing Status New dressing applied 06/29/22 1123   Wound Cleansed Cleansed with saline 06/29/22 1123   Dressing/Treatment Negative pressure wound therapy 06/29/22 1123   Offloading for Diabetic Foot Ulcers Offloading ordered 06/29/22 1123   Dressing Change Due 07/01/22 06/29/22 1123   Wound Length (cm) 9.5 cm 06/27/22 1346   Wound Width (cm) 6.5 cm 06/27/22 1346   Wound Depth (cm) 1 cm 06/27/22 1346   Wound Surface Area (cm^2) 61.75 cm^2 06/27/22 1346   Change in Wound Size % (l*w) 28.28 06/27/22 1346   Wound Volume (cm^3) 61.75 cm^3 06/27/22 1346   Wound Healing % 58 06/27/22 1346   Undermining Starts ___ O'Clock 1100 06/28/22 2250   Undermining Ends___ O'Clock 200 06/28/22 2250   Undermining Maxium Distance (cm) 1 06/28/22 2250   Wound Assessment Pink/red;Slough 06/29/22 1123   Drainage Amount Small 06/29/22 1123   Drainage Description Serosanguinous 06/29/22 1123   Odor Mild 06/29/22 1123   Kristel-wound Assessment Blanchable erythema 06/29/22 1123   Margins Attached edges 06/29/22 1123   Wound Thickness Description not for Pressure Injury Full thickness 06/29/22 1123   Number of days: 20          Response to treatment:  With complaints of pain. Pain Assessment:  Severity:  7 / 10  Quality of pain: aching, burning, throbbing, shooting, tender, pressure  Wound Pain Timing/Severity: intermittent  Premedicated: Yes  Plan:   Plan of Care: Wound 06/08/22 Throat Right-Dressing/Treatment: Alginate with Ag,Foam  [REMOVED] Wound 06/08/22 Chest Right;Upper-Dressing/Treatment: Open to air  Wound 06/08/22 Radial Distal;Left-Dressing/Treatment: Alginate with Ag,Foam  Wound 06/08/22 Knee Left;Posterior-Dressing/Treatment: Alginate with Ag,Dry dressing  Wound 06/08/22 Sacrum-Dressing/Treatment: Negative pressure wound therapy    Recommend. Areas to right Neck/L wrist and left leg wounds: Cleanse with Cleanse with normal salinealine, pat dry.   Apply Alginate AG  Cover with guaze and bordered foam dressing. Change daily.     Changed Mom, Wed, Fri.      Cleanse sacral wound with normal saline, pat dry, apply barrier wipe then hydrocolloid to wound edges.  Hydrocolloid strips to edges of posterior wound near rectum. Apply VAC drape to wound edges.  Insert gray foam with into tunnel area @ 12 o'clock to 2 o'clock. Gray foam tracking over right hip to attach vacuum suction to. Cover with VAC drape to seal.     4 pieces of  Vera flow  foam used for sacrum wound dressing and tracking.   Cleanse choice  machine;set at 24 flush normal saline, soak for 10  minutes every 3 hours.  Treatment is 125 mmHg continuous suction.     Change cannister once a week or when full  Not changed today.   If suction fails or patient is discharged:  -remove vac dressing  -cleanse wound with normal saline  -pack wound with saline moistened guaze and change every 12 hours.    Call wound care for deterioration [phone] or call [phone] and leave message.         Specialty Bed Required : Yes power pro elite  [x] Low Air Loss   [x] Pressure Redistribution  [] Fluid Immersion  [] Bariatric  [] Total Pressure Relief  [] Other:     Current Diet: Diet NPO  ADULT TUBE FEEDING; PEG; Other Tube Feeding (specify);  Cira Combe Peptide 1.5; Bolus; 4 Times Daily; 325; 30; Before and after each bolus  Dietician consult:  Yes    Discharge Plan:  Placement for patient upon discharge: intermediate care facility   Patient appropriate for Outpatient [address]: Yes    Referrals:  [x]  following  [] [facility]  [] Supplies  [] Other    Patient/Caregiver Teaching:  Level of patient/caregiver understanding able to:   [] Indicates understanding       [] Needs reinforcement  [] Unsuccessful      [] Verbal Understanding  [] Demonstrated understanding       [] No evidence of learning  [] Refused teaching         [x] N/A       Electronically signed by Hu Miranda RN, on 6/29/2022 at 11:25 AM

## 2022-06-29 NOTE — PLAN OF CARE
Problem: Discharge Planning  Goal: Discharge to home or other facility with appropriate resources  Outcome: Progressing     Problem: Skin/Tissue Integrity  Goal: Absence of new skin breakdown  Description: 1. Monitor for areas of redness and/or skin breakdown  2. Assess vascular access sites hourly  3. Every 4-6 hours minimum:  Change oxygen saturation probe site  4. Every 4-6 hours:  If on nasal continuous positive airway pressure, respiratory therapy assess nares and determine need for appliance change or resting period.   Outcome: Progressing   Wound care per orders  Problem: ABCDS Injury Assessment  Goal: Absence of physical injury  Outcome: Progressing     Problem: Pain  Goal: Verbalizes/displays adequate comfort level or baseline comfort level  Outcome: Progressing   Pain medication effective, see mar/flowsheets

## 2022-06-29 NOTE — PROGRESS NOTES
Hospitalist Progress Note      PCP: Janessa Gr DO    Date of Admission: 6/23/2022    Chief Complaint: fever    Hospital Course: This is a 28 y.o. 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 Pt has G-tube. He was readmitted to [facility] 5/2-6/8 with sepsis, acute resp faiure and found to have MRSA bacteremia with blood cx that remained positive from 5/2 until 5/19 with infected atrial thrombus, septic pulm emboli and empyema. Had port removed on 5/7, repeat blood cx on 6/22 remain negative. On daptomycin, but now with pancytopenia which is acute on chronic. He may also have ceftaroline-induced myelosuppression per infectious disease and the drug was stopped on 6/22. Fully oriented and alert at the time of the visit. Was confused at the time of arrival.  Still neutropenic. Being followed by oncology. Bone marrow biopsy is pending  Dysfunction of the G-tube was treated by gastroenterology with replacement with a new one. WBC count is low but slightly better than yesterday. On Granix    Subjective: Weak, fully alert and oriented. No chest pain, no shortness of breath, no nausea, no vomiting.   No changes from yesterday      Medications:  Reviewed    Infusion Medications    sodium chloride      sodium chloride 10 mL/hr at 06/25/22 1311    sodium chloride      sodium chloride 25 mL (06/26/22 1338)     Scheduled Medications    filgrastim-aafi  300 mcg SubCUTAneous QPM    sodium chloride flush  5-40 mL IntraVENous 2 times per day    meropenem  1,000 mg IntraVENous Q8H    apixaban  5 mg Per G Tube BID    daptomycin (CUBICIN) IVPB  8 mg/kg IntraVENous T43A    folic acid  1 mg Per G Tube Daily    gabapentin  100 mg Per G Tube TID    magnesium oxide  400 mg Per G Tube BID    melatonin  5 mg Per G Tube Nightly    QUEtiapine  50 mg Oral Nightly    saliva substitute   Oral Q6H WA    sennosides-docusate sodium  2 tablet Per G Tube Nightly    thiamine  100 mg Per G Tube Daily    venlafaxine  75 mg Oral Daily with breakfast    zinc oxide   Topical BID     PRN Meds: morphine, sodium chloride, sodium chloride flush, sodium chloride, acetaminophen **OR** acetaminophen, sodium chloride, bisacodyl, sodium chloride, acetaminophen, albuterol, clonazePAM, Lip Balm, oxyCODONE, oxyCODONE, perflutren lipid microspheres, sodium chloride, sodium chloride flush      Intake/Output Summary (Last 24 hours) at 6/29/2022 1322  Last data filed at 6/29/2022 1200  Gross per 24 hour   Intake 1085 ml   Output 3000 ml   Net -1915 ml       Physical Exam Performed:    /63   Pulse 93   Temp 97.9 °F (36.6 °C) (Oral)   Resp 17   Ht 5' 6\" (1.676 m)   Wt 110 lb 3.2 oz (50 kg)   SpO2 94%   BMI 17.79 kg/m²     General appearance: Cachectic, alert and pleasant  HEENT: Pupils equal, round, and reactive to light. Conjunctivae/corneas clear. Neck: Supple, with full range of motion. No jugular venous distention. Surgical dressing in place  Respiratory:  Normal respiratory effort. Clear to auscultation, bilaterally without Rales/Wheezes/Rhonchi. Cardiovascular: Regular rate and rhythm with normal S1/S2 without murmurs, rubs or gallops. Abdomen: Soft, non-tender, non-distended with normal bowel sounds. Feeding tube in place  Musculoskeletal: No clubbing, cyanosis or edema bilaterally. Full range of motion without deformity. Skin: Skin color, texture, turgor normal.  No rashes or lesions. Neurologic:  Neurovascularly intact without any focal sensory/motor deficits. Cranial nerves: II-XII intact, grossly non-focal.  Psychiatric: Alert and oriented, thought content appropriate, normal insight. Appropriate communication  Capillary Refill: Brisk,3 seconds, normal   Peripheral Pulses: +2 palpable, equal bilaterally     I examined the patient today (06/29/22).   Physical exam is similar to yesterday (6/28)    Labs: Recent Labs     06/27/22  0640 06/28/22  0520 06/29/22  0456   WBC 0.2* 0.3* 0.7*   HGB 9.4* 8.1* 8.1*   HCT 28.2* 24.2* 24.1*    155 176     Recent Labs     06/27/22  0640 06/28/22  0520 06/29/22  0456   * 136 132*   K 4.5 4.9 5.4*    99 97*   CO2 32 32 33*   BUN 32* 24* 25*   CREATININE <0.5* <0.5* <0.5*   CALCIUM 8.9 8.6 9.1     No results for input(s): AST, ALT, BILIDIR, BILITOT, ALKPHOS in the last 72 hours. No results for input(s): INR in the last 72 hours. No results for input(s): Margie Mould in the last 72 hours. Urinalysis:      Lab Results   Component Value Date    NITRU Negative 06/22/2022    WBCUA 6-9 06/22/2022    BACTERIA 3+ 06/22/2022    RBCUA  06/22/2022    BLOODU MODERATE 06/22/2022    SPECGRAV 1.010 06/22/2022    GLUCOSEU Negative 06/22/2022       Radiology:  CT BIOPSY BONE MARROW   Final Result   Successful CT guided bone marrow aspiration and core biopsy of the left iliac   bone. CT GUIDED NEEDLE PLACEMENT   Final Result   Successful CT guided bone marrow aspiration and core biopsy of the left iliac   bone. XR CHEST PORTABLE   Final Result   Curvilinear opacities throughout both lungs, which could represent either   mild interstitial pulmonary edema and/or atypical pneumonia. Assessment/Plan:    Active Hospital Problems    Diagnosis     Sepsis ([facility] [A41.9]      Priority: Medium    Pancytopenia ([facility] [address] [V08.889]      Priority: Medium    Primary squamous cell carcinoma of tongue (HCC) [C02.9]     Anxiety [F41.9]      PLAN:    Pancytopenia  Still neutropenic and anemic. Bone marrow biopsy shows agranulocytosis. May still be side effect from previous antibiotics. Continue to monitor on Granix    Neutropenic fever with sepsis  Continue empiric antibiotics  Temperature is normal today as well  No new issues noted. Head and neck cancer  Postop. Pain is controlled. Heme-onc is following. Main oncology is at [facility].   No changes

## 2022-06-30 VITALS
HEIGHT: 66 IN | RESPIRATION RATE: 20 BRPM | TEMPERATURE: 97.7 F | SYSTOLIC BLOOD PRESSURE: 116 MMHG | OXYGEN SATURATION: 93 % | BODY MASS INDEX: 17.72 KG/M2 | HEART RATE: 108 BPM | WEIGHT: 110.23 LBS | DIASTOLIC BLOOD PRESSURE: 69 MMHG

## 2022-06-30 LAB
ANION GAP SERPL CALCULATED.3IONS-SCNC: 5 MMOL/L (ref 3–16)
ATYPICAL LYMPHOCYTE RELATIVE PERCENT: 1 % (ref 0–6)
BANDED NEUTROPHILS RELATIVE PERCENT: 42 % (ref 0–7)
BASOPHILS ABSOLUTE: 0 K/UL (ref 0–0.2)
BASOPHILS RELATIVE PERCENT: 1 %
BUN BLDV-MCNC: 27 MG/DL (ref 7–20)
CALCIUM SERPL-MCNC: 9 MG/DL (ref 8.3–10.6)
CHLORIDE BLD-SCNC: 94 MMOL/L (ref 99–110)
CO2: 31 MMOL/L (ref 21–32)
CREAT SERPL-MCNC: <0.5 MG/DL (ref 0.9–1.3)
DOHLE BODIES: PRESENT
EOSINOPHILS ABSOLUTE: 0.2 K/UL (ref 0–0.6)
EOSINOPHILS RELATIVE PERCENT: 8 %
GFR AFRICAN AMERICAN: >60
GFR NON-AFRICAN AMERICAN: >60
GLUCOSE BLD-MCNC: 101 MG/DL (ref 70–99)
HCT VFR BLD CALC: 24.9 % (ref 40.5–52.5)
HEMATOLOGY PATH CONSULT: NO
HEMOGLOBIN: 8.4 G/DL (ref 13.5–17.5)
LYMPHOCYTES ABSOLUTE: 0.4 K/UL (ref 1–5.1)
LYMPHOCYTES RELATIVE PERCENT: 13 %
MCH RBC QN AUTO: 28.4 PG (ref 26–34)
MCHC RBC AUTO-ENTMCNC: 33.7 G/DL (ref 31–36)
MCV RBC AUTO: 84.4 FL (ref 80–100)
METAMYELOCYTES RELATIVE PERCENT: 6 %
MONOCYTES ABSOLUTE: 0.2 K/UL (ref 0–1.3)
MONOCYTES RELATIVE PERCENT: 6 %
MYELOCYTE PERCENT: 2 %
NEUTROPHILS ABSOLUTE: 1.8 K/UL (ref 1.7–7.7)
NEUTROPHILS RELATIVE PERCENT: 21 %
PDW BLD-RTO: 15.8 % (ref 12.4–15.4)
PLATELET # BLD: 175 K/UL (ref 135–450)
PLATELET SLIDE REVIEW: ADEQUATE
PMV BLD AUTO: 7.2 FL (ref 5–10.5)
POTASSIUM REFLEX MAGNESIUM: 5.2 MMOL/L (ref 3.5–5.1)
RBC # BLD: 2.95 M/UL (ref 4.2–5.9)
RBC # BLD: NORMAL 10*6/UL
SARS-COV-2, NAAT: NOT DETECTED
SODIUM BLD-SCNC: 130 MMOL/L (ref 136–145)
WBC # BLD: 2.5 K/UL (ref 4–11)

## 2022-06-30 PROCEDURE — 97110 THERAPEUTIC EXERCISES: CPT

## 2022-06-30 PROCEDURE — 6360000002 HC RX W HCPCS: Performed by: NURSE PRACTITIONER

## 2022-06-30 PROCEDURE — 6360000002 HC RX W HCPCS: Performed by: INTERNAL MEDICINE

## 2022-06-30 PROCEDURE — 97535 SELF CARE MNGMENT TRAINING: CPT

## 2022-06-30 PROCEDURE — 85025 COMPLETE CBC W/AUTO DIFF WBC: CPT

## 2022-06-30 PROCEDURE — 97116 GAIT TRAINING THERAPY: CPT

## 2022-06-30 PROCEDURE — 97530 THERAPEUTIC ACTIVITIES: CPT

## 2022-06-30 PROCEDURE — 80048 BASIC METABOLIC PNL TOTAL CA: CPT

## 2022-06-30 PROCEDURE — 87635 SARS-COV-2 COVID-19 AMP PRB: CPT

## 2022-06-30 PROCEDURE — 6370000000 HC RX 637 (ALT 250 FOR IP): Performed by: INTERNAL MEDICINE

## 2022-06-30 PROCEDURE — 2580000003 HC RX 258: Performed by: INTERNAL MEDICINE

## 2022-06-30 RX ORDER — QUETIAPINE FUMARATE 50 MG/1
50 TABLET, FILM COATED ORAL NIGHTLY
Qty: 60 TABLET | Refills: 3 | DISCHARGE
Start: 2022-06-30

## 2022-06-30 RX ORDER — VENLAFAXINE HYDROCHLORIDE 75 MG/1
75 CAPSULE, EXTENDED RELEASE ORAL
Qty: 30 CAPSULE | Refills: 3 | DISCHARGE
Start: 2022-07-01

## 2022-06-30 RX ORDER — CLONAZEPAM 0.5 MG/1
0.5 TABLET ORAL DAILY PRN
Qty: 4 TABLET | Refills: 0 | Status: SHIPPED | OUTPATIENT
Start: 2022-06-30 | End: 2022-07-04

## 2022-06-30 RX ORDER — LANOLIN ALCOHOL/MO/W.PET/CERES
400 CREAM (GRAM) TOPICAL 2 TIMES DAILY
Qty: 30 TABLET | DISCHARGE
Start: 2022-06-30

## 2022-06-30 RX ORDER — GABAPENTIN 100 MG/1
100 CAPSULE ORAL 3 TIMES DAILY
Qty: 12 CAPSULE | Refills: 0 | Status: SHIPPED | OUTPATIENT
Start: 2022-06-30 | End: 2022-07-04

## 2022-06-30 RX ADMIN — APIXABAN 5 MG: 5 TABLET, FILM COATED ORAL at 09:05

## 2022-06-30 RX ADMIN — Medication 400 MG: at 09:05

## 2022-06-30 RX ADMIN — MEROPENEM 1000 MG: 1 INJECTION, POWDER, FOR SOLUTION INTRAVENOUS at 06:18

## 2022-06-30 RX ADMIN — MORPHINE SULFATE 2 MG: 2 INJECTION, SOLUTION INTRAMUSCULAR; INTRAVENOUS at 12:17

## 2022-06-30 RX ADMIN — VENLAFAXINE HYDROCHLORIDE 75 MG: 37.5 CAPSULE, EXTENDED RELEASE ORAL at 09:05

## 2022-06-30 RX ADMIN — GABAPENTIN 100 MG: 100 CAPSULE ORAL at 09:05

## 2022-06-30 RX ADMIN — SODIUM CHLORIDE, PRESERVATIVE FREE 10 ML: 5 INJECTION INTRAVENOUS at 09:05

## 2022-06-30 RX ADMIN — Medication: at 12:34

## 2022-06-30 RX ADMIN — FOLIC ACID 1 MG: 1 TABLET ORAL at 09:05

## 2022-06-30 RX ADMIN — OXYCODONE 10 MG: 5 TABLET ORAL at 09:04

## 2022-06-30 RX ADMIN — Medication 100 MG: at 09:05

## 2022-06-30 RX ADMIN — MORPHINE SULFATE 2 MG: 2 INJECTION, SOLUTION INTRAMUSCULAR; INTRAVENOUS at 04:36

## 2022-06-30 ASSESSMENT — PAIN SCALES - GENERAL
PAINLEVEL_OUTOF10: 8
PAINLEVEL_OUTOF10: 8

## 2022-06-30 NOTE — PROGRESS NOTES
PROGRESS NOTE  S:31 yrs Patient  admitted on 6/23/2022 with Sepsis ([facility] [address] [A41.9] . Secure-lock for low profile G tube missing. The tube was deflated and removed. It was replaced with an 18 [address] [employer] G tube. Current Hospital Schedued Meds   filgrastim-aafi  300 mcg SubCUTAneous QPM    sodium chloride flush  5-40 mL IntraVENous 2 times per day    meropenem  1,000 mg IntraVENous Q8H    apixaban  5 mg Per G Tube BID    daptomycin (CUBICIN) IVPB  8 mg/kg IntraVENous K82A    folic acid  1 mg Per G Tube Daily    gabapentin  100 mg Per G Tube TID    magnesium oxide  400 mg Per G Tube BID    melatonin  5 mg Per G Tube Nightly    QUEtiapine  50 mg Oral Nightly    saliva substitute   Oral Q6H WA    sennosides-docusate sodium  2 tablet Per G Tube Nightly    thiamine  100 mg Per G Tube Daily    venlafaxine  75 mg Oral Daily with breakfast    zinc oxide   Topical BID     Current Hospital IV Meds   sodium chloride      sodium chloride 10 mL/hr at 06/25/22 1311    sodium chloride      sodium chloride 25 mL (06/29/22 1355)     Current Hospital PRN Meds  morphine, sodium chloride, sodium chloride flush, sodium chloride, acetaminophen **OR** acetaminophen, sodium chloride, bisacodyl, sodium chloride, acetaminophen, albuterol, clonazePAM, Lip Balm, oxyCODONE, oxyCODONE, perflutren lipid microspheres, sodium chloride, sodium chloride flush    Exam:   Vitals:    06/30/22 0506   BP:    Pulse:    Resp: 16   Temp:    SpO2:      I/O last 3 completed shifts:   In: 7801 [NG/GT:1825]  Out: 3850 [Urine:3500; Drains:350]   General appearance: alert, appears stated age and cooperative  HEENT: PERRLA  Neck: no adenopathy, no carotid bruit, no JVD, supple, symmetrical, trachea midline and thyroid not enlarged, symmetric, no tenderness/mass/nodules  Lungs: clear to auscultation bilaterally  Heart: regular rate and rhythm, S1, S2 normal, no murmur, click, rub or gallop  Abdomen: soft, non-tender; bowel sounds normal; no masses,  no organomegaly  Extremities: extremities normal, atraumatic, no cyanosis or edema     Labs:  CBC:   Recent Labs     06/28/22  0520 06/29/22  0456 06/30/22  0445   WBC 0.3* 0.7* 2.5*   HGB 8.1* 8.1* 8.4*   HCT 24.2* 24.1* 24.9*   MCV 85.4 83.1 84.4    176 175     BMP:   Recent Labs     06/28/22  0520 06/29/22  0456 06/30/22  0445    132* 130*   K 4.9 5.4* 5.2*   CL 99 97* 94*   CO2 32 33* 31   BUN 24* 25* 27*   CREATININE <0.5* <0.5* <0.5*     LIVER PROFILE: No results for input(s): AST, ALT, LIPASE, PROT, BILIDIR, BILITOT, ALKPHOS in the last 72 hours. Invalid input(s): AMYLASE,  ALB  PT/INR:   No results for input(s): INR in the last 72 hours. Invalid input(s): PT    IMAGING:  No results found. 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 PEG tube exchanged to [facility] 18 Fr tube. 2. ID following  3. Will follow.       Nael Farley MD  7:50 AM 6/30/2022            [address] [facility]    [address]      [address] [place] [address]     Phone: [phone]     Fax: [phone]

## 2022-06-30 NOTE — CARE COORDINATION
Per conversation with Dr Venu Lassiter, pt is discharge ready today. In the interest of a timely discharge, CM made transport arrangements for a 1230  with [facility]. VM left for Farzaneh So in admissions at facility. Primary nurse, Tana Padilla, notified.

## 2022-06-30 NOTE — PLAN OF CARE
Problem: Discharge Planning  Goal: Discharge to home or other facility with appropriate resources  Outcome: Adequate for Discharge     Problem: Skin/Tissue Integrity  Goal: Absence of new skin breakdown  Description: 1. Monitor for areas of redness and/or skin breakdown  2. Assess vascular access sites hourly  3. Every 4-6 hours minimum:  Change oxygen saturation probe site  4. Every 4-6 hours:  If on nasal continuous positive airway pressure, respiratory therapy assess nares and determine need for appliance change or resting period.   Outcome: Adequate for Discharge     Problem: Safety - Adult  Goal: Free from fall injury  Outcome: Adequate for Discharge     Problem: ABCDS Injury Assessment  Goal: Absence of physical injury  Outcome: Adequate for Discharge     Problem: Nutrition Deficit:  Goal: Optimize nutritional status  Outcome: Adequate for Discharge     Problem: Pain  Goal: Verbalizes/displays adequate comfort level or baseline comfort level  Outcome: Adequate for Discharge

## 2022-06-30 NOTE — PROGRESS NOTES
Wound care nurse notified of patient discharge. WoundVac disconnected per protocol and dressing replaced with a moist to moist per instructions from Dane Garcia, [facility] (wound care).

## 2022-06-30 NOTE — PROGRESS NOTES
Pt transported to [facility] by [employer] [facility] with all belongings. Attempted to call report - no answer at nurse's station.

## 2022-06-30 NOTE — CARE COORDINATION
CASE MANAGEMENT DISCHARGE SUMMARY      Discharge to: [facility] to [address] completed: yes  Hospital Exemption Notification (HENS) completed: yes    IMM given: (date)     New Durable Medical Equipment ordered/agency: na    Transportation:       Medical Transport explained to [employer]. Pt/family voice no agency preference. Agency used: [employer] up time: 1230   Ambulance form completed: Yes    Confirmed discharge plan with:     Patient: yes     Family:  Yes, Александр Franz (brother) [phone] -  left    also spoke with girlfriend, Steph Redman, [phone]     Facility/Agency, name:  [facility] faxed to facility and admissions, Bettie Grace, notified     Phone number for report to facility: [phone]     RN, name: Karen Axel    Note: Discharging nurse to complete [facility], reconcile AVS, and place final copy with patient's discharge packet. RN to ensure that written prescriptions for Level II medications are sent with patient to the facility as per protocol.     Vamshi Sanders RN

## 2022-06-30 NOTE — DISCHARGE SUMMARY
Hospital Medicine Discharge Summary    Patient ID: Cherie York      Patient's PCP: Reny Barnett DO    Admit Date: 6/23/2022     Discharge Date:   06/30/22     Admitting Provider: No admitting provider for patient encounter. Discharge Provider: Jomar Reddy MD     Discharge Diagnoses: Active Hospital Problems    Diagnosis     Sepsis ([facility] [address] [A41.9]      Priority: Medium    Pancytopenia ([facility] [address] [D61.818]      Priority: Medium    Primary squamous cell carcinoma of tongue ([facility] [address] [C02.9]     Anxiety [F41.9]        The patient was seen and examined on day of discharge and this discharge summary is in conjunction with any daily progress note from day of discharge. Hospital Course:       Patient was admitted with neutropenic fever and feeding tube dysfunction. Evaluated by infectious disease, oncology and gastroenterology. Feeding tube was exchanged  Patient had a bone marrow biopsy which showed a granulocytosis. Patient responded to filgrastim. Neutropenia has resolved on the day of discharge. Empiric IV antibiotics were started and will be continued at the skilled nursing facility. Infectious disease wrote the orders including follow-up labs. Patient to follow-up with own oncologist at [facility]. I discussed the patient's condition with patient's brother over the phone. Being discharged in stable clinical condition. Will be transferred to skilled nursing facility        Physical Exam Performed:     /69   Pulse (!) 108   Temp 97.7 °F (36.5 °C) (Oral)   Resp 20   Ht 5' 6\" (1.676 m)   Wt 110 lb 3.7 oz (50 kg)   SpO2 93%   BMI 17.79 kg/m²       General appearance:  No apparent distress, appears stated age and cooperative. HEENT:  Normal cephalic, atraumatic without obvious deformity. Pupils equal, round, and reactive to light. Extra ocular muscles intact. Conjunctivae/corneas clear. Neck: Supple, with full range of motion. No jugular venous distention.  Trachea midline. Respiratory:  Normal respiratory effort. Clear to auscultation, bilaterally without Rales/Wheezes/Rhonchi. Cardiovascular:  Regular rate and rhythm with normal S1/S2 without murmurs, rubs or gallops. Abdomen: Soft, non-tender, non-distended with normal bowel sounds. Musculoskeletal:  No clubbing, cyanosis or edema bilaterally. Full range of motion without deformity. Skin: Skin color, texture, turgor normal.  No rashes or lesions. Neurologic:  Neurovascularly intact without any focal sensory/motor deficits. Cranial nerves: II-XII intact, grossly non-focal.  Psychiatric:  Alert and oriented, thought content appropriate, normal insight  Capillary Refill: Brisk,< 3 seconds   Peripheral Pulses: +2 palpable, equal bilaterally       Labs: For convenience and continuity at follow-up the following most recent labs are provided:      CBC:    Lab Results   Component Value Date/Time    WBC 2.5 06/30/2022 04:45 AM    HGB 8.4 06/30/2022 04:45 AM    HCT 24.9 06/30/2022 04:45 AM     06/30/2022 04:45 AM       Renal:    Lab Results   Component Value Date/Time     06/30/2022 04:45 AM    K 5.2 06/30/2022 04:45 AM    CL 94 06/30/2022 04:45 AM    CO2 31 06/30/2022 04:45 AM    BUN 27 06/30/2022 04:45 AM    CREATININE <0.5 06/30/2022 04:45 AM    CALCIUM 9.0 06/30/2022 04:45 AM    PHOS 4.8 06/11/2022 06:15 AM         Significant Diagnostic Studies    Radiology:   CT BIOPSY BONE MARROW   Final Result   Successful CT guided bone marrow aspiration and core biopsy of the left iliac   bone. CT GUIDED NEEDLE PLACEMENT   Final Result   Successful CT guided bone marrow aspiration and core biopsy of the left iliac   bone. XR CHEST PORTABLE   Final Result   Curvilinear opacities throughout both lungs, which could represent either   mild interstitial pulmonary edema and/or atypical pneumonia.                 Consults:     IP CONSULT TO CASE MANAGEMENT  IP CONSULT TO ONCOLOGY  IP CONSULT TO GI  IP CONSULT TO HEM/ONC  IP CONSULT TO HOSPITALIST    Disposition: Skilled nursing facility    Condition at Discharge: Stable    Discharge Instructions/Follow-up: IV antibiotics and labs written in continuation of care form    Code Status:  Full Code     Activity: activity as tolerated    Diet: Diet NPO  ADULT TUBE FEEDING; PEG; Other Tube Feeding (specify); Dilan Cardenas Peptide 1.5; Bolus; 4 Times Daily; 325; 30; Before and after each bolus       Discharge Medications:     Current Discharge Medication List           Details   venlafaxine (EFFEXOR XR) 75 MG extended release capsule Take 1 capsule by mouth daily (with breakfast)  Qty: 30 capsule, Refills: 3      QUEtiapine (SEROQUEL) 50 MG tablet Take 1 tablet by mouth nightly  Qty: 60 tablet, Refills: 3      magnesium oxide (MAG-OX) 400 (240 Mg) MG tablet 1 tablet by Per G Tube route 2 times daily  Qty: 30 tablet              Details   oxyCODONE 7.5 MG TABS 7.5 mg by Per G Tube route every 6 hours as needed for Pain for up to 4 days. Qty: 16 tablet, Refills: 0    Comments: Reduce doses taken as pain becomes manageable  Associated Diagnoses: Chronic bilateral low back pain without sciatica; Primary squamous cell carcinoma of tongue (HCC)      clonazePAM (KLONOPIN) 0.5 MG tablet 1 tablet by Per G Tube route daily as needed for Anxiety for up to 4 days. Qty: 4 tablet, Refills: 0    Associated Diagnoses: Anxiety; Panic disorder      gabapentin (NEURONTIN) 100 MG capsule 1 capsule by Per G Tube route 3 times daily for 4 days.   Qty: 12 capsule, Refills: 0    Associated Diagnoses: Critical illness myopathy; Peripheral polyneuropathy              Details   apixaban (ELIQUIS) 5 MG TABS tablet Take 5 mg by mouth 2 times daily      bisacodyl (DULCOLAX) 10 MG suppository Place 10 mg rectally daily as needed      folic acid (FOLVITE) 1 MG tablet 1 mg by Enteral route daily      lidocaine viscous hcl (XYLOCAINE) 2 % SOLN solution       nystatin (MYCOSTATIN) 460860 UNIT/ML suspension Take 500,000 Units by mouth every 4 hours as needed      senna-docusate (PERICOLACE) 8.6-50 MG per tablet 2 tablets by Enteral route nightly      sodium chloride (OCEAN) 0.65 % nasal spray 1 spray by Nasal route as needed      thiamine 100 mg tablet 100 mg by Enteral route daily      acetaminophen (TYLENOL) 325 mg tablet Take 650 mg by mouth every 6 hours as needed for Pain      melatonin 3 mg TABS tablet Take 6 mg by mouth nightly      albuterol (PROVENTIL) (2.5 MG/3ML) 0.083% nebulizer solution Take 3 mLs by nebulization every 6 hours as needed for Wheezing  Qty: 120 each, Refills: 3    Associated Diagnoses: Moderate persistent asthma, unspecified whether complicated      hydrOXYzine (ATARAX) 10 MG tablet Take 2.5 tablets by mouth every 8 hours as needed for Anxiety  Qty: 30 tablet, Refills: 2    Associated Diagnoses: Anxiety             Time Spent on discharge is more than 45 minutes in the examination, evaluation, counseling and review of medications and discharge plan. Signed:    Heidy Cisneros MD   6/30/2022      Thank you Yara Zamorano DO for the opportunity to be involved in this patient's care. If you have any questions or concerns, please feel free to contact me at [phone].

## 2022-06-30 NOTE — DISCHARGE INSTR - DIET

## 2022-06-30 NOTE — PROGRESS NOTES
Physical Therapy  Facility/Department: [facility] B3 - MED SURG  Daily Treatment Note  NAME: Bambi Nathan  : 1990  MRN: [id number]    Date of Service: 2022    Discharge Recommendations:  Subacute/Skilled Nursing Facility   PT Equipment Recommendations  Equipment Needed: No (defer to next level of care)    Patient Diagnosis(es): The primary encounter diagnosis was Chronic bilateral low back pain without sciatica. Diagnoses of Primary squamous cell carcinoma of tongue ([facility] [address] Panic disorder, Anxiety, Critical illness myopathy, and Peripheral polyneuropathy were also pertinent to this visit. Assessment   Assessment: Pt participated in BLE therapeutic ex in bed, transfers with SW and min assist of 1 and 2nd person for line management, amb 4 ft bed to chair with mod assist of 2. He voices motivation for rehab. Pt will benefit from skilled PT to address deficits. Recommend SNF at discharge. Activity Tolerance: Patient limited by endurance; Patient limited by pain; Patient limited by fatigue  Equipment Needed: No (defer to next level of care)   AM PAC SCORE 12, CL  Plan    Plan: 3-5 times per week  Current Treatment Recommendations: Strengthening;Cognitive reorientation;Equipment evaluation, education, & procurement;Positioning; Therapeutic activities; Patient/Caregiver education & training; Safety education & training;Home exercise program;Pain management;Gait training;Balance training;Functional mobility training;Stair training;Neuromuscular re-education;Transfer training;ADL/Self-care training; Endurance training     Restrictions  Restrictions/Precautions: NPO,Isolation,Fall Risk,General Precautions  Position Activity Restriction  Other position/activity restrictions: antonio, PEG, NPO, MRSA contact precautions, antonio, wound vac, IV     Subjective    Subjective: Pt agreed to therapy  Pain: Pt reports buttocks \"hurts alot\" unless he can unweight pressure on it.  Pt provided emotional support, repositioning with waffle Provided Comments: Disease specific: positioning precautions for buttocks wound, general safety, therapeutic ex.  Pt voices and demonstrates understanding    Therapy Time   Individual Concurrent Group Co-treatment   Time In 1020         Time Out [address] [facility] [address], PT

## 2022-06-30 NOTE — PROGRESS NOTES
Occupational Therapy  Facility/Department: [facility] B3 - MED SURG  Occupational Therapy Daily Treatment Note  Name: Jesse Solorzano  : 1990  MRN: [id number]  Date of Service: 2022    Discharge Recommendations:  [address]     Patient Diagnosis(es): The primary encounter diagnosis was Chronic bilateral low back pain without sciatica. Diagnoses of Primary squamous cell carcinoma of tongue ([facility] Panic disorder, Anxiety, Critical illness myopathy, and Peripheral polyneuropathy were also pertinent to this visit. Past Medical History:  has a past medical history of Cancer ([facility] [address] Past Surgical History:  has a past surgical history that includes CT BIOPSY BONE MARROW (2022). Assessment   Performance deficits / Impairments: Decreased functional mobility ; Decreased safe awareness;Decreased balance;Decreased endurance;Decreased ROM; Decreased ADL status; Decreased strength;Decreased fine motor control;Decreased high-level IADLs;Decreased posture;Decreased vision/visual deficit; Decreased coordination;Decreased cognition  Assessment: Pt was able to transfer to chair with RW and mod x2 today. He demo's posterior lean when standing and requires assist to turn walker. Cushion placed in recliner for skin integrity. Cont OT in acute care. Recommend skilled OT at SNF. Completed co-tx with PT to safely address ADLs and mobility d/t extent of performance deficits.     Prognosis: Fair  REQUIRES OT FOLLOW-UP: Yes  Activity Tolerance  Activity Tolerance: Patient Tolerated treatment well        Plan   Plan  Times per Week: 3-5x/week  Current Treatment Recommendations: Strengthening,ROM,Balance training,Functional mobility training,Endurance training,Safety education & training,Equipment evaluation, education, & procurement,Return to work related activity,Neuromuscular re-education,Patient/Caregiver education & training,Self-Care / ADL,Home management training,Coordination training Restrictions  Restrictions/Precautions  Restrictions/Precautions: NPO,Isolation,Fall Risk,General Precautions  Position Activity Restriction  Other position/activity restrictions: antonio, PEG, NPO, MRSA contact precautions, antonio, wound vac, IV    Subjective   General  Chart Reviewed: Yes  Patient assessed for rehabilitation services?: Yes  Additional Pertinent Hx: tongue Cancer, neck dissection (01/2022), hospital admission in early May with multiple intubations and chest tube placements, sepsis  Response to previous treatment: Patient with no complaints from previous session  Family / Caregiver Present: No  Referring Practitioner: Milagros Pruitt MD  Diagnosis: Pancytopenia  Subjective  Subjective: Pt in bed and agreeable to therapy. General Comment  Comments: RN approved therapy. Objective   Heart Rate: (!) 108  Heart Rate Source: Monitor  BP: 116/69  BP Location: Right upper arm  BP Method: Automatic  Patient Position: Semi fowlers  MAP (Calculated): 84.67  Resp: 20  SpO2: 93 %  O2 Device: None (Room air)        Safety Devices  Type of Devices: Gait belt;Nurse notified; All fall risk precautions in place;Call light within reach; Left in chair;Chair alarm in place        ADL  Feeding: NPO  Grooming: Supervision;Setup  Grooming Skilled Clinical Factors: seated  UE Dressing: Moderate assistance  UE Dressing Skilled Clinical Factors: changed gown  LE Dressing: Dependent/Total  Toileting: Dependent/Total     Activity Tolerance  Activity Tolerance: Patient limited by endurance; Patient limited by pain; Patient limited by fatigue  Bed mobility  Supine to Sit: Minimal assistance  Sit to Supine: Unable to assess  Transfers  Stand Pivot Transfers: Moderate assistance;2 Person assistance (RW (demo's posterior lean))  Sit to stand:  Moderate assistance;2 Person assistance  Stand to sit: Moderate assistance;2 Person assistance     Cognition  Overall Cognitive Status: Exceptions  Arousal/Alertness: Appropriate responses to stimuli  Following Commands: Follows one step commands consistently  Attention Span: Appears intact  Safety Judgement: Decreased awareness of need for safety  Problem Solving: Assistance required to correct errors made;Assistance required to identify errors made  Insights: Decreased awareness of deficits  Initiation: Requires cues for some  Sequencing: Requires cues for some  Orientation  Overall Orientation Status: Within Functional Limits       A/AROM Exercises: x10 AROM shoulder flexion while seated in chair. grasp/release, wrist flexion, wrist extension, elbow flexion, chest press, shoulder flexion/extension. Education Given To: Patient  Education Provided: Role of Therapy; ADL Adaptive Strategies;Transfer Training;Plan of Care;Precautions  Education Provided Comments: disease specific: pressure relief strategies, general safety, use of call light, ther ex, POC, safe t/fs. Education Method: Verbal  Barriers to Learning: None  Education Outcome: Verbalized understanding;Continued education needed    Disease Specific Education: Pt educated on importance of OOB mobility, prevention of complications of bedrest, and general safety during hospitalization. Pt verbalized understanding  AM-PAC Score   AM-[facility] Inpatient Daily Activity Raw Score: 11 (06/30/22 1256)  AM-PAC Inpatient ADL T-Scale Score : 29.04 (06/30/22 1256)  ADL Inpatient CMS 0-100% Score: 70.42 (06/30/22 1256)  ADL Inpatient CMS G-Code Modifier : CL (06/30/22 1256)    Goals  Short Term Goals  Time Frame for Short term goals: 1 week- 7/6 unless stated otherwise. Short Term Goal 1: Pt will complete functional transfers with min A and AD PRN. -ongoing, mod x2 6/30  Short Term Goal 2: Pt will perform BUE ther ex x20 to increase strength for functional activities and pressure relief strategies (7/4). -ongoing, 6/30  Short Term Goal 3: Pt will complete LB dressing with mod A.-ongoing 6/30  Short Term Goal 4: Pt will perform UB dressing with min A. GOAL MET 6/11/22 Pt completed UB dressing with SPV. Patient Goals   Patient goals : \"to sit up in the chair. \"     Therapy Time   Individual Concurrent Group Co-treatment   Time In       1013   Time Out       1051   Minutes       38   Timed Code Treatment Minutes: 45 Minutes    If pt is discharged prior to next OT session, this note will serve as the discharge summary.   Andreia Vo OT

## 2022-06-30 NOTE — PROGRESS NOTES
ONCOLOGY HEMATOLOGY CARE PROGRESS NOTE      SUBJECTIVE:     The patient is still very debilitated. He is stable. Heme Hx:    Mr. Tanja Lu  is a 67-year-old male status post hemiglossectomy for head neck carcinoma. He is now in a rehabilitation facility due to MRSA. He was recently changed from ceftriaxone to daptomycin. I was asked to see him for pancytopenia    ROS:     Constitutional:  No weight loss, No fever, No chills, No night sweats. Energy level poor. Eyes:  No impairment or change in vision  ENT / Mouth:  No pain, abnormal ulceration, bleeding, nasal drip.pos change in voice   Cardiovascular:  No chest pain, palpitations, new edema, or calf discomfort  Respiratory:  No pain, hemoptysis, change to breathing  Gastrointestinal:  No pain, cramping, jaundice, change to eating and bowel habits  Urinary:  No pain, bleeding or change in continence  Genitalia: No pain, bleeding or discharge  Musculoskeletal:  No redness, pain, edema or weakness  Skin:  No pruritus, rash, change to nodules or lesions Wound on sacrum  Neurologic:  No discomfort, change in mental status, speech, sensory or motor activity  Psychiatric:  No change in concentration or change to affect or mood  Endocrine:  No hot flashes, increased thirst, or change to urine production  Hematologic: No petechiae, ecchymosis or bleeding  Lymphatic:  No lymphadenopathy or lymphedema  Allergy / Immunologic:  No eczema, hives, frequent or recurrent infections    OBJECTIVE        Physical    VITALS:  /70   Pulse (!) 108   Temp 97.7 °F (36.5 °C) (Oral)   Resp 20   Ht 5' 6\" (1.676 m)   Wt 110 lb 3.7 oz (50 kg)   SpO2 95%   BMI 17.79 kg/m²   TEMPERATURE:  Current - Temp: 97.7 °F (36.5 °C);  Max - Temp  Av °F (36.7 °C)  Min: 97.7 °F (36.5 °C)  Max: 98.3 °F (36.8 °C)  PULSE OXIMETRY RANGE: SpO2  Av.4 %  Min: 90 %  Max: 95 %  24HR INTAKE/OUTPUT:      Intake/Output Summary (Last 24 hours) at 6/30/2022 0920  Last data filed at 6/30/2022 0400  Gross per 24 hour   Intake 1095 ml   Output 2050 ml   Net -955 ml       CONSTITUTIONAL: Chronically ill-appearing, no acute distress. Cachectic  EYES: Sclera clear, conjunctiva normal  ENT: Oral pharynx unremarkable with moist mucus membranes  LUNGS: Clear to auscultation. No increased labor with breathing. CARDIOVASCULAR: Regular rate and rhythm, normal S1 and S2, no S3 or S4, and no murmur noted. ABDOMEN: Soft, non-distended, non-tender. Small amount of leakage about the G-tube  MUSCULOSKELETAL: There is no redness, warmth, or swelling of the joints. NEUROLOGIC: Awake, alert. Grossly non-focal.  SKIN: No bruising or bleeding. PSYCH: Normal affect. Data  Recent Labs     06/30/22 0445 06/29/22 0456 06/28/22  0520 06/22/22  0650 06/20/22  0630   WBC 2.5* 0.7* 0.3*   < > 2.4*   NEUTROABS 1.8 0.1*  --   --  1.5*   LYMPHOPCT 13.0 62.0  --   --  11.9   RBC 2.95* 2.89* 2.84*   < > 2.43*   HGB 8.4* 8.1* 8.1*   < > 7.1*   HCT 24.9* 24.1* 24.2*   < > 21.2*   MCV 84.4 83.1 85.4   < > 86.9   MCH 28.4 27.9 28.5   < > 29.2   MCHC 33.7 33.5 33.4   < > 33.6   RDW 15.8* 16.1* 15.9*   < > 15.6*    176 155   < > 79*    < > = values in this interval not displayed. Recent Labs     06/28/22  0520 06/29/22  0456 06/30/22 0445    132* 130*   K 4.9 5.4* 5.2*   CL 99 97* 94*   CO2 32 33* 31   BUN 24* 25* 27*   CREATININE <0.5* <0.5* <0.5*     No results for input(s): AST, ALT, ALB, BILIDIR, BILITOT, ALKPHOS in the last 72 hours. Magnesium:    Lab Results   Component Value Date/Time    MG 2.00 06/13/2022 05:05 AM    MG 1.70 06/10/2022 07:19 AM       Imaging  CT GUIDED NEEDLE PLACEMENT  Narrative: PROCEDURE:  CT GUIDED BONE MARROW ASPIRATION AND CORE NEEDLE BONE BIOPSY OF THE LEFT  ILIAC BONE.     MODERATE CONSCIOUS SEDATION    6/24/2022    HISTORY:  ORDERING SYSTEM PROVIDED HISTORY: pancytopenia  TECHNOLOGIST PROVIDED HISTORY:  Culture  Flow cytometry  Cytogenetics  MDS and AML panel. Reason for exam:->pancytopenia  Reason for Exam: pancytopenia; ORDERING SYSTEM PROVIDED HISTORY: pancytopenia  TECHNOLOGIST PROVIDED HISTORY:  Culture  Flow cytometry  Cytogenetics  MDS and AML panel. Reason for exam:->pancytopenia    SEDATION:  Moderate sedation was ordered and supervised by the attending with physician  face-to-face monitoring. Medications were provided and recorded by Radiology  nurses. A total of 2 mg of Versed and 100 mcg of fentanyl was used for  conscious sedation during the procedure, with total conscious sedation time  of 17 minutes. TECHNIQUE:  Informed consent was obtained following a detailed explanation of the  procedure including risks, benefits, and alternatives. Universal protocol  was followed. Sterile gowns, masks, hats and gloves utilized for maximal  sterile barrier. Axial images were obtained through the iliac bones using CT  guidance and a suitable skin site was prepped and draped in sterile fashion. Local anesthesia was achieved with lidocaine. An 11 gauge amiando bone  marrow biopsy needle was advanced into the left iliac bone and approximately  10 mL of bone marrow aspirate was obtained. A single core biopsy specimen  was obtained and the patient tolerated the procedure well. Hemostasis was  achieved with digital pressure. The site was cleaned, and a dry sterile  dressing was applied. Dose modulation, iterative reconstruction, and/or weight based adjustment of  the mA/kV was utilized to reduce the radiation dose to as low as reasonably  achievable. Total exam dose linear product was 389.33 mGy per cm. Impression: Successful CT guided bone marrow aspiration and core biopsy of the left iliac  bone. CT BIOPSY BONE MARROW  Narrative: PROCEDURE:  CT GUIDED BONE MARROW ASPIRATION AND CORE NEEDLE BONE BIOPSY OF THE LEFT  ILIAC BONE.     MODERATE CONSCIOUS SEDATION    6/24/2022    HISTORY:  ORDERING SYSTEM PROVIDED HISTORY: pancytopenia  TECHNOLOGIST PROVIDED HISTORY:  Culture  Flow cytometry  Cytogenetics  MDS and AML panel. Reason for exam:->pancytopenia  Reason for Exam: pancytopenia; ORDERING SYSTEM PROVIDED HISTORY: pancytopenia  TECHNOLOGIST PROVIDED HISTORY:  Culture  Flow cytometry  Cytogenetics  MDS and AML panel. Reason for exam:->pancytopenia    SEDATION:  Moderate sedation was ordered and supervised by the attending with physician  face-to-face monitoring. Medications were provided and recorded by Radiology  nurses. A total of 2 mg of Versed and 100 mcg of fentanyl was used for  conscious sedation during the procedure, with total conscious sedation time  of 17 minutes. TECHNIQUE:  Informed consent was obtained following a detailed explanation of the  procedure including risks, benefits, and alternatives. Universal protocol  was followed. Sterile gowns, masks, hats and gloves utilized for maximal  sterile barrier. Axial images were obtained through the iliac bones using CT  guidance and a suitable skin site was prepped and draped in sterile fashion. Local anesthesia was achieved with lidocaine. An 11 gauge [employer] bone  marrow biopsy needle was advanced into the left iliac bone and approximately  10 mL of bone marrow aspirate was obtained. A single core biopsy specimen  was obtained and the patient tolerated the procedure well. Hemostasis was  achieved with digital pressure. The site was cleaned, and a dry sterile  dressing was applied. Dose modulation, iterative reconstruction, and/or weight based adjustment of  the mA/kV was utilized to reduce the radiation dose to as low as reasonably  achievable. Total exam dose linear product was 389.33 mGy per cm. Impression: Successful CT guided bone marrow aspiration and core biopsy of the left iliac  bone.   XR CHEST PORTABLE  Narrative: EXAMINATION:  ONE XRAY VIEW OF THE CHEST    6/24/2022 5:55 am    COMPARISON:  06/22/2022, 06/15/2022    HISTORY:  ORDERING SYSTEM PROVIDED HISTORY: SEPSIS  TECHNOLOGIST PROVIDED HISTORY:  Reason for exam:->SEPSIS  Reason for Exam: SEPSIS    FINDINGS:  A single frontal view of the chest was performed. There is no acute skeletal  abnormality. There are multiple surgical clips overlying the neck soft  tissues. A left arm PICC is in place, with tip overlying the cavoatrial  junction. The heart size and mediastinal contours are stable, and within  normal limits. There are curvilinear opacities within both lungs, which  could represent either interstitial pulmonary edema or atypical pneumonia. No focus of acute airspace consolidation is identified. There is no evidence  of a pneumothorax. Impression: Curvilinear opacities throughout both lungs, which could represent either  mild interstitial pulmonary edema and/or atypical pneumonia. Problem List  Patient Active Problem List   Diagnosis    Chronic bilateral low back pain without sciatica    Anxiety    Insomnia due to other mental disorder    Poor dentition    Class 1 obesity due to excess calories with serious comorbidity and body mass index (BMI) of 34.0 to 34.9 in adult    Primary squamous cell carcinoma of tongue (HCC)    Recurrent major depressive disorder (HCC)    Prediabetes    Critical illness myopathy    Sepsis ([address]    Pancytopenia ([address]       ASSESSMENT AND PLAN:    Pancytopenia:  -It is a little unusual for the platelets to recover prior to the white blood count; however this is not unheard of  -S/P bone marrow biopsy 6/24 to address his persistent neutropenia despite stopping his antibiotics  -B12 and folate are normal  -Iron studies are consistent with anemia of chronic disease  -Heparin-induced thrombocytopenia panel is negative  -Primary bone marrow biopsy demonstrates agranulocytosis. No evidence of increased blasts.       Neutropenia:  -He was started on Granix  -His white blood count is recovering, but his neutrophils are still lagging behind  -He will continue with Granix  -Based on the bone marrow, this could be from his high-dose antibiotics  -    Head neck carcinoma:  -Status [facility] at the [facility]  -He received surgery, chemotherapy and radiation      ONCOLOGIC DISPOSITION:  -Once he is not neutropenic    Edu Mcgovern MD  May be reached through PS

## 2022-07-07 ENCOUNTER — TELEPHONE (OUTPATIENT)
Dept: INFECTIOUS DISEASES | Age: 32
End: 2022-07-07

## 2022-07-07 NOTE — TELEPHONE ENCOUNTER
Spoke with Liz Grubbs at [facility] and she stated that IV abx are finished and PICC d/c'd.

## 2022-07-09 LAB
Lab: NORMAL
REPORT: NORMAL
THIS TEST SENT TO: NORMAL

## 2022-07-25 LAB — CULTURE, FUNGUS BLOOD: NORMAL

## 2022-07-25 NOTE — PROGRESS NOTES
Physician Progress Note      Toi Carlson  CSN #:                  [id number]  :                       1990  ADMIT DATE:       2022 4:36 PM  [address] DATE:        2022 2:20 PM  RESPONDING  PROVIDER #:        Elias Zacarias MD          QUERY TEXT:    Patient admitted with sepsis. Documentation reflects \"UTI is due to the   chronic indwelling urinary catheter\" on  and then was dropped. If   possible, please document in the progress notes and discharge summary if UTI   is due to the chronic indwelling urinary catheter was: The medical record reflects the following:  Risk Factors: chronic antonio, pancytopenia  Clinical Indicators:  urine culture with pseudomonas,  UTI associated with   antonio  Treatment: IV merrem, daptomycin, UA and cultures, ID consult, supportive care    Thank you,  Yaritza Guillermo RN, CDS  [email or web address]  Options provided:  -- UTI is due to the chronic indwelling urinary catheter confirmed after study  -- UTI is due to the chronic indwelling urinary catheter treated and resolved  -- UTI is due to the chronic indwelling urinary catheter ruled out after study  -- Other - I will add my own diagnosis  -- Disagree - Not applicable / Not valid  -- Disagree - Clinically unable to determine / Unknown  -- Refer to Clinical Documentation Reviewer    PROVIDER RESPONSE TEXT:    UTI is due to the chronic indwelling urinary catheter confirmed after study.     Query created by: Lavell Moran on 2022 12:56 PM      Electronically signed by:  Elias Zacarias MD 2022 9:02 AM

## 2022-08-09 LAB — CULTURE, AFB BLOOD: NORMAL
